# Patient Record
Sex: FEMALE | Race: WHITE | NOT HISPANIC OR LATINO | Employment: FULL TIME | URBAN - METROPOLITAN AREA
[De-identification: names, ages, dates, MRNs, and addresses within clinical notes are randomized per-mention and may not be internally consistent; named-entity substitution may affect disease eponyms.]

---

## 2019-03-27 LAB
EXTERNAL CHLAMYDIA RESULT: NEGATIVE
EXTERNAL HIV SCREEN: NORMAL
HCV AB SER-ACNC: <0.1

## 2020-10-27 ENCOUNTER — INITIAL PRENATAL (OUTPATIENT)
Dept: OBGYN CLINIC | Facility: CLINIC | Age: 30
End: 2020-10-27

## 2020-10-27 VITALS — HEIGHT: 64 IN | WEIGHT: 208 LBS | BODY MASS INDEX: 35.51 KG/M2

## 2020-10-27 DIAGNOSIS — Z71.89 PRENATAL CONSULT: Primary | ICD-10-CM

## 2020-10-27 PROCEDURE — OBC: Performed by: PHYSICIAN ASSISTANT

## 2020-10-27 RX ORDER — ESTRADIOL 2 MG/1
2 TABLET ORAL DAILY
COMMUNITY
Start: 2020-08-17 | End: 2020-11-13 | Stop reason: ALTCHOICE

## 2020-10-27 RX ORDER — FOLIC ACID 1 MG/1
1 TABLET ORAL DAILY
COMMUNITY
Start: 2020-09-24

## 2020-10-27 RX ORDER — METFORMIN HYDROCHLORIDE 500 MG/1
1500 TABLET, EXTENDED RELEASE ORAL DAILY
COMMUNITY
Start: 2020-10-26

## 2020-11-04 ENCOUNTER — INITIAL PRENATAL (OUTPATIENT)
Dept: OBGYN CLINIC | Facility: CLINIC | Age: 30
End: 2020-11-04
Payer: COMMERCIAL

## 2020-11-04 VITALS — DIASTOLIC BLOOD PRESSURE: 80 MMHG | BODY MASS INDEX: 35.7 KG/M2 | SYSTOLIC BLOOD PRESSURE: 120 MMHG | WEIGHT: 208 LBS

## 2020-11-04 DIAGNOSIS — Z11.51 SCREENING FOR HPV (HUMAN PAPILLOMAVIRUS): ICD-10-CM

## 2020-11-04 DIAGNOSIS — Z11.8 SCREENING FOR CHLAMYDIAL DISEASE: ICD-10-CM

## 2020-11-04 DIAGNOSIS — E28.2 PCOS (POLYCYSTIC OVARIAN SYNDROME): ICD-10-CM

## 2020-11-04 DIAGNOSIS — Z36.89 ENCOUNTER FOR OTHER SPECIFIED ANTENATAL SCREENING: Primary | ICD-10-CM

## 2020-11-04 DIAGNOSIS — Z11.3 SCREENING FOR STDS (SEXUALLY TRANSMITTED DISEASES): ICD-10-CM

## 2020-11-04 DIAGNOSIS — Z01.419 ROUTINE GYNECOLOGICAL EXAMINATION: ICD-10-CM

## 2020-11-04 DIAGNOSIS — O24.019 PRE-EXISTING TYPE 1 DIABETES MELLITUS DURING PREGNANCY, ANTEPARTUM: ICD-10-CM

## 2020-11-04 DIAGNOSIS — Z3A.11 11 WEEKS GESTATION OF PREGNANCY: ICD-10-CM

## 2020-11-04 PROCEDURE — PNV: Performed by: OBSTETRICS & GYNECOLOGY

## 2020-11-04 PROCEDURE — 76801 OB US < 14 WKS SINGLE FETUS: CPT | Performed by: OBSTETRICS & GYNECOLOGY

## 2020-11-04 RX ORDER — ACETAMINOPHEN 160 MG
2000 TABLET,DISINTEGRATING ORAL DAILY
COMMUNITY

## 2020-11-09 LAB
DEPRECATED C TRACH RRNA XXX QL PRB: NOT DETECTED
HPV HR 12 DNA CVX QL NAA+PROBE: NOT DETECTED
HPV16 DNA SPEC QL NAA+PROBE: NOT DETECTED
HPV18 DNA SPEC QL NAA+PROBE: NOT DETECTED
N GONORRHOEA DNA UR QL NAA+PROBE: NOT DETECTED
THIN PREP CVX: NORMAL

## 2020-11-12 ENCOUNTER — TELEPHONE (OUTPATIENT)
Dept: OBGYN CLINIC | Facility: CLINIC | Age: 30
End: 2020-11-12

## 2020-11-12 ENCOUNTER — NURSE TRIAGE (OUTPATIENT)
Dept: OTHER | Facility: OTHER | Age: 30
End: 2020-11-12

## 2020-11-12 ENCOUNTER — HOSPITAL ENCOUNTER (EMERGENCY)
Facility: HOSPITAL | Age: 30
Discharge: HOME | End: 2020-11-12
Attending: EMERGENCY MEDICINE | Admitting: EMERGENCY MEDICINE
Payer: COMMERCIAL

## 2020-11-12 VITALS
SYSTOLIC BLOOD PRESSURE: 129 MMHG | RESPIRATION RATE: 18 BRPM | BODY MASS INDEX: 35.7 KG/M2 | OXYGEN SATURATION: 98 % | DIASTOLIC BLOOD PRESSURE: 83 MMHG | WEIGHT: 208 LBS | HEART RATE: 94 BPM | TEMPERATURE: 98.4 F

## 2020-11-12 DIAGNOSIS — O20.9 VAGINAL BLEEDING IN PREGNANCY, FIRST TRIMESTER: Primary | ICD-10-CM

## 2020-11-12 LAB
ABO GROUP BLD: NORMAL
ALBUMIN SERPL BCP-MCNC: 3.1 G/DL (ref 3.5–5)
ALP SERPL-CCNC: 49 U/L (ref 46–116)
ALT SERPL W P-5'-P-CCNC: 21 U/L (ref 12–78)
ANION GAP SERPL CALCULATED.3IONS-SCNC: 14 MMOL/L (ref 4–13)
AST SERPL W P-5'-P-CCNC: 36 U/L (ref 5–45)
B-HCG SERPL-ACNC: ABNORMAL MIU/ML
BACTERIA UR QL AUTO: ABNORMAL /HPF
BASOPHILS # BLD AUTO: 0.05 THOUSANDS/ΜL (ref 0–0.1)
BASOPHILS NFR BLD AUTO: 0 % (ref 0–1)
BILIRUB SERPL-MCNC: 0.27 MG/DL (ref 0.2–1)
BILIRUB UR QL STRIP: NEGATIVE
BUN SERPL-MCNC: 12 MG/DL (ref 5–25)
CALCIUM ALBUM COR SERPL-MCNC: 9.9 MG/DL (ref 8.3–10.1)
CALCIUM SERPL-MCNC: 9.2 MG/DL (ref 8.3–10.1)
CHLORIDE SERPL-SCNC: 101 MMOL/L (ref 100–108)
CLARITY UR: CLEAR
CO2 SERPL-SCNC: 21 MMOL/L (ref 21–32)
COLOR UR: ABNORMAL
CREAT SERPL-MCNC: 0.72 MG/DL (ref 0.6–1.3)
EOSINOPHIL # BLD AUTO: 0.09 THOUSAND/ΜL (ref 0–0.61)
EOSINOPHIL NFR BLD AUTO: 1 % (ref 0–6)
ERYTHROCYTE [DISTWIDTH] IN BLOOD BY AUTOMATED COUNT: 12.3 % (ref 11.6–15.1)
GFR SERPL CREATININE-BSD FRML MDRD: 113 ML/MIN/1.73SQ M
GLUCOSE SERPL-MCNC: 170 MG/DL (ref 65–140)
GLUCOSE UR STRIP-MCNC: ABNORMAL MG/DL
HCT VFR BLD AUTO: 40.5 % (ref 34.8–46.1)
HGB BLD-MCNC: 13.4 G/DL (ref 11.5–15.4)
HGB UR QL STRIP.AUTO: ABNORMAL
IMM GRANULOCYTES # BLD AUTO: 0.05 THOUSAND/UL (ref 0–0.2)
IMM GRANULOCYTES NFR BLD AUTO: 0 % (ref 0–2)
KETONES UR STRIP-MCNC: NEGATIVE MG/DL
LEUKOCYTE ESTERASE UR QL STRIP: NEGATIVE
LYMPHOCYTES # BLD AUTO: 3.66 THOUSANDS/ΜL (ref 0.6–4.47)
LYMPHOCYTES NFR BLD AUTO: 27 % (ref 14–44)
MCH RBC QN AUTO: 31.2 PG (ref 26.8–34.3)
MCHC RBC AUTO-ENTMCNC: 33.1 G/DL (ref 31.4–37.4)
MCV RBC AUTO: 94 FL (ref 82–98)
MONOCYTES # BLD AUTO: 0.58 THOUSAND/ΜL (ref 0.17–1.22)
MONOCYTES NFR BLD AUTO: 4 % (ref 4–12)
NEUTROPHILS # BLD AUTO: 9.13 THOUSANDS/ΜL (ref 1.85–7.62)
NEUTS SEG NFR BLD AUTO: 68 % (ref 43–75)
NITRITE UR QL STRIP: NEGATIVE
NON-SQ EPI CELLS URNS QL MICRO: ABNORMAL /HPF
NRBC BLD AUTO-RTO: 0 /100 WBCS
PH UR STRIP.AUTO: 5.5 [PH]
PLATELET # BLD AUTO: 269 THOUSANDS/UL (ref 149–390)
PMV BLD AUTO: 11.5 FL (ref 8.9–12.7)
POTASSIUM SERPL-SCNC: 4.4 MMOL/L (ref 3.5–5.3)
PROT SERPL-MCNC: 7.2 G/DL (ref 6.4–8.2)
PROT UR STRIP-MCNC: NEGATIVE MG/DL
RBC # BLD AUTO: 4.3 MILLION/UL (ref 3.81–5.12)
RBC #/AREA URNS AUTO: ABNORMAL /HPF
RH BLD: POSITIVE
SODIUM SERPL-SCNC: 136 MMOL/L (ref 136–145)
SP GR UR STRIP.AUTO: 1.01 (ref 1–1.03)
UROBILINOGEN UR QL STRIP.AUTO: 0.2 E.U./DL
WBC # BLD AUTO: 13.56 THOUSAND/UL (ref 4.31–10.16)
WBC #/AREA URNS AUTO: ABNORMAL /HPF

## 2020-11-12 PROCEDURE — 99285 EMERGENCY DEPT VISIT HI MDM: CPT | Performed by: PHYSICIAN ASSISTANT

## 2020-11-12 PROCEDURE — 36415 COLL VENOUS BLD VENIPUNCTURE: CPT | Performed by: PHYSICIAN ASSISTANT

## 2020-11-12 PROCEDURE — 86900 BLOOD TYPING SEROLOGIC ABO: CPT | Performed by: PHYSICIAN ASSISTANT

## 2020-11-12 PROCEDURE — 84702 CHORIONIC GONADOTROPIN TEST: CPT | Performed by: PHYSICIAN ASSISTANT

## 2020-11-12 PROCEDURE — 86901 BLOOD TYPING SEROLOGIC RH(D): CPT | Performed by: PHYSICIAN ASSISTANT

## 2020-11-12 PROCEDURE — 85025 COMPLETE CBC W/AUTO DIFF WBC: CPT | Performed by: PHYSICIAN ASSISTANT

## 2020-11-12 PROCEDURE — 81001 URINALYSIS AUTO W/SCOPE: CPT | Performed by: PHYSICIAN ASSISTANT

## 2020-11-12 PROCEDURE — 80053 COMPREHEN METABOLIC PANEL: CPT | Performed by: PHYSICIAN ASSISTANT

## 2020-11-12 PROCEDURE — 99283 EMERGENCY DEPT VISIT LOW MDM: CPT

## 2020-11-13 ENCOUNTER — HOSPITAL ENCOUNTER (OUTPATIENT)
Dept: RADIOLOGY | Age: 30
Discharge: HOME | End: 2020-11-13
Payer: COMMERCIAL

## 2020-11-13 ENCOUNTER — ROUTINE PRENATAL (OUTPATIENT)
Dept: OBGYN CLINIC | Facility: CLINIC | Age: 30
End: 2020-11-13

## 2020-11-13 ENCOUNTER — TRANSCRIBE ORDERS (OUTPATIENT)
Dept: OBGYN CLINIC | Facility: CLINIC | Age: 30
End: 2020-11-13

## 2020-11-13 VITALS — SYSTOLIC BLOOD PRESSURE: 120 MMHG | DIASTOLIC BLOOD PRESSURE: 80 MMHG | BODY MASS INDEX: 34.84 KG/M2 | WEIGHT: 203 LBS

## 2020-11-13 DIAGNOSIS — O20.0 THREATENED ABORTION: ICD-10-CM

## 2020-11-13 DIAGNOSIS — Z34.01 PRENATAL CARE, FIRST PREGNANCY IN FIRST TRIMESTER: Primary | ICD-10-CM

## 2020-11-13 DIAGNOSIS — O09.811 PREGNANCY RESULTING FROM IN VITRO FERTILIZATION, FIRST TRIMESTER: ICD-10-CM

## 2020-11-13 DIAGNOSIS — O20.0 THREATENED ABORTION: Primary | ICD-10-CM

## 2020-11-13 PROCEDURE — PNV: Performed by: PHYSICIAN ASSISTANT

## 2020-11-13 PROCEDURE — 76801 OB US < 14 WKS SINGLE FETUS: CPT

## 2020-11-17 ENCOUNTER — TELEPHONE (OUTPATIENT)
Dept: PERINATAL CARE | Facility: CLINIC | Age: 30
End: 2020-11-17

## 2020-11-18 ENCOUNTER — ROUTINE PRENATAL (OUTPATIENT)
Dept: PERINATAL CARE | Facility: OTHER | Age: 30
End: 2020-11-18
Payer: COMMERCIAL

## 2020-11-18 VITALS
BODY MASS INDEX: 36.4 KG/M2 | HEART RATE: 80 BPM | SYSTOLIC BLOOD PRESSURE: 119 MMHG | WEIGHT: 213.2 LBS | HEIGHT: 64 IN | DIASTOLIC BLOOD PRESSURE: 82 MMHG | TEMPERATURE: 95.9 F

## 2020-11-18 DIAGNOSIS — O09.811 PREGNANCY RESULTING FROM IN VITRO FERTILIZATION, FIRST TRIMESTER: ICD-10-CM

## 2020-11-18 DIAGNOSIS — O99.211 MATERNAL OBESITY, ANTEPARTUM, FIRST TRIMESTER: ICD-10-CM

## 2020-11-18 DIAGNOSIS — Z36.82 ENCOUNTER FOR ANTENATAL SCREENING FOR NUCHAL TRANSLUCENCY: ICD-10-CM

## 2020-11-18 DIAGNOSIS — O24.011 PREGNANCY COMPLICATED BY PRE-EXISTING TYPE 1 DIABETES, FIRST TRIMESTER: Primary | ICD-10-CM

## 2020-11-18 DIAGNOSIS — O20.9 FIRST TRIMESTER BLEEDING: ICD-10-CM

## 2020-11-18 DIAGNOSIS — Z3A.12 12 WEEKS GESTATION OF PREGNANCY: ICD-10-CM

## 2020-11-18 PROCEDURE — 76801 OB US < 14 WKS SINGLE FETUS: CPT | Performed by: OBSTETRICS & GYNECOLOGY

## 2020-11-18 PROCEDURE — 76813 OB US NUCHAL MEAS 1 GEST: CPT | Performed by: OBSTETRICS & GYNECOLOGY

## 2020-11-18 PROCEDURE — 99242 OFF/OP CONSLTJ NEW/EST SF 20: CPT | Performed by: OBSTETRICS & GYNECOLOGY

## 2020-11-18 PROCEDURE — NC001 PR NO CHARGE: Performed by: OBSTETRICS & GYNECOLOGY

## 2020-11-19 ENCOUNTER — HOSPITAL ENCOUNTER (EMERGENCY)
Facility: HOSPITAL | Age: 30
Discharge: HOME | End: 2020-11-20
Attending: EMERGENCY MEDICINE | Admitting: EMERGENCY MEDICINE
Payer: COMMERCIAL

## 2020-11-19 DIAGNOSIS — O41.8X90 SUBCHORIONIC HEMORRHAGE: ICD-10-CM

## 2020-11-19 DIAGNOSIS — O46.8X9 SUBCHORIONIC HEMORRHAGE: ICD-10-CM

## 2020-11-19 DIAGNOSIS — O20.9 VAGINAL BLEEDING IN PREGNANCY, FIRST TRIMESTER: Primary | ICD-10-CM

## 2020-11-19 PROCEDURE — 99284 EMERGENCY DEPT VISIT MOD MDM: CPT

## 2020-11-19 RX ORDER — ASPIRIN 81 MG/1
162 TABLET, CHEWABLE ORAL DAILY
COMMUNITY
End: 2021-03-20 | Stop reason: HOSPADM

## 2020-11-20 VITALS
WEIGHT: 213 LBS | BODY MASS INDEX: 36.56 KG/M2 | SYSTOLIC BLOOD PRESSURE: 120 MMHG | DIASTOLIC BLOOD PRESSURE: 80 MMHG | HEART RATE: 70 BPM | OXYGEN SATURATION: 100 % | TEMPERATURE: 98.1 F | RESPIRATION RATE: 18 BRPM

## 2020-11-20 LAB
ALBUMIN SERPL BCP-MCNC: 3.3 G/DL (ref 3.5–5)
ALP SERPL-CCNC: 55 U/L (ref 46–116)
ALT SERPL W P-5'-P-CCNC: 23 U/L (ref 12–78)
ANION GAP SERPL CALCULATED.3IONS-SCNC: 9 MMOL/L (ref 4–13)
AST SERPL W P-5'-P-CCNC: 14 U/L (ref 5–45)
B-HCG SERPL-ACNC: ABNORMAL MIU/ML
BACTERIA UR QL AUTO: ABNORMAL /HPF
BASOPHILS # BLD AUTO: 0.03 THOUSANDS/ΜL (ref 0–0.1)
BASOPHILS NFR BLD AUTO: 0 % (ref 0–1)
BILIRUB SERPL-MCNC: 0.25 MG/DL (ref 0.2–1)
BILIRUB UR QL STRIP: NEGATIVE
BUN SERPL-MCNC: 14 MG/DL (ref 5–25)
CALCIUM ALBUM COR SERPL-MCNC: 9.8 MG/DL (ref 8.3–10.1)
CALCIUM SERPL-MCNC: 9.2 MG/DL (ref 8.3–10.1)
CHLORIDE SERPL-SCNC: 102 MMOL/L (ref 100–108)
CLARITY UR: ABNORMAL
CO2 SERPL-SCNC: 25 MMOL/L (ref 21–32)
COLOR UR: ABNORMAL
CREAT SERPL-MCNC: 0.78 MG/DL (ref 0.6–1.3)
EOSINOPHIL # BLD AUTO: 0.1 THOUSAND/ΜL (ref 0–0.61)
EOSINOPHIL NFR BLD AUTO: 1 % (ref 0–6)
ERYTHROCYTE [DISTWIDTH] IN BLOOD BY AUTOMATED COUNT: 12.3 % (ref 11.6–15.1)
GFR SERPL CREATININE-BSD FRML MDRD: 102 ML/MIN/1.73SQ M
GLUCOSE SERPL-MCNC: 158 MG/DL (ref 65–140)
GLUCOSE UR STRIP-MCNC: ABNORMAL MG/DL
HCT VFR BLD AUTO: 40.2 % (ref 34.8–46.1)
HGB BLD-MCNC: 13 G/DL (ref 11.5–15.4)
HGB UR QL STRIP.AUTO: ABNORMAL
IMM GRANULOCYTES # BLD AUTO: 0.07 THOUSAND/UL (ref 0–0.2)
IMM GRANULOCYTES NFR BLD AUTO: 1 % (ref 0–2)
KETONES UR STRIP-MCNC: NEGATIVE MG/DL
LEUKOCYTE ESTERASE UR QL STRIP: NEGATIVE
LYMPHOCYTES # BLD AUTO: 4.05 THOUSANDS/ΜL (ref 0.6–4.47)
LYMPHOCYTES NFR BLD AUTO: 29 % (ref 14–44)
MCH RBC QN AUTO: 30.9 PG (ref 26.8–34.3)
MCHC RBC AUTO-ENTMCNC: 32.3 G/DL (ref 31.4–37.4)
MCV RBC AUTO: 96 FL (ref 82–98)
MONOCYTES # BLD AUTO: 0.58 THOUSAND/ΜL (ref 0.17–1.22)
MONOCYTES NFR BLD AUTO: 4 % (ref 4–12)
NEUTROPHILS # BLD AUTO: 9.02 THOUSANDS/ΜL (ref 1.85–7.62)
NEUTS SEG NFR BLD AUTO: 65 % (ref 43–75)
NITRITE UR QL STRIP: NEGATIVE
NON-SQ EPI CELLS URNS QL MICRO: ABNORMAL /HPF
NRBC BLD AUTO-RTO: 0 /100 WBCS
PH UR STRIP.AUTO: 6.5 [PH]
PLATELET # BLD AUTO: 231 THOUSANDS/UL (ref 149–390)
PMV BLD AUTO: 11.2 FL (ref 8.9–12.7)
POTASSIUM SERPL-SCNC: 4.2 MMOL/L (ref 3.5–5.3)
PROT SERPL-MCNC: 7 G/DL (ref 6.4–8.2)
PROT UR STRIP-MCNC: ABNORMAL MG/DL
RBC # BLD AUTO: 4.21 MILLION/UL (ref 3.81–5.12)
RBC #/AREA URNS AUTO: ABNORMAL /HPF
SODIUM SERPL-SCNC: 136 MMOL/L (ref 136–145)
SP GR UR STRIP.AUTO: 1.02 (ref 1–1.03)
UROBILINOGEN UR QL STRIP.AUTO: 0.2 E.U./DL
WBC # BLD AUTO: 13.85 THOUSAND/UL (ref 4.31–10.16)
WBC #/AREA URNS AUTO: ABNORMAL /HPF

## 2020-11-20 PROCEDURE — 87086 URINE CULTURE/COLONY COUNT: CPT | Performed by: PHYSICIAN ASSISTANT

## 2020-11-20 PROCEDURE — 85025 COMPLETE CBC W/AUTO DIFF WBC: CPT | Performed by: PHYSICIAN ASSISTANT

## 2020-11-20 PROCEDURE — 84702 CHORIONIC GONADOTROPIN TEST: CPT | Performed by: PHYSICIAN ASSISTANT

## 2020-11-20 PROCEDURE — 36415 COLL VENOUS BLD VENIPUNCTURE: CPT | Performed by: PHYSICIAN ASSISTANT

## 2020-11-20 PROCEDURE — 99285 EMERGENCY DEPT VISIT HI MDM: CPT | Performed by: PHYSICIAN ASSISTANT

## 2020-11-20 PROCEDURE — 80053 COMPREHEN METABOLIC PANEL: CPT | Performed by: PHYSICIAN ASSISTANT

## 2020-11-20 PROCEDURE — 81001 URINALYSIS AUTO W/SCOPE: CPT | Performed by: PHYSICIAN ASSISTANT

## 2020-11-20 PROCEDURE — 96360 HYDRATION IV INFUSION INIT: CPT

## 2020-11-20 RX ADMIN — SODIUM CHLORIDE 1000 ML: 0.9 INJECTION, SOLUTION INTRAVENOUS at 00:14

## 2020-11-21 LAB — BACTERIA UR CULT: NORMAL

## 2020-11-23 ENCOUNTER — TELEPHONE (OUTPATIENT)
Dept: PERINATAL CARE | Facility: CLINIC | Age: 30
End: 2020-11-23

## 2020-11-23 ENCOUNTER — ROUTINE PRENATAL (OUTPATIENT)
Dept: OBGYN CLINIC | Facility: CLINIC | Age: 30
End: 2020-11-23

## 2020-11-23 ENCOUNTER — TELEMEDICINE (OUTPATIENT)
Dept: PERINATAL CARE | Facility: CLINIC | Age: 30
End: 2020-11-23

## 2020-11-23 VITALS — WEIGHT: 219 LBS | SYSTOLIC BLOOD PRESSURE: 120 MMHG | DIASTOLIC BLOOD PRESSURE: 80 MMHG | BODY MASS INDEX: 37.59 KG/M2

## 2020-11-23 DIAGNOSIS — Z3A.13 13 WEEKS GESTATION OF PREGNANCY: ICD-10-CM

## 2020-11-23 DIAGNOSIS — O46.90 VAGINAL BLEEDING IN PREGNANCY: ICD-10-CM

## 2020-11-23 DIAGNOSIS — O28.5 ABNORMAL CHROMOSOMAL AND GENETIC FINDING ON ANTENATAL SCREENING MOTHER: Primary | ICD-10-CM

## 2020-11-23 DIAGNOSIS — Z31.5 ENCOUNTER FOR PROCREATIVE GENETIC COUNSELING: ICD-10-CM

## 2020-11-23 DIAGNOSIS — O24.011 PRE-EXISTING TYPE 1 DIABETES MELLITUS DURING PREGNANCY IN FIRST TRIMESTER: Primary | ICD-10-CM

## 2020-11-23 PROCEDURE — NC001 PR NO CHARGE

## 2020-11-23 PROCEDURE — PNV: Performed by: OBSTETRICS & GYNECOLOGY

## 2020-11-25 LAB — HBA1C MFR BLD HPLC: 6.3 %

## 2020-11-27 LAB — EXTERNAL HEPATITIS B SURFACE ANTIGEN: NEGATIVE

## 2020-12-02 ENCOUNTER — ROUTINE PRENATAL (OUTPATIENT)
Dept: OBGYN CLINIC | Facility: CLINIC | Age: 30
End: 2020-12-02

## 2020-12-02 VITALS — DIASTOLIC BLOOD PRESSURE: 82 MMHG | SYSTOLIC BLOOD PRESSURE: 120 MMHG | BODY MASS INDEX: 36.22 KG/M2 | WEIGHT: 211 LBS

## 2020-12-02 DIAGNOSIS — E10.9 TYPE 1 DIABETES MELLITUS WITHOUT COMPLICATION (HCC): ICD-10-CM

## 2020-12-02 DIAGNOSIS — Z34.02 PRENATAL CARE, FIRST PREGNANCY IN SECOND TRIMESTER: Primary | ICD-10-CM

## 2020-12-02 PROCEDURE — PNV: Performed by: PHYSICIAN ASSISTANT

## 2020-12-05 ENCOUNTER — NURSE TRIAGE (OUTPATIENT)
Dept: OTHER | Facility: OTHER | Age: 30
End: 2020-12-05

## 2020-12-06 ENCOUNTER — TELEPHONE (OUTPATIENT)
Dept: PERINATAL CARE | Facility: CLINIC | Age: 30
End: 2020-12-06

## 2020-12-09 ENCOUNTER — ROUTINE PRENATAL (OUTPATIENT)
Dept: OBGYN CLINIC | Facility: CLINIC | Age: 30
End: 2020-12-09

## 2020-12-09 ENCOUNTER — TELEPHONE (OUTPATIENT)
Dept: OBGYN CLINIC | Facility: CLINIC | Age: 30
End: 2020-12-09

## 2020-12-09 VITALS — SYSTOLIC BLOOD PRESSURE: 122 MMHG | BODY MASS INDEX: 36.9 KG/M2 | DIASTOLIC BLOOD PRESSURE: 84 MMHG | WEIGHT: 215 LBS

## 2020-12-09 DIAGNOSIS — O41.8X20 SUBCHORIONIC HEMORRHAGE OF PLACENTA IN SECOND TRIMESTER, SINGLE OR UNSPECIFIED FETUS: ICD-10-CM

## 2020-12-09 DIAGNOSIS — Z34.02 PRENATAL CARE, FIRST PREGNANCY IN SECOND TRIMESTER: Primary | ICD-10-CM

## 2020-12-09 DIAGNOSIS — O46.8X2 SUBCHORIONIC HEMORRHAGE OF PLACENTA IN SECOND TRIMESTER, SINGLE OR UNSPECIFIED FETUS: ICD-10-CM

## 2020-12-09 PROCEDURE — PNV: Performed by: PHYSICIAN ASSISTANT

## 2020-12-15 ENCOUNTER — TELEPHONE (OUTPATIENT)
Dept: PERINATAL CARE | Facility: OTHER | Age: 30
End: 2020-12-15

## 2020-12-16 ENCOUNTER — TELEPHONE (OUTPATIENT)
Dept: PERINATAL CARE | Facility: CLINIC | Age: 30
End: 2020-12-16

## 2020-12-21 ENCOUNTER — ROUTINE PRENATAL (OUTPATIENT)
Dept: OBGYN CLINIC | Facility: CLINIC | Age: 30
End: 2020-12-21

## 2020-12-21 ENCOUNTER — HOSPITAL ENCOUNTER (OUTPATIENT)
Facility: HOSPITAL | Age: 30
Discharge: HOME | End: 2020-12-21
Attending: OBSTETRICS & GYNECOLOGY | Admitting: OBSTETRICS & GYNECOLOGY
Payer: COMMERCIAL

## 2020-12-21 ENCOUNTER — TELEPHONE (OUTPATIENT)
Dept: OBGYN CLINIC | Facility: CLINIC | Age: 30
End: 2020-12-21

## 2020-12-21 VITALS
OXYGEN SATURATION: 96 % | RESPIRATION RATE: 20 BRPM | SYSTOLIC BLOOD PRESSURE: 141 MMHG | TEMPERATURE: 98.5 F | BODY MASS INDEX: 35.51 KG/M2 | WEIGHT: 208 LBS | DIASTOLIC BLOOD PRESSURE: 85 MMHG | HEIGHT: 64 IN | HEART RATE: 98 BPM

## 2020-12-21 VITALS — WEIGHT: 208 LBS | SYSTOLIC BLOOD PRESSURE: 122 MMHG | BODY MASS INDEX: 35.7 KG/M2 | DIASTOLIC BLOOD PRESSURE: 80 MMHG

## 2020-12-21 DIAGNOSIS — O46.90 VAGINAL BLEEDING IN PREGNANCY: ICD-10-CM

## 2020-12-21 DIAGNOSIS — Z3A.17 17 WEEKS GESTATION OF PREGNANCY: Primary | ICD-10-CM

## 2020-12-21 DIAGNOSIS — O42.919 PRETERM PREMATURE RUPTURE OF MEMBRANES (PPROM) WITH UNKNOWN ONSET OF LABOR: ICD-10-CM

## 2020-12-21 LAB
A1 MICROGLOB PLACENTAL VAG QL: POSITIVE
ERYTHROCYTE [DISTWIDTH] IN BLOOD BY AUTOMATED COUNT: 12.6 % (ref 11.6–15.1)
HCT VFR BLD AUTO: 38.9 % (ref 34.8–46.1)
HGB BLD-MCNC: 12.9 G/DL (ref 11.5–15.4)
MCH RBC QN AUTO: 31.2 PG (ref 26.8–34.3)
MCHC RBC AUTO-ENTMCNC: 33.2 G/DL (ref 31.4–37.4)
MCV RBC AUTO: 94 FL (ref 82–98)
PLATELET # BLD AUTO: 245 THOUSANDS/UL (ref 149–390)
PMV BLD AUTO: 11 FL (ref 8.9–12.7)
RBC # BLD AUTO: 4.13 MILLION/UL (ref 3.81–5.12)
WBC # BLD AUTO: 12.23 THOUSAND/UL (ref 4.31–10.16)

## 2020-12-21 PROCEDURE — 84112 EVAL AMNIOTIC FLUID PROTEIN: CPT | Performed by: OBSTETRICS & GYNECOLOGY

## 2020-12-21 PROCEDURE — NC001 PR NO CHARGE: Performed by: OBSTETRICS & GYNECOLOGY

## 2020-12-21 PROCEDURE — G0463 HOSPITAL OUTPT CLINIC VISIT: HCPCS

## 2020-12-21 PROCEDURE — 99214 OFFICE O/P EST MOD 30 MIN: CPT | Performed by: OBSTETRICS & GYNECOLOGY

## 2020-12-21 PROCEDURE — PNV: Performed by: OBSTETRICS & GYNECOLOGY

## 2020-12-21 PROCEDURE — 99213 OFFICE O/P EST LOW 20 MIN: CPT

## 2020-12-21 PROCEDURE — 85027 COMPLETE CBC AUTOMATED: CPT | Performed by: OBSTETRICS & GYNECOLOGY

## 2020-12-24 ENCOUNTER — TELEPHONE (OUTPATIENT)
Dept: PERINATAL CARE | Facility: OTHER | Age: 30
End: 2020-12-24

## 2020-12-28 ENCOUNTER — ULTRASOUND (OUTPATIENT)
Dept: PERINATAL CARE | Facility: CLINIC | Age: 30
End: 2020-12-28
Payer: COMMERCIAL

## 2020-12-28 VITALS
WEIGHT: 210.4 LBS | DIASTOLIC BLOOD PRESSURE: 77 MMHG | SYSTOLIC BLOOD PRESSURE: 124 MMHG | HEART RATE: 106 BPM | BODY MASS INDEX: 35.92 KG/M2 | HEIGHT: 64 IN

## 2020-12-28 DIAGNOSIS — R73.03 PREDIABETES: ICD-10-CM

## 2020-12-28 DIAGNOSIS — O09.812 ENCOUNTER FOR SUPERVISION OF PREGNANCY RESULTING FROM ASSISTED REPRODUCTIVE TECHNOLOGY IN SECOND TRIMESTER, ANTEPARTUM: ICD-10-CM

## 2020-12-28 DIAGNOSIS — O45.92 PLACENTAL ABRUPTION IN SECOND TRIMESTER: ICD-10-CM

## 2020-12-28 DIAGNOSIS — Z3A.18 18 WEEKS GESTATION OF PREGNANCY: Primary | ICD-10-CM

## 2020-12-28 DIAGNOSIS — O42.919 PRETERM PREMATURE RUPTURE OF MEMBRANES (PPROM) WITH UNKNOWN ONSET OF LABOR: ICD-10-CM

## 2020-12-28 PROCEDURE — 76811 OB US DETAILED SNGL FETUS: CPT | Performed by: OBSTETRICS & GYNECOLOGY

## 2020-12-28 PROCEDURE — 99212 OFFICE O/P EST SF 10 MIN: CPT | Performed by: OBSTETRICS & GYNECOLOGY

## 2020-12-29 PROBLEM — O09.812 ENCOUNTER FOR SUPERVISION OF PREGNANCY RESULTING FROM ASSISTED REPRODUCTIVE TECHNOLOGY IN SECOND TRIMESTER, ANTEPARTUM: Status: ACTIVE | Noted: 2020-12-29

## 2020-12-29 PROBLEM — R73.03 PREDIABETES: Status: ACTIVE | Noted: 2020-12-29

## 2020-12-30 ENCOUNTER — ROUTINE PRENATAL (OUTPATIENT)
Dept: OBGYN CLINIC | Facility: CLINIC | Age: 30
End: 2020-12-30

## 2020-12-30 ENCOUNTER — TELEPHONE (OUTPATIENT)
Dept: PERINATAL CARE | Facility: CLINIC | Age: 30
End: 2020-12-30

## 2020-12-30 VITALS — BODY MASS INDEX: 35.19 KG/M2 | SYSTOLIC BLOOD PRESSURE: 124 MMHG | DIASTOLIC BLOOD PRESSURE: 74 MMHG | WEIGHT: 205 LBS

## 2020-12-30 DIAGNOSIS — O42.90: ICD-10-CM

## 2020-12-30 DIAGNOSIS — R73.03 PREDIABETES: ICD-10-CM

## 2020-12-30 DIAGNOSIS — Z11.8 SPECIAL SCREENING EXAMINATION FOR CHLAMYDIAL DISEASE: ICD-10-CM

## 2020-12-30 DIAGNOSIS — N72 ACUTE CERVICITIS: Primary | ICD-10-CM

## 2020-12-30 DIAGNOSIS — Z3A.18 18 WEEKS GESTATION OF PREGNANCY: ICD-10-CM

## 2020-12-30 DIAGNOSIS — N76.0 ACUTE VAGINITIS: ICD-10-CM

## 2020-12-30 DIAGNOSIS — Z11.3 SCREENING EXAMINATION FOR STD (SEXUALLY TRANSMITTED DISEASE): ICD-10-CM

## 2020-12-30 DIAGNOSIS — O09.812 ENCOUNTER FOR SUPERVISION OF PREGNANCY RESULTING FROM ASSISTED REPRODUCTIVE TECHNOLOGY IN SECOND TRIMESTER, ANTEPARTUM: ICD-10-CM

## 2020-12-30 PROCEDURE — PNV: Performed by: OBSTETRICS & GYNECOLOGY

## 2020-12-30 RX ORDER — MULTIVIT WITH MINERALS/LUTEIN
1000 TABLET ORAL DAILY
COMMUNITY

## 2021-01-03 LAB
A VAGINAE DNA VAG NAA+PROBE-LOG#: <3.25
A VAGINAE DNA VAG QL NAA+PROBE: NOT DETECTED
BVAB2 DNA VAG QL NAA+PROBE: NOT DETECTED
C TRACH DNA SPEC QL PROBE+SIG AMP: NOT DETECTED
G VAGINALIS DNA SPEC QL NAA+PROBE: NOT DETECTED
G VAGINALIS DNA VAG NAA+PROBE-LOG#: <3.25
HSV1 DNA SPEC QL NAA+PROBE: NOT DETECTED
HSV2 DNA SPEC QL NAA+PROBE: NOT DETECTED
LACTOBACILLUS DNA VAG NAA+PROBE-LOG#: DETECTED
LACTOBACILLUS DNA VAG NAA+PROBE-LOG#: NORMAL
M GENITALIUM DNA SPEC QL NAA+PROBE: NOT DETECTED
MEGA1 DNA VAG QL NAA+PROBE: NOT DETECTED
N GONORRHOEA DNA SPEC QL NAA+PROBE: NOT DETECTED
SL AMB C.ALBICANS BY PCR: NOT DETECTED
SL AMB CANDIDA GENUS: NOT DETECTED
T VAGINALIS RRNA SPEC QL NAA+PROBE: NOT DETECTED

## 2021-01-04 ENCOUNTER — TELEPHONE (OUTPATIENT)
Dept: OBGYN CLINIC | Facility: CLINIC | Age: 31
End: 2021-01-04

## 2021-01-04 NOTE — TELEPHONE ENCOUNTER
Pt advised  Pt scheduled OV for a HB check for 1/7/2021 because she was nervous about waiting 2 weeks without any kind of check up or u/s

## 2021-01-07 ENCOUNTER — ROUTINE PRENATAL (OUTPATIENT)
Dept: OBGYN CLINIC | Facility: CLINIC | Age: 31
End: 2021-01-07

## 2021-01-07 VITALS — SYSTOLIC BLOOD PRESSURE: 130 MMHG | BODY MASS INDEX: 34.84 KG/M2 | WEIGHT: 203 LBS | DIASTOLIC BLOOD PRESSURE: 80 MMHG

## 2021-01-07 DIAGNOSIS — O09.812 ENCOUNTER FOR SUPERVISION OF PREGNANCY RESULTING FROM ASSISTED REPRODUCTIVE TECHNOLOGY IN SECOND TRIMESTER, ANTEPARTUM: ICD-10-CM

## 2021-01-07 DIAGNOSIS — Z34.02 PRENATAL CARE, FIRST PREGNANCY IN SECOND TRIMESTER: Primary | ICD-10-CM

## 2021-01-07 DIAGNOSIS — O42.919 PRETERM PREMATURE RUPTURE OF MEMBRANES (PPROM) WITH UNKNOWN ONSET OF LABOR: ICD-10-CM

## 2021-01-07 DIAGNOSIS — Z86.39 H/O INSULIN DEPENDENT DIABETES MELLITUS: ICD-10-CM

## 2021-01-07 PROCEDURE — PNV: Performed by: PHYSICIAN ASSISTANT

## 2021-01-07 NOTE — PROGRESS NOTES
Pt feeling well overall  She experienced PPROM ~ 2 weeks ago  She is being comanaged and followed by MFM--next appt 1/13  She reports no BRB  No cramping  occ clearish-brownish discharge  No fever  Pt is on modified bedrest at home--not doing much but ambulating a bit to avoid dvt  She reports she is hydrating A LOT  She actually reports some fluttering, +FM this week  C/o constipation and advised stool softener ok  May take pnv QOD  Hopes to get steroids for lung maturity in near future--  pt is an insulin dependent diabetic

## 2021-01-07 NOTE — PROGRESS NOTES
The patient is having watery brown discharge  The patient is having mild swelling in her hands  No nausea, vomiting, headache or dizziness  No bleeding or cramping  The patient is having upper abdominal cramping

## 2021-01-12 ENCOUNTER — TELEPHONE (OUTPATIENT)
Dept: PERINATAL CARE | Facility: OTHER | Age: 31
End: 2021-01-12

## 2021-01-13 ENCOUNTER — ULTRASOUND (OUTPATIENT)
Dept: PERINATAL CARE | Facility: OTHER | Age: 31
End: 2021-01-13
Payer: COMMERCIAL

## 2021-01-13 VITALS
DIASTOLIC BLOOD PRESSURE: 82 MMHG | BODY MASS INDEX: 36.51 KG/M2 | HEIGHT: 64 IN | HEART RATE: 105 BPM | SYSTOLIC BLOOD PRESSURE: 127 MMHG | WEIGHT: 213.85 LBS

## 2021-01-13 DIAGNOSIS — O41.02X0 OLIGOHYDRAMNIOS IN SINGLETON PREGNANCY IN SECOND TRIMESTER: ICD-10-CM

## 2021-01-13 DIAGNOSIS — O42.912: ICD-10-CM

## 2021-01-13 DIAGNOSIS — Z36.89 ENCOUNTER FOR FETAL ANATOMIC SURVEY: ICD-10-CM

## 2021-01-13 DIAGNOSIS — O24.012 PREGNANCY COMPLICATED BY PRE-EXISTING TYPE 1 DIABETES, SECOND TRIMESTER: ICD-10-CM

## 2021-01-13 DIAGNOSIS — Z3A.20 20 WEEKS GESTATION OF PREGNANCY: Primary | ICD-10-CM

## 2021-01-13 PROCEDURE — 76816 OB US FOLLOW-UP PER FETUS: CPT | Performed by: OBSTETRICS & GYNECOLOGY

## 2021-01-13 PROCEDURE — 99214 OFFICE O/P EST MOD 30 MIN: CPT | Performed by: OBSTETRICS & GYNECOLOGY

## 2021-01-13 NOTE — LETTER
January 17, 2021     Faith Caldwell MD  [address] [address] [facility]    Patient: Skyler Ghotra   YOB: 1990   Date of Visit: 1/13/2021       Dear Dr Marci Nails: Thank you for referring Skyler Ghotra to me for evaluation  Below are my notes for this consultation  If you have questions, please do not hesitate to call me  I look forward to following your patient along with you  Sincerely,        Melo Pedroza MD        CC: No Recipients  Melo Pedroza MD  1/13/2021 11:20 AM  Sign when Signing Visit  Please refer to the [facility] ultrasound report in Ob Procedures for additional information regarding today's visit

## 2021-01-13 NOTE — PROGRESS NOTES
Please refer to the [facility] ultrasound report in Ob Procedures for additional information regarding today's visit

## 2021-01-14 ENCOUNTER — ROUTINE PRENATAL (OUTPATIENT)
Dept: OBGYN CLINIC | Facility: CLINIC | Age: 31
End: 2021-01-14

## 2021-01-14 VITALS — WEIGHT: 216 LBS | SYSTOLIC BLOOD PRESSURE: 100 MMHG | BODY MASS INDEX: 37.08 KG/M2 | DIASTOLIC BLOOD PRESSURE: 70 MMHG

## 2021-01-14 DIAGNOSIS — Z3A.21 21 WEEKS GESTATION OF PREGNANCY: Primary | ICD-10-CM

## 2021-01-14 DIAGNOSIS — O09.812 ENCOUNTER FOR SUPERVISION OF PREGNANCY RESULTING FROM ASSISTED REPRODUCTIVE TECHNOLOGY IN SECOND TRIMESTER, ANTEPARTUM: ICD-10-CM

## 2021-01-14 DIAGNOSIS — R73.03 PREDIABETES: ICD-10-CM

## 2021-01-14 DIAGNOSIS — O42.012 PRETERM PROM W/ONSET LABOR WITHIN 24 HOURS RUPTURE IN 2ND TRIMESTER: ICD-10-CM

## 2021-01-14 PROCEDURE — PNV: Performed by: OBSTETRICS & GYNECOLOGY

## 2021-01-14 NOTE — PROGRESS NOTES
No bleeding or cramping  No nausea, vomiting, headache or dizziness  The patient has abdominal pain and frequent flatulence

## 2021-01-14 NOTE — PROGRESS NOTES
Patient denies any fevers or chills  She still continues to leak fluid  Denies any vaginal bleeding or pelvic pressure or pain  Positive fetal movement  Vaginal cultures were negative  Patient was not started on antibiotics secondary to her allergies to penicillins  Did discuss the possibility of a Z-Leo if needed  Patient will be admitted to labor room between 23 and 24 weeks as per  Center  Patient will be seen by  next week  Patient understands the plan of action once admitted to the labor room  All questions were answered  Patient's  was present at today's visit  COVID 19 vaccine was discussed

## 2021-01-17 ENCOUNTER — TELEPHONE (OUTPATIENT)
Dept: PERINATAL CARE | Facility: OTHER | Age: 31
End: 2021-01-17

## 2021-01-17 RX ORDER — AZITHROMYCIN 500 MG/1
TABLET, FILM COATED ORAL
Qty: 2 TABLET | Refills: 0 | Status: SHIPPED | OUTPATIENT
Start: 2021-01-17 | End: 2021-01-18

## 2021-01-17 RX ORDER — CLINDAMYCIN HYDROCHLORIDE 300 MG/1
300 CAPSULE ORAL 3 TIMES DAILY
Qty: 15 CAPSULE | Refills: 0 | Status: SHIPPED | OUTPATIENT
Start: 2021-01-17 | End: 2021-01-23 | Stop reason: ALTCHOICE

## 2021-01-17 NOTE — TELEPHONE ENCOUNTER
I called Pushpa Rodriguez today to let her know that I placed order for antibiotics, Azithromycin and clindamycin, to her pharmacy  I also recommended that she schedule an appointment for later this week to check amniotic fluid volume and discuss preparations for hospital admission at 23 weeks gestation

## 2021-01-19 ENCOUNTER — ROUTINE PRENATAL (OUTPATIENT)
Dept: OBGYN CLINIC | Facility: CLINIC | Age: 31
End: 2021-01-19

## 2021-01-19 VITALS — SYSTOLIC BLOOD PRESSURE: 130 MMHG | WEIGHT: 210.2 LBS | BODY MASS INDEX: 36.08 KG/M2 | DIASTOLIC BLOOD PRESSURE: 70 MMHG

## 2021-01-19 DIAGNOSIS — O42.912 PREMATURE RUPTURE OF MEMBRANES IN SECOND TRIMESTER, UNSPECIFIED DURATION TO ONSET OF LABOR: ICD-10-CM

## 2021-01-19 DIAGNOSIS — R73.03 PREDIABETES: ICD-10-CM

## 2021-01-19 DIAGNOSIS — O09.812 ENCOUNTER FOR SUPERVISION OF PREGNANCY RESULTING FROM ASSISTED REPRODUCTIVE TECHNOLOGY IN SECOND TRIMESTER, ANTEPARTUM: ICD-10-CM

## 2021-01-19 DIAGNOSIS — Z3A.21 21 WEEKS GESTATION OF PREGNANCY: Primary | ICD-10-CM

## 2021-01-19 PROCEDURE — PNV: Performed by: OBSTETRICS & GYNECOLOGY

## 2021-01-19 NOTE — PROGRESS NOTES
Recently seen at  Center an ultrasound revealed no fluid around the baby  Patient was placed on Zithromax 1 dose and clindamycin  Patient denies any fever or vaginal discharge  Patient seen Center on this Saturday with the plan to be admitted to the hospital at 23 weeks      Continue hydration prenatal vitamins eating healthy, call with any fevers or abdominal pain or cramps

## 2021-01-19 NOTE — PROGRESS NOTES
No bleeding or cramping  No nausea, vomiting, headache or dizziness  The patient has been feeling better since taking the colace at night

## 2021-01-22 ENCOUNTER — TELEPHONE (OUTPATIENT)
Dept: PERINATAL CARE | Facility: CLINIC | Age: 31
End: 2021-01-22

## 2021-01-22 NOTE — TELEPHONE ENCOUNTER
Spoke with patient and confirmed appointment with M  1 support person ( must be over age of 15) may accompany patient  Will you and your support person be able to wear a mask ,without a valve , during entire appointment? Yes   To minimize your exposure in our waiting area,check in and rooming questions will be done via phone  When you arrive in the parking lot please call the following inside line # prior to entering office:    [phone]    Have you or your support person traveled outside the state in the last 2 weeks? No   If yes, what state did you travel to? Do you or your support person have:  Fever or flu- like symptoms? No      Symptoms of upper respiratory infection like runny nose, sore throat or cough? No  Do you have new headache that you have not had in the past? No  Have you experienced any new shortness of breath recently? No  Do you have any new loss of taste or smell? No  Do you have any new diarrhea, nausea or vomiting? No   Have you recently been in contact with anyone who has been sick or diagnosed with COVID-19 infection? No  Have you been recommended to quarantine because of an exposure to a confirmed positive COVID19 person? No  Have you recently been tested for COVID19? No    Patient verbalized understanding of all instructions

## 2021-01-23 ENCOUNTER — ROUTINE PRENATAL (OUTPATIENT)
Dept: PERINATAL CARE | Facility: OTHER | Age: 31
End: 2021-01-23
Payer: COMMERCIAL

## 2021-01-23 VITALS
DIASTOLIC BLOOD PRESSURE: 80 MMHG | HEIGHT: 64 IN | WEIGHT: 214.73 LBS | BODY MASS INDEX: 36.66 KG/M2 | SYSTOLIC BLOOD PRESSURE: 124 MMHG

## 2021-01-23 DIAGNOSIS — O35.9XX0 SUSPECTED FETAL ANOMALY, ANTEPARTUM, SINGLE OR UNSPECIFIED FETUS: ICD-10-CM

## 2021-01-23 DIAGNOSIS — O41.02X0 OLIGOHYDRAMNIOS IN SINGLETON PREGNANCY IN SECOND TRIMESTER: ICD-10-CM

## 2021-01-23 DIAGNOSIS — O09.812 ENCOUNTER FOR SUPERVISION OF PREGNANCY RESULTING FROM ASSISTED REPRODUCTIVE TECHNOLOGY IN SECOND TRIMESTER, ANTEPARTUM: ICD-10-CM

## 2021-01-23 DIAGNOSIS — Z3A.22 22 WEEKS GESTATION OF PREGNANCY: ICD-10-CM

## 2021-01-23 DIAGNOSIS — O42.919 PRETERM PREMATURE RUPTURE OF MEMBRANES (PPROM) WITH UNKNOWN ONSET OF LABOR: Primary | ICD-10-CM

## 2021-01-23 DIAGNOSIS — O24.012 PREGNANCY COMPLICATED BY PRE-EXISTING TYPE 1 DIABETES, SECOND TRIMESTER: ICD-10-CM

## 2021-01-23 PROCEDURE — 99215 OFFICE O/P EST HI 40 MIN: CPT | Performed by: OBSTETRICS & GYNECOLOGY

## 2021-01-23 PROCEDURE — 76816 OB US FOLLOW-UP PER FETUS: CPT | Performed by: OBSTETRICS & GYNECOLOGY

## 2021-01-23 NOTE — LETTER
January 23, 2021     Gustavo Thomas MD  [address] [address] [facility]    Patient: Guero Jeff   YOB: 1990   Date of Visit: 1/23/2021       Dear Dr Ernesto Garay: Thank you for referring Guero Jeff to me for evaluation  Below are my notes for this consultation  If you have questions, please do not hesitate to call me  I look forward to following your patient along with you  Sincerely,        Farzana Hansen MD        CC: No Recipients  Farzana Hansen MD  1/23/2021  6:15 PM  Sign when Signing Visit  Guero Jeff  has no complaints today  She reports fetal movements and does not report any vaginal bleeding or signs of labor  Her recently completed fetal testing revealed a normal NIPT  Problem list:  1  Type 1 diabetic on insulin pump and glucose sensor  2  IVF pregnancy  3  Increased BMI  4  P prom at 17 weeks 4 days  5  Allergic to amoxicillin and cefaclor so she got clindamycin and azithromycin antibiotics for latency    Ultrasound findings: The ultrasound today shows anhydramnios  Normal umbilical Dopplers and normal MCA Dopplers  A new finding today is fetal ascites  No other sign of hydrops is seen  Etiology unknown  Fetal breathing and movement seen  Fetus is liang breech  Pregnancy ultrasound has limitations and is unable to detect all forms of fetal congenital abnormalities  Specific counseling was provided on the following problems:  1  New findings today of ascites  Etiology unknown  Persistent anhydramnios is seen  Possible etiology could be infectious but ultrasound showed fetus is active with fetal breathing motions  Other etiologies could be rupture of an abdominal cyst that we had not identified in the past   Other etiologies could be signs of aneuploidy not found on cell free DNA screening or other fetal malformations such as a heart defect that we have not identified    This may be another sign that can be associated with a poor prognosis/future stillbirth    Follow up recommended:   Tests ordered today: none  Tests recommended through her ob office in the future: none  Future ultrasounds ordered today:    Recommend a growth scan and fetal echo and review of missed anatomy after admission  Due to unanticipated P prom she has not formally transfer her diabetes management to our diabetes educators  She was concerned today because her glucose sensor soon after eating will show high sugars in the 200 range  I expressed that in pregnancy we follow fasting blood sugars and 2 hour or 1 hour postprandial blood sugars as pregnancy is expected to cause insulin resistance and higher overall sugars  Recommend she correlate her fasting blood sugar and 2 hour post prandials by glucose sensor to the testing by glucometer  Recommend she monitor herself very closely for signs of infection and onset of labor with this new finding of fetal ascites  Plan admission on Thursday  Will plan steroids and magnesium and NICU consult  Since she is not in labor we may be able to control her hyperglycemia from her steroids with titration of her insulin pump rather than starting an insulin drip and stopping her pump  Pre visit time reviewing her records   15 minutes  Face to face time 30 minutes  Post visit time on documentation of note, updating her problem list, adding orders and prescriptions 30 minutes  Procedures that were completed today were charged separately     The level of decision making was high complexity    Jessica Jin MD

## 2021-01-23 NOTE — PROGRESS NOTES
Dillan Langston  has no complaints today  She reports fetal movements and does not report any vaginal bleeding or signs of labor  Her recently completed fetal testing revealed a normal NIPT  Problem list:  1  Type 1 diabetic on insulin pump and glucose sensor  2  IVF pregnancy  3  Increased BMI  4  P prom at 17 weeks 4 days  5  Allergic to amoxicillin and cefaclor so she got clindamycin and azithromycin antibiotics for latency    Ultrasound findings: The ultrasound today shows anhydramnios  Normal umbilical Dopplers and normal MCA Dopplers  A new finding today is fetal ascites  No other sign of hydrops is seen  Etiology unknown  Fetal breathing and movement seen  Fetus is liang breech  Pregnancy ultrasound has limitations and is unable to detect all forms of fetal congenital abnormalities  Specific counseling was provided on the following problems:  1  New findings today of ascites  Etiology unknown  Persistent anhydramnios is seen  Possible etiology could be infectious but ultrasound showed fetus is active with fetal breathing motions  Other etiologies could be rupture of an abdominal cyst that we had not identified in the past   Other etiologies could be signs of aneuploidy not found on cell free DNA screening or other fetal malformations such as a heart defect that we have not identified  This may be another sign that can be associated with a poor prognosis/future stillbirth    Follow up recommended:   Tests ordered today: none  Tests recommended through her ob office in the future: none  Future ultrasounds ordered today:    Recommend a growth scan and fetal echo and review of missed anatomy after admission  Due to unanticipated P prom she has not formally transfer her diabetes management to our diabetes educators  She was concerned today because her glucose sensor soon after eating will show high sugars in the 200 range    I expressed that in pregnancy we follow fasting blood sugars and 2 hour or 1 hour postprandial blood sugars as pregnancy is expected to cause insulin resistance and higher overall sugars  Recommend she correlate her fasting blood sugar and 2 hour post prandials by glucose sensor to the testing by glucometer  Recommend she monitor herself very closely for signs of infection and onset of labor with this new finding of fetal ascites  Plan admission on Thursday  Will plan steroids and magnesium and NICU consult  Since she is not in labor we may be able to control her hyperglycemia from her steroids with titration of her insulin pump rather than starting an insulin drip and stopping her pump  Pre visit time reviewing her records   15 minutes  Face to face time 30 minutes  Post visit time on documentation of note, updating her problem list, adding orders and prescriptions 30 minutes  Procedures that were completed today were charged separately     The level of decision making was high complexity    Obi Gordon MD

## 2021-01-23 NOTE — Clinical Note
Type I IDDM with glucose sensor and insulin pump  How quickly can we get her seen  She is being admitted Thursday for PPROM at 23 weeks  We will have to give her steroids  Please give suggestions in your note as to how to titrate insulin pump for hyperglycemia from steroids rather then switching to insulin drip       Thanks    Izabel Kilgore

## 2021-01-28 ENCOUNTER — HOSPITAL ENCOUNTER (INPATIENT)
Facility: HOSPITAL | Age: 31
LOS: 51 days | Discharge: HOME | End: 2021-03-20
Attending: OBSTETRICS & GYNECOLOGY | Admitting: OBSTETRICS & GYNECOLOGY
Payer: COMMERCIAL

## 2021-01-28 DIAGNOSIS — O42.919 PRETERM PREMATURE RUPTURE OF MEMBRANES (PPROM) DELIVERED, CURRENT HOSPITALIZATION: ICD-10-CM

## 2021-01-28 DIAGNOSIS — Z3A.24 24 WEEKS GESTATION OF PREGNANCY: ICD-10-CM

## 2021-01-28 DIAGNOSIS — O24.013 TYPE 1 DIABETES MELLITUS AFFECTING PREGNANCY IN THIRD TRIMESTER, ANTEPARTUM: ICD-10-CM

## 2021-01-28 DIAGNOSIS — O41.02X0 OLIGOHYDRAMNIOS IN SINGLETON PREGNANCY IN SECOND TRIMESTER: ICD-10-CM

## 2021-01-28 DIAGNOSIS — Z98.891 S/P PRIMARY LOW TRANSVERSE C-SECTION: Primary | ICD-10-CM

## 2021-01-28 DIAGNOSIS — Z3A.27 27 WEEKS GESTATION OF PREGNANCY: ICD-10-CM

## 2021-01-28 DIAGNOSIS — O24.012 PREGNANCY COMPLICATED BY PRE-EXISTING TYPE 1 DIABETES, SECOND TRIMESTER: ICD-10-CM

## 2021-01-28 DIAGNOSIS — O42.919 PRETERM PREMATURE RUPTURE OF MEMBRANES (PPROM) WITH UNKNOWN ONSET OF LABOR: ICD-10-CM

## 2021-01-28 DIAGNOSIS — Z3A.23 23 WEEKS GESTATION OF PREGNANCY: ICD-10-CM

## 2021-01-28 LAB
ABO GROUP BLD: NORMAL
BLD GP AB SCN SERPL QL: NEGATIVE
ERYTHROCYTE [DISTWIDTH] IN BLOOD BY AUTOMATED COUNT: 12.9 % (ref 11.6–15.1)
GLUCOSE SERPL-MCNC: 140 MG/DL (ref 65–140)
GLUCOSE SERPL-MCNC: 176 MG/DL (ref 65–140)
GLUCOSE SERPL-MCNC: 203 MG/DL (ref 65–140)
GLUCOSE SERPL-MCNC: 94 MG/DL (ref 65–140)
HCT VFR BLD AUTO: 38.9 % (ref 34.8–46.1)
HGB BLD-MCNC: 12.9 G/DL (ref 11.5–15.4)
MCH RBC QN AUTO: 30.7 PG (ref 26.8–34.3)
MCHC RBC AUTO-ENTMCNC: 33.2 G/DL (ref 31.4–37.4)
MCV RBC AUTO: 93 FL (ref 82–98)
PLATELET # BLD AUTO: 204 THOUSANDS/UL (ref 149–390)
PMV BLD AUTO: 11.2 FL (ref 8.9–12.7)
RBC # BLD AUTO: 4.2 MILLION/UL (ref 3.81–5.12)
RH BLD: POSITIVE
RPR SER QL: NORMAL
SPECIMEN EXPIRATION DATE: NORMAL
WBC # BLD AUTO: 11.48 THOUSAND/UL (ref 4.31–10.16)

## 2021-01-28 PROCEDURE — 86901 BLOOD TYPING SEROLOGIC RH(D): CPT | Performed by: OBSTETRICS & GYNECOLOGY

## 2021-01-28 PROCEDURE — 4A1HXCZ MONITORING OF PRODUCTS OF CONCEPTION, CARDIAC RATE, EXTERNAL APPROACH: ICD-10-PCS | Performed by: OBSTETRICS & GYNECOLOGY

## 2021-01-28 PROCEDURE — NC001 PR NO CHARGE: Performed by: OBSTETRICS & GYNECOLOGY

## 2021-01-28 PROCEDURE — 85027 COMPLETE CBC AUTOMATED: CPT | Performed by: OBSTETRICS & GYNECOLOGY

## 2021-01-28 PROCEDURE — 82948 REAGENT STRIP/BLOOD GLUCOSE: CPT

## 2021-01-28 PROCEDURE — 86900 BLOOD TYPING SEROLOGIC ABO: CPT | Performed by: OBSTETRICS & GYNECOLOGY

## 2021-01-28 PROCEDURE — 59025 FETAL NON-STRESS TEST: CPT | Performed by: OBSTETRICS & GYNECOLOGY

## 2021-01-28 PROCEDURE — 86592 SYPHILIS TEST NON-TREP QUAL: CPT | Performed by: OBSTETRICS & GYNECOLOGY

## 2021-01-28 PROCEDURE — 99254 IP/OBS CNSLTJ NEW/EST MOD 60: CPT | Performed by: OBSTETRICS & GYNECOLOGY

## 2021-01-28 PROCEDURE — 86850 RBC ANTIBODY SCREEN: CPT | Performed by: OBSTETRICS & GYNECOLOGY

## 2021-01-28 RX ORDER — MAGNESIUM SULFATE HEPTAHYDRATE 40 MG/ML
2 INJECTION, SOLUTION INTRAVENOUS ONCE
Status: COMPLETED | OUTPATIENT
Start: 2021-01-28 | End: 2021-01-28

## 2021-01-28 RX ORDER — DOCUSATE SODIUM 100 MG/1
100 CAPSULE, LIQUID FILLED ORAL 2 TIMES DAILY
Status: DISCONTINUED | OUTPATIENT
Start: 2021-01-28 | End: 2021-03-16

## 2021-01-28 RX ORDER — METFORMIN HYDROCHLORIDE 500 MG/1
1500 TABLET, EXTENDED RELEASE ORAL DAILY
Status: DISCONTINUED | OUTPATIENT
Start: 2021-01-28 | End: 2021-03-20 | Stop reason: HOSPADM

## 2021-01-28 RX ORDER — ACETAMINOPHEN 325 MG/1
650 TABLET ORAL EVERY 4 HOURS PRN
Status: DISCONTINUED | OUTPATIENT
Start: 2021-01-28 | End: 2021-03-16

## 2021-01-28 RX ORDER — MAGNESIUM SULFATE HEPTAHYDRATE 40 MG/ML
2 INJECTION, SOLUTION INTRAVENOUS CONTINUOUS
Status: DISCONTINUED | OUTPATIENT
Start: 2021-01-28 | End: 2021-01-28

## 2021-01-28 RX ORDER — CALCIUM GLUCONATE 94 MG/ML
1 INJECTION, SOLUTION INTRAVENOUS ONCE AS NEEDED
Status: DISCONTINUED | OUTPATIENT
Start: 2021-01-28 | End: 2021-01-29

## 2021-01-28 RX ORDER — ASPIRIN 81 MG/1
162 TABLET, CHEWABLE ORAL DAILY
Status: DISCONTINUED | OUTPATIENT
Start: 2021-01-28 | End: 2021-03-16

## 2021-01-28 RX ORDER — CALCIUM CARBONATE 200(500)MG
1000 TABLET,CHEWABLE ORAL DAILY PRN
Status: DISCONTINUED | OUTPATIENT
Start: 2021-01-28 | End: 2021-03-16

## 2021-01-28 RX ORDER — SODIUM CHLORIDE 9 MG/ML
125 INJECTION, SOLUTION INTRAVENOUS CONTINUOUS
Status: DISCONTINUED | OUTPATIENT
Start: 2021-01-28 | End: 2021-02-04

## 2021-01-28 RX ORDER — MAGNESIUM SULFATE HEPTAHYDRATE 40 MG/ML
4 INJECTION, SOLUTION INTRAVENOUS ONCE
Status: COMPLETED | OUTPATIENT
Start: 2021-01-28 | End: 2021-01-28

## 2021-01-28 RX ORDER — BETAMETHASONE SODIUM PHOSPHATE AND BETAMETHASONE ACETATE 3; 3 MG/ML; MG/ML
12 INJECTION, SUSPENSION INTRA-ARTICULAR; INTRALESIONAL; INTRAMUSCULAR; SOFT TISSUE EVERY 24 HOURS
Status: COMPLETED | OUTPATIENT
Start: 2021-01-28 | End: 2021-01-29

## 2021-01-28 RX ADMIN — MAGNESIUM SULFATE IN WATER 2 G/HR: 40 INJECTION, SOLUTION INTRAVENOUS at 22:06

## 2021-01-28 RX ADMIN — SODIUM CHLORIDE 125 ML/HR: 0.9 INJECTION, SOLUTION INTRAVENOUS at 18:23

## 2021-01-28 RX ADMIN — DOCUSATE SODIUM 100 MG: 100 CAPSULE, LIQUID FILLED ORAL at 18:19

## 2021-01-28 RX ADMIN — BETAMETHASONE SODIUM PHOSPHATE AND BETAMETHASONE ACETATE 12 MG: 3; 3 INJECTION, SUSPENSION INTRA-ARTICULAR; INTRALESIONAL; INTRAMUSCULAR at 12:12

## 2021-01-28 RX ADMIN — MAGNESIUM SULFATE HEPTAHYDRATE 2 G: 40 INJECTION, SOLUTION INTRAVENOUS at 12:20

## 2021-01-28 RX ADMIN — MAGNESIUM SULFATE IN WATER 2 G/HR: 40 INJECTION, SOLUTION INTRAVENOUS at 12:31

## 2021-01-28 RX ADMIN — ASPIRIN 81 MG CHEWABLE TABLET 162 MG: 81 TABLET CHEWABLE at 22:02

## 2021-01-28 RX ADMIN — METFORMIN ER 500 MG 1500 MG: 500 TABLET ORAL at 22:02

## 2021-01-28 RX ADMIN — MAGNESIUM SULFATE HEPTAHYDRATE 4 G: 40 INJECTION, SOLUTION INTRAVENOUS at 12:00

## 2021-01-28 RX ADMIN — SODIUM CHLORIDE 125 ML/HR: 0.9 INJECTION, SOLUTION INTRAVENOUS at 10:47

## 2021-01-28 NOTE — H&P
History & Physical - OB/GYN   Kiet Schmidt 27 y o  female MRN: [id number]  Unit/Bed#: -01 Encounter: [id number]    27 y o   at 23w0d by embryo transfer date, IVF pregnancy    She is a patient of Dr Maurizio Finn    Chief complaint:  I am here for admission until delivery, PPROM at 17 weeks 3 days    HPI:  Amaury Rubalcava presents with her  for admission until delivery in the setting of PPROM at 16 weeks  She reports occasional discharge that is clear to yellow, but denies any malodorous discharge, bright red vaginal bleeding  Denies pelvic cramping of contractions  Denies fevers, chills, nausea, vomiting, chest pain, shortness of breath  She continues to use her insulin pump  Reports occasional postprandial hyperglycemia in the 200s, but overall states her blood sugars are well controlled, notable for fasting blood glucose in the 80s most mornings  She received latency antibiotics outpatient      Pregnancy Complications:  Patient Active Problem List   Diagnosis    23 weeks gestation of pregnancy     premature rupture of membranes (PPROM) with unknown onset of labor    Vaginal bleeding in pregnancy, second trimester    Encounter for supervision of pregnancy resulting from assisted reproductive technology in second trimester, antepartum    Prediabetes    Oligohydramnios in poe pregnancy in second trimester    Pregnancy complicated by pre-existing type 1 diabetes, second trimester    Suspected fetal anomaly, antepartum       PMH:  Past Medical History:   Diagnosis Date    Diabetes mellitus ([address] )     Female infertility     Varicella        PSH:  No past surgical history on file      Social Hx:  Denies x 3 in pregnancy     OB Hx:  OB History    Para Term  AB Living   3 0 0 0 2 0   SAB TAB Ectopic Multiple Live Births   2 0 0 0 0      # Outcome Date GA Lbr [place]/2nd Weight Sex Delivery Anes PTL Lv   3 Current            2 SAB            1 SAB                Meds:  No current facility-administered medications on file prior to encounter  Current Outpatient Medications on File Prior to Encounter   Medication Sig Dispense Refill    Ascorbic Acid (vitamin C) 1000 MG tablet Take 1,000 mg by mouth daily      aspirin 81 mg chewable tablet Chew 162 mg daily       Cholecalciferol (Vitamin D3) 50 MCG (2000 UT) capsule Take 2,000 Units by mouth daily      folic acid (FOLVITE) 1 mg tablet Take 1 mg by mouth daily      metFORMIN (GLUCOPHAGE-XR) 500 mg 24 hr tablet Take 1,500 mg by mouth daily      NovoLOG 100 UNIT/ML injection Inject 100 mL as directed daily      Prenatal MV-Min-Fe Fum-FA-DHA (PRENATAL 1 PO) Take by mouth         Allergies: Allergies   Allergen Reactions    Amoxicillin Hives    Cefaclor        Labs:  Blood type: O+  Antibody: negative  Group B strep: unknown  HIV: negative  Hepatitis B: negative  RPR: NR  Rubella: Immune       Physical Exam:  /75   Pulse 100   Temp 97 6 °F (36 4 °C) (Oral)   Resp 18   SpO2 94%     Physical Exam  Constitutional:       Appearance: Normal appearance  Cardiovascular:      Rate and Rhythm: Normal rate and regular rhythm  Pulses: Normal pulses  Heart sounds: Normal heart sounds  Pulmonary:      Effort: Pulmonary effort is normal       Breath sounds: Normal breath sounds  Abdominal:      General: Abdomen is flat  Bowel sounds are normal       Palpations: Abdomen is soft  Comments: Gravid    Skin:     General: Skin is warm and dry  Neurological:      General: No focal deficit present  Mental Status: She is alert and oriented to person, place, and time  Psychiatric:         Mood and Affect: Mood normal          Behavior: Behavior normal          Estimated Fetal Weight: 1 lbs  Presentation: Breech    SVE: deferred  FHT:  150 / Moderate 6 - 25 bpm / + 10x10 accelerations, no decelerations  [place]: quiet    Membranes: PPROM    Assessment:   27 y o   at 23w0d with PPROM    Plan:   1   Admit to L&D  2  CBC, RPR, type and screen  3  PPROM: received latency antibiotics outpatient, BTM 1/28-1/29  4  Magnesium sulfate x 12 hours for fetal neuroprotection, can stop at 0000 1/28  5  T1DM: using continuous insulin pump, basal rate 3, Meal Boluses 3 units, and correction boluses 1 units for every 25 mg/dl over 150 mg/dl  Plan to continue this regimen for days 1-2, modify by half to days 3-4, then return to normal settings after day 5, blood sugars-fasting, pre prandial, postprandial and qhs, will need snacks in-between meals  6  Consultations: perinatology, neonatology, anesthesia   7  IUP @ 23 weeks: NST TID   8  FEN: GDM 22 Kcal diet, NS while magnesium ongoing  9  PPX: SCDs, ambulate, consider lovenox  10   Incomplete prenatal panel: follow-up Hep B Ag, RPR IgG    Discussed with Dr Lena Pineda DO

## 2021-01-28 NOTE — PLAN OF CARE
Problem: PAIN - ADULT  Goal: Verbalizes/displays adequate comfort level or baseline comfort level  Description: Interventions:  - Encourage patient to monitor pain and request assistance  - Assess pain using appropriate pain scale  - Administer analgesics based on type and severity of pain and evaluate response  - Implement non-pharmacological measures as appropriate and evaluate response  - Consider cultural and social influences on pain and pain management  - Notify physician/advanced practitioner if interventions unsuccessful or patient reports new pain  Outcome: Progressing     Problem: INFECTION - ADULT  Goal: Absence or prevention of progression during hospitalization  Description: INTERVENTIONS:  - Assess and monitor for signs and symptoms of infection  - Monitor lab/diagnostic results  - Monitor all insertion sites, i e  indwelling lines, tubes, and drains  - Monitor endotracheal if appropriate and nasal secretions for changes in amount and color  - [place] appropriate cooling/warming therapies per order  - Administer medications as ordered  - Instruct and encourage patient and family to use good hand hygiene technique  - Identify and instruct in appropriate isolation precautions for identified infection/condition  Outcome: Progressing  Goal: Absence of fever/infection during neutropenic period  Description: INTERVENTIONS:  - Monitor WBC    Outcome: Progressing     Problem: SAFETY ADULT  Goal: Patient will remain free of falls  Description: INTERVENTIONS:  - Assess patient frequently for physical needs  -  Identify cognitive and physical deficits and behaviors that affect risk of falls    -  [place] fall precautions as indicated by assessment   - Educate patient/family on patient safety including physical limitations  - Instruct patient to call for assistance with activity based on assessment  - Modify environment to reduce risk of injury  - Consider OT/PT consult to assist with strengthening/mobility  Outcome: Progressing  Goal: Maintain or return to baseline ADL function  Description: INTERVENTIONS:  -  Assess patient's ability to carry out ADLs; assess patient's baseline for ADL function and identify physical deficits which impact ability to perform ADLs (bathing, care of mouth/teeth, toileting, grooming, dressing, etc )  - Assess/evaluate cause of self-care deficits   - Assess range of motion  - Assess patient's mobility; develop plan if impaired  - Assess patient's need for assistive devices and provide as appropriate  - Encourage maximum independence but intervene and supervise when necessary  - Involve family in performance of ADLs  - Assess for home care needs following discharge   - Consider OT consult to assist with ADL evaluation and planning for discharge  - Provide patient education as appropriate  Outcome: Progressing  Goal: Maintain or return mobility status to optimal level  Description: INTERVENTIONS:  - Assess patient's baseline mobility status (ambulation, transfers, stairs, etc )    - Identify cognitive and physical deficits and behaviors that affect mobility  - Identify mobility aids required to assist with transfers and/or ambulation (gait belt, sit-to-stand, lift, walker, cane, etc )  - [place] fall precautions as indicated by assessment  - Record patient progress and toleration of activity level on Mobility SBAR; progress patient to next Phase/Stage  - Instruct patient to call for assistance with activity based on assessment  - Consider rehabilitation consult to assist with strengthening/weightbearing, etc   Outcome: Progressing     Problem: Knowledge Deficit  Goal: Patient/family/caregiver demonstrates understanding of disease process, treatment plan, medications, and discharge instructions  Description: Complete learning assessment and assess knowledge base    Interventions:  - Provide teaching at level of understanding  - Provide teaching via preferred learning methods  Outcome: Progressing  Goal: Verbalizes understanding of labor plan  Description: Assess patient/family/caregiver's baseline knowledge level and ability to understand information  Provide education via patient/family/caregiver's preferred learning method at appropriate level of understanding  1  Provide teaching at level of understanding  2  Provide teaching via preferred learning method(s)  Outcome: Progressing     Problem: DISCHARGE PLANNING  Goal: Discharge to home or other facility with appropriate resources  Description: INTERVENTIONS:  - Identify barriers to discharge w/patient and caregiver  - Arrange for needed discharge resources and transportation as appropriate  - Identify discharge learning needs (meds, wound care, etc )  - Arrange for interpretive services to assist at discharge as needed  - Refer to Case Management Department for coordinating discharge planning if the patient needs post-hospital services based on physician/advanced practitioner order or complex needs related to functional status, cognitive ability, or social support system  Outcome: Progressing     Problem: Labor & Delivery  Goal: Manages discomfort  Description: Assess and monitor for signs and symptoms of discomfort  Assess patient's pain level regularly and per hospital policy  Administer medications as ordered  Support use of nonpharmacological methods to help control pain such as distraction, imagery, relaxation, and application of heat and cold  Collaborate with interdisciplinary team and patient to determine appropriate pain management plan  1  Include patient in decisions related to comfort  2  Offer non-pharmacological pain management interventions  3  Report ineffective pain management to physician  Outcome: Progressing  Goal: Patient vital signs are stable  Description: 1  Assess vital signs - vaginal delivery    Outcome: Progressing

## 2021-01-28 NOTE — CONSULTS
Consultation - BRIAN Lino 27 y o  female MRN: [id number]  Unit/Bed#: -01 Encounter: [id number]      Inpatient consult to Perinatology  Consult performed by: Oscar Lin DO  Consult ordered by: Oscar Lin DO          Chief Complaint   Patient presents with    Rupture of Membranes     premature at 17weeks 4 days       Physician Requesting Consult: Ericka Pabon MD  Reason for Consult / Principal Problem: PPROM  Subspeciality: Perinatology    HPI:  Saira Tuttle presents with her  for admission until delivery in the setting of PPROM at 12 weeks  She reports occasional discharge that is clear to yellow, but denies any malodorous discharge, bright red vaginal bleeding  Denies pelvic cramping of contractions  Denies fevers, chills, nausea, vomiting, chest pain, shortness of breath       She continues to use her insulin pump  Reports occasional postprandial hyperglycemia in the 200s, but overall states her blood sugars are well controlled, notable for fasting blood glucose in the 80s most mornings  She received latency antibiotics outpatient       Past Medical History:   Diagnosis Date    Diabetes mellitus ([facility] )     Female infertility     Varicella        OB History    Para Term  AB Living   3 0 0 0 2 0   SAB TAB Ectopic Multiple Live Births   2 0 0 0 0      # Outcome Date GA Lbr [place]/2nd Weight Sex Delivery Anes PTL Lv   3 Current            2 SAB            1 SAB                Baby complications/comments: Growth Scan EFW 1 lb, Breech, Fetal Ascites seen on most recent ultrasound     Historical Information   Past Medical History:   Diagnosis Date    [facility] )     Female infertility     Varicella      No past surgical history on file    Social History   Social History     Substance and Sexual Activity   Alcohol Use Not Currently     Social History     Substance and Sexual Activity   Drug Use Never     Social History     Tobacco Use   Smoking Status Former Smoker Smokeless Tobacco Never Used     Family History   Problem Relation Age of Onset    Hyperlipidemia Mother     Diabetes Father     Heart attack Father     Diabetes Sister     Heart attack Paternal Uncle          Meds/Allergies      Medications Prior to Admission   Medication    Ascorbic Acid (vitamin C) 1000 MG tablet    aspirin 81 mg chewable tablet    Cholecalciferol (Vitamin D3) 50 MCG (2000 UT) capsule    folic acid (FOLVITE) 1 mg tablet    metFORMIN (GLUCOPHAGE-XR) 500 mg 24 hr tablet    NovoLOG 100 UNIT/ML injection    Prenatal MV-Min-Fe Fum-FA-DHA (PRENATAL 1 PO)        Allergies   Allergen Reactions    Amoxicillin Hives    Cefaclor        Review of Systems   Constitutional: Negative for chills, diaphoresis, fatigue and fever  Respiratory: Negative for cough, choking, chest tightness and shortness of breath  Cardiovascular: Negative for chest pain, palpitations and leg swelling  Gastrointestinal: Negative for abdominal pain, constipation, diarrhea, nausea and vomiting  Genitourinary: Positive for vaginal discharge  Negative for decreased urine volume, pelvic pain, vaginal bleeding and vaginal pain  Musculoskeletal: Negative for arthralgias, back pain, myalgias and neck pain  Neurological: Negative for dizziness, weakness, light-headedness and headaches  Psychiatric/Behavioral: Negative for self-injury, sleep disturbance and suicidal ideas  The patient is not nervous/anxious  /64   Pulse 100   Temp 98 °F (36 7 °C) (Oral)   Resp 18   SpO2 96%     Physical Exam  Constitutional:       Appearance: Normal appearance  Cardiovascular:      Rate and Rhythm: Normal rate and regular rhythm  Pulses: Normal pulses  Heart sounds: Normal heart sounds  Pulmonary:      Effort: Pulmonary effort is normal       Breath sounds: Normal breath sounds  Abdominal:      General: Abdomen is flat  Bowel sounds are normal       Palpations: Abdomen is soft        Comments: Gravid    Skin:     General: Skin is warm and dry  Neurological:      General: No focal deficit present  Mental Status: She is alert and oriented to person, place, and time  Psychiatric:         Mood and Affect: Mood normal          Behavior: Behavior normal            SVE: deferred  FHT:  150 / Moderate 6 - 25 bpm / + 10x10 accelerations, no decelerations  [place]: quiet     Membranes: PPROM    Labs:  Blood type: O+  Antibody: negative  Group B strep: unknown  HIV: negative  Hepatitis B: negative  RPR: NR  Rubella: immune     Assessment:   27 y o   at 23w0d with PPROM     Plan:   1  Admit to L&D  2  CBC, RPR, type and screen  3  PPROM: s/p latency antibiotics, Start course of betamethasone  4  Magnesium sulfate x 12 hours for fetal neuroprotection, can stop at 0000   5  T1DM: using continuous insulin pump, basal rate 3, Meal Boluses 3 units, and correction boluses 1 units for every 25 mg/dl over 150 mg/dl  Plan to continue this regimen for days 1-2, modify by half to days 3-4, then return to normal settings after day 5, blood sugars-fasting, pre prandial, postprandial and qhs, will need snacks in-between meals; fetal echo scheduled for tomorrow   6  Consultations:  neonatology, anesthesia   7  IUP @ 23 weeks: NST TID  8  FEN: GDM 22 Kcal diet, NS while magnesium ongoing  9  PPX: SCDs, ambulate, consider lovenox  10   Incomplete prenatal panel: follow-up Hep B Ag, RPR IgG    Patient seen and assessed with Dr Heladio Randall, DO   OB/Gyn PGY-4  2021 4:11 PM

## 2021-01-29 LAB
GLUCOSE SERPL-MCNC: 126 MG/DL (ref 65–140)
GLUCOSE SERPL-MCNC: 132 MG/DL (ref 65–140)
GLUCOSE SERPL-MCNC: 165 MG/DL (ref 65–140)
GLUCOSE SERPL-MCNC: 166 MG/DL (ref 65–140)
GLUCOSE SERPL-MCNC: 188 MG/DL (ref 65–140)
GLUCOSE SERPL-MCNC: 191 MG/DL (ref 65–140)
GLUCOSE SERPL-MCNC: 238 MG/DL (ref 65–140)

## 2021-01-29 PROCEDURE — 76816 OB US FOLLOW-UP PER FETUS: CPT | Performed by: OBSTETRICS & GYNECOLOGY

## 2021-01-29 PROCEDURE — 82948 REAGENT STRIP/BLOOD GLUCOSE: CPT

## 2021-01-29 PROCEDURE — NC001 PR NO CHARGE: Performed by: OBSTETRICS & GYNECOLOGY

## 2021-01-29 PROCEDURE — 59025 FETAL NON-STRESS TEST: CPT | Performed by: OBSTETRICS & GYNECOLOGY

## 2021-01-29 PROCEDURE — 99233 SBSQ HOSP IP/OBS HIGH 50: CPT | Performed by: OBSTETRICS & GYNECOLOGY

## 2021-01-29 RX ORDER — MELATONIN
2000 DAILY
Status: DISCONTINUED | OUTPATIENT
Start: 2021-01-29 | End: 2021-03-16

## 2021-01-29 RX ORDER — FOLIC ACID 1 MG/1
1 TABLET ORAL DAILY
Status: DISCONTINUED | OUTPATIENT
Start: 2021-01-29 | End: 2021-03-16

## 2021-01-29 RX ORDER — ASCORBIC ACID 500 MG
1000 TABLET ORAL DAILY
Status: DISCONTINUED | OUTPATIENT
Start: 2021-01-29 | End: 2021-03-20 | Stop reason: HOSPADM

## 2021-01-29 RX ADMIN — DOCUSATE SODIUM 100 MG: 100 CAPSULE, LIQUID FILLED ORAL at 17:47

## 2021-01-29 RX ADMIN — DOCUSATE SODIUM 100 MG: 100 CAPSULE, LIQUID FILLED ORAL at 08:59

## 2021-01-29 RX ADMIN — ASPIRIN 81 MG CHEWABLE TABLET 162 MG: 81 TABLET CHEWABLE at 22:11

## 2021-01-29 RX ADMIN — METFORMIN ER 500 MG 1500 MG: 500 TABLET ORAL at 22:11

## 2021-01-29 RX ADMIN — Medication 1 TABLET: at 08:03

## 2021-01-29 RX ADMIN — Medication 2000 UNITS: at 08:59

## 2021-01-29 RX ADMIN — FOLIC ACID 1 MG: 1 TABLET ORAL at 08:59

## 2021-01-29 RX ADMIN — INSULIN ASPART 100 UNITS: 100 INJECTION, SOLUTION INTRAVENOUS; SUBCUTANEOUS at 09:03

## 2021-01-29 RX ADMIN — OXYCODONE HYDROCHLORIDE AND ACETAMINOPHEN 1000 MG: 500 TABLET ORAL at 08:03

## 2021-01-29 RX ADMIN — BETAMETHASONE SODIUM PHOSPHATE AND BETAMETHASONE ACETATE 12 MG: 3; 3 INJECTION, SUSPENSION INTRA-ARTICULAR; INTRALESIONAL; INTRAMUSCULAR at 11:56

## 2021-01-29 NOTE — PROGRESS NOTES
Progress Note - [facility]  Paulino Case 27 y o  female MRN: [id number]  Unit/Bed#:  330-01 Encounter: [id number]      Paulino Case has no current complaints  Continues to report scan leakage of fluid but denies malodorous leakage, vaginal bleeding, fevers, chills, cramping  Reports fetus moving well  She is happy with her post prandial blood sugars that they are not as high as she would have anticipated  /76 (BP Location: Left arm)   Pulse 94   Temp 98 3 °F (36 8 °C) (Oral)   Resp 18   Ht 5' 4" (1 626 m)   SpO2 95%   BMI 36 86 kg/m²     Physical Exam:   General: AAOx3  No acute distress  Heart: Regular rate & rhythm  No murmurs/clicks/gallops  Lungs: Clear bilaterally  Normal effort  No wheezes/rales/rhonchi  Abdominal: Soft, non-tender gravid uterus  Extremities: No TTP  Lower limbs symmetric bilaterally  FHT:  Baseline Rate: 130 bpm  Variability: Moderate 6-25 bpm  Accelerations: 10 x 10 (<32 weeks)  Decelerations: None  FHR Category: Category I  TOCO:   Contraction Frequency (minutes): 0    Lab Results   Component Value Date    WBC 11 48 (H) 2021    HGB 12 9 2021    HCT 38 9 2021     2021     Lab Results   Component Value Date    K 4 2 2020     2020    CO2 25 2020    BUN 14 2020    CREATININE 0 78 2020    AST 14 2020    ALT 23 2020       A/P: Paulino Case is a 27 y o   at 23w1d admitted with PPROM  Currently in stable condition   Hospital Day: 2   1) PPROM: s/p latency antibiotics outpatient, second dose of betamethasone today, s/p magnesium sulfate x 12 hours   2) T1DM: using continuous insulin pump, basal rate 3, Meal Boluses  1 units per 3 grams carbohydrate, and correction boluses 1 units for every 25 mg/dl over 150 mg/dl   Plan to continue this regimen for days 1-2, modify by half to days 3-4, then return to normal settings after day 5   blood sugars-fasting, pre prandial, postprandial and qhs, will need snacks in-between meals; fetal echo scheduled for today    Postprandial blood sugars 203 after lunch, 176 after dinner, fasting this morning 132  3) IUP @ 23 weeks: NST TID   4) DVT PPx: SCDs, ambulate, consider lovenox   5) FEN: GDM 2200 Calorie Diet   6) Consultations: neonatology, anesthesia    Disposition: inpatient until delivery

## 2021-01-29 NOTE — UTILIZATION REVIEW
Initial Clinical Review    Admission: Date/Time/Statement:   Admission Orders (From admission, onward)     Ordered        01/28/21 0950  INPATIENT ADMISSION  Once                   Orders Placed This Encounter   Procedures    INPATIENT ADMISSION     Standing Status:   Standing     Number of Occurrences:   1     Order Specific Question:   Level of Care     Answer:   Med Surg [16]     Order Specific Question:   Estimated length of stay     Answer:   More than 2 Midnights     Order Specific Question:   Certification     Answer:   I certify that inpatient services are medically necessary for this patient for a duration of greater than two midnights  See H&P and MD Progress Notes for additional information about the patient's course of treatment  Chief Complaint   Patient presents with    Rupture of Membranes     premature at 17weeks 4 days     Assessment/Plan:  28 yo femle G 3 P 0 @  23w0d by embryo transfer date, IVF pregnancy, presented to L&D from home as inpatient admission for PPROM since  17 3/7 wks Patient continues to leak good fetal movement  PMHX DM on insulin pump  Estimated Fetal Weight: 1 lbs  Presentation: Breech  SVE: deferred  FHT:  150 / Moderate 6 - 25 bpm / + 10x10 accelerations, no decelerations  [place]: quiet  Plan BTM MGSO 4 x 12 hrs for neuro protection, Consult perinatology neonatology  Will remains as inpatient admission until delivery        01-23-21 showed absent GORDON     ED Triage Vitals   Temperature Pulse Respirations Blood Pressure SpO2   01/28/21 1010 01/28/21 1010 01/28/21 1010 01/28/21 1010 01/28/21 1200   99 1 °F (37 3 °C) 88 16 129/66 98 %      Temp Source Heart Rate Source Patient Position - Orthostatic VS BP Location FiO2 (%)   01/28/21 1010 01/28/21 1010 01/28/21 2000 01/28/21 2000 --   Oral Monitor Sitting Right arm       Pain Score       01/28/21 0952       No Pain         Additional Vital Signs:   Date/Time  Temp  Pulse  Resp  BP  SpO2  O2 Device  Cardiac (WDL)  Patient Requested medication(s) are due for refill today: Yes  Patient has already received a courtesy refill: No  Other reason request has been forwarded to provider: none Position - Orthostatic VS   01/29/21 1147  98 1 °F (36 7 °C)  102  20  120/68  99 %         01/29/21 0846  98 2 °F (36 8 °C)  99  20  130/72  98 %         01/29/21 0800              WDL     01/29/21 0408  98 3 °F (36 8 °C)  94  18  121/76  95 %  None (Room air)    Sitting   01/29/21 0000  97 9 °F (36 6 °C)  97  18  116/60  96 %  None (Room air)    Sitting   01/28/21 2200    102  18  105/58  95 %  None (Room air)    Sitting   01/28/21 2000  97 7 °F (36 5 °C)  107Abnormal   18  117/73  95 %  None (Room air)  WDL  Sitting   01/28/21 1900                   Comment rows:   OBSERV: pt states no leaking of vaginal fluid since arrival to hospital today at 01/28/21 1900   01/28/21 1800    109Abnormal   18  113/71  95 %         01/28/21 1600  98 °F (36 7 °C)  100  18  114/64  96 %         01/28/21 1410    100    124/75           01/28/21 1400  97 6 °F (36 4 °C)  100  18  124/75  94 %         01/28/21 1205    96    122/78           01/28/21 1200  98 1 °F (36 7 °C)  96  18  122/78  98 %             Pertinent Labs/Diagnostic Test Results:       Results from last 7 days   Lab Units 01/28/21  1044   WBC Thousand/uL 11 48*   HEMOGLOBIN g/dL 12 9   HEMATOCRIT % 38 9   PLATELETS Thousands/uL 204                 Results from last 7 days   Lab Units 01/29/21  1154 01/29/21  0942 01/29/21  0304 01/28/21  2050 01/28/21  1819 01/28/21  1603 01/28/21  1345   POC GLUCOSE mg/dl 166* 126 132 176* 140 203* 94         Past Medical History:   Diagnosis Date    Diabetes mellitus ([address] )     Female infertility     Varicella      Present on Admission:  **None**      Admitting Diagnosis: 23 weeks gestation of pregnancy [Z3A 23]  Age/Sex: 27 y o  female  Admission Orders:  Scheduled Medications:  vitamin C, 1,000 mg, Oral, Daily  aspirin, 162 mg, Oral, Daily  cholecalciferol, 2,000 Units, Oral, Daily  docusate sodium, 100 mg, Oral, BID  folic acid, 1 mg, Oral, Daily  metFORMIN, 1,500 mg, Oral, Daily  patient maintained insulin pump, 1 each, Subcutaneous, Q8H  prenatal multivitamin, 1 tablet, Oral, Daily      Continuous IV Infusions:  sodium chloride, 125 mL/hr, Intravenous, Continuous      PRN Meds:  acetaminophen, 650 mg, Oral, Q4H PRN  calcium carbonate, 1,000 mg, Oral, Daily PRN  insulin aspart, 100 Units, Subcutaneous Insulin Pump, Daily PRN        IP CONSULT TO ANESTHESIOLOGY  IP CONSULT TO PERINATOLOGY  IP CONSULT TO NEONATOLOGY   Blood sugar ac and hs  NST TID       Network Utilization Review Department  ATTENTION: Please call with any questions or concerns to [phone] and carefully listen to the prompts so that you are directed to the right person  All voicemails are confidential   Nadya Zamora all requests for admission clinical reviews, approved or denied determinations and any other requests to dedicated fax number below belonging to the campus where the patient is receiving treatment   List of dedicated fax numbers for the Facilities:  [address] DENIALS (Administrative/Medical Necessity) [phone]   [address] (Maternity/NICU/Pediatrics) [phone]   [address] [place] [address] Dr Damian Rey 7661 (  Alicia Hilario "Jinny" 103) [address] [address] [address] [address] [place], [place] [address] [phone]

## 2021-01-30 LAB
EXTERNAL RUBELLA IGG QUANTITATION: 1.25
GLUCOSE SERPL-MCNC: 106 MG/DL (ref 65–140)
GLUCOSE SERPL-MCNC: 119 MG/DL (ref 65–140)
GLUCOSE SERPL-MCNC: 122 MG/DL (ref 65–140)
GLUCOSE SERPL-MCNC: 134 MG/DL (ref 65–140)
GLUCOSE SERPL-MCNC: 143 MG/DL (ref 65–140)
GLUCOSE SERPL-MCNC: 151 MG/DL (ref 65–140)
GLUCOSE SERPL-MCNC: 259 MG/DL (ref 65–140)

## 2021-01-30 PROCEDURE — 86777 TOXOPLASMA ANTIBODY: CPT | Performed by: OBSTETRICS & GYNECOLOGY

## 2021-01-30 PROCEDURE — NC001 PR NO CHARGE: Performed by: OBSTETRICS & GYNECOLOGY

## 2021-01-30 PROCEDURE — 99233 SBSQ HOSP IP/OBS HIGH 50: CPT | Performed by: OBSTETRICS & GYNECOLOGY

## 2021-01-30 PROCEDURE — 86644 CMV ANTIBODY: CPT | Performed by: OBSTETRICS & GYNECOLOGY

## 2021-01-30 PROCEDURE — 86696 HERPES SIMPLEX TYPE 2 TEST: CPT | Performed by: OBSTETRICS & GYNECOLOGY

## 2021-01-30 PROCEDURE — 59025 FETAL NON-STRESS TEST: CPT | Performed by: OBSTETRICS & GYNECOLOGY

## 2021-01-30 PROCEDURE — 86762 RUBELLA ANTIBODY: CPT | Performed by: OBSTETRICS & GYNECOLOGY

## 2021-01-30 PROCEDURE — 82948 REAGENT STRIP/BLOOD GLUCOSE: CPT

## 2021-01-30 PROCEDURE — 86695 HERPES SIMPLEX TYPE 1 TEST: CPT | Performed by: OBSTETRICS & GYNECOLOGY

## 2021-01-30 PROCEDURE — 86694 HERPES SIMPLEX NES ANTBDY: CPT | Performed by: OBSTETRICS & GYNECOLOGY

## 2021-01-30 RX ADMIN — DOCUSATE SODIUM 100 MG: 100 CAPSULE, LIQUID FILLED ORAL at 18:35

## 2021-01-30 RX ADMIN — OXYCODONE HYDROCHLORIDE AND ACETAMINOPHEN 1000 MG: 500 TABLET ORAL at 10:00

## 2021-01-30 RX ADMIN — METFORMIN ER 500 MG 1500 MG: 500 TABLET ORAL at 22:19

## 2021-01-30 RX ADMIN — Medication 2000 UNITS: at 11:35

## 2021-01-30 RX ADMIN — ASPIRIN 81 MG CHEWABLE TABLET 162 MG: 81 TABLET CHEWABLE at 22:19

## 2021-01-30 RX ADMIN — Medication 1 TABLET: at 10:00

## 2021-01-30 RX ADMIN — FOLIC ACID 1 MG: 1 TABLET ORAL at 11:35

## 2021-01-30 RX ADMIN — DOCUSATE SODIUM 100 MG: 100 CAPSULE, LIQUID FILLED ORAL at 10:00

## 2021-01-30 NOTE — CONSULTS
MATERNAL CONSULTATION - NICU   Elina Kinney 27 y o  female MRN: [id number]  Unit/Bed#: -01 Encounter: [id number]    History of Present Illness     Inpatient consult to Neonatology  Consult performed by: Janeth Lechuga MD  Consult ordered by: Lucia Alvarado DO        HPI: Elina Kinney is a 27y o  year old  female at 23w2d with an ESTHER of 2021, who presents with PPROM since 17 weeks gestation and anhydromnios for fetal monitoring  She has received a course of betamethasone and magnesium sulfate this admission  She has the following prenatal labs: per OB record are O positive, HepB negative, HIV negative, RPR NR, GBS unknown, with no s/o chorio, no maternal fever  Pregnancy complications: IDDM, IVF, PPROM since 17 weeks gestation, anhydromnios, fetal ascites    Fetal Complications: anhydromnios, fetal ascites    Maternal medical history and medications: DM on insulin, infertility    Maternal social history: denies smoke, drugs, alcohol, She is a teacher  Marital status: ,  supportive per [facility]    Maternal  medications: Vitamin, Aspirin, Metformin,  Insulin, s/p betamethasone ,, magnesium sulfate    Historical Information   Past Medical History:   Diagnosis Date    [facility] mellitus [facility] )     Female infertility     Varicella      No past surgical history on file    Social History     Tobacco Use   Smoking Status Former Smoker   Smokeless Tobacco Never Used     OB History:   # 1 - Date: None, Sex: None, Weight: None, GA: None, Delivery: None, Apgar1: None, Apgar5: None, Living: None, Birth Comments: None    # 2 - Date: None, Sex: None, Weight: None, GA: None, Delivery: None, Apgar1: None, Apgar5: None, Living: None, Birth Comments: None    # 3 - Date: None, Sex: None, Weight: None, GA: None, Delivery: None, Apgar1: None, Apgar5: None, Living: None, Birth Comments: None    Family History: non-contributory    Meds/Allergies   all current active meds have been reviewed    Allergies   Allergen Reactions    Amoxicillin Hives    Cefaclor        Objective   No intake or output data in the 24 hours ending 21 466  Invasive Devices     Peripheral Intravenous Line            Peripheral IV 21 Dorsal (posterior); Right Hand 1 day          Line            Pump Device Insulin pump Anterior; Left Hip 1 day                Lab Results: I have personally reviewed pertinent reports  Imaging Studies: I have personally reviewed pertinent reports  Counseling / [address] in her room, and she told the FOB had left the hospital  Hema Fry agreed to discuss the plan for baby today, I asked her to call nicu if her  wants to talk to us after he comes back  Hema Fry told she understood the reason of today's consult, her pregnancy is complicated with PPROM for 6 weeks with anhydramnios so the fetus is at high risk of  mortality and complications secondary to sepsis, pulmonary hypoplasia as well as extreme prematurity  At first we discussed about the  labor and possible  delivery,discussed about the  DR resuscitation, chance of survival for 23 week infant, and morbidities of infant born at 20 weeks gestation  Then I mentioned increased possibility of pulmonary hypoplasia due to PPROM for 6 weeks, which most likely will be a challenge to adequately ventilate the infant after birth in the DR and in the nicu with maximum ventilatory support on 100% FiO2 and surfactant administration  After the discussion she verbalized that she understood the complications of 23 weeks gestation and chance infant may not survive after full resuscitation in DR and still wants us to do resuscitation in case infant is born at any time beyond 23 weeks  Then I explained that the NICU team will be present to care for the infant at the time of delivery  The neonatologist would evaluate the infant immediately   It would be reassuirng if he had breathing efforts and a heart rate  The team will intubate for respiratory support  There is a chance that our equipment may not be small enough for his airway and as mentioned earlier the lungs may be hypoplastic making the ventilation inadequate  We may have to place an UVC to give fluid/medication in DR during the resuscitation  Once he is stable in the delivery room,he will be transported to the NICU for continued care  Charmaine Solomon verbalized she understood the DR resuscitation/intubation process and possible challenges and complications  Then we discussed the NICU care for extreme  infants  Infant will need surfactant and mechanical ventilation for unknown duration, the longer he needs it, the more chance of chronic lungs disease  Infant will be in heated incubator for thermoregulation  She was encouraged to  initiate pumping her breast milk as soon as possible following delivery  Infant will not be feeding initially and will need central lines placed using UVC and UAC for TPN, antibiotics for h/o PPROM and any other medication as clinically needed  I discussed the screening done for  infants, HUS for IVH, echo for PDA, eye exam for ROP screen  Infant may need PRBC and other blood product transfusion during nicu stay  The risk of infection due to PPROM, prematurity and  IV access for PN in first few weeks was discussed  Explained that long term complications, poor neurodevelopmental outcome could be worse if infant gets infected or has NEC, or IVH or growth restriction  We discussed the long term complications of  infants born at a gestational age of 20 weeks  There is a high chance the infant would have chronic lung disease and during discharge infant may need respiratory support, and will have  some type of neurodevelopmental delay, which includes increased risk of IVH, developmental delay, vision, hearing problem, language, behavioral disorder       During the consult I mentioned there are other concerns besides PPROM and  delivery at or after 23 weeks gestation and they are increased risk of infection and risk of Lung hypoplasia due to anhydramnios  I mentioned possibility of difficulty of adequate ventilation and oxygenation in the DR and in nicu on maximal ventilatory support  The recent fetal u/s showed ascites and mother's TORCH titer was sent  I also discussed that we will wait for her report and monitor the ascites in her next u/s  If the infant has ascites after birth, we may need to do further investigation which could include abdomen u/s, aspirate the ascitic fluid for diagnosis or therapy if it affects the infant's clinical condition  I told her that NICU can talk to parents again after 25 weeks gestation or  if any new findings or result are back  I also told her that nicu will keep parents updated every day and every moment as clinical condition changes  The lab, xray, u/s report and all plans will be discussed with them in details  Jim Buckner verbalized she understood all of the the above risks and complications and she wants nicu to do the resuscitation after birth  ( since this was our first discussion and consult and it was lot of information for Lupe,  I did not ask her about the chest compression in DR if needed  Jim Buckner requested to resuscitate and intubate the infant after birth if he is born any time from now, ie 23 weeks gestation)  We will plan to check in with OB team weekly while she is inpatient, NICU  can come back to talk to parents in future as the infant grows and we have more information on u/s, fetal weight, and ascites  NICU team  to come back to talk to parents if they have any further question and then discuss their wish regarding chest compression and cardiac medication if needed in DR after infant is born and if HR is low after intubation on PPV and oxygen        Assessment/Plan     Assessment:  at 23 weeks 2 days gestation with [facility] since 17 weeks gestation Anhydramnios                       Fetal ascites on u/s of unknown cause at present    Plan:  - Continue mother and fetal monitoring per OB   - Serial u/s and fetal growth scan  - Call nicu if parent have any question or concern in future  - Nicu to talk to parents to discuss further about the morbidity,mortality and other complications once fetus is > 25 weeks or if any new findings on u/s   - Call NICU at [phone] for any concern  Total floor / unit time spent today 45 minutes  Greater than 50% of total time was spent with the patient and / or family counseling and / or coordination of care

## 2021-01-30 NOTE — PROGRESS NOTES
Progress Note - [facility]  Ama Recinos 27 y o  female MRN: [id number]  Unit/Bed#: -01 Encounter: [id number]      Ama Recinos has no current complaints  Report leakage of fluid but denies malodorous leakage, vaginal bleeding, fevers, chills, cramping  Reports fetus moving well  /50 (BP Location: Left arm)   Pulse 84   Temp 98 3 °F (36 8 °C) (Oral)   Resp 18   Ht 5' 4" (1 626 m)   SpO2 95%   BMI 36 86 kg/m²     Physical Exam:   General: AAOx3  No acute distress  Heart: Regular rate & rhythm  No murmurs/clicks/gallops  Lungs: Clear bilaterally  Normal effort  No wheezes/rales/rhonchi  Abdominal: Soft, non-tender gravid uterus  Extremities: No TTP  Lower limbs symmetric bilaterally  FHT:  Baseline Rate: 140 bpm  Variability: Moderate 6-25 bpm  Accelerations: 10 x 10 (<32 weeks)  Decelerations: None  FHR Category: Category I  TOCO:   Contraction Frequency (minutes): 0  Contraction Quality: Not applicable    Lab Results   Component Value Date    WBC 11 48 (H) 2021    HGB 12 9 2021    HCT 38 9 2021     2021     Lab Results   Component Value Date    K 4 2 2020     2020    CO2 25 2020    BUN 14 2020    CREATININE 0 78 2020    AST 14 2020    ALT 23 2020       A/P: Ama Recinos is a 27 y o   at 23w2d admitted with PPROM  Currently in stable condition   Hospital Day: 3     1) PPROM: s/p latency antibiotics outpatient, second dose of betamethasone today, s/p magnesium sulfate x 12 hours    - Fetal ascites noted, TORCH labs ordered by Dr Gayle Márquez, f/u accordingly   - Fetal echo with slight pericardial effusion, may be physiologic process, otherwise normal    2) T1DM: using continuous insulin pump, basal rate 3, Meal Boluses  1 units per 3 grams carbohydrate, and correction boluses 1 units for every 25 mg/dl over 150 mg/dl   Plan to continue this regimen for days 1-2, modify by half to days 3-4, then return to normal settings after day 5   blood sugars-fasting, pre prandial, postprandial and qhs, will need snacks in-between meals; fetal echo scheduled for today    Postprandial blood sugars 188 after dinner, received 11u bolus, overnight 0300 value 106    3) IUP @ 23 weeks: NST TID     4) DVT PPx: SCDs, ambulate, consider lovenox     5) FEN: GDM 2200 Calorie Diet     6) Consultations: neonatology, anesthesia    Disposition: inpatient until delivery

## 2021-01-30 NOTE — QUICK NOTE
Breakfast bolus:    71g of carbs for breakfast  Preprandial glucose 119    Patient had 24U bolus prior to breakfast  Will continue monitoring pre and postprandial glucose      Srinivasa Cheng MD  OBGYN, PGY-4  1/30/2021  10:35 AM

## 2021-01-30 NOTE — QUICK NOTE
Iup at 23 2/7 weeks with pprom being managed by mfm  Patient reports still very hungry with meals, not enough compared to her usual diet  Will discuss with mfm team who is also managing glucose  Continue care as per mfm

## 2021-01-30 NOTE — PLAN OF CARE
Problem: PAIN - ADULT  Goal: Verbalizes/displays adequate comfort level or baseline comfort level  Description: Interventions:  - Encourage patient to monitor pain and request assistance  - Assess pain using appropriate pain scale  - Administer analgesics based on type and severity of pain and evaluate response  - Implement non-pharmacological measures as appropriate and evaluate response  - Consider cultural and social influences on pain and pain management  - Notify physician/advanced practitioner if interventions unsuccessful or patient reports new pain  Outcome: Progressing     Problem: INFECTION - ADULT  Goal: Absence or prevention of progression during hospitalization  Description: INTERVENTIONS:  - Assess and monitor for signs and symptoms of infection  - Monitor lab/diagnostic results  - Monitor all insertion sites, i e  indwelling lines, tubes, and drains  - Monitor endotracheal if appropriate and nasal secretions for changes in amount and color  - [place] appropriate cooling/warming therapies per order  - Administer medications as ordered  - Instruct and encourage patient and family to use good hand hygiene technique  - Identify and instruct in appropriate isolation precautions for identified infection/condition  Outcome: Progressing  Goal: Absence of fever/infection during neutropenic period  Description: INTERVENTIONS:  - Monitor WBC    Outcome: Progressing     Problem: SAFETY ADULT  Goal: Patient will remain free of falls  Description: INTERVENTIONS:  - Assess patient frequently for physical needs  -  Identify cognitive and physical deficits and behaviors that affect risk of falls    -  [place] fall precautions as indicated by assessment   - Educate patient/family on patient safety including physical limitations  - Instruct patient to call for assistance with activity based on assessment  - Modify environment to reduce risk of injury  - Consider OT/PT consult to assist with strengthening/mobility  Outcome: Progressing  Goal: Maintain or return to baseline ADL function  Description: INTERVENTIONS:  -  Assess patient's ability to carry out ADLs; assess patient's baseline for ADL function and identify physical deficits which impact ability to perform ADLs (bathing, care of mouth/teeth, toileting, grooming, dressing, etc )  - Assess/evaluate cause of self-care deficits   - Assess range of motion  - Assess patient's mobility; develop plan if impaired  - Assess patient's need for assistive devices and provide as appropriate  - Encourage maximum independence but intervene and supervise when necessary  - Involve family in performance of ADLs  - Assess for home care needs following discharge   - Consider OT consult to assist with ADL evaluation and planning for discharge  - Provide patient education as appropriate  Outcome: Progressing  Goal: Maintain or return mobility status to optimal level  Description: INTERVENTIONS:  - Assess patient's baseline mobility status (ambulation, transfers, stairs, etc )    - Identify cognitive and physical deficits and behaviors that affect mobility  - Identify mobility aids required to assist with transfers and/or ambulation (gait belt, sit-to-stand, lift, walker, cane, etc )  - [place] fall precautions as indicated by assessment  - Record patient progress and toleration of activity level on Mobility SBAR; progress patient to next Phase/Stage  - Instruct patient to call for assistance with activity based on assessment  - Consider rehabilitation consult to assist with strengthening/weightbearing, etc   Outcome: Progressing     Problem: Knowledge Deficit  Goal: Patient/family/caregiver demonstrates understanding of disease process, treatment plan, medications, and discharge instructions  Description: Complete learning assessment and assess knowledge base    Interventions:  - Provide teaching at level of understanding  - Provide teaching via preferred learning methods  Outcome: Progressing     Problem: DISCHARGE PLANNING  Goal: Discharge to home or other facility with appropriate resources  Description: INTERVENTIONS:  - Identify barriers to discharge w/patient and caregiver  - Arrange for needed discharge resources and transportation as appropriate  - Identify discharge learning needs (meds, wound care, etc )  - Arrange for interpretive services to assist at discharge as needed  - Refer to Case Management Department for coordinating discharge planning if the patient needs post-hospital services based on physician/advanced practitioner order or complex needs related to functional status, cognitive ability, or social support system  Outcome: Progressing

## 2021-01-31 LAB
ABO GROUP BLD: NORMAL
BLD GP AB SCN SERPL QL: NEGATIVE
GLUCOSE SERPL-MCNC: 109 MG/DL (ref 65–140)
GLUCOSE SERPL-MCNC: 113 MG/DL (ref 65–140)
GLUCOSE SERPL-MCNC: 152 MG/DL (ref 65–140)
GLUCOSE SERPL-MCNC: 154 MG/DL (ref 65–140)
GLUCOSE SERPL-MCNC: 178 MG/DL (ref 65–140)
GLUCOSE SERPL-MCNC: 180 MG/DL (ref 65–140)
GLUCOSE SERPL-MCNC: 85 MG/DL (ref 65–140)
GLUCOSE SERPL-MCNC: 87 MG/DL (ref 65–140)
RH BLD: POSITIVE
SPECIMEN EXPIRATION DATE: NORMAL

## 2021-01-31 PROCEDURE — 59025 FETAL NON-STRESS TEST: CPT | Performed by: OBSTETRICS & GYNECOLOGY

## 2021-01-31 PROCEDURE — 86900 BLOOD TYPING SEROLOGIC ABO: CPT | Performed by: OBSTETRICS & GYNECOLOGY

## 2021-01-31 PROCEDURE — 99233 SBSQ HOSP IP/OBS HIGH 50: CPT | Performed by: OBSTETRICS & GYNECOLOGY

## 2021-01-31 PROCEDURE — 86850 RBC ANTIBODY SCREEN: CPT | Performed by: OBSTETRICS & GYNECOLOGY

## 2021-01-31 PROCEDURE — 86901 BLOOD TYPING SEROLOGIC RH(D): CPT | Performed by: OBSTETRICS & GYNECOLOGY

## 2021-01-31 PROCEDURE — NC001 PR NO CHARGE: Performed by: OBSTETRICS & GYNECOLOGY

## 2021-01-31 PROCEDURE — 82948 REAGENT STRIP/BLOOD GLUCOSE: CPT

## 2021-01-31 RX ADMIN — DOCUSATE SODIUM 100 MG: 100 CAPSULE, LIQUID FILLED ORAL at 18:43

## 2021-01-31 RX ADMIN — ASPIRIN 81 MG CHEWABLE TABLET 162 MG: 81 TABLET CHEWABLE at 21:46

## 2021-01-31 RX ADMIN — Medication 1 TABLET: at 09:55

## 2021-01-31 RX ADMIN — METFORMIN ER 500 MG 1500 MG: 500 TABLET ORAL at 21:46

## 2021-01-31 RX ADMIN — OXYCODONE HYDROCHLORIDE AND ACETAMINOPHEN 1000 MG: 500 TABLET ORAL at 09:55

## 2021-01-31 RX ADMIN — Medication 2000 UNITS: at 09:55

## 2021-01-31 RX ADMIN — FOLIC ACID 1 MG: 1 TABLET ORAL at 09:55

## 2021-01-31 RX ADMIN — DOCUSATE SODIUM 100 MG: 100 CAPSULE, LIQUID FILLED ORAL at 09:55

## 2021-01-31 NOTE — PROGRESS NOTES
Progress Note - Maternal Fetal Medicine   Paulino Case 27 y o  female MRN: [id number]  Unit/Bed#: -01 Encounter: [id number]    Assessment:  27 y o  [id number] at 23w3d admitted with PPROM  Hospital day 4    Plan:    PPROM  S/p latency antibiotics outpatient  S/p BTM 1/28-1/29, magnesium x 12 hours  Fetal ascites noted, TORCH labs pending  Fetal echo with slight pericardial effusion    T1DM  Using continuous insulin pump with basal rate 2 25  Meal bolus: 1U per 3g carb (breakfast), per 4g carb (lunch, dinner)  ISF: 1U for every 25mg/dL over 100mg/dL  Plan to decrease basal rate to 1 5 tomorrow  Blood sugars: fasting, preprandial, postprandial, QHS    IUP at 23 weeks  NST TID    FEN: GDM 2200 calorie diet    DVT ppx: SCDs, ambulation      Subjective/Objective   Chief Complaint:     Patient is feeling well  Denies contractions, cramping, vaginal bleeding, leakage of fluid  Denies fever, myalgia chills, shortness of breath, chest pain, lower extremity tenderness  Subjective:     Contractions: no  Loss of fluid: no  Vaginal bleeding: no  Pain: no  Tolerating PO: yes  Voiding: yes  Ambulating: yes  Headaches: no  Visual changes: no  Chest pain: no  Shortness of breath: no  Nausea: no  Vomiting/Diarrhea: no  Dysuria: no  Leg pain: no    Objective:     Vitals:   Temp:  [97 7 °F (36 5 °C)-98 6 °F (37 °C)] 97 9 °F (36 6 °C)  HR:  [73-90] 73  Resp:  [17-20] 17  BP: (105-122)/(58-71) 109/59       Physical Exam  Constitutional:       General: She is not in acute distress  Appearance: She is well-developed  She is not diaphoretic  Pulmonary:      Effort: Pulmonary effort is normal  No respiratory distress  Abdominal:      General: There is no distension  Palpations: Abdomen is soft  There is no mass  Tenderness: There is no abdominal tenderness  There is no guarding or rebound  Hernia: No hernia is present  Neurological:      Mental Status: She is alert and oriented to person, place, and time  Fetal Assessment:  FHT: 140 / Moderate 6 - 25 bpm / 10x10 / reactive  [place]: None    Lab, Imaging and other studies: I have personally reviewed pertinent films in PACS    Lab Results   Component Value Date    WBC 11 48 (H) 01/28/2021    HGB 12 9 01/28/2021    HCT 38 9 01/28/2021    MCV 93 01/28/2021     01/28/2021       Lab Results   Component Value Date    CALCIUM 9 2 11/20/2020    K 4 2 11/20/2020    CO2 25 11/20/2020     11/20/2020    BUN 14 11/20/2020    CREATININE 0 78 11/20/2020       Lab Results   Component Value Date    ALT 23 11/20/2020    AST 14 11/20/2020    ALKPHOS 55 11/20/2020       Maria E Briscoe MD  OBGYN, PGY-4  1/31/2021  6:03 AM

## 2021-01-31 NOTE — QUICK NOTE
Lunch bolus:    50g of carbs for lunch  Preprandial glucose 136  Bolus: 12 5U    Dinner bolus:    67g of carbs for dinner  Preprandial glucose 143  Bolus: Allen Canela MD  OBN, PGY-4  1/30/2021  7:59 PM

## 2021-01-31 NOTE — PLAN OF CARE
Problem: PAIN - ADULT  Goal: Verbalizes/displays adequate comfort level or baseline comfort level  Description: Interventions:  - Encourage patient to monitor pain and request assistance  - Assess pain using appropriate pain scale  - Administer analgesics based on type and severity of pain and evaluate response  - Implement non-pharmacological measures as appropriate and evaluate response  - Consider cultural and social influences on pain and pain management  - Notify physician/advanced practitioner if interventions unsuccessful or patient reports new pain  Outcome: Progressing     Problem: INFECTION - ADULT  Goal: Absence or prevention of progression during hospitalization  Description: INTERVENTIONS:  - Assess and monitor for signs and symptoms of infection  - Monitor lab/diagnostic results  - Monitor all insertion sites, i e  indwelling lines, tubes, and drains  - Monitor endotracheal if appropriate and nasal secretions for changes in amount and color  - [place] appropriate cooling/warming therapies per order  - Administer medications as ordered  - Instruct and encourage patient and family to use good hand hygiene technique  - Identify and instruct in appropriate isolation precautions for identified infection/condition  Outcome: Progressing  Goal: Absence of fever/infection during neutropenic period  Description: INTERVENTIONS:  - Monitor WBC    Outcome: Progressing     Problem: SAFETY ADULT  Goal: Patient will remain free of falls  Description: INTERVENTIONS:  - Assess patient frequently for physical needs  -  Identify cognitive and physical deficits and behaviors that affect risk of falls    -  [place] fall precautions as indicated by assessment   - Educate patient/family on patient safety including physical limitations  - Instruct patient to call for assistance with activity based on assessment  - Modify environment to reduce risk of injury  - Consider OT/PT consult to assist with strengthening/mobility  Outcome: Progressing  Goal: Maintain or return to baseline ADL function  Description: INTERVENTIONS:  -  Assess patient's ability to carry out ADLs; assess patient's baseline for ADL function and identify physical deficits which impact ability to perform ADLs (bathing, care of mouth/teeth, toileting, grooming, dressing, etc )  - Assess/evaluate cause of self-care deficits   - Assess range of motion  - Assess patient's mobility; develop plan if impaired  - Assess patient's need for assistive devices and provide as appropriate  - Encourage maximum independence but intervene and supervise when necessary  - Involve family in performance of ADLs  - Assess for home care needs following discharge   - Consider OT consult to assist with ADL evaluation and planning for discharge  - Provide patient education as appropriate  Outcome: Progressing  Goal: Maintain or return mobility status to optimal level  Description: INTERVENTIONS:  - Assess patient's baseline mobility status (ambulation, transfers, stairs, etc )    - Identify cognitive and physical deficits and behaviors that affect mobility  - Identify mobility aids required to assist with transfers and/or ambulation (gait belt, sit-to-stand, lift, walker, cane, etc )  - [place] fall precautions as indicated by assessment  - Record patient progress and toleration of activity level on Mobility SBAR; progress patient to next Phase/Stage  - Instruct patient to call for assistance with activity based on assessment  - Consider rehabilitation consult to assist with strengthening/weightbearing, etc   Outcome: Progressing     Problem: Knowledge Deficit  Goal: Patient/family/caregiver demonstrates understanding of disease process, treatment plan, medications, and discharge instructions  Description: Complete learning assessment and assess knowledge base    Interventions:  - Provide teaching at level of understanding  - Provide teaching via preferred learning methods  Outcome: Progressing     Problem: DISCHARGE PLANNING  Goal: Discharge to home or other facility with appropriate resources  Description: INTERVENTIONS:  - Identify barriers to discharge w/patient and caregiver  - Arrange for needed discharge resources and transportation as appropriate  - Identify discharge learning needs (meds, wound care, etc )  - Arrange for interpretive services to assist at discharge as needed  - Refer to Case Management Department for coordinating discharge planning if the patient needs post-hospital services based on physician/advanced practitioner order or complex needs related to functional status, cognitive ability, or social support system  Outcome: Progressing

## 2021-02-01 PROBLEM — R73.03 PREDIABETES: Status: RESOLVED | Noted: 2020-12-29 | Resolved: 2021-02-01

## 2021-02-01 LAB
GLUCOSE SERPL-MCNC: 115 MG/DL (ref 65–140)
GLUCOSE SERPL-MCNC: 128 MG/DL (ref 65–140)
GLUCOSE SERPL-MCNC: 137 MG/DL (ref 65–140)
GLUCOSE SERPL-MCNC: 140 MG/DL (ref 65–140)
GLUCOSE SERPL-MCNC: 173 MG/DL (ref 65–140)
GLUCOSE SERPL-MCNC: 180 MG/DL (ref 65–140)
GLUCOSE SERPL-MCNC: 86 MG/DL (ref 65–140)
GLUCOSE SERPL-MCNC: 93 MG/DL (ref 65–140)

## 2021-02-01 PROCEDURE — 59025 FETAL NON-STRESS TEST: CPT | Performed by: OBSTETRICS & GYNECOLOGY

## 2021-02-01 PROCEDURE — 82948 REAGENT STRIP/BLOOD GLUCOSE: CPT

## 2021-02-01 PROCEDURE — 99232 SBSQ HOSP IP/OBS MODERATE 35: CPT | Performed by: OBSTETRICS & GYNECOLOGY

## 2021-02-01 PROCEDURE — NC001 PR NO CHARGE: Performed by: OBSTETRICS & GYNECOLOGY

## 2021-02-01 RX ADMIN — DOCUSATE SODIUM 100 MG: 100 CAPSULE, LIQUID FILLED ORAL at 18:00

## 2021-02-01 RX ADMIN — Medication 1 TABLET: at 08:39

## 2021-02-01 RX ADMIN — ASPIRIN 81 MG CHEWABLE TABLET 162 MG: 81 TABLET CHEWABLE at 22:29

## 2021-02-01 RX ADMIN — OXYCODONE HYDROCHLORIDE AND ACETAMINOPHEN 1000 MG: 500 TABLET ORAL at 08:40

## 2021-02-01 RX ADMIN — Medication 2000 UNITS: at 08:40

## 2021-02-01 RX ADMIN — FOLIC ACID 1 MG: 1 TABLET ORAL at 08:40

## 2021-02-01 RX ADMIN — DOCUSATE SODIUM 100 MG: 100 CAPSULE, LIQUID FILLED ORAL at 08:40

## 2021-02-01 RX ADMIN — METFORMIN ER 500 MG 1500 MG: 500 TABLET ORAL at 22:29

## 2021-02-01 NOTE — UTILIZATION REVIEW
Notification of Inpatient Admission/Inpatient Authorization Request - Your request has been successfully submitted and the reference number is [id number]     This is a Notification of Inpatient Admission for [address]  Be advised that this patient was admitted to our facility under Inpatient Status  Contact Ike Majano at [phone] for additional admission information  [facility]  DEDICATED [phone]  Patient Name:   Mack Lino   YOB: 1990       [address]   [address]:   Rodrigo Jones  Tax ID: [id number]  NPI: [id number] Attending Provider/NPI: Ericka Pabon Md [[id number]]   Place of Service Code: 24     Place of Service Name:  [address] [place] [address]   Start Date: 1/28/21 5364     Discharge Date & Time: No discharge date for patient encounter  Type of Admission: Inpatient Status Discharge Disposition   (if discharged): Home/Self Care   Patient Diagnoses: 23 weeks gestation of pregnancy [Z3A 23]     Orders: Admission Orders (From admission, onward)     Ordered        01/28/21 0950  INPATIENT ADMISSION  Once                    Assigned Utilization Review Contact: Ike Majano  Utilization   Network Utilization Review Department  Phone: [phone]; Fax [phone]  Email: [email or web address]   ATTENTION PAYERS: Please call the assigned Utilization  directly with any questions or concerns ALL voicemails in the department are confidential  Send all requests for admission clinical reviews, approved or denied determinations and any other requests to dedicated fax number belonging to the campus where the patient is receiving treatment       Rosalina Dunbar RN   Registered Nurse   Specialty:  Utilization Review   Utilization Review   Signed   Date of Service:  1/29/2021  1:09 PM               Signed        Expand All Collapse All      Initial Clinical Review     Admission: Date/Time/Statement:       Admission Orders (From admission, onward)              Ordered          01/28/21 0950   INPATIENT ADMISSION  Once                             Orders Placed This Encounter   Procedures    INPATIENT ADMISSION       Standing Status:   Standing       Number of Occurrences:   1       Order Specific Question:   Level of Care       Answer:   Med Surg [16]       Order Specific Question:   Estimated length of stay       Answer:   More than 2 Midnights       Order Specific Question:   Certification       Answer:   I certify that inpatient services are medically necessary for this patient for a duration of greater than two midnights   See H&P and MD Progress Notes for additional information about the patient's course of treatment            Chief Complaint   Patient presents with    Rupture of Membranes       premature at 17weeks 4 days      Assessment/Plan:  28 yo femle G 3 P 0 @  23w0d by embryo transfer date, IVF pregnancy, presented to L&D from home as inpatient admission for PPROM since  17 3/7 wks Patient continues to leak good fetal movement  PMHX DM on insulin pump  Estimated Fetal Weight: 1 lbs  Presentation: [place]  SVE: deferred  FHT:  150 / Moderate 6 - 25 bpm / + 10x10 accelerations, no decelerations  [place]: quiet  Plan BTM MGSO 4 x 12 hrs for neuro protection, Consult perinatology neonatology  Will remains as inpatient admission until delivery        01-23-21 showed absent GORDON             ED Triage Vitals   Temperature Pulse Respirations Blood Pressure SpO2   01/28/21 1010 01/28/21 1010 01/28/21 1010 01/28/21 1010 01/28/21 1200   99 1 °F (37 3 °C) 88 16 129/66 98 %       Temp Source Heart Rate Source Patient Position - Orthostatic VS BP Location FiO2 (%)   01/28/21 1010 01/28/21 1010 01/28/21 2000 01/28/21 2000 --   Oral Monitor Sitting Right arm         Pain Score           01/28/21 0952           No Pain              Additional Vital Signs:   Date/Time   Temp   Pulse   Resp   BP   SpO2   O2 Device   Cardiac (WDL)   Patient Position - Orthostatic VS   01/29/21 1147   98 1 °F (36 7 °C)   102   20   120/68   99 %            01/29/21 0846   98 2 °F (36 8 °C)   99   20   130/72   98 %            01/29/21 0800                     WDL      01/29/21 0408   98 3 °F (36 8 °C)   94   18   121/76   95 %   None (Room air)      Sitting   01/29/21 0000   97 9 °F (36 6 °C)   97   18   116/60   96 %   None (Room air)      Sitting   01/28/21 2200      102   18   105/58   95 %   None (Room air)      Sitting   01/28/21 2000   97 7 °F (36 5 °C)   107Abnormal    18   117/73   95 %   None (Room air)   WDL   Sitting   01/28/21 1900                           Comment rows:   OBSERV: pt states no leaking of vaginal fluid since arrival to hospital today at 01/28/21 1900   01/28/21 1800      109Abnormal    18   113/71   95 %            01/28/21 1600   98 °F (36 7 °C)   100   18   114/64   96 %            01/28/21 1410      100      124/75               01/28/21 1400   97 6 °F (36 4 °C)   100   18   124/75   94 %            01/28/21 1205      96      122/78               01/28/21 1200   98 1 °F (36 7 °C)   96   18   122/78   98 %                  Pertinent Labs/Diagnostic Test Results:            Results from last 7 days   Lab Units 01/28/21  1044   WBC Thousand/uL 11 48*   HEMOGLOBIN g/dL 12 9   HEMATOCRIT % 38 9   PLATELETS Thousands/uL 204                             Results from last 7 days   Lab Units 01/29/21  1154 01/29/21  0942 01/29/21  0304 01/28/21  2050 01/28/21  1819 01/28/21  1603 01/28/21  1345   POC GLUCOSE mg/dl 166* 126 132 176* 140 203* 94            Medical History        Past Medical History:   Diagnosis Date    Diabetes mellitus ([address] )      Female infertility      Varicella          Present on Admission:  **None**        Admitting Diagnosis: 23 weeks gestation of pregnancy [Z3A 23]  Age/Sex: 27 y o  female  Admission Orders:  Scheduled Medications:  vitamin C, 1,000 mg, Oral, Daily  aspirin, 162 mg, Oral, Daily  cholecalciferol, 2,000 Units, Oral, Daily  docusate sodium, 100 mg, Oral, BID  folic acid, 1 mg, Oral, Daily  metFORMIN, 1,500 mg, Oral, Daily  patient maintained insulin pump, 1 each, Subcutaneous, Q8H  prenatal multivitamin, 1 tablet, Oral, Daily        Continuous IV Infusions:  sodium chloride, 125 mL/hr, Intravenous, Continuous        PRN Meds:  acetaminophen, 650 mg, Oral, Q4H PRN  calcium carbonate, 1,000 mg, Oral, Daily PRN  insulin aspart, 100 Units, Subcutaneous Insulin Pump, Daily PRN           IP CONSULT TO ANESTHESIOLOGY  IP CONSULT TO PERINATOLOGY  IP CONSULT TO NEONATOLOGY   Blood sugar ac and hs  NST TID         Network Utilization Review Department  ATTENTION: Please call with any questions or concerns to [phone] and carefully listen to the prompts so that you are directed to the right person  All voicemails are confidential   Finis Feeling all requests for admission clinical reviews, approved or denied determinations and any other requests to dedicated fax number below belonging to the campus where the patient is receiving treatment   List of dedicated fax numbers for the Facilities:  [address] DENIALS (Administrative/Medical Necessity) [phone]   [address] (Maternity/NICU/Pediatrics) [phone]   [address] Damian Rey 0461 ([facility]) [address] [place] [address] [address] [place]) [phone] [address] [place] [address] Pkwy [phone]

## 2021-02-01 NOTE — UTILIZATION REVIEW
Continued Stay Review    Date: 02-01-21                          Current Patient Class: inpatient  Current Level of Care: medical    HPI:30 y o  female initially admitted on *01-28-21 for PPROM @ 23 wks  ruptured since 17 wks     Assessment/Plan: s/p BTM MGSO4 IV and po antibiotics 23 4/7 wks continues to leak, good fetal movement denies cramping or contractions  Plan maintain pregnancy as long as possible  Deliver if S/S of infection, fetal distress  Patient will remain in hospital until delivery  01-29-21 US   Anhydramnios  Repeat US every Tuesday and Friday   Pertinent Labs/Diagnostic Results:       Results from last 7 days   Lab Units 01/28/21  1044   WBC Thousand/uL 11 48*   HEMOGLOBIN g/dL 12 9   HEMATOCRIT % 38 9   PLATELETS Thousands/uL 204     Results from last 7 days   Lab Units 02/01/21  0301 01/31/21  2145 01/31/21  1940 01/31/21  1620 01/31/21  1341 01/31/21  1112 01/31/21  0858 01/31/21  0604 01/31/21  0251 01/30/21  2152 01/30/21  1837 01/30/21  1627   POC GLUCOSE mg/dl 128 152* 178* 180* 109 154* 87 85 113 151* 143* 259*     Vital Signs:   /Time  Temp  Pulse  Resp  BP  SpO2  O2 Device  Cardiac (WDL)  Patient Position - Orthostatic VS   02/01/21 0605                   Comment rows:   OBSERV: Sent tigertext to [facility] to notify that pt forgot to decrease her basil rate at 2200 but did adjust it to 1 5 at this time  at 02/01/21 0605   02/01/21 0348  98 4 °F (36 9 °C)  77  18  117/59  100 %      Lying   02/01/21 0020  97 9 °F (36 6 °C)  80  18  113/60           01/31/21 2200                   Comment rows:   OBSERV: notified dr Simone Solis of pt's bs of 152 and that she still has 8 1 active units of insulin  He instructed me to let her know to change basil rate down to 1 5 now  Pt understands and will lower it   at 01/31/21 2200   01/31/21 2100            None (Room air)  WDL     01/31/21 2020  98 °F (36 7 °C)  91  18  112/67                 Medications:   Scheduled Medications:  vitamin C, 1,000 mg, Oral, Daily  aspirin, 162 mg, Oral, Daily  cholecalciferol, 2,000 Units, Oral, Daily  docusate sodium, 100 mg, Oral, BID  folic acid, 1 mg, Oral, Daily  metFORMIN, 1,500 mg, Oral, Daily  patient maintained insulin pump, 1 each, Subcutaneous, Q8H  prenatal multivitamin, 1 tablet, Oral, Daily      Continuous IV Infusions:  sodium chloride, 125 mL/hr, Intravenous, Continuous      PRN Meds:  acetaminophen, 650 mg, Oral, Q4H PRN  calcium carbonate, 1,000 mg, Oral, Daily PRN  insulin aspart, 100 Units, Subcutaneous Insulin Pump, Daily PRN        Discharge Plan:     Network Utilization Review Department  ATTENTION: Please call with any questions or concerns to [phone] and carefully listen to the prompts so that you are directed to the right person  All voicemails are confidential   Bertha Fu all requests for admission clinical reviews, approved or denied determinations and any other requests to dedicated fax number below belonging to the campus where the patient is receiving treatment   List of dedicated fax numbers for the Facilities:  [address] DENIALS (Administrative/Medical Necessity) [phone]   [address] (Maternity/NICU/Pediatrics) [phone] [address] [place] [address] Dr Damian Rey 2127 ([facility]) [address] [place] [address] [address] [place] [address] [phone]

## 2021-02-01 NOTE — PROGRESS NOTES
Progress Note - Maternal Fetal Medicine   Cramen Linares 27 y o  female MRN: [id number]  Unit/Bed#: -01 Encounter: [id number]    Assessment:  27 y o  [id number] at 23w4d admitted with PPROM @ 17w  Hospital day #5  Plan:    #1  PPROM:  S/p latency antibiotics outpatient  S/p BTM 1/28-1/29, magnesium x 12 hours  Fetal ascites noted, TORCH labs pending  Fetal echo with slight pericardial effusion     #2  T1DM:  Using continuous insulin pump with basal rate 2 25 -> decreased to 1 5 this AM per patient  Meal bolus: 1U per 3g carb (breakfast), per 4g carb (lunch, dinner)  Blood sugars: fasting, preprandial, postprandial, QHS     IUP at 23w4d:  NST TID     FEN: GDM 2200 calorie diet     DVT ppx: SCDs, ambulation      Subjective:   Denies contractions or cramping  No vaginal bleeding  Continues to have some leakage, not foul smelling  Denies SOB or chest pain  Changed her basal rate this AM       Objective:     Vitals:   Vitals:    01/31/21 1547 01/31/21 2020 02/01/21 0020 02/01/21 0348   BP: 112/62 112/67 113/60 117/59   BP Location: Left arm Left arm  Right arm   Pulse: 80 91 80 77   Resp: 18 18 18 18   Temp: 98 3 °F (36 8 °C) 98 °F (36 7 °C) 97 9 °F (36 6 °C) 98 4 °F (36 9 °C)   TempSrc: Oral Oral Oral Oral   SpO2: 97%   100%   Height:             Physical Exam  Constitutional:       General: She is not in acute distress  Appearance: She is normal weight  She is not ill-appearing  Cardiovascular:      Rate and Rhythm: Normal rate and regular rhythm  Heart sounds: Normal heart sounds  Pulmonary:      Effort: Pulmonary effort is normal  No respiratory distress  Breath sounds: Normal breath sounds  Skin:     General: Skin is warm and dry  Neurological:      Mental Status: She is alert and oriented to person, place, and time     Psychiatric:         Mood and Affect: Mood normal          Behavior: Behavior normal            Fetal Assessment:  FHT: 140 / Moderate 6 - 25 bpm / no decels, reactive  [place]: quiet    Lab, Imaging and other studies: I have personally reviewed pertinent reports        Lab Results   Component Value Date    WBC 11 48 (H) 01/28/2021    HGB 12 9 01/28/2021    HCT 38 9 01/28/2021    MCV 93 01/28/2021     01/28/2021       Lab Results   Component Value Date    CALCIUM 9 2 11/20/2020    K 4 2 11/20/2020    CO2 25 11/20/2020     11/20/2020    BUN 14 11/20/2020    CREATININE 0 78 11/20/2020       Lab Results   Component Value Date    ALT 23 11/20/2020    AST 14 11/20/2020    ALKPHOS 55 11/20/2020       Maddy Lewis MD  OB/GYN PGY-3  2/1/2021  6:22 AM

## 2021-02-02 LAB
ABO GROUP BLD: NORMAL
BLD GP AB SCN SERPL QL: NEGATIVE
GLUCOSE SERPL-MCNC: 102 MG/DL (ref 65–140)
GLUCOSE SERPL-MCNC: 116 MG/DL (ref 65–140)
GLUCOSE SERPL-MCNC: 117 MG/DL (ref 65–140)
GLUCOSE SERPL-MCNC: 120 MG/DL (ref 65–140)
GLUCOSE SERPL-MCNC: 126 MG/DL (ref 65–140)
GLUCOSE SERPL-MCNC: 138 MG/DL (ref 65–140)
GLUCOSE SERPL-MCNC: 163 MG/DL (ref 65–140)
GLUCOSE SERPL-MCNC: 214 MG/DL (ref 65–140)
RH BLD: POSITIVE
SPECIMEN EXPIRATION DATE: NORMAL

## 2021-02-02 PROCEDURE — 99232 SBSQ HOSP IP/OBS MODERATE 35: CPT | Performed by: OBSTETRICS & GYNECOLOGY

## 2021-02-02 PROCEDURE — 82948 REAGENT STRIP/BLOOD GLUCOSE: CPT

## 2021-02-02 PROCEDURE — 87389 HIV-1 AG W/HIV-1&-2 AB AG IA: CPT | Performed by: OBSTETRICS & GYNECOLOGY

## 2021-02-02 PROCEDURE — 86850 RBC ANTIBODY SCREEN: CPT | Performed by: OBSTETRICS & GYNECOLOGY

## 2021-02-02 PROCEDURE — 86901 BLOOD TYPING SEROLOGIC RH(D): CPT | Performed by: OBSTETRICS & GYNECOLOGY

## 2021-02-02 PROCEDURE — 86900 BLOOD TYPING SEROLOGIC ABO: CPT | Performed by: OBSTETRICS & GYNECOLOGY

## 2021-02-02 RX ADMIN — Medication 1 TABLET: at 08:22

## 2021-02-02 RX ADMIN — DOCUSATE SODIUM 100 MG: 100 CAPSULE, LIQUID FILLED ORAL at 18:06

## 2021-02-02 RX ADMIN — FOLIC ACID 1 MG: 1 TABLET ORAL at 08:22

## 2021-02-02 RX ADMIN — DOCUSATE SODIUM 100 MG: 100 CAPSULE, LIQUID FILLED ORAL at 08:22

## 2021-02-02 RX ADMIN — OXYCODONE HYDROCHLORIDE AND ACETAMINOPHEN 1000 MG: 500 TABLET ORAL at 08:22

## 2021-02-02 RX ADMIN — Medication 2000 UNITS: at 08:22

## 2021-02-02 RX ADMIN — METFORMIN ER 500 MG 1500 MG: 500 TABLET ORAL at 21:20

## 2021-02-02 RX ADMIN — ASPIRIN 81 MG CHEWABLE TABLET 162 MG: 81 TABLET CHEWABLE at 21:20

## 2021-02-02 NOTE — PLAN OF CARE
Problem: PAIN - ADULT  Goal: Verbalizes/displays adequate comfort level or baseline comfort level  Description: Interventions:  - Encourage patient to monitor pain and request assistance  - Assess pain using appropriate pain scale  - Administer analgesics based on type and severity of pain and evaluate response  - Implement non-pharmacological measures as appropriate and evaluate response  - Consider cultural and social influences on pain and pain management  - Notify physician/advanced practitioner if interventions unsuccessful or patient reports new pain  Outcome: Progressing     Problem: INFECTION - ADULT  Goal: Absence or prevention of progression during hospitalization  Description: INTERVENTIONS:  - Assess and monitor for signs and symptoms of infection  - Monitor lab/diagnostic results  - Monitor all insertion sites, i e  indwelling lines, tubes, and drains  - Monitor endotracheal if appropriate and nasal secretions for changes in amount and color  - [place] appropriate cooling/warming therapies per order  - Administer medications as ordered  - Instruct and encourage patient and family to use good hand hygiene technique  - Identify and instruct in appropriate isolation precautions for identified infection/condition  Outcome: Progressing  Goal: Absence of fever/infection during neutropenic period  Description: INTERVENTIONS:  - Monitor WBC    Outcome: Progressing     Problem: SAFETY ADULT  Goal: Patient will remain free of falls  Description: INTERVENTIONS:  - Assess patient frequently for physical needs  -  Identify cognitive and physical deficits and behaviors that affect risk of falls    -  [place] fall precautions as indicated by assessment   - Educate patient/family on patient safety including physical limitations  - Instruct patient to call for assistance with activity based on assessment  - Modify environment to reduce risk of injury  - Consider OT/PT consult to assist with strengthening/mobility  Outcome: Progressing  Goal: Maintain or return to baseline ADL function  Description: INTERVENTIONS:  -  Assess patient's ability to carry out ADLs; assess patient's baseline for ADL function and identify physical deficits which impact ability to perform ADLs (bathing, care of mouth/teeth, toileting, grooming, dressing, etc )  - Assess/evaluate cause of self-care deficits   - Assess range of motion  - Assess patient's mobility; develop plan if impaired  - Assess patient's need for assistive devices and provide as appropriate  - Encourage maximum independence but intervene and supervise when necessary  - Involve family in performance of ADLs  - Assess for home care needs following discharge   - Consider OT consult to assist with ADL evaluation and planning for discharge  - Provide patient education as appropriate  Outcome: Progressing  Goal: Maintain or return mobility status to optimal level  Description: INTERVENTIONS:  - Assess patient's baseline mobility status (ambulation, transfers, stairs, etc )    - Identify cognitive and physical deficits and behaviors that affect mobility  - Identify mobility aids required to assist with transfers and/or ambulation (gait belt, sit-to-stand, lift, walker, cane, etc )  - [place] fall precautions as indicated by assessment  - Record patient progress and toleration of activity level on Mobility SBAR; progress patient to next Phase/Stage  - Instruct patient to call for assistance with activity based on assessment  - Consider rehabilitation consult to assist with strengthening/weightbearing, etc   Outcome: Progressing     Problem: DISCHARGE PLANNING  Goal: Discharge to home or other facility with appropriate resources  Description: INTERVENTIONS:  - Identify barriers to discharge w/patient and caregiver  - Arrange for needed discharge resources and transportation as appropriate  - Identify discharge learning needs (meds, wound care, etc )  - Arrange for interpretive services to assist at discharge as needed  - Refer to Case Management Department for coordinating discharge planning if the patient needs post-hospital services based on physician/advanced practitioner order or complex needs related to functional status, cognitive ability, or social support system  Outcome: Progressing     Problem: ANTEPARTUM  Goal: Maintain pregnancy as long as maternal and/or fetal condition is stable  Description: INTERVENTIONS:  - Maternal surveillance  - Fetal surveillance  - Monitor uterine activity  - Medications as ordered  - Bedrest  Outcome: Progressing     Problem: DISCHARGE PLANNING - CARE MANAGEMENT  Goal: Discharge to post-acute care or home with appropriate resources  Description: INTERVENTIONS:  - Conduct assessment to determine patient/family and health care team treatment goals, and need for post-acute services based on payer coverage, community resources, and patient preferences, and barriers to discharge  - Address psychosocial, clinical, and financial barriers to discharge as identified in assessment in conjunction with the patient/family and health care team  - Arrange appropriate level of post-acute services according to patients   needs and preference and payer coverage in collaboration with the physician and health care team  - Communicate with and update the patient/family, physician, and health care team regarding progress on the discharge plan  - Arrange appropriate transportation to post-acute venues  Outcome: Progressing

## 2021-02-02 NOTE — PROGRESS NOTES
Progress Note - Maternal Fetal Medicine   [place] Ashleyo 27 y o  female MRN: [id number]  Unit/Bed#: -01 Encounter: [id number]    Assessment:  27 y o   at 23w5d admitted with PPROM  Hospital day 6    Plan:    PPROM  S/p latency antibiotics outpatient  S/p BTM -, magnesium x 12 hours  Fetal ascites noted, TORCH labs pending  Fetal echo with slight pericardial effusion    T1DM  Using continuous insulin pump with basal rate 1 5  Meal bolus: 1U per 3g carb (breakfast), per 4g carb (lunch, dinner)  ISF: 1U for every 25mg/dL over 100mg/dL  Blood sugars: fasting, preprandial, postprandial, QHS  Patient has high postprandial glucose; Dr Samir Brink to adjust insulin regimen with patient this AM     IUP at 23 weeks  NST TID    FEN: GDM 2200 calorie diet    DVT ppx: SCDs, ambulation      Subjective/Objective   Chief Complaint:     Patient is feeling well  Denies contractions, cramping, vaginal bleeding, leakage of fluid  Denies fever, myalgia chills, shortness of breath, chest pain, lower extremity tenderness  She is concerned about her insulin regimen  She reports she has needed to give herself boluses for high postprandial levels, then would drop low requiring saltine crackers or a snack to level herself out  She would like to speak with Dr Samir Brink this morning regarding her insulin regimen  Subjective:     Contractions: no  Loss of fluid: no  Vaginal bleeding: no  Pain: no  Tolerating PO: yes  Voiding: yes  Ambulating: yes  Headaches: no  Visual changes: no  Chest pain: no  Shortness of breath: no  Nausea: no  Vomiting/Diarrhea: no  Dysuria: no  Leg pain: no    Objective:     Vitals:   Temp:  [97 9 °F (36 6 °C)-98 3 °F (36 8 °C)] 98 3 °F (36 8 °C)  HR:  [] 86  Resp:  [15-18] 15  BP: (112-127)/(56-78) 127/76       Physical Exam  Constitutional:       General: She is not in acute distress  Appearance: She is well-developed  She is not diaphoretic     Pulmonary:      Effort: Pulmonary effort is normal    Abdominal:      General: There is no distension  Palpations: Abdomen is soft  There is no mass  Tenderness: There is no abdominal tenderness  There is no guarding or rebound  Hernia: No hernia is present  Neurological:      Mental Status: She is alert and oriented to person, place, and time         Fetal Assessment:  FHT: 140 / Moderate 6 - 25 bpm / 10x10 / reactive  [place]: None    Lab, Imaging and other studies: I have personally reviewed pertinent films in PACS    Lab Results   Component Value Date    WBC 11 48 (H) 01/28/2021    HGB 12 9 01/28/2021    HCT 38 9 01/28/2021    MCV 93 01/28/2021     01/28/2021       Lab Results   Component Value Date    CALCIUM 9 2 11/20/2020    K 4 2 11/20/2020    CO2 25 11/20/2020     11/20/2020    BUN 14 11/20/2020    CREATININE 0 78 11/20/2020       Lab Results   Component Value Date    ALT 23 11/20/2020    AST 14 11/20/2020    ALKPHOS 55 11/20/2020       Víctor Aquino MD  OBGYN, PGY-4  2/2/2021  7:34 AM

## 2021-02-03 LAB
CMV IGG SERPL IA-ACNC: 7.9 U/ML (ref 0–0.59)
GLUCOSE SERPL-MCNC: 121 MG/DL (ref 65–140)
GLUCOSE SERPL-MCNC: 129 MG/DL (ref 65–140)
GLUCOSE SERPL-MCNC: 131 MG/DL (ref 65–140)
GLUCOSE SERPL-MCNC: 135 MG/DL (ref 65–140)
GLUCOSE SERPL-MCNC: 177 MG/DL (ref 65–140)
GLUCOSE SERPL-MCNC: 181 MG/DL (ref 65–140)
GLUCOSE SERPL-MCNC: 214 MG/DL (ref 65–140)
HSV1 IGG SER IA-ACNC: <0.91 INDEX (ref 0–0.9)
HSV2 IGG SER IA-ACNC: <0.91 INDEX (ref 0–0.9)
RUBV IGG SERPL IA-ACNC: 1.25 INDEX
T GONDII IGG SERPL IA-ACNC: <3 IU/ML (ref 0–7.1)

## 2021-02-03 PROCEDURE — 59025 FETAL NON-STRESS TEST: CPT | Performed by: OBSTETRICS & GYNECOLOGY

## 2021-02-03 PROCEDURE — 76816 OB US FOLLOW-UP PER FETUS: CPT | Performed by: OBSTETRICS & GYNECOLOGY

## 2021-02-03 PROCEDURE — NC001 PR NO CHARGE: Performed by: OBSTETRICS & GYNECOLOGY

## 2021-02-03 PROCEDURE — 99232 SBSQ HOSP IP/OBS MODERATE 35: CPT | Performed by: OBSTETRICS & GYNECOLOGY

## 2021-02-03 PROCEDURE — 82948 REAGENT STRIP/BLOOD GLUCOSE: CPT

## 2021-02-03 RX ADMIN — OXYCODONE HYDROCHLORIDE AND ACETAMINOPHEN 1000 MG: 500 TABLET ORAL at 08:18

## 2021-02-03 RX ADMIN — Medication 2000 UNITS: at 08:15

## 2021-02-03 RX ADMIN — Medication 1 TABLET: at 08:18

## 2021-02-03 RX ADMIN — DOCUSATE SODIUM 100 MG: 100 CAPSULE, LIQUID FILLED ORAL at 21:57

## 2021-02-03 RX ADMIN — DOCUSATE SODIUM 100 MG: 100 CAPSULE, LIQUID FILLED ORAL at 08:18

## 2021-02-03 RX ADMIN — METFORMIN ER 500 MG 1500 MG: 500 TABLET ORAL at 21:57

## 2021-02-03 RX ADMIN — ASPIRIN 81 MG CHEWABLE TABLET 162 MG: 81 TABLET CHEWABLE at 21:57

## 2021-02-03 RX ADMIN — FOLIC ACID 1 MG: 1 TABLET ORAL at 08:15

## 2021-02-03 NOTE — QUICK NOTE
NST 02/03/21 Time: 1422 - 1442    Baseline: 145-150  Variability: moderate  Accelerations: present, 10x10  Decelerations: absent  Contractions: absent  Assessment: not indicative of immediate need to delivery (23w6d gestational age)  Plan: continue TID and PRN    Results from last 7 days   Lab Units 02/03/21  1609 02/03/21  1316 02/03/21  1125 02/03/21  0855 02/03/21  0606 02/02/21  2318   POC GLUCOSE mg/dl 177* 121 135 131 129 120     Waldron Dubin, MD 5:15 PM

## 2021-02-03 NOTE — PLAN OF CARE
Problem: PAIN - ADULT  Goal: Verbalizes/displays adequate comfort level or baseline comfort level  Description: Interventions:  - Encourage patient to monitor pain and request assistance  - Assess pain using appropriate pain scale  - Administer analgesics based on type and severity of pain and evaluate response  - Implement non-pharmacological measures as appropriate and evaluate response  - Consider cultural and social influences on pain and pain management  - Notify physician/advanced practitioner if interventions unsuccessful or patient reports new pain  Outcome: Progressing     Problem: INFECTION - ADULT  Goal: Absence or prevention of progression during hospitalization  Description: INTERVENTIONS:  - Assess and monitor for signs and symptoms of infection  - Monitor lab/diagnostic results  - Monitor all insertion sites, i e  indwelling lines, tubes, and drains  - Monitor endotracheal if appropriate and nasal secretions for changes in amount and color  - [place] appropriate cooling/warming therapies per order  - Administer medications as ordered  - Instruct and encourage patient and family to use good hand hygiene technique  - Identify and instruct in appropriate isolation precautions for identified infection/condition  Outcome: Progressing  Goal: Absence of fever/infection during neutropenic period  Description: INTERVENTIONS:  - Monitor WBC    Outcome: Progressing     Problem: SAFETY ADULT  Goal: Patient will remain free of falls  Description: INTERVENTIONS:  - Assess patient frequently for physical needs  -  Identify cognitive and physical deficits and behaviors that affect risk of falls    -  [place] fall precautions as indicated by assessment   - Educate patient/family on patient safety including physical limitations  - Instruct patient to call for assistance with activity based on assessment  - Modify environment to reduce risk of injury  - Consider OT/PT consult to assist with strengthening/mobility  Outcome: Progressing  Goal: Maintain or return to baseline ADL function  Description: INTERVENTIONS:  -  Assess patient's ability to carry out ADLs; assess patient's baseline for ADL function and identify physical deficits which impact ability to perform ADLs (bathing, care of mouth/teeth, toileting, grooming, dressing, etc )  - Assess/evaluate cause of self-care deficits   - Assess range of motion  - Assess patient's mobility; develop plan if impaired  - Assess patient's need for assistive devices and provide as appropriate  - Encourage maximum independence but intervene and supervise when necessary  - Involve family in performance of ADLs  - Assess for home care needs following discharge   - Consider OT consult to assist with ADL evaluation and planning for discharge  - Provide patient education as appropriate  Outcome: Progressing  Goal: Maintain or return mobility status to optimal level  Description: INTERVENTIONS:  - Assess patient's baseline mobility status (ambulation, transfers, stairs, etc )    - Identify cognitive and physical deficits and behaviors that affect mobility  - Identify mobility aids required to assist with transfers and/or ambulation (gait belt, sit-to-stand, lift, walker, cane, etc )  - [place] fall precautions as indicated by assessment  - Record patient progress and toleration of activity level on Mobility SBAR; progress patient to next Phase/Stage  - Instruct patient to call for assistance with activity based on assessment  - Consider rehabilitation consult to assist with strengthening/weightbearing, etc   Outcome: Progressing     Problem: DISCHARGE PLANNING  Goal: Discharge to home or other facility with appropriate resources  Description: INTERVENTIONS:  - Identify barriers to discharge w/patient and caregiver  - Arrange for needed discharge resources and transportation as appropriate  - Identify discharge learning needs (meds, wound care, etc )  - Arrange for interpretive services to assist at discharge as needed  - Refer to Case Management Department for coordinating discharge planning if the patient needs post-hospital services based on physician/advanced practitioner order or complex needs related to functional status, cognitive ability, or social support system  Outcome: Progressing     Problem: ANTEPARTUM  Goal: Maintain pregnancy as long as maternal and/or fetal condition is stable  Description: INTERVENTIONS:  - Maternal surveillance  - Fetal surveillance  - Monitor uterine activity  - Medications as ordered  - Bedrest  Outcome: Progressing     Problem: DISCHARGE PLANNING - CARE MANAGEMENT  Goal: Discharge to post-acute care or home with appropriate resources  Description: INTERVENTIONS:  - Conduct assessment to determine patient/family and health care team treatment goals, and need for post-acute services based on payer coverage, community resources, and patient preferences, and barriers to discharge  - Address psychosocial, clinical, and financial barriers to discharge as identified in assessment in conjunction with the patient/family and health care team  - Arrange appropriate level of post-acute services according to patients   needs and preference and payer coverage in collaboration with the physician and health care team  - Communicate with and update the patient/family, physician, and health care team regarding progress on the discharge plan  - Arrange appropriate transportation to post-acute venues  Outcome: Progressing

## 2021-02-03 NOTE — PROGRESS NOTES
Progress Note - Maternal Fetal Medicine   Reema Willams 27 y o  female MRN: [id number]  Unit/Bed#: -01 Encounter: [id number]    Assessment:  27 y o  [id number] at 23w6d admitted with PPROM  Hospital day 7    Plan:  1  PPROM  - S/p Latency antibiotics  - S/p BTM 1/28-1/29, magnesium 12h  - There was noted fetal ascites with no significant hydrops    - Fetal Echo with slight pericardial effusion    2  T1DM  - using continous insulin with basal rate 1 6  - meal bolus: 1U per 3g carbs (breakfast), per 4 g carb (lunch, dinner)  - ISF: 1U for every 25mg/dL over 100mg/dL  - blood glucose levels [phone]  - we may consider to  Discuss lowering carb  content in diet    3  IUP at 23w  - NST TID    4  FEN  - GDM 2200 ruddy    5  DVT PPx  - SCDs, ambulation    Subjective/Objective   Chief Complaint:     Patient is feeling well decline contractions fluid leakage, vaginal bleeding and reporting good fetal movements  She decline fever myalgia, chills shortness of breath, chest pain lower extremity pain  Subjective:     Contractions: no  Loss of fluid: yes  Vaginal bleeding: no  Fetal movement: yes    Pain: no  Tolerating PO: yes  Voiding: yes  Ambulating: yes  Headaches: no  Visual changes: no  Chest pain: no  Shortness of breath: no  Nausea: no  Vomiting/Diarrhea: no  Dysuria: no  Leg pain: no    Objective:     Vitals:   Temp:  [97 6 °F (36 4 °C)-98 4 °F (36 9 °C)] 98 2 °F (36 8 °C)  HR:  [] 87  Resp:  [16-18] 18  BP: (101-129)/(56-75) 105/63       Physical Exam  Constitutional:       Appearance: She is obese  Cardiovascular:      Rate and Rhythm: Normal rate  Pulmonary:      Effort: Pulmonary effort is normal    Abdominal:      Palpations: Abdomen is soft  Musculoskeletal: Normal range of motion  Skin:     General: Skin is warm  Neurological:      General: No focal deficit present  Mental Status: She is alert and oriented to person, place, and time     Psychiatric:         Mood and Affect: Mood normal          Behavior: Behavior normal            Fetal Assessment:  FHT: 145 / Moderate 6 - 25 bpm / + accels and no decels, reactive  [place]: q no contractions    Lab, Imaging and other studies: I have personally reviewed pertinent reports        Lab Results   Component Value Date    WBC 11 48 (H) 01/28/2021    HGB 12 9 01/28/2021    HCT 38 9 01/28/2021    MCV 93 01/28/2021     01/28/2021       Lab Results   Component Value Date    CALCIUM 9 2 11/20/2020    K 4 2 11/20/2020    CO2 25 11/20/2020     11/20/2020    BUN 14 11/20/2020    CREATININE 0 78 11/20/2020       Lab Results   Component Value Date    ALT 23 11/20/2020    AST 14 11/20/2020    ALKPHOS 55 43/91/8461       Grecia Hammer MD  OBGYN, PGY-3  2/3/2021  6:03 AM

## 2021-02-04 LAB
GLUCOSE SERPL-MCNC: 116 MG/DL (ref 65–140)
GLUCOSE SERPL-MCNC: 122 MG/DL (ref 65–140)
GLUCOSE SERPL-MCNC: 127 MG/DL (ref 65–140)
GLUCOSE SERPL-MCNC: 135 MG/DL (ref 65–140)
GLUCOSE SERPL-MCNC: 150 MG/DL (ref 65–140)
GLUCOSE SERPL-MCNC: 156 MG/DL (ref 65–140)
GLUCOSE SERPL-MCNC: 222 MG/DL (ref 65–140)
HIV 1+2 AB+HIV1 P24 AG SERPL QL IA: NORMAL

## 2021-02-04 PROCEDURE — 82948 REAGENT STRIP/BLOOD GLUCOSE: CPT

## 2021-02-04 PROCEDURE — 99232 SBSQ HOSP IP/OBS MODERATE 35: CPT | Performed by: OBSTETRICS & GYNECOLOGY

## 2021-02-04 RX ADMIN — OXYCODONE HYDROCHLORIDE AND ACETAMINOPHEN 1000 MG: 500 TABLET ORAL at 09:31

## 2021-02-04 RX ADMIN — FOLIC ACID 1 MG: 1 TABLET ORAL at 09:31

## 2021-02-04 RX ADMIN — DOCUSATE SODIUM 100 MG: 100 CAPSULE, LIQUID FILLED ORAL at 09:31

## 2021-02-04 RX ADMIN — DOCUSATE SODIUM 100 MG: 100 CAPSULE, LIQUID FILLED ORAL at 20:34

## 2021-02-04 RX ADMIN — ASPIRIN 81 MG CHEWABLE TABLET 162 MG: 81 TABLET CHEWABLE at 22:03

## 2021-02-04 RX ADMIN — METFORMIN ER 500 MG 1500 MG: 500 TABLET ORAL at 22:04

## 2021-02-04 RX ADMIN — Medication 2000 UNITS: at 09:31

## 2021-02-04 RX ADMIN — Medication 1 TABLET: at 09:31

## 2021-02-04 NOTE — PROGRESS NOTES
Progress Note - [facility]  Maverick Love 27 y o  female MRN: [id number]  Unit/Bed#: -01 Encounter: [id number]      Maverick Love has no current complaints  Relieved that the ultrasound showed less ascites than yesterday  Continues to report occasional fluid leakage  Denies foul smelling discharge, purulent discharge  Denies vaginal bleeding  /70   Pulse 83   Temp 97 6 °F (36 4 °C) (Oral)   Resp 16   Ht 5' 4" (1 626 m)   SpO2 97%   BMI 36 86 kg/m²     Physical Exam:   General: AAOx3  No acute distress  Heart: Regular rate & rhythm  No murmurs/clicks/gallops  Lungs: Clear bilaterally  Normal effort  No wheezes/rales/rhonchi  Abdominal: Soft, non-tender gravid uterus  Extremities: No TTP  Lower limbs symmetric bilaterally  FHT:  Baseline Rate: 140 bpm  Variability: Moderate 6-25 bpm  Accelerations: Episodic, 15 x 15 or greater, At variable times  Decelerations: None  FHR Category: Category I  TOCO:   Contraction Frequency (minutes): none  Contraction Duration (seconds): n/a  Contraction Quality: Not applicable    Lab Results   Component Value Date    WBC 11 48 (H) 2021    HGB 12 9 2021    HCT 38 9 2021     2021     Lab Results   Component Value Date    K 4 2 2020     2020    CO2 25 2020    BUN 14 2020    CREATININE 0 78 2020    AST 14 2020    ALT 23 2020       A/P: Maverick Love is a 27 y o   at 24w0d admitted with PPROM  Currently in stable condition  Hospital Day: 8   1) PPROM: s/p latency antibiotics, magnesium sulfate x 12 hours, BTM -, fetal ultrasound yesterday showed fetus in liang breech presentation, overall stable fetal ascites, no overt evidence of fetal hydrops     2) T1DM: on insulin pump; basal rate 1 65, insulin to carb ration 1:5 for breakfast and lunch, 1:4 with dinner, ISF 1 units for every 25 mg/dL over 100 mg /dL, fasting blood sugars have been in the  120s, postprandial blood sugars 130s-214s, continue to monitor closely, continue 2[address] GDM diet   3) IUP @ 23 weeks: NST TID  4) DVT PPx: SCDs, ambulation, consider lovenox     Dispo: inpatient    Daniel Torres DO

## 2021-02-05 LAB
ABO GROUP BLD: NORMAL
BASOPHILS # BLD AUTO: 0.03 THOUSANDS/ΜL (ref 0–0.1)
BASOPHILS NFR BLD AUTO: 0 % (ref 0–1)
BLD GP AB SCN SERPL QL: NEGATIVE
EOSINOPHIL # BLD AUTO: 0.04 THOUSAND/ΜL (ref 0–0.61)
EOSINOPHIL NFR BLD AUTO: 0 % (ref 0–6)
ERYTHROCYTE [DISTWIDTH] IN BLOOD BY AUTOMATED COUNT: 13 % (ref 11.6–15.1)
GLUCOSE SERPL-MCNC: 128 MG/DL (ref 65–140)
GLUCOSE SERPL-MCNC: 135 MG/DL (ref 65–140)
GLUCOSE SERPL-MCNC: 164 MG/DL (ref 65–140)
GLUCOSE SERPL-MCNC: 186 MG/DL (ref 65–140)
HCT VFR BLD AUTO: 38.4 % (ref 34.8–46.1)
HGB BLD-MCNC: 12.6 G/DL (ref 11.5–15.4)
IMM GRANULOCYTES # BLD AUTO: 0.14 THOUSAND/UL (ref 0–0.2)
IMM GRANULOCYTES NFR BLD AUTO: 1 % (ref 0–2)
LYMPHOCYTES # BLD AUTO: 3.02 THOUSANDS/ΜL (ref 0.6–4.47)
LYMPHOCYTES NFR BLD AUTO: 22 % (ref 14–44)
MCH RBC QN AUTO: 30.6 PG (ref 26.8–34.3)
MCHC RBC AUTO-ENTMCNC: 32.8 G/DL (ref 31.4–37.4)
MCV RBC AUTO: 93 FL (ref 82–98)
MONOCYTES # BLD AUTO: 0.6 THOUSAND/ΜL (ref 0.17–1.22)
MONOCYTES NFR BLD AUTO: 4 % (ref 4–12)
NEUTROPHILS # BLD AUTO: 9.88 THOUSANDS/ΜL (ref 1.85–7.62)
NEUTS SEG NFR BLD AUTO: 73 % (ref 43–75)
NRBC BLD AUTO-RTO: 0 /100 WBCS
PLATELET # BLD AUTO: 227 THOUSANDS/UL (ref 149–390)
PMV BLD AUTO: 11.7 FL (ref 8.9–12.7)
RBC # BLD AUTO: 4.12 MILLION/UL (ref 3.81–5.12)
RH BLD: POSITIVE
SPECIMEN EXPIRATION DATE: NORMAL
WBC # BLD AUTO: 13.71 THOUSAND/UL (ref 4.31–10.16)

## 2021-02-05 PROCEDURE — 86900 BLOOD TYPING SEROLOGIC ABO: CPT | Performed by: OBSTETRICS & GYNECOLOGY

## 2021-02-05 PROCEDURE — 59025 FETAL NON-STRESS TEST: CPT | Performed by: OBSTETRICS & GYNECOLOGY

## 2021-02-05 PROCEDURE — 86901 BLOOD TYPING SEROLOGIC RH(D): CPT | Performed by: OBSTETRICS & GYNECOLOGY

## 2021-02-05 PROCEDURE — 99232 SBSQ HOSP IP/OBS MODERATE 35: CPT | Performed by: OBSTETRICS & GYNECOLOGY

## 2021-02-05 PROCEDURE — 86850 RBC ANTIBODY SCREEN: CPT | Performed by: OBSTETRICS & GYNECOLOGY

## 2021-02-05 PROCEDURE — 85025 COMPLETE CBC W/AUTO DIFF WBC: CPT | Performed by: OBSTETRICS & GYNECOLOGY

## 2021-02-05 PROCEDURE — NC001 PR NO CHARGE: Performed by: OBSTETRICS & GYNECOLOGY

## 2021-02-05 PROCEDURE — 82948 REAGENT STRIP/BLOOD GLUCOSE: CPT

## 2021-02-05 RX ORDER — BACITRACIN, NEOMYCIN, POLYMYXIN B 400; 3.5; 5 [USP'U]/G; MG/G; [USP'U]/G
1 OINTMENT TOPICAL 2 TIMES DAILY
Status: DISCONTINUED | OUTPATIENT
Start: 2021-02-05 | End: 2021-02-17

## 2021-02-05 RX ADMIN — ASPIRIN 81 MG CHEWABLE TABLET 162 MG: 81 TABLET CHEWABLE at 22:22

## 2021-02-05 RX ADMIN — METFORMIN ER 500 MG 1500 MG: 500 TABLET ORAL at 23:03

## 2021-02-05 RX ADMIN — DOCUSATE SODIUM 100 MG: 100 CAPSULE, LIQUID FILLED ORAL at 20:31

## 2021-02-05 RX ADMIN — OXYCODONE HYDROCHLORIDE AND ACETAMINOPHEN 1000 MG: 500 TABLET ORAL at 09:58

## 2021-02-05 RX ADMIN — Medication 1 TABLET: at 09:58

## 2021-02-05 RX ADMIN — DOCUSATE SODIUM 100 MG: 100 CAPSULE, LIQUID FILLED ORAL at 09:58

## 2021-02-05 RX ADMIN — BACITRACIN, NEOMYCIN, POLYMYXIN B 1 SMALL APPLICATION: 400; 3.5; 5 OINTMENT TOPICAL at 20:31

## 2021-02-05 RX ADMIN — FOLIC ACID 1 MG: 1 TABLET ORAL at 08:32

## 2021-02-05 RX ADMIN — INSULIN ASPART 100 UNITS: 100 INJECTION, SOLUTION INTRAVENOUS; SUBCUTANEOUS at 18:55

## 2021-02-05 RX ADMIN — Medication 2000 UNITS: at 08:30

## 2021-02-05 NOTE — PLAN OF CARE
Problem: PAIN - ADULT  Goal: Verbalizes/displays adequate comfort level or baseline comfort level  Description: Interventions:  - Encourage patient to monitor pain and request assistance  - Assess pain using appropriate pain scale  - Administer analgesics based on type and severity of pain and evaluate response  - Implement non-pharmacological measures as appropriate and evaluate response  - Consider cultural and social influences on pain and pain management  - Notify physician/advanced practitioner if interventions unsuccessful or patient reports new pain  Outcome: Progressing     Problem: INFECTION - ADULT  Goal: Absence or prevention of progression during hospitalization  Description: INTERVENTIONS:  - Assess and monitor for signs and symptoms of infection  - Monitor lab/diagnostic results  - Monitor all insertion sites, i e  indwelling lines, tubes, and drains  - Monitor endotracheal if appropriate and nasal secretions for changes in amount and color  - [place] appropriate cooling/warming therapies per order  - Administer medications as ordered  - Instruct and encourage patient and family to use good hand hygiene technique  - Identify and instruct in appropriate isolation precautions for identified infection/condition  Outcome: Progressing  Goal: Absence of fever/infection during neutropenic period  Description: INTERVENTIONS:  - Monitor WBC    Outcome: Progressing     Problem: SAFETY ADULT  Goal: Patient will remain free of falls  Description: INTERVENTIONS:  - Assess patient frequently for physical needs  -  Identify cognitive and physical deficits and behaviors that affect risk of falls    -  [place] fall precautions as indicated by assessment   - Educate patient/family on patient safety including physical limitations  - Instruct patient to call for assistance with activity based on assessment  - Modify environment to reduce risk of injury  - Consider OT/PT consult to assist with strengthening/mobility  Outcome: Progressing  Goal: Maintain or return to baseline ADL function  Description: INTERVENTIONS:  -  Assess patient's ability to carry out ADLs; assess patient's baseline for ADL function and identify physical deficits which impact ability to perform ADLs (bathing, care of mouth/teeth, toileting, grooming, dressing, etc )  - Assess/evaluate cause of self-care deficits   - Assess range of motion  - Assess patient's mobility; develop plan if impaired  - Assess patient's need for assistive devices and provide as appropriate  - Encourage maximum independence but intervene and supervise when necessary  - Involve family in performance of ADLs  - Assess for home care needs following discharge   - Consider OT consult to assist with ADL evaluation and planning for discharge  - Provide patient education as appropriate  Outcome: Progressing  Goal: Maintain or return mobility status to optimal level  Description: INTERVENTIONS:  - Assess patient's baseline mobility status (ambulation, transfers, stairs, etc )    - Identify cognitive and physical deficits and behaviors that affect mobility  - Identify mobility aids required to assist with transfers and/or ambulation (gait belt, sit-to-stand, lift, walker, cane, etc )  - [place] fall precautions as indicated by assessment  - Record patient progress and toleration of activity level on Mobility SBAR; progress patient to next Phase/Stage  - Instruct patient to call for assistance with activity based on assessment  - Consider rehabilitation consult to assist with strengthening/weightbearing, etc   Outcome: Progressing     Problem: DISCHARGE PLANNING  Goal: Discharge to home or other facility with appropriate resources  Description: INTERVENTIONS:  - Identify barriers to discharge w/patient and caregiver  - Arrange for needed discharge resources and transportation as appropriate  - Identify discharge learning needs (meds, wound care, etc )  - Arrange for interpretive services to assist at discharge as needed  - Refer to Case Management Department for coordinating discharge planning if the patient needs post-hospital services based on physician/advanced practitioner order or complex needs related to functional status, cognitive ability, or social support system  Outcome: Progressing     Problem: Labor & Delivery  Goal: Manages discomfort  Description: Assess and monitor for signs and symptoms of discomfort  Assess patient's pain level regularly and per hospital policy  Administer medications as ordered  Support use of nonpharmacological methods to help control pain such as distraction, imagery, relaxation, and application of heat and cold  Collaborate with interdisciplinary team and patient to determine appropriate pain management plan  1  Include patient in decisions related to comfort  2  Offer non-pharmacological pain management interventions  3  Report ineffective pain management to physician  Outcome: Progressing  Goal: Patient vital signs are stable  Description: 1  Assess vital signs - vaginal delivery    Outcome: Progressing     Problem: ANTEPARTUM  Goal: Maintain pregnancy as long as maternal and/or fetal condition is stable  Description: INTERVENTIONS:  - Maternal surveillance  - Fetal surveillance  - Monitor uterine activity  - Medications as ordered  - Bedrest  Outcome: Progressing     Problem: DISCHARGE PLANNING - CARE MANAGEMENT  Goal: Discharge to post-acute care or home with appropriate resources  Description: INTERVENTIONS:  - Conduct assessment to determine patient/family and health care team treatment goals, and need for post-acute services based on payer coverage, community resources, and patient preferences, and barriers to discharge  - Address psychosocial, clinical, and financial barriers to discharge as identified in assessment in conjunction with the patient/family and health care team  - Arrange appropriate level of post-acute services according to patients   needs and preference and payer coverage in collaboration with the physician and health care team  - Communicate with and update the patient/family, physician, and health care team regarding progress on the discharge plan  - Arrange appropriate transportation to post-acute venues  Outcome: Progressing

## 2021-02-05 NOTE — PROGRESS NOTES
Progress Note - [facility]  Hunter Zabala 27 y o  female MRN: [id number]  Unit/Bed#: -01 Encounter: [id number]      Hunter Zabala has no current complaints  Continues to report occasional fluid leakage  Denies foul smelling discharge, purulent discharge  Denies vaginal bleeding  /77 (BP Location: Left arm)   Pulse 87   Temp 98 1 °F (36 7 °C) (Oral)   Resp 18   Ht 5' 4" (1 626 m)   Wt 95 3 kg (210 lb)   SpO2 97%   BMI 36 05 kg/m²     Physical Exam:   General: AAOx3  No acute distress  Heart: Regular rate & rhythm  No murmurs/clicks/gallops  Lungs: Clear bilaterally  Normal effort  No wheezes/rales/rhonchi  Abdominal: Soft, non-tender gravid uterus  Extremities: No TTP  Lower limbs symmetric bilaterally  FHT:  Baseline Rate: 135 bpm  Variability: Moderate 6-25 bpm  Accelerations: Episodic, 10 x 10 (<32 weeks), At variable times  Decelerations: None  FHR Category: Category I  TOCO:   Contraction Frequency (minutes): 0  Contraction Duration (seconds): n/a  Contraction Quality: Not applicable    Lab Results   Component Value Date    WBC 11 48 (H) 2021    HGB 12 9 2021    HCT 38 9 2021     2021     Lab Results   Component Value Date    K 4 2 2020     2020    CO2 25 2020    BUN 14 2020    CREATININE 0 78 2020    AST 14 2020    ALT 23 2020       A/P: Hunter Zabala is a 27 y o   at 24w0d admitted with PPROM  Currently in stable condition  Hospital Day: 9   1) PPROM: s/p latency antibiotics, magnesium sulfate x 12 hours, BTM -, fetal ultrasound yesterday showed fetus in liang breech presentation, overall stable fetal ascites, no overt evidence of fetal hydrops     2) T1DM: on insulin pump; basal rate now 1 75 12a-12p followed by 2 0 12p-12a, insulin to carb ration 1:5 for breakfast and lunch, 1:4 with dinner, ISF 1 units for every 25 mg/dL over 100 mg /dL, fasting blood sugars have been in the  120s, postprandial blood sugars 130s-220s, continue to monitor closely, continue 2200 Jayson GDM diet   3) IUP @ 23 weeks: NST TID  4) DVT PPx: SCDs, ambulation, consider lovenox     Dispo: inpatient    Sabi Pandya DO

## 2021-02-05 NOTE — UTILIZATION REVIEW
Continued Stay Review    Date: 02-05-21                        Current Patient Class: inpatient  Current Level of Care: medical    HPI:30 y o  female initially admitted on *01-28-21 at 23 weeks gestation for PPROM since 16 week gestation     Assessment/Plan:  HD # 8  continues to leak clear odorless  (+) fetal movement  Abdomen soft no s/s of infection  NST TID  24 weeks gestation    02-03-21 GORDON 1 0 cm  OLIGOHYDRAMNIOS  The fetus is in liang breech presentation and fetal ascites is present   though appears slightly improved from prior    T1DM: on insulin pump; basal rate now 1 75 12a-12p followed by 2 0 12p-12a, insulin to carb ration 1:5 for breakfast and lunch, 1:4 with dinner, ISF 1 units for every 25 mg/dL over 100 mg /dL, fasting blood sugars have been in the  120s, postprandial blood sugars 130s-220s, continue to monitor closely, continue 2200 Jayson GDM diet   S/P BTM X 2 doses and 12 hrs MGSO4 infusion  Patient will remain in house until delivery      FHT:  Baseline Rate: 135 bpm  Variability: Moderate 6-25 bpm  Accelerations: Episodic, 10 x 10 (<32 weeks), At variable times  Decelerations: None  FHR Category: Category I  TOCO:   Contraction Frequency (minutes): 0  Contraction Duration (seconds): n/a  Contraction Quality: Not applicable     P  Results from last 7 days   Lab Units 02/05/21  1109 02/05/21  0735 02/04/21  2237 02/04/21  2033 02/04/21  1821 02/04/21  1610 02/04/21  1340 02/04/21  1107 02/04/21  0733 02/03/21  2129 02/03/21  1856 02/03/21  1609   POC GLUCOSE mg/dl 164* 135 156* 222* 135 150* 116 122 127 214* 181* 177*     Vital Signs:   Date/Time  Temp  Pulse  Resp  BP  SpO2  O2 Device  Cardiac (WDL)  Patient Position - Orthostatic VS   02/05/21 1148  98 2 °F (36 8 °C)  --  --  --  --  --  --  --   02/05/21 0819  98 °F (36 7 °C)  101  20  120/76  98 %  --  --  --   02/05/21 0400  98 1 °F (36 7 °C)  --  --  --  --  --  --  --   02/05/21 0000  98 3 °F (36 8 °C)  87  18  126/77  --  --  --  Lying 02/04/21 2200  --  --  --  --  --  --  WDL  --   02/04/21 1811  97 8 °F (36 6 °C)  94  20  124/79  --  None (Room air)  --  Sitting   02/04/21 1147  98 1 °F (36 7 °C)  87  20  112/76  --  --  --  --   02/04/21 0731  97 9 °F (36 6 °C)  84  18  116/73  --  --  WDL  Sitting   Comment rows:         Medications:   Scheduled Medications:  vitamin C, 1,000 mg, Oral, Daily  aspirin, 162 mg, Oral, Daily  cholecalciferol, 2,000 Units, Oral, Daily  docusate sodium, 100 mg, Oral, BID  folic acid, 1 mg, Oral, Daily  metFORMIN, 1,500 mg, Oral, Daily  patient maintained insulin pump, 1 each, Subcutaneous, Q8H  prenatal multivitamin, 1 tablet, Oral, Daily      Continuous IV Infusions:     PRN Meds:  acetaminophen, 650 mg, Oral, Q4H PRN  calcium carbonate, 1,000 mg, Oral, Daily PRN  insulin aspart, 100 Units, Subcutaneous Insulin Pump, Daily PRN        Discharge Plan: home after delivery      Network Utilization Review Department  ATTENTION: Please call with any questions or concerns to [phone] and carefully listen to the prompts so that you are directed to the right person  All voicemails are confidential   Nadya Zamora all requests for admission clinical reviews, approved or denied determinations and any other requests to dedicated fax number below belonging to the campus where the patient is receiving treatment   List of dedicated fax numbers for the Facilities:  [address] DENIALS (Administrative/Medical Necessity) [phone]   [address] (Maternity/NICU/Pediatrics) [phone]   [address] [address] [facility] [address] [place] [address] ([address]etta [address] "Jinny" [address]) [address] [facility] [phone] 244 [facility] 301 [facility] [phone]   [address] [place] [address] [phone]

## 2021-02-05 NOTE — QUICK NOTE
NST 02/05/21 Time: 1424 - 1750    Baseline: 140-145  Variability: moderate  Accelerations: present, 10x10  Decelerations: variables at 1723, 1557, 1503, 1447, and 1433  Contractions: infrequent and irregular  Assessment: appropriate for gestational age and clinical condition  Plan: OK to return to TID and PRN     Recent Labs     02/05/21  0735 02/05/21  1109   POCGLU 135 164*     Salbador Grady MD 6:19 PM

## 2021-02-05 NOTE — PLAN OF CARE
Problem: PAIN - ADULT  Goal: Verbalizes/displays adequate comfort level or baseline comfort level  Description: Interventions:  - Encourage patient to monitor pain and request assistance  - Assess pain using appropriate pain scale  - Administer analgesics based on type and severity of pain and evaluate response  - Implement non-pharmacological measures as appropriate and evaluate response  - Consider cultural and social influences on pain and pain management  - Notify physician/advanced practitioner if interventions unsuccessful or patient reports new pain  Outcome: Progressing     Problem: INFECTION - ADULT  Goal: Absence or prevention of progression during hospitalization  Description: INTERVENTIONS:  - Assess and monitor for signs and symptoms of infection  - Monitor lab/diagnostic results  - Monitor all insertion sites, i e  indwelling lines, tubes, and drains  - Monitor endotracheal if appropriate and nasal secretions for changes in amount and color  - [place] appropriate cooling/warming therapies per order  - Administer medications as ordered  - Instruct and encourage patient and family to use good hand hygiene technique  - Identify and instruct in appropriate isolation precautions for identified infection/condition  Outcome: Progressing  Goal: Absence of fever/infection during neutropenic period  Description: INTERVENTIONS:  - Monitor WBC    Outcome: Progressing     Problem: SAFETY ADULT  Goal: Patient will remain free of falls  Description: INTERVENTIONS:  - Assess patient frequently for physical needs  -  Identify cognitive and physical deficits and behaviors that affect risk of falls    -  [place] fall precautions as indicated by assessment   - Educate patient/family on patient safety including physical limitations  - Instruct patient to call for assistance with activity based on assessment  - Modify environment to reduce risk of injury  - Consider OT/PT consult to assist with strengthening/mobility  Outcome: Progressing  Goal: Maintain or return to baseline ADL function  Description: INTERVENTIONS:  -  Assess patient's ability to carry out ADLs; assess patient's baseline for ADL function and identify physical deficits which impact ability to perform ADLs (bathing, care of mouth/teeth, toileting, grooming, dressing, etc )  - Assess/evaluate cause of self-care deficits   - Assess range of motion  - Assess patient's mobility; develop plan if impaired  - Assess patient's need for assistive devices and provide as appropriate  - Encourage maximum independence but intervene and supervise when necessary  - Involve family in performance of ADLs  - Assess for home care needs following discharge   - Consider OT consult to assist with ADL evaluation and planning for discharge  - Provide patient education as appropriate  Outcome: Progressing  Goal: Maintain or return mobility status to optimal level  Description: INTERVENTIONS:  - Assess patient's baseline mobility status (ambulation, transfers, stairs, etc )    - Identify cognitive and physical deficits and behaviors that affect mobility  - Identify mobility aids required to assist with transfers and/or ambulation (gait belt, sit-to-stand, lift, walker, cane, etc )  - [place] fall precautions as indicated by assessment  - Record patient progress and toleration of activity level on Mobility SBAR; progress patient to next Phase/Stage  - Instruct patient to call for assistance with activity based on assessment  - Consider rehabilitation consult to assist with strengthening/weightbearing, etc   Outcome: Progressing     Problem: DISCHARGE PLANNING  Goal: Discharge to home or other facility with appropriate resources  Description: INTERVENTIONS:  - Identify barriers to discharge w/patient and caregiver  - Arrange for needed discharge resources and transportation as appropriate  - Identify discharge learning needs (meds, wound care, etc )  - Arrange for interpretive services to assist at discharge as needed  - Refer to Case Management Department for coordinating discharge planning if the patient needs post-hospital services based on physician/advanced practitioner order or complex needs related to functional status, cognitive ability, or social support system  Outcome: Progressing     Problem: Labor & Delivery  Goal: Manages discomfort  Description: Assess and monitor for signs and symptoms of discomfort  Assess patient's pain level regularly and per hospital policy  Administer medications as ordered  Support use of nonpharmacological methods to help control pain such as distraction, imagery, relaxation, and application of heat and cold  Collaborate with interdisciplinary team and patient to determine appropriate pain management plan  1  Include patient in decisions related to comfort  2  Offer non-pharmacological pain management interventions  3  Report ineffective pain management to physician  Outcome: Progressing  Goal: Patient vital signs are stable  Description: 1  Assess vital signs - vaginal delivery    Outcome: Progressing     Problem: ANTEPARTUM  Goal: Maintain pregnancy as long as maternal and/or fetal condition is stable  Description: INTERVENTIONS:  - Maternal surveillance  - Fetal surveillance  - Monitor uterine activity  - Medications as ordered  - Bedrest  Outcome: Progressing     Problem: DISCHARGE PLANNING - CARE MANAGEMENT  Goal: Discharge to post-acute care or home with appropriate resources  Description: INTERVENTIONS:  - Conduct assessment to determine patient/family and health care team treatment goals, and need for post-acute services based on payer coverage, community resources, and patient preferences, and barriers to discharge  - Address psychosocial, clinical, and financial barriers to discharge as identified in assessment in conjunction with the patient/family and health care team  - Arrange appropriate level of post-acute services according to patients   needs and preference and payer coverage in collaboration with the physician and health care team  - Communicate with and update the patient/family, physician, and health care team regarding progress on the discharge plan  - Arrange appropriate transportation to post-acute venues  Outcome: Progressing

## 2021-02-06 LAB
GLUCOSE SERPL-MCNC: 109 MG/DL (ref 65–140)
GLUCOSE SERPL-MCNC: 122 MG/DL (ref 65–140)
GLUCOSE SERPL-MCNC: 140 MG/DL (ref 65–140)
GLUCOSE SERPL-MCNC: 159 MG/DL (ref 65–140)
GLUCOSE SERPL-MCNC: 161 MG/DL (ref 65–140)
GLUCOSE SERPL-MCNC: 171 MG/DL (ref 65–140)
GLUCOSE SERPL-MCNC: 97 MG/DL (ref 65–140)

## 2021-02-06 PROCEDURE — NC001 PR NO CHARGE: Performed by: OBSTETRICS & GYNECOLOGY

## 2021-02-06 PROCEDURE — 59025 FETAL NON-STRESS TEST: CPT | Performed by: OBSTETRICS & GYNECOLOGY

## 2021-02-06 PROCEDURE — 99232 SBSQ HOSP IP/OBS MODERATE 35: CPT | Performed by: OBSTETRICS & GYNECOLOGY

## 2021-02-06 PROCEDURE — 82948 REAGENT STRIP/BLOOD GLUCOSE: CPT

## 2021-02-06 RX ADMIN — FOLIC ACID 1 MG: 1 TABLET ORAL at 08:55

## 2021-02-06 RX ADMIN — Medication 1 TABLET: at 08:54

## 2021-02-06 RX ADMIN — OXYCODONE HYDROCHLORIDE AND ACETAMINOPHEN 1000 MG: 500 TABLET ORAL at 08:54

## 2021-02-06 RX ADMIN — DOCUSATE SODIUM 100 MG: 100 CAPSULE, LIQUID FILLED ORAL at 18:03

## 2021-02-06 RX ADMIN — METFORMIN ER 500 MG 1500 MG: 500 TABLET ORAL at 22:06

## 2021-02-06 RX ADMIN — BACITRACIN, NEOMYCIN, POLYMYXIN B 1 SMALL APPLICATION: 400; 3.5; 5 OINTMENT TOPICAL at 18:03

## 2021-02-06 RX ADMIN — BACITRACIN, NEOMYCIN, POLYMYXIN B 1 SMALL APPLICATION: 400; 3.5; 5 OINTMENT TOPICAL at 08:55

## 2021-02-06 RX ADMIN — ASPIRIN 81 MG CHEWABLE TABLET 162 MG: 81 TABLET CHEWABLE at 22:06

## 2021-02-06 RX ADMIN — DOCUSATE SODIUM 100 MG: 100 CAPSULE, LIQUID FILLED ORAL at 08:54

## 2021-02-06 RX ADMIN — Medication 2000 UNITS: at 08:56

## 2021-02-06 NOTE — PLAN OF CARE
Problem: PAIN - ADULT  Goal: Verbalizes/displays adequate comfort level or baseline comfort level  Description: Interventions:  - Encourage patient to monitor pain and request assistance  - Assess pain using appropriate pain scale  - Administer analgesics based on type and severity of pain and evaluate response  - Implement non-pharmacological measures as appropriate and evaluate response  - Consider cultural and social influences on pain and pain management  - Notify physician/advanced practitioner if interventions unsuccessful or patient reports new pain  Outcome: Progressing     Problem: INFECTION - ADULT  Goal: Absence or prevention of progression during hospitalization  Description: INTERVENTIONS:  - Assess and monitor for signs and symptoms of infection  - Monitor lab/diagnostic results  - Monitor all insertion sites, i e  indwelling lines, tubes, and drains  - Monitor endotracheal if appropriate and nasal secretions for changes in amount and color  - [place] appropriate cooling/warming therapies per order  - Administer medications as ordered  - Instruct and encourage patient and family to use good hand hygiene technique  - Identify and instruct in appropriate isolation precautions for identified infection/condition  Outcome: Progressing     Problem: SAFETY ADULT  Goal: Patient will remain free of falls  Description: INTERVENTIONS:  - Assess patient frequently for physical needs  -  Identify cognitive and physical deficits and behaviors that affect risk of falls    -  [place] fall precautions as indicated by assessment   - Educate patient/family on patient safety including physical limitations  - Instruct patient to call for assistance with activity based on assessment  - Modify environment to reduce risk of injury  - Consider OT/PT consult to assist with strengthening/mobility  Outcome: Progressing  Goal: Maintain or return to baseline ADL function  Description: INTERVENTIONS:  -  Assess patient's ability to carry out ADLs; assess patient's baseline for ADL function and identify physical deficits which impact ability to perform ADLs (bathing, care of mouth/teeth, toileting, grooming, dressing, etc )  - Assess/evaluate cause of self-care deficits   - Assess range of motion  - Assess patient's mobility; develop plan if impaired  - Assess patient's need for assistive devices and provide as appropriate  - Encourage maximum independence but intervene and supervise when necessary  - Involve family in performance of ADLs  - Assess for home care needs following discharge   - Consider OT consult to assist with ADL evaluation and planning for discharge  - Provide patient education as appropriate  Outcome: Progressing  Goal: Maintain or return mobility status to optimal level  Description: INTERVENTIONS:  - Assess patient's baseline mobility status (ambulation, transfers, stairs, etc )    - Identify cognitive and physical deficits and behaviors that affect mobility  - Identify mobility aids required to assist with transfers and/or ambulation (gait belt, sit-to-stand, lift, walker, cane, etc )  - [place] fall precautions as indicated by assessment  - Record patient progress and toleration of activity level on Mobility SBAR; progress patient to next Phase/Stage  - Instruct patient to call for assistance with activity based on assessment  - Consider rehabilitation consult to assist with strengthening/weightbearing, etc   Outcome: Progressing     Problem: DISCHARGE PLANNING  Goal: Discharge to home or other facility with appropriate resources  Description: INTERVENTIONS:  - Identify barriers to discharge w/patient and caregiver  - Arrange for needed discharge resources and transportation as appropriate  - Identify discharge learning needs (meds, wound care, etc )  - Arrange for interpretive services to assist at discharge as needed  - Refer to Case Management Department for coordinating discharge planning if the patient needs post-hospital services based on physician/advanced practitioner order or complex needs related to functional status, cognitive ability, or social support system  Outcome: Progressing     Problem: Labor & Delivery  Goal: Manages discomfort  Description: Assess and monitor for signs and symptoms of discomfort  Assess patient's pain level regularly and per hospital policy  Administer medications as ordered  Support use of nonpharmacological methods to help control pain such as distraction, imagery, relaxation, and application of heat and cold  Collaborate with interdisciplinary team and patient to determine appropriate pain management plan  1  Include patient in decisions related to comfort  2  Offer non-pharmacological pain management interventions  3  Report ineffective pain management to physician  Outcome: Progressing  Goal: Patient vital signs are stable  Description: 1  Assess vital signs - vaginal delivery    Outcome: Progressing     Problem: ANTEPARTUM  Goal: Maintain pregnancy as long as maternal and/or fetal condition is stable  Description: INTERVENTIONS:  - Maternal surveillance  - Fetal surveillance  - Monitor uterine activity  - Medications as ordered  - Bedrest  Outcome: Progressing     Problem: DISCHARGE PLANNING - CARE MANAGEMENT  Goal: Discharge to post-acute care or home with appropriate resources  Description: INTERVENTIONS:  - Conduct assessment to determine patient/family and health care team treatment goals, and need for post-acute services based on payer coverage, community resources, and patient preferences, and barriers to discharge  - Address psychosocial, clinical, and financial barriers to discharge as identified in assessment in conjunction with the patient/family and health care team  - Arrange appropriate level of post-acute services according to patients   needs and preference and payer coverage in collaboration with the physician and health care team  - Communicate with and update the patient/family, physician, and health care team regarding progress on the discharge plan  - Arrange appropriate transportation to post-acute venues  Outcome: Progressing     Problem: Potential for Falls  Goal: Patient will remain free of falls  Description: INTERVENTIONS:  - Assess patient frequently for physical needs  -  Identify cognitive and physical deficits and behaviors that affect risk of falls    -  [place] fall precautions as indicated by assessment   - Educate patient/family on patient safety including physical limitations  - Instruct patient to call for assistance with activity based on assessment  - Modify environment to reduce risk of injury  - Consider OT/PT consult to assist with strengthening/mobility  Outcome: Progressing     Problem: INFECTION - ADULT  Goal: Absence of fever/infection during neutropenic period  Description: INTERVENTIONS:  - Monitor WBC    Outcome: Completed

## 2021-02-06 NOTE — PROGRESS NOTES
Progress Note - [facility]  Nieves Yusuf 27 y o  female MRN: [id number]  Unit/Bed#:  330-01 Encounter: [id number]      Nieves Yusuf has no current complaints  Somewhat tearful yesterday when the variable decelerations occurred, but better this moring  Continues to report occasional fluid leakage  Denies foul smelling discharge, purulent discharge  Denies vaginal bleeding  In regards to her blood sugars, she reports only eating vegetables for snacks yesterday and did not bolus for snacks  She also did not have preprandial blood sugars drawn, but confirmed that they will be drawn today  BP 98/53 (BP Location: Right arm)   Pulse 81   Temp 98 1 °F (36 7 °C)   Resp 18   Ht 5' 4" (1 626 m)   Wt 95 3 kg (210 lb)   SpO2 96%   BMI 36 05 kg/m²     Physical Exam:   General: AAOx3  No acute distress  Heart: Regular rate & rhythm  No murmurs/clicks/gallops  Lungs: Clear bilaterally  Normal effort  No wheezes/rales/rhonchi  Abdominal: Soft, non-tender gravid uterus  Extremities: No TTP  Lower limbs symmetric bilaterally  FHT:  Baseline Rate: 140 bpm  Variability: Moderate 6-25 bpm  Accelerations: 10 x 10 (<32 weeks)  Decelerations: Variable  FHR Category: Category I  TOCO:   Contraction Frequency (minutes): 0  Contraction Duration (seconds): n/a  Contraction Quality: Not applicable    Lab Results   Component Value Date    WBC 13 71 (H) 2021    HGB 12 6 2021    HCT 38 4 2021     2021     Lab Results   Component Value Date    K 4 2 2020     2020    CO2 25 2020    BUN 14 2020    CREATININE 0 78 2020    AST 14 2020    ALT 23 2020       A/P: Nieves Yusuf is a 27 y o   at 24w0d admitted with PPROM  Currently in stable condition   Hospital Day: 10   1) PPROM: s/p latency antibiotics, magnesium sulfate x 12 hours, BTM -, fetal ultrasound yesterday showed fetus in liang breech presentation, overall stable fetal ascites, no overt evidence of fetal hydrops     2) T1DM: on insulin pump; basal rate now 2 0, insulin to carb ration 1:5 for breakfast and lunch, 1:4 with dinner and snacks, ISF 1 units for every 25 mg/dL over 100 mg /dL, fasting blood sugars have been in the 120s, postprandial blood sugars 120s-180s, continue to monitor closely, Diet [place]/CHO controlled consistent carbohydrate Diet Level 1 (4 carb servings/60 grams CHO/meal  3) IUP @ 23 weeks: NST TID  4) DVT PPx: SCDs, ambulation, consider lovenox     Dispo: inpatient    [place], DO

## 2021-02-06 NOTE — PLAN OF CARE
Problem: PAIN - ADULT  Goal: Verbalizes/displays adequate comfort level or baseline comfort level  Description: Interventions:  - Encourage patient to monitor pain and request assistance  - Assess pain using appropriate pain scale  - Administer analgesics based on type and severity of pain and evaluate response  - Implement non-pharmacological measures as appropriate and evaluate response  - Consider cultural and social influences on pain and pain management  - Notify physician/advanced practitioner if interventions unsuccessful or patient reports new pain  Outcome: Progressing     Problem: INFECTION - ADULT  Goal: Absence or prevention of progression during hospitalization  Description: INTERVENTIONS:  - Assess and monitor for signs and symptoms of infection  - Monitor lab/diagnostic results  - Monitor all insertion sites, i e  indwelling lines, tubes, and drains  - Monitor endotracheal if appropriate and nasal secretions for changes in amount and color  - [place] appropriate cooling/warming therapies per order  - Administer medications as ordered  - Instruct and encourage patient and family to use good hand hygiene technique  - Identify and instruct in appropriate isolation precautions for identified infection/condition  Outcome: Progressing  Goal: Absence of fever/infection during neutropenic period  Description: INTERVENTIONS:  - Monitor WBC    Outcome: Progressing     Problem: SAFETY ADULT  Goal: Patient will remain free of falls  Description: INTERVENTIONS:  - Assess patient frequently for physical needs  -  Identify cognitive and physical deficits and behaviors that affect risk of falls    -  [place] fall precautions as indicated by assessment   - Educate patient/family on patient safety including physical limitations  - Instruct patient to call for assistance with activity based on assessment  - Modify environment to reduce risk of injury  - Consider OT/PT consult to assist with strengthening/mobility  Outcome: Progressing  Goal: Maintain or return to baseline ADL function  Description: INTERVENTIONS:  -  Assess patient's ability to carry out ADLs; assess patient's baseline for ADL function and identify physical deficits which impact ability to perform ADLs (bathing, care of mouth/teeth, toileting, grooming, dressing, etc )  - Assess/evaluate cause of self-care deficits   - Assess range of motion  - Assess patient's mobility; develop plan if impaired  - Assess patient's need for assistive devices and provide as appropriate  - Encourage maximum independence but intervene and supervise when necessary  - Involve family in performance of ADLs  - Assess for home care needs following discharge   - Consider OT consult to assist with ADL evaluation and planning for discharge  - Provide patient education as appropriate  Outcome: Progressing  Goal: Maintain or return mobility status to optimal level  Description: INTERVENTIONS:  - Assess patient's baseline mobility status (ambulation, transfers, stairs, etc )    - Identify cognitive and physical deficits and behaviors that affect mobility  - Identify mobility aids required to assist with transfers and/or ambulation (gait belt, sit-to-stand, lift, walker, cane, etc )  - [place] fall precautions as indicated by assessment  - Record patient progress and toleration of activity level on Mobility SBAR; progress patient to next Phase/Stage  - Instruct patient to call for assistance with activity based on assessment  - Consider rehabilitation consult to assist with strengthening/weightbearing, etc   Outcome: Progressing     Problem: DISCHARGE PLANNING  Goal: Discharge to home or other facility with appropriate resources  Description: INTERVENTIONS:  - Identify barriers to discharge w/patient and caregiver  - Arrange for needed discharge resources and transportation as appropriate  - Identify discharge learning needs (meds, wound care, etc )  - Arrange for interpretive services to assist at discharge as needed  - Refer to Case Management Department for coordinating discharge planning if the patient needs post-hospital services based on physician/advanced practitioner order or complex needs related to functional status, cognitive ability, or social support system  Outcome: Progressing     Problem: Labor & Delivery  Goal: Manages discomfort  Description: Assess and monitor for signs and symptoms of discomfort  Assess patient's pain level regularly and per hospital policy  Administer medications as ordered  Support use of nonpharmacological methods to help control pain such as distraction, imagery, relaxation, and application of heat and cold  Collaborate with interdisciplinary team and patient to determine appropriate pain management plan  1  Include patient in decisions related to comfort  2  Offer non-pharmacological pain management interventions  3  Report ineffective pain management to physician  Outcome: Progressing  Goal: Patient vital signs are stable  Description: 1  Assess vital signs - vaginal delivery    Outcome: Progressing     Problem: ANTEPARTUM  Goal: Maintain pregnancy as long as maternal and/or fetal condition is stable  Description: INTERVENTIONS:  - Maternal surveillance  - Fetal surveillance  - Monitor uterine activity  - Medications as ordered  - Bedrest  Outcome: Progressing     Problem: DISCHARGE PLANNING - CARE MANAGEMENT  Goal: Discharge to post-acute care or home with appropriate resources  Description: INTERVENTIONS:  - Conduct assessment to determine patient/family and health care team treatment goals, and need for post-acute services based on payer coverage, community resources, and patient preferences, and barriers to discharge  - Address psychosocial, clinical, and financial barriers to discharge as identified in assessment in conjunction with the patient/family and health care team  - Arrange appropriate level of post-acute services according to patients   needs and preference and payer coverage in collaboration with the physician and health care team  - Communicate with and update the patient/family, physician, and health care team regarding progress on the discharge plan  - Arrange appropriate transportation to post-acute venues  Outcome: Progressing

## 2021-02-07 LAB
GLUCOSE SERPL-MCNC: 119 MG/DL (ref 65–140)
GLUCOSE SERPL-MCNC: 132 MG/DL (ref 65–140)
GLUCOSE SERPL-MCNC: 166 MG/DL (ref 65–140)
GLUCOSE SERPL-MCNC: 84 MG/DL (ref 65–140)
GLUCOSE SERPL-MCNC: 84 MG/DL (ref 65–140)
GLUCOSE SERPL-MCNC: 89 MG/DL (ref 65–140)

## 2021-02-07 PROCEDURE — 59025 FETAL NON-STRESS TEST: CPT | Performed by: OBSTETRICS & GYNECOLOGY

## 2021-02-07 PROCEDURE — 82948 REAGENT STRIP/BLOOD GLUCOSE: CPT

## 2021-02-07 PROCEDURE — 99232 SBSQ HOSP IP/OBS MODERATE 35: CPT | Performed by: OBSTETRICS & GYNECOLOGY

## 2021-02-07 PROCEDURE — NC001 PR NO CHARGE: Performed by: OBSTETRICS & GYNECOLOGY

## 2021-02-07 RX ADMIN — ENOXAPARIN SODIUM 40 MG: 40 INJECTION SUBCUTANEOUS at 09:14

## 2021-02-07 RX ADMIN — Medication 2000 UNITS: at 09:13

## 2021-02-07 RX ADMIN — FOLIC ACID 1 MG: 1 TABLET ORAL at 09:15

## 2021-02-07 RX ADMIN — ASPIRIN 81 MG CHEWABLE TABLET 162 MG: 81 TABLET CHEWABLE at 22:00

## 2021-02-07 RX ADMIN — BACITRACIN, NEOMYCIN, POLYMYXIN B 1 SMALL APPLICATION: 400; 3.5; 5 OINTMENT TOPICAL at 09:15

## 2021-02-07 RX ADMIN — DOCUSATE SODIUM 100 MG: 100 CAPSULE, LIQUID FILLED ORAL at 09:14

## 2021-02-07 RX ADMIN — OXYCODONE HYDROCHLORIDE AND ACETAMINOPHEN 1000 MG: 500 TABLET ORAL at 09:12

## 2021-02-07 RX ADMIN — METFORMIN ER 500 MG 1500 MG: 500 TABLET ORAL at 22:01

## 2021-02-07 RX ADMIN — DOCUSATE SODIUM 100 MG: 100 CAPSULE, LIQUID FILLED ORAL at 22:00

## 2021-02-07 RX ADMIN — Medication 1 TABLET: at 09:15

## 2021-02-07 NOTE — PLAN OF CARE
Problem: PAIN - ADULT  Goal: Verbalizes/displays adequate comfort level or baseline comfort level  Description: Interventions:  - Encourage patient to monitor pain and request assistance  - Assess pain using appropriate pain scale  - Administer analgesics based on type and severity of pain and evaluate response  - Implement non-pharmacological measures as appropriate and evaluate response  - Consider cultural and social influences on pain and pain management  - Notify physician/advanced practitioner if interventions unsuccessful or patient reports new pain  Outcome: Progressing     Problem: INFECTION - ADULT  Goal: Absence or prevention of progression during hospitalization  Description: INTERVENTIONS:  - Assess and monitor for signs and symptoms of infection  - Monitor lab/diagnostic results  - Monitor all insertion sites, i e  indwelling lines, tubes, and drains  - Monitor endotracheal if appropriate and nasal secretions for changes in amount and color  - [place] appropriate cooling/warming therapies per order  - Administer medications as ordered  - Instruct and encourage patient and family to use good hand hygiene technique  - Identify and instruct in appropriate isolation precautions for identified infection/condition  Outcome: Progressing     Problem: SAFETY ADULT  Goal: Patient will remain free of falls  Description: INTERVENTIONS:  - Assess patient frequently for physical needs  -  Identify cognitive and physical deficits and behaviors that affect risk of falls    -  [place] fall precautions as indicated by assessment   - Educate patient/family on patient safety including physical limitations  - Instruct patient to call for assistance with activity based on assessment  - Modify environment to reduce risk of injury  - Consider OT/PT consult to assist with strengthening/mobility  Outcome: Progressing  Goal: Maintain or return to baseline ADL function  Description: INTERVENTIONS:  -  Assess patient's ability to carry out ADLs; assess patient's baseline for ADL function and identify physical deficits which impact ability to perform ADLs (bathing, care of mouth/teeth, toileting, grooming, dressing, etc )  - Assess/evaluate cause of self-care deficits   - Assess range of motion  - Assess patient's mobility; develop plan if impaired  - Assess patient's need for assistive devices and provide as appropriate  - Encourage maximum independence but intervene and supervise when necessary  - Involve family in performance of ADLs  - Assess for home care needs following discharge   - Consider OT consult to assist with ADL evaluation and planning for discharge  - Provide patient education as appropriate  Outcome: Progressing  Goal: Maintain or return mobility status to optimal level  Description: INTERVENTIONS:  - Assess patient's baseline mobility status (ambulation, transfers, stairs, etc )    - Identify cognitive and physical deficits and behaviors that affect mobility  - Identify mobility aids required to assist with transfers and/or ambulation (gait belt, sit-to-stand, lift, walker, cane, etc )  - [place] fall precautions as indicated by assessment  - Record patient progress and toleration of activity level on Mobility SBAR; progress patient to next Phase/Stage  - Instruct patient to call for assistance with activity based on assessment  - Consider rehabilitation consult to assist with strengthening/weightbearing, etc   Outcome: Progressing     Problem: DISCHARGE PLANNING  Goal: Discharge to home or other facility with appropriate resources  Description: INTERVENTIONS:  - Identify barriers to discharge w/patient and caregiver  - Arrange for needed discharge resources and transportation as appropriate  - Identify discharge learning needs (meds, wound care, etc )  - Arrange for interpretive services to assist at discharge as needed  - Refer to Case Management Department for coordinating discharge planning if the patient needs post-hospital services based on physician/advanced practitioner order or complex needs related to functional status, cognitive ability, or social support system  Outcome: Progressing     Problem: Labor & Delivery  Goal: Manages discomfort  Description: Assess and monitor for signs and symptoms of discomfort  Assess patient's pain level regularly and per hospital policy  Administer medications as ordered  Support use of nonpharmacological methods to help control pain such as distraction, imagery, relaxation, and application of heat and cold  Collaborate with interdisciplinary team and patient to determine appropriate pain management plan  1  Include patient in decisions related to comfort  2  Offer non-pharmacological pain management interventions  3  Report ineffective pain management to physician  Outcome: Progressing  Goal: Patient vital signs are stable  Description: 1  Assess vital signs - vaginal delivery    Outcome: Progressing     Problem: ANTEPARTUM  Goal: Maintain pregnancy as long as maternal and/or fetal condition is stable  Description: INTERVENTIONS:  - Maternal surveillance  - Fetal surveillance  - Monitor uterine activity  - Medications as ordered  - Bedrest  Outcome: Progressing     Problem: DISCHARGE PLANNING - CARE MANAGEMENT  Goal: Discharge to post-acute care or home with appropriate resources  Description: INTERVENTIONS:  - Conduct assessment to determine patient/family and health care team treatment goals, and need for post-acute services based on payer coverage, community resources, and patient preferences, and barriers to discharge  - Address psychosocial, clinical, and financial barriers to discharge as identified in assessment in conjunction with the patient/family and health care team  - Arrange appropriate level of post-acute services according to patients   needs and preference and payer coverage in collaboration with the physician and health care team  - Communicate with and update the patient/family, physician, and health care team regarding progress on the discharge plan  - Arrange appropriate transportation to post-acute venues  Outcome: Progressing     Problem: Potential for Falls  Goal: Patient will remain free of falls  Description: INTERVENTIONS:  - Assess patient frequently for physical needs  -  Identify cognitive and physical deficits and behaviors that affect risk of falls    -  [place] fall precautions as indicated by assessment   - Educate patient/family on patient safety including physical limitations  - Instruct patient to call for assistance with activity based on assessment  - Modify environment to reduce risk of injury  - Consider OT/PT consult to assist with strengthening/mobility  Outcome: Progressing

## 2021-02-07 NOTE — PROGRESS NOTES
Progress Note - [facility]  Remington Cruz 27 y o  female MRN: [id number]  Unit/Bed#:  330-01 Encounter: [id number]      Remington Cruz has no current complaints  Resting well overnight  Attempting to avoid carbohydrate heavy late night snacks  In regards to her blood sugars, she reports only eating vegetables for snacks yesterday and did not bolus for snacks  She also did not have preprandial blood sugars drawn, but confirmed that they will be drawn today  /60 (BP Location: Right arm)   Pulse 86   Temp 97 6 °F (36 4 °C) (Oral)   Resp 18   Ht 5' 4" (1 626 m)   Wt 95 3 kg (210 lb)   SpO2 97%   BMI 36 05 kg/m²     Physical Exam:   General: AAOx3  No acute distress  Heart: Regular rate & rhythm  No murmurs/clicks/gallops  Lungs: Clear bilaterally  Normal effort  No wheezes/rales/rhonchi  Abdominal: Soft, non-tender gravid uterus  Extremities: No TTP  Lower limbs symmetric bilaterally  FHT:  Baseline Rate: 140 bpm  Variability: Moderate 6-25 bpm  Accelerations: 15 x 15 or greater  Decelerations: None  FHR Category: Category I  TOCO:   Contraction Frequency (minutes): 0  Contraction Duration (seconds): n/a  Contraction Quality: Not applicable    Lab Results   Component Value Date    WBC 13 71 (H) 2021    HGB 12 6 2021    HCT 38 4 2021     2021     Lab Results   Component Value Date    K 4 2 2020     2020    CO2 25 2020    BUN 14 2020    CREATININE 0 78 2020    AST 14 2020    ALT 23 2020       A/P: Remington Cruz is a 27 y o   at 24w0d admitted with PPROM  Currently in stable condition  Hospital Day:    1) PPROM: s/p latency antibiotics, magnesium sulfate x 12 hours, BTM -, fetal ultrasound yesterday showed fetus in liang breech presentation, overall stable fetal ascites, no overt evidence of fetal hydrops     2) T1DM: on insulin pump; basal rate now 2 1 midnight to 8 am,  2 0 all other hours, insulin to carb ration 1:5 for breakfast and lunch, 1:4 with dinner and snacks, ISF 1 units for every 25 mg/dL over 100 mg /dL, fasting blood sugars over the weekend 80s-90s, postprandial blood sugars 100s-180s, continue to monitor closely, Diet Jayson/CHO controlled consistent carbohydrate Diet Level 1 with 4 carb servings/60 grams CHO/meal  3) IUP @ 23 weeks: NST TID  4) DVT PPx: SCDs, ambulation, lovenox 40 mg qday  5) IV access: for midline placement this week, likely Monday, hold anticoagulation prior to midline placement     Dispo: inpatient    [place], DO

## 2021-02-08 LAB
GLUCOSE SERPL-MCNC: 115 MG/DL (ref 65–140)
GLUCOSE SERPL-MCNC: 118 MG/DL (ref 65–140)
GLUCOSE SERPL-MCNC: 134 MG/DL (ref 65–140)
GLUCOSE SERPL-MCNC: 158 MG/DL (ref 65–140)
GLUCOSE SERPL-MCNC: 172 MG/DL (ref 65–140)
GLUCOSE SERPL-MCNC: 89 MG/DL (ref 65–140)
GLUCOSE SERPL-MCNC: 94 MG/DL (ref 65–140)

## 2021-02-08 PROCEDURE — 82948 REAGENT STRIP/BLOOD GLUCOSE: CPT

## 2021-02-08 PROCEDURE — 99232 SBSQ HOSP IP/OBS MODERATE 35: CPT | Performed by: OBSTETRICS & GYNECOLOGY

## 2021-02-08 PROCEDURE — 59025 FETAL NON-STRESS TEST: CPT | Performed by: OBSTETRICS & GYNECOLOGY

## 2021-02-08 PROCEDURE — NC001 PR NO CHARGE: Performed by: OBSTETRICS & GYNECOLOGY

## 2021-02-08 RX ADMIN — DOCUSATE SODIUM 100 MG: 100 CAPSULE, LIQUID FILLED ORAL at 18:04

## 2021-02-08 RX ADMIN — ENOXAPARIN SODIUM 40 MG: 40 INJECTION SUBCUTANEOUS at 12:48

## 2021-02-08 RX ADMIN — Medication 2000 UNITS: at 08:10

## 2021-02-08 RX ADMIN — DOCUSATE SODIUM 100 MG: 100 CAPSULE, LIQUID FILLED ORAL at 08:10

## 2021-02-08 RX ADMIN — ASPIRIN 81 MG CHEWABLE TABLET 162 MG: 81 TABLET CHEWABLE at 22:06

## 2021-02-08 RX ADMIN — FOLIC ACID 1 MG: 1 TABLET ORAL at 08:08

## 2021-02-08 RX ADMIN — METFORMIN ER 500 MG 1500 MG: 500 TABLET ORAL at 22:06

## 2021-02-08 RX ADMIN — OXYCODONE HYDROCHLORIDE AND ACETAMINOPHEN 1000 MG: 500 TABLET ORAL at 08:08

## 2021-02-08 RX ADMIN — Medication 1 TABLET: at 08:08

## 2021-02-08 NOTE — PLAN OF CARE
Problem: PAIN - ADULT  Goal: Verbalizes/displays adequate comfort level or baseline comfort level  Description: Interventions:  - Encourage patient to monitor pain and request assistance  - Assess pain using appropriate pain scale  - Administer analgesics based on type and severity of pain and evaluate response  - Implement non-pharmacological measures as appropriate and evaluate response  - Consider cultural and social influences on pain and pain management  - Notify physician/advanced practitioner if interventions unsuccessful or patient reports new pain  Outcome: Progressing     Problem: INFECTION - ADULT  Goal: Absence or prevention of progression during hospitalization  Description: INTERVENTIONS:  - Assess and monitor for signs and symptoms of infection  - Monitor lab/diagnostic results  - Monitor all insertion sites, i e  indwelling lines, tubes, and drains  - Monitor endotracheal if appropriate and nasal secretions for changes in amount and color  - [place] appropriate cooling/warming therapies per order  - Administer medications as ordered  - Instruct and encourage patient and family to use good hand hygiene technique  - Identify and instruct in appropriate isolation precautions for identified infection/condition  Outcome: Progressing     Problem: SAFETY ADULT  Goal: Patient will remain free of falls  Description: INTERVENTIONS:  - Assess patient frequently for physical needs  -  Identify cognitive and physical deficits and behaviors that affect risk of falls    -  [place] fall precautions as indicated by assessment   - Educate patient/family on patient safety including physical limitations  - Instruct patient to call for assistance with activity based on assessment  - Modify environment to reduce risk of injury  - Consider OT/PT consult to assist with strengthening/mobility  Outcome: Progressing  Goal: Maintain or return to baseline ADL function  Description: INTERVENTIONS:  -  Assess patient's ability to carry out ADLs; assess patient's baseline for ADL function and identify physical deficits which impact ability to perform ADLs (bathing, care of mouth/teeth, toileting, grooming, dressing, etc )  - Assess/evaluate cause of self-care deficits   - Assess range of motion  - Assess patient's mobility; develop plan if impaired  - Assess patient's need for assistive devices and provide as appropriate  - Encourage maximum independence but intervene and supervise when necessary  - Involve family in performance of ADLs  - Assess for home care needs following discharge   - Consider OT consult to assist with ADL evaluation and planning for discharge  - Provide patient education as appropriate  Outcome: Progressing  Goal: Maintain or return mobility status to optimal level  Description: INTERVENTIONS:  - Assess patient's baseline mobility status (ambulation, transfers, stairs, etc )    - Identify cognitive and physical deficits and behaviors that affect mobility  - Identify mobility aids required to assist with transfers and/or ambulation (gait belt, sit-to-stand, lift, walker, cane, etc )  - [place] fall precautions as indicated by assessment  - Record patient progress and toleration of activity level on Mobility SBAR; progress patient to next Phase/Stage  - Instruct patient to call for assistance with activity based on assessment  - Consider rehabilitation consult to assist with strengthening/weightbearing, etc   Outcome: Progressing     Problem: DISCHARGE PLANNING  Goal: Discharge to home or other facility with appropriate resources  Description: INTERVENTIONS:  - Identify barriers to discharge w/patient and caregiver  - Arrange for needed discharge resources and transportation as appropriate  - Identify discharge learning needs (meds, wound care, etc )  - Arrange for interpretive services to assist at discharge as needed  - Refer to Case Management Department for coordinating discharge planning if the patient needs post-hospital services based on physician/advanced practitioner order or complex needs related to functional status, cognitive ability, or social support system  Outcome: Progressing     Problem: ANTEPARTUM  Goal: Maintain pregnancy as long as maternal and/or fetal condition is stable  Description: INTERVENTIONS:  - Maternal surveillance  - Fetal surveillance  - Monitor uterine activity  - Medications as ordered  - Bedrest  Outcome: Progressing     Problem: DISCHARGE PLANNING - CARE MANAGEMENT  Goal: Discharge to post-acute care or home with appropriate resources  Description: INTERVENTIONS:  - Conduct assessment to determine patient/family and health care team treatment goals, and need for post-acute services based on payer coverage, community resources, and patient preferences, and barriers to discharge  - Address psychosocial, clinical, and financial barriers to discharge as identified in assessment in conjunction with the patient/family and health care team  - Arrange appropriate level of post-acute services according to patients   needs and preference and payer coverage in collaboration with the physician and health care team  - Communicate with and update the patient/family, physician, and health care team regarding progress on the discharge plan  - Arrange appropriate transportation to post-acute venues  Outcome: Progressing     Problem: Potential for Falls  Goal: Patient will remain free of falls  Description: INTERVENTIONS:  - Assess patient frequently for physical needs  -  Identify cognitive and physical deficits and behaviors that affect risk of falls    -  [place] fall precautions as indicated by assessment   - Educate patient/family on patient safety including physical limitations  - Instruct patient to call for assistance with activity based on assessment  - Modify environment to reduce risk of injury  - Consider OT/PT consult to assist with strengthening/mobility  Outcome: Progressing

## 2021-02-08 NOTE — PROGRESS NOTES
Progress Note - Maternal Fetal Medicine   Fred Ojeda 27 y o  female MRN: [id number]  Unit/Bed#: -01 Encounter: [id number]    Assessment:  27 y o  [id number] at 24w4d admitted with PPROM  Hospital day 12    Plan:  1) PPROM: s/p latency antibiotics, magnesium sulfate x 12 hours, BTM 1/28-1/29, fetal ultrasound 2/3 showed fetus in liang breech presentation, overall stable fetal ascites, no overt evidence of fetal hydrops  2) T1DM: on insulin pump; basal rate now 2 05 midnight to 8 am,  2 0 all other hours, insulin to carb ration 1:5 for breakfast and lunch, 1:4 with dinner and snacks, ISF 1 units for every 25 mg/dL over 100 mg /dL, fasting blood sugars yesterday 84-86, postprandial blood sugars 80s-160s, continue to monitor closely, Diet Jayson/CHO controlled consistent carbohydrate Diet Level 1 with 4 carb servings/60 grams CHO/meal  3) IUP @ 24 weeks: NST TID  4) DVT PPx: SCDs, ambulation, lovenox 40 mg qday  5) IV access: for midline placement possibly today, hold anticoagulation prior to midline placement     Subjective/Objective   Chief Complaint:     No complaints this morning    Subjective:     Patient currently has no complaints this morning  Contractions: no  Loss of fluid: no  Vaginal bleeding: yes  Fetal movement: yes    Objective:     Vitals:   Temp:  [97 5 °F (36 4 °C)-98 4 °F (36 9 °C)] 98 1 °F (36 7 °C)  HR:  [74-88] 74  Resp:  [16-18] 16  BP: ()/(53-74) 97/53       Physical Exam  Vitals signs and nursing note reviewed  Constitutional:       Appearance: She is well-developed  Cardiovascular:      Rate and Rhythm: Normal rate and regular rhythm  Pulmonary:      Effort: Pulmonary effort is normal       Breath sounds: Normal breath sounds  Abdominal:      General: Bowel sounds are normal       Tenderness: There is no abdominal tenderness  There is no guarding or rebound  Musculoskeletal: Normal range of motion  General: No tenderness     Neurological:      Mental Status: She is alert and oriented to person, place, and time         Fetal Assessment:  FHT: 140 / Moderate 6 - 25 bpm / reactive, no decels  [place]: none    Lab Results   Component Value Date    WBC 13 71 (H) 02/05/2021    HGB 12 6 02/05/2021    HCT 38 4 02/05/2021    MCV 93 02/05/2021     02/05/2021       Lab Results   Component Value Date    CALCIUM 9 2 11/20/2020    K 4 2 11/20/2020    CO2 25 11/20/2020     11/20/2020    BUN 14 11/20/2020    CREATININE 0 78 11/20/2020       Lab Results   Component Value Date    ALT 23 11/20/2020    AST 14 11/20/2020    ALKPHOS 55 11/20/2020       Thi Sarah Beth Silverio MD  OBGYN, PGY-4  2/8/2021  6:44 AM

## 2021-02-08 NOTE — PLAN OF CARE
Problem: PAIN - ADULT  Goal: Verbalizes/displays adequate comfort level or baseline comfort level  Description: Interventions:  - Encourage patient to monitor pain and request assistance  - Assess pain using appropriate pain scale  - Administer analgesics based on type and severity of pain and evaluate response  - Implement non-pharmacological measures as appropriate and evaluate response  - Consider cultural and social influences on pain and pain management  - Notify physician/advanced practitioner if interventions unsuccessful or patient reports new pain  Outcome: Progressing     Problem: INFECTION - ADULT  Goal: Absence or prevention of progression during hospitalization  Description: INTERVENTIONS:  - Assess and monitor for signs and symptoms of infection  - Monitor lab/diagnostic results  - Monitor all insertion sites, i e  indwelling lines, tubes, and drains  - Monitor endotracheal if appropriate and nasal secretions for changes in amount and color  - [place] appropriate cooling/warming therapies per order  - Administer medications as ordered  - Instruct and encourage patient and family to use good hand hygiene technique  - Identify and instruct in appropriate isolation precautions for identified infection/condition  Outcome: Progressing     Problem: SAFETY ADULT  Goal: Patient will remain free of falls  Description: INTERVENTIONS:  - Assess patient frequently for physical needs  -  Identify cognitive and physical deficits and behaviors that affect risk of falls    -  [place] fall precautions as indicated by assessment   - Educate patient/family on patient safety including physical limitations  - Instruct patient to call for assistance with activity based on assessment  - Modify environment to reduce risk of injury  - Consider OT/PT consult to assist with strengthening/mobility  Outcome: Progressing  Goal: Maintain or return to baseline ADL function  Description: INTERVENTIONS:  -  Assess patient's ability to carry out ADLs; assess patient's baseline for ADL function and identify physical deficits which impact ability to perform ADLs (bathing, care of mouth/teeth, toileting, grooming, dressing, etc )  - Assess/evaluate cause of self-care deficits   - Assess range of motion  - Assess patient's mobility; develop plan if impaired  - Assess patient's need for assistive devices and provide as appropriate  - Encourage maximum independence but intervene and supervise when necessary  - Involve family in performance of ADLs  - Assess for home care needs following discharge   - Consider OT consult to assist with ADL evaluation and planning for discharge  - Provide patient education as appropriate  Outcome: Progressing  Goal: Maintain or return mobility status to optimal level  Description: INTERVENTIONS:  - Assess patient's baseline mobility status (ambulation, transfers, stairs, etc )    - Identify cognitive and physical deficits and behaviors that affect mobility  - Identify mobility aids required to assist with transfers and/or ambulation (gait belt, sit-to-stand, lift, walker, cane, etc )  - [place] fall precautions as indicated by assessment  - Record patient progress and toleration of activity level on Mobility SBAR; progress patient to next Phase/Stage  - Instruct patient to call for assistance with activity based on assessment  - Consider rehabilitation consult to assist with strengthening/weightbearing, etc   Outcome: Progressing     Problem: DISCHARGE PLANNING  Goal: Discharge to home or other facility with appropriate resources  Description: INTERVENTIONS:  - Identify barriers to discharge w/patient and caregiver  - Arrange for needed discharge resources and transportation as appropriate  - Identify discharge learning needs (meds, wound care, etc )  - Arrange for interpretive services to assist at discharge as needed  - Refer to Case Management Department for coordinating discharge planning if the patient needs post-hospital services based on physician/advanced practitioner order or complex needs related to functional status, cognitive ability, or social support system  Outcome: Progressing     Problem: Labor & Delivery  Goal: Manages discomfort  Description: Assess and monitor for signs and symptoms of discomfort  Assess patient's pain level regularly and per hospital policy  Administer medications as ordered  Support use of nonpharmacological methods to help control pain such as distraction, imagery, relaxation, and application of heat and cold  Collaborate with interdisciplinary team and patient to determine appropriate pain management plan  1  Include patient in decisions related to comfort  2  Offer non-pharmacological pain management interventions  3  Report ineffective pain management to physician  Outcome: Progressing  Goal: Patient vital signs are stable  Description: 1  Assess vital signs - vaginal delivery    Outcome: Progressing     Problem: ANTEPARTUM  Goal: Maintain pregnancy as long as maternal and/or fetal condition is stable  Description: INTERVENTIONS:  - Maternal surveillance  - Fetal surveillance  - Monitor uterine activity  - Medications as ordered  - Bedrest  Outcome: Progressing     Problem: DISCHARGE PLANNING - CARE MANAGEMENT  Goal: Discharge to post-acute care or home with appropriate resources  Description: INTERVENTIONS:  - Conduct assessment to determine patient/family and health care team treatment goals, and need for post-acute services based on payer coverage, community resources, and patient preferences, and barriers to discharge  - Address psychosocial, clinical, and financial barriers to discharge as identified in assessment in conjunction with the patient/family and health care team  - Arrange appropriate level of post-acute services according to patients   needs and preference and payer coverage in collaboration with the physician and health care team  - Communicate with and update the patient/family, physician, and health care team regarding progress on the discharge plan  - Arrange appropriate transportation to post-acute venues  Outcome: Progressing     Problem: Potential for Falls  Goal: Patient will remain free of falls  Description: INTERVENTIONS:  - Assess patient frequently for physical needs  -  Identify cognitive and physical deficits and behaviors that affect risk of falls    -  [place] fall precautions as indicated by assessment   - Educate patient/family on patient safety including physical limitations  - Instruct patient to call for assistance with activity based on assessment  - Modify environment to reduce risk of injury  - Consider OT/PT consult to assist with strengthening/mobility  Outcome: Progressing

## 2021-02-09 LAB
GLUCOSE SERPL-MCNC: 103 MG/DL (ref 65–140)
GLUCOSE SERPL-MCNC: 104 MG/DL (ref 65–140)
GLUCOSE SERPL-MCNC: 115 MG/DL (ref 65–140)
GLUCOSE SERPL-MCNC: 136 MG/DL (ref 65–140)
GLUCOSE SERPL-MCNC: 157 MG/DL (ref 65–140)
GLUCOSE SERPL-MCNC: 165 MG/DL (ref 65–140)
GLUCOSE SERPL-MCNC: 218 MG/DL (ref 65–140)
GLUCOSE SERPL-MCNC: 97 MG/DL (ref 65–140)

## 2021-02-09 PROCEDURE — 59025 FETAL NON-STRESS TEST: CPT | Performed by: OBSTETRICS & GYNECOLOGY

## 2021-02-09 PROCEDURE — 76816 OB US FOLLOW-UP PER FETUS: CPT | Performed by: OBSTETRICS & GYNECOLOGY

## 2021-02-09 PROCEDURE — NC001 PR NO CHARGE: Performed by: OBSTETRICS & GYNECOLOGY

## 2021-02-09 PROCEDURE — 99232 SBSQ HOSP IP/OBS MODERATE 35: CPT | Performed by: OBSTETRICS & GYNECOLOGY

## 2021-02-09 PROCEDURE — 82948 REAGENT STRIP/BLOOD GLUCOSE: CPT

## 2021-02-09 RX ORDER — POLYETHYLENE GLYCOL 3350 17 G/17G
17 POWDER, FOR SOLUTION ORAL DAILY PRN
Status: DISCONTINUED | OUTPATIENT
Start: 2021-02-09 | End: 2021-03-16

## 2021-02-09 RX ADMIN — Medication 2000 UNITS: at 08:59

## 2021-02-09 RX ADMIN — Medication 1 TABLET: at 08:59

## 2021-02-09 RX ADMIN — OXYCODONE HYDROCHLORIDE AND ACETAMINOPHEN 1000 MG: 500 TABLET ORAL at 08:59

## 2021-02-09 RX ADMIN — METFORMIN ER 500 MG 1500 MG: 500 TABLET ORAL at 22:23

## 2021-02-09 RX ADMIN — DOCUSATE SODIUM 100 MG: 100 CAPSULE, LIQUID FILLED ORAL at 18:10

## 2021-02-09 RX ADMIN — DOCUSATE SODIUM 100 MG: 100 CAPSULE, LIQUID FILLED ORAL at 08:59

## 2021-02-09 RX ADMIN — ASPIRIN 81 MG CHEWABLE TABLET 162 MG: 81 TABLET CHEWABLE at 22:23

## 2021-02-09 RX ADMIN — FOLIC ACID 1 MG: 1 TABLET ORAL at 08:59

## 2021-02-09 RX ADMIN — POLYETHYLENE GLYCOL 3350 17 G: 17 POWDER, FOR SOLUTION ORAL at 19:59

## 2021-02-09 NOTE — PROGRESS NOTES
Progress Note - Maternal Fetal Medicine   Keli Horne 27 y o  female MRN: [id number]  Unit/Bed#: -01 Encounter: [id number]    Assessment:  27 y o  [id number] at 24w5d admitted with PPROM  Hospital day 13    Plan:  1) PPROM: s/p latency antibiotics, magnesium sulfate x 12 hours, BTM 1/28-1/29, fetal ultrasound 2/3 showed fetus in liang breech presentation, overall stable fetal ascites, no overt evidence of fetal hydrops  Patient will be due for another ultrasound scan today  2) T1DM: on insulin pump; basal rate now 2 05 midnight to 8 am,  2 0 all other hours, insulin to carb ration 1:5 for breakfast and lunch, 1:4 with dinner and snacks, ISF 1 units for every 25 mg/dL over 100 mg /dL, fasting blood sugars yesterday 89, postprandial blood sugars 110s-170s, continue to monitor closely, Diet Jayson/CHO controlled consistent carbohydrate Diet Level 1 with 4 carb servings/60 grams CHO/meal  3) IUP @ 24 weeks: NST TID  4) DVT PPx: SCDs, ambulation, lovenox 40 mg qday  5) IV access: for midline placement today, hold anticoagulation prior to midline placement     Subjective/Objective   Chief Complaint:     No complaints this morning    Subjective:     Patient currently has no complaints this morning  Contractions: no  Loss of fluid: no  Vaginal bleeding: yes  Fetal movement: yes    Objective:     Vitals:   Temp:  [97 9 °F (36 6 °C)-98 2 °F (36 8 °C)] 97 9 °F (36 6 °C)  HR:  [88-91] 88  Resp:  [16-20] 16  BP: ()/(55-68) 99/55       Physical Exam  Vitals signs and nursing note reviewed  Constitutional:       Appearance: She is well-developed  Cardiovascular:      Rate and Rhythm: Normal rate and regular rhythm  Pulmonary:      Effort: Pulmonary effort is normal       Breath sounds: Normal breath sounds  Abdominal:      General: Bowel sounds are normal       Tenderness: There is no abdominal tenderness  There is no guarding or rebound  Comments: Gravid uterus   Musculoskeletal: Normal range of motion  General: No tenderness  Neurological:      Mental Status: She is alert and oriented to person, place, and time         Fetal Assessment:  FHT: 140 / Moderate 6 - 25 bpm / reactive, no decels  [place]: none    Lab Results   Component Value Date    WBC 13 71 (H) 02/05/2021    HGB 12 6 02/05/2021    HCT 38 4 02/05/2021    MCV 93 02/05/2021     02/05/2021       Lab Results   Component Value Date    CALCIUM 9 2 11/20/2020    K 4 2 11/20/2020    CO2 25 11/20/2020     11/20/2020    BUN 14 11/20/2020    CREATININE 0 78 11/20/2020       Lab Results   Component Value Date    ALT 23 11/20/2020    AST 14 11/20/2020    ALKPHOS 55 11/20/2020       Thi Thi Gerhard Boxer, MD  OBGYN, PGY-4  2/9/2021  6:43 AM

## 2021-02-09 NOTE — PLAN OF CARE
Problem: PAIN - ADULT  Goal: Verbalizes/displays adequate comfort level or baseline comfort level  Description: Interventions:  - Encourage patient to monitor pain and request assistance  - Assess pain using appropriate pain scale  - Administer analgesics based on type and severity of pain and evaluate response  - Implement non-pharmacological measures as appropriate and evaluate response  - Consider cultural and social influences on pain and pain management  - Notify physician/advanced practitioner if interventions unsuccessful or patient reports new pain  Outcome: Progressing     Problem: INFECTION - ADULT  Goal: Absence or prevention of progression during hospitalization  Description: INTERVENTIONS:  - Assess and monitor for signs and symptoms of infection  - Monitor lab/diagnostic results  - Monitor all insertion sites, i e  indwelling lines, tubes, and drains  - Monitor endotracheal if appropriate and nasal secretions for changes in amount and color  - [place] appropriate cooling/warming therapies per order  - Administer medications as ordered  - Instruct and encourage patient and family to use good hand hygiene technique  - Identify and instruct in appropriate isolation precautions for identified infection/condition  Outcome: Progressing     Problem: SAFETY ADULT  Goal: Patient will remain free of falls  Description: INTERVENTIONS:  - Assess patient frequently for physical needs  -  Identify cognitive and physical deficits and behaviors that affect risk of falls    -  [place] fall precautions as indicated by assessment   - Educate patient/family on patient safety including physical limitations  - Instruct patient to call for assistance with activity based on assessment  - Modify environment to reduce risk of injury  - Consider OT/PT consult to assist with strengthening/mobility  Outcome: Progressing  Goal: Maintain or return to baseline ADL function  Description: INTERVENTIONS:  -  Assess patient's ability to carry out ADLs; assess patient's baseline for ADL function and identify physical deficits which impact ability to perform ADLs (bathing, care of mouth/teeth, toileting, grooming, dressing, etc )  - Assess/evaluate cause of self-care deficits   - Assess range of motion  - Assess patient's mobility; develop plan if impaired  - Assess patient's need for assistive devices and provide as appropriate  - Encourage maximum independence but intervene and supervise when necessary  - Involve family in performance of ADLs  - Assess for home care needs following discharge   - Consider OT consult to assist with ADL evaluation and planning for discharge  - Provide patient education as appropriate  Outcome: Progressing  Goal: Maintain or return mobility status to optimal level  Description: INTERVENTIONS:  - Assess patient's baseline mobility status (ambulation, transfers, stairs, etc )    - Identify cognitive and physical deficits and behaviors that affect mobility  - Identify mobility aids required to assist with transfers and/or ambulation (gait belt, sit-to-stand, lift, walker, cane, etc )  - [place] fall precautions as indicated by assessment  - Record patient progress and toleration of activity level on Mobility SBAR; progress patient to next Phase/Stage  - Instruct patient to call for assistance with activity based on assessment  - Consider rehabilitation consult to assist with strengthening/weightbearing, etc   Outcome: Progressing     Problem: DISCHARGE PLANNING  Goal: Discharge to home or other facility with appropriate resources  Description: INTERVENTIONS:  - Identify barriers to discharge w/patient and caregiver  - Arrange for needed discharge resources and transportation as appropriate  - Identify discharge learning needs (meds, wound care, etc )  - Arrange for interpretive services to assist at discharge as needed  - Refer to Case Management Department for coordinating discharge planning if the patient needs post-hospital services based on physician/advanced practitioner order or complex needs related to functional status, cognitive ability, or social support system  Outcome: Progressing     Problem: Labor & Delivery  Goal: Manages discomfort  Description: Assess and monitor for signs and symptoms of discomfort  Assess patient's pain level regularly and per hospital policy  Administer medications as ordered  Support use of nonpharmacological methods to help control pain such as distraction, imagery, relaxation, and application of heat and cold  Collaborate with interdisciplinary team and patient to determine appropriate pain management plan  1  Include patient in decisions related to comfort  2  Offer non-pharmacological pain management interventions  3  Report ineffective pain management to physician  Outcome: Progressing  Goal: Patient vital signs are stable  Description: 1  Assess vital signs - vaginal delivery    Outcome: Progressing     Problem: ANTEPARTUM  Goal: Maintain pregnancy as long as maternal and/or fetal condition is stable  Description: INTERVENTIONS:  - Maternal surveillance  - Fetal surveillance  - Monitor uterine activity  - Medications as ordered  - Bedrest  Outcome: Progressing     Problem: DISCHARGE PLANNING - CARE MANAGEMENT  Goal: Discharge to post-acute care or home with appropriate resources  Description: INTERVENTIONS:  - Conduct assessment to determine patient/family and health care team treatment goals, and need for post-acute services based on payer coverage, community resources, and patient preferences, and barriers to discharge  - Address psychosocial, clinical, and financial barriers to discharge as identified in assessment in conjunction with the patient/family and health care team  - Arrange appropriate level of post-acute services according to patients   needs and preference and payer coverage in collaboration with the physician and health care team  - Communicate with and update the patient/family, physician, and health care team regarding progress on the discharge plan  - Arrange appropriate transportation to post-acute venues  Outcome: Progressing     Problem: Potential for Falls  Goal: Patient will remain free of falls  Description: INTERVENTIONS:  - Assess patient frequently for physical needs  -  Identify cognitive and physical deficits and behaviors that affect risk of falls    -  [place] fall precautions as indicated by assessment   - Educate patient/family on patient safety including physical limitations  - Instruct patient to call for assistance with activity based on assessment  - Modify environment to reduce risk of injury  - Consider OT/PT consult to assist with strengthening/mobility  Outcome: Progressing

## 2021-02-10 LAB
GLUCOSE SERPL-MCNC: 108 MG/DL (ref 65–140)
GLUCOSE SERPL-MCNC: 114 MG/DL (ref 65–140)
GLUCOSE SERPL-MCNC: 133 MG/DL (ref 65–140)
GLUCOSE SERPL-MCNC: 138 MG/DL (ref 65–140)
GLUCOSE SERPL-MCNC: 143 MG/DL (ref 65–140)
GLUCOSE SERPL-MCNC: 162 MG/DL (ref 65–140)
GLUCOSE SERPL-MCNC: 174 MG/DL (ref 65–140)
GLUCOSE SERPL-MCNC: 180 MG/DL (ref 65–140)

## 2021-02-10 PROCEDURE — NC001 PR NO CHARGE: Performed by: OBSTETRICS & GYNECOLOGY

## 2021-02-10 PROCEDURE — 99232 SBSQ HOSP IP/OBS MODERATE 35: CPT | Performed by: OBSTETRICS & GYNECOLOGY

## 2021-02-10 PROCEDURE — 59025 FETAL NON-STRESS TEST: CPT | Performed by: OBSTETRICS & GYNECOLOGY

## 2021-02-10 PROCEDURE — 82948 REAGENT STRIP/BLOOD GLUCOSE: CPT

## 2021-02-10 RX ADMIN — OXYCODONE HYDROCHLORIDE AND ACETAMINOPHEN 1000 MG: 500 TABLET ORAL at 09:29

## 2021-02-10 RX ADMIN — DOCUSATE SODIUM 100 MG: 100 CAPSULE, LIQUID FILLED ORAL at 09:29

## 2021-02-10 RX ADMIN — Medication 1 TABLET: at 09:29

## 2021-02-10 RX ADMIN — FOLIC ACID 1 MG: 1 TABLET ORAL at 09:29

## 2021-02-10 RX ADMIN — METFORMIN ER 500 MG 1500 MG: 500 TABLET ORAL at 22:44

## 2021-02-10 RX ADMIN — ASPIRIN 81 MG CHEWABLE TABLET 162 MG: 81 TABLET CHEWABLE at 22:13

## 2021-02-10 RX ADMIN — Medication 2000 UNITS: at 09:29

## 2021-02-10 RX ADMIN — DOCUSATE SODIUM 100 MG: 100 CAPSULE, LIQUID FILLED ORAL at 18:06

## 2021-02-10 RX ADMIN — ENOXAPARIN SODIUM 40 MG: 40 INJECTION SUBCUTANEOUS at 09:30

## 2021-02-10 NOTE — UTILIZATION REVIEW
Continued Stay Review  Date: 2/10/2021                        Current Patient Class: inpatient   Current Level of Care: med surg   HPI:30 y o  female initially admitted on 01-28-21 at 21 weeks gestation for PPROM since 16 week gestation     Assessment/Plan: 2/10/2021 OB Resident MD  27 y o  Amena Aggarwal at 24w6d admitted with PPROM  Hospital day 14  Plan:  1) PPROM: s/p latency antibiotics, magnesium sulfate x 12 hours, BTM 1/28-1/29, fetal ultrasound 2/9 showed fetus weighing 700g in liang breech presentation, improved fetal ascites, anhydramnios     2) T1DM: on insulin pump; basal rate 2 05 midnight to 8 am,  2 0 all other hours, insulin to carb ration 1:5 for breakfast and lunch, 1:4 with dinner and snacks, ISF 1 units for every 25 mg/dL over 100 mg /dL, fasting blood sugars yesterday 100-110s, postprandial blood sugars 90s-150s, continue to monitor closely, Diet Jayson/CHO controlled consistent carbohydrate Diet Level 1 with 4 carb servings/60 grams CHO/meal; kinked tube last night which was then replaced  3) IUP @ 24 weeks: NST TID  4) DVT PPx: SCDs, ambulation, lovenox 40 mg qday    Pertinent Labs/Diagnostic Results:       Results from last 7 days   Lab Units 02/05/21  2125   WBC Thousand/uL 13 71*   HEMOGLOBIN g/dL 12 6   HEMATOCRIT % 38 4   PLATELETS Thousands/uL 227   NEUTROS ABS Thousands/µL 9 88*                 Results from last 7 days   Lab Units 02/10/21  1353 02/10/21  1216 02/10/21  0937 02/10/21  0611 02/09/21  2304 02/09/21  2116 02/09/21  1918 02/09/21  1636 02/09/21  1359 02/09/21  1126 02/09/21  0858 02/09/21  0615   POC GLUCOSE mg/dl 133 138 114 108 136 218* 157* 104 97 165* 115 103     Vital Signs:   Date/Time  Temp  Pulse  Resp  BP  SpO2  O2 Device  Cardiac (WDL)  Patient Position - Orthostatic VS   02/10/21 1145  98 2 °F (36 8 °C)  --  --  --  --  --  --  --   02/10/21 0930  --  --  --  --  --  None (Room air)  WDL  --   02/10/21 0757  98 °F (36 7 °C)  82  20  100/53  --  --  --  --   02/10/21 0600 97 8 °F (36 6 °C)  --  --  --  --  --  --  --   02/10/21 0300  --  --  --  --  --  --  WDL  --   02/10/21 0000  98 °F (36 7 °C)  83  20  103/56  --  None (Room air)  --  Lying         Medications:   Scheduled Medications:  vitamin C, 1,000 mg, Oral, Daily  aspirin, 162 mg, Oral, Daily  cholecalciferol, 2,000 Units, Oral, Daily  docusate sodium, 100 mg, Oral, BID  enoxaparin, 40 mg, Subcutaneous, H79W IRAJ  folic acid, 1 mg, Oral, Daily  metFORMIN, 1,500 mg, Oral, Daily  neomycin-bacitracin-polymyxin b, 1 small application, Topical, BID  patient maintained insulin pump, 1 each, Subcutaneous, Q8H  prenatal multivitamin, 1 tablet, Oral, Daily    Continuous IV Infusions:     PRN Meds:  acetaminophen, 650 mg, Oral, Q4H PRN  calcium carbonate, 1,000 mg, Oral, Daily PRN  insulin aspart, 100 Units, Subcutaneous Insulin Pump, Daily PRN  polyethylene glycol, 17 g, Oral, Daily PRN  Discharge Plan: Delivery is recommended with onset of labor, clinical suspicion of evolving chorioamnionitis or abruption, or concerns related to fetal heart rate monitoring  TBD  Network Utilization Review Department  ATTENTION: Please call with any questions or concerns to [phone] and carefully listen to the prompts so that you are directed to the right person  All voicemails are confidential   Tom Sevilla all requests for admission clinical reviews, approved or denied determinations and any other requests to dedicated fax number below belonging to the campus where the patient is receiving treatment   List of dedicated fax numbers for the Facilities:  [address] DENIALS (Administrative/Medical Necessity) [phone]   [address] [place] [address] (Maternity/NICU/Pediatrics) 261 [facility],[address] [address] [place] [address] [place] [address] [place] ([address]) [address] [place] [address] [place] [address] [address] [phone]   [address] [phone]

## 2021-02-10 NOTE — PLAN OF CARE
Problem: PAIN - ADULT  Goal: Verbalizes/displays adequate comfort level or baseline comfort level  Description: Interventions:  - Encourage patient to monitor pain and request assistance  - Assess pain using appropriate pain scale  - Administer analgesics based on type and severity of pain and evaluate response  - Implement non-pharmacological measures as appropriate and evaluate response  - Consider cultural and social influences on pain and pain management  - Notify physician/advanced practitioner if interventions unsuccessful or patient reports new pain  Outcome: Progressing     Problem: INFECTION - ADULT  Goal: Absence or prevention of progression during hospitalization  Description: INTERVENTIONS:  - Assess and monitor for signs and symptoms of infection  - Monitor lab/diagnostic results  - Monitor all insertion sites, i e  indwelling lines, tubes, and drains  - Monitor endotracheal if appropriate and nasal secretions for changes in amount and color  - [place] appropriate cooling/warming therapies per order  - Administer medications as ordered  - Instruct and encourage patient and family to use good hand hygiene technique  - Identify and instruct in appropriate isolation precautions for identified infection/condition  Outcome: Progressing     Problem: SAFETY ADULT  Goal: Patient will remain free of falls  Description: INTERVENTIONS:  - Assess patient frequently for physical needs  -  Identify cognitive and physical deficits and behaviors that affect risk of falls    -  [place] fall precautions as indicated by assessment   - Educate patient/family on patient safety including physical limitations  - Instruct patient to call for assistance with activity based on assessment  - Modify environment to reduce risk of injury  - Consider OT/PT consult to assist with strengthening/mobility  Outcome: Progressing  Goal: Maintain or return to baseline ADL function  Description: INTERVENTIONS:  -  Assess patient's ability to carry out ADLs; assess patient's baseline for ADL function and identify physical deficits which impact ability to perform ADLs (bathing, care of mouth/teeth, toileting, grooming, dressing, etc )  - Assess/evaluate cause of self-care deficits   - Assess range of motion  - Assess patient's mobility; develop plan if impaired  - Assess patient's need for assistive devices and provide as appropriate  - Encourage maximum independence but intervene and supervise when necessary  - Involve family in performance of ADLs  - Assess for home care needs following discharge   - Consider OT consult to assist with ADL evaluation and planning for discharge  - Provide patient education as appropriate  Outcome: Progressing  Goal: Maintain or return mobility status to optimal level  Description: INTERVENTIONS:  - Assess patient's baseline mobility status (ambulation, transfers, stairs, etc )    - Identify cognitive and physical deficits and behaviors that affect mobility  - Identify mobility aids required to assist with transfers and/or ambulation (gait belt, sit-to-stand, lift, walker, cane, etc )  - [place] fall precautions as indicated by assessment  - Record patient progress and toleration of activity level on Mobility SBAR; progress patient to next Phase/Stage  - Instruct patient to call for assistance with activity based on assessment  - Consider rehabilitation consult to assist with strengthening/weightbearing, etc   Outcome: Progressing     Problem: DISCHARGE PLANNING  Goal: Discharge to home or other facility with appropriate resources  Description: INTERVENTIONS:  - Identify barriers to discharge w/patient and caregiver  - Arrange for needed discharge resources and transportation as appropriate  - Identify discharge learning needs (meds, wound care, etc )  - Arrange for interpretive services to assist at discharge as needed  - Refer to Case Management Department for coordinating discharge planning if the patient needs post-hospital services based on physician/advanced practitioner order or complex needs related to functional status, cognitive ability, or social support system  Outcome: Progressing     Problem: Labor & Delivery  Goal: Manages discomfort  Description: Assess and monitor for signs and symptoms of discomfort  Assess patient's pain level regularly and per hospital policy  Administer medications as ordered  Support use of nonpharmacological methods to help control pain such as distraction, imagery, relaxation, and application of heat and cold  Collaborate with interdisciplinary team and patient to determine appropriate pain management plan  1  Include patient in decisions related to comfort  2  Offer non-pharmacological pain management interventions  3  Report ineffective pain management to physician  Outcome: Progressing  Goal: Patient vital signs are stable  Description: 1  Assess vital signs - vaginal delivery    Outcome: Progressing     Problem: ANTEPARTUM  Goal: Maintain pregnancy as long as maternal and/or fetal condition is stable  Description: INTERVENTIONS:  - Maternal surveillance  - Fetal surveillance  - Monitor uterine activity  - Medications as ordered  - Bedrest  Outcome: Progressing     Problem: DISCHARGE PLANNING - CARE MANAGEMENT  Goal: Discharge to post-acute care or home with appropriate resources  Description: INTERVENTIONS:  - Conduct assessment to determine patient/family and health care team treatment goals, and need for post-acute services based on payer coverage, community resources, and patient preferences, and barriers to discharge  - Address psychosocial, clinical, and financial barriers to discharge as identified in assessment in conjunction with the patient/family and health care team  - Arrange appropriate level of post-acute services according to patients   needs and preference and payer coverage in collaboration with the physician and health care team  - Communicate with and update the patient/family, physician, and health care team regarding progress on the discharge plan  - Arrange appropriate transportation to post-acute venues  Outcome: Progressing     Problem: Potential for Falls  Goal: Patient will remain free of falls  Description: INTERVENTIONS:  - Assess patient frequently for physical needs  -  Identify cognitive and physical deficits and behaviors that affect risk of falls    -  [place] fall precautions as indicated by assessment   - Educate patient/family on patient safety including physical limitations  - Instruct patient to call for assistance with activity based on assessment  - Modify environment to reduce risk of injury  - Consider OT/PT consult to assist with strengthening/mobility  Outcome: Progressing

## 2021-02-10 NOTE — PROGRESS NOTES
Progress Note - Maternal Fetal Medicine   Kiet Schmidt 27 y o  female MRN: [id number]  Unit/Bed#: -01 Encounter: [id number]    Assessment:  27 y o  [id number] at 24w6d admitted with PPROM  Hospital day 14    Plan:  1) PPROM: s/p latency antibiotics, magnesium sulfate x 12 hours, BTM 1/28-1/29, fetal ultrasound 2/9 showed fetus weighing 700g in liang breech presentation, improved fetal ascites, anhydramnios  2) T1DM: on insulin pump; basal rate 2 05 midnight to 8 am,  2 0 all other hours, insulin to carb ration 1:5 for breakfast and lunch, 1:4 with dinner and snacks, ISF 1 units for every 25 mg/dL over 100 mg /dL, fasting blood sugars yesterday 100-110s, postprandial blood sugars 90s-150s, continue to monitor closely, Diet Jayson/CHO controlled consistent carbohydrate Diet Level 1 with 4 carb servings/60 grams CHO/meal; kinked tube last night which was then replaced  3) IUP @ 24 weeks: NST TID  4) DVT PPx: SCDs, ambulation, lovenox 40 mg qday    Subjective/Objective   Chief Complaint:     No complaints this morning    Subjective:     Patient currently has no complaints this morning  Contractions: no  Loss of fluid: yes  Vaginal bleeding: no  Fetal movement: yes    Objective:     Vitals:   Temp:  [97 6 °F (36 4 °C)-98 3 °F (36 8 °C)] 98 °F (36 7 °C)  HR:  [] 83  Resp:  [20] 20  BP: (103-112)/(56-78) 103/56       Physical Exam  Vitals signs and nursing note reviewed  Constitutional:       Appearance: She is well-developed  Cardiovascular:      Rate and Rhythm: Normal rate and regular rhythm  Pulmonary:      Effort: Pulmonary effort is normal       Breath sounds: Normal breath sounds  Abdominal:      General: Bowel sounds are normal       Tenderness: There is no abdominal tenderness  There is no guarding or rebound  Comments: gravid   Musculoskeletal: Normal range of motion  General: No tenderness  Neurological:      Mental Status: She is alert and oriented to person, place, and time  Fetal Assessment:  FHT: 145 / Moderate 6 - 25 bpm / reactive, no decels  [place]: none    Lab Results   Component Value Date    WBC 13 71 (H) 02/05/2021    HGB 12 6 02/05/2021    HCT 38 4 02/05/2021    MCV 93 02/05/2021     02/05/2021       Lab Results   Component Value Date    CALCIUM 9 2 11/20/2020    K 4 2 11/20/2020    CO2 25 11/20/2020     11/20/2020    BUN 14 11/20/2020    CREATININE 0 78 11/20/2020       Lab Results   Component Value Date    ALT 23 11/20/2020    AST 14 11/20/2020    ALKPHOS 55 11/20/2020       Sarah Beth Swain MD  OBGYN, PGY-4  2/10/2021  5:54 AM

## 2021-02-11 LAB
ABO GROUP BLD: NORMAL
BLD GP AB SCN SERPL QL: NEGATIVE
GLUCOSE SERPL-MCNC: 108 MG/DL (ref 65–140)
GLUCOSE SERPL-MCNC: 115 MG/DL (ref 65–140)
GLUCOSE SERPL-MCNC: 152 MG/DL (ref 65–140)
GLUCOSE SERPL-MCNC: 184 MG/DL (ref 65–140)
GLUCOSE SERPL-MCNC: 187 MG/DL (ref 65–140)
GLUCOSE SERPL-MCNC: 72 MG/DL (ref 65–140)
RH BLD: POSITIVE
SPECIMEN EXPIRATION DATE: NORMAL

## 2021-02-11 PROCEDURE — 59025 FETAL NON-STRESS TEST: CPT | Performed by: OBSTETRICS & GYNECOLOGY

## 2021-02-11 PROCEDURE — NC001 PR NO CHARGE: Performed by: OBSTETRICS & GYNECOLOGY

## 2021-02-11 PROCEDURE — 86900 BLOOD TYPING SEROLOGIC ABO: CPT | Performed by: OBSTETRICS & GYNECOLOGY

## 2021-02-11 PROCEDURE — 99232 SBSQ HOSP IP/OBS MODERATE 35: CPT | Performed by: OBSTETRICS & GYNECOLOGY

## 2021-02-11 PROCEDURE — 86901 BLOOD TYPING SEROLOGIC RH(D): CPT | Performed by: OBSTETRICS & GYNECOLOGY

## 2021-02-11 PROCEDURE — 86850 RBC ANTIBODY SCREEN: CPT | Performed by: OBSTETRICS & GYNECOLOGY

## 2021-02-11 PROCEDURE — 82948 REAGENT STRIP/BLOOD GLUCOSE: CPT

## 2021-02-11 RX ADMIN — FOLIC ACID 1 MG: 1 TABLET ORAL at 08:20

## 2021-02-11 RX ADMIN — ASPIRIN 81 MG CHEWABLE TABLET 162 MG: 81 TABLET CHEWABLE at 22:01

## 2021-02-11 RX ADMIN — METFORMIN ER 500 MG 1500 MG: 500 TABLET ORAL at 22:01

## 2021-02-11 RX ADMIN — DOCUSATE SODIUM 100 MG: 100 CAPSULE, LIQUID FILLED ORAL at 18:03

## 2021-02-11 RX ADMIN — ENOXAPARIN SODIUM 40 MG: 40 INJECTION SUBCUTANEOUS at 08:19

## 2021-02-11 RX ADMIN — DOCUSATE SODIUM 100 MG: 100 CAPSULE, LIQUID FILLED ORAL at 08:19

## 2021-02-11 RX ADMIN — Medication 2000 UNITS: at 14:19

## 2021-02-11 RX ADMIN — OXYCODONE HYDROCHLORIDE AND ACETAMINOPHEN 1000 MG: 500 TABLET ORAL at 08:19

## 2021-02-11 RX ADMIN — Medication 1 TABLET: at 08:19

## 2021-02-11 NOTE — PROGRESS NOTES
Progress Note - Maternal Fetal Medicine   Sosa Santos 27 y o  female MRN: [id number]  Unit/Bed#: -01 Encounter: [id number]    Assessment:  27 y o  [id number] at 25w0d admitted with PPROM  Hospital day 15    Plan:  1) PPROM: s/p latency antibiotics, magnesium sulfate x 12 hours, BTM 1/28-1/29, fetal ultrasound 2/9 showed fetus weighing 700g in liang breech presentation, improved fetal ascites, anhydramnios  2) T1DM: on insulin pump; basal rate 2 05 midnight to 8 am,  2 0 all other hours, insulin to carb ration 1:5 for breakfast and lunch, 1:4 with dinner and snacks, ISF 1 units for every 25 mg/dL over 100 mg /dL, fasting blood sugars yesterday 100s, postprandial blood sugars 110s-180s, continue to monitor closely, Diet Jayson/CHO controlled consistent carbohydrate Diet Level 1 with 4 carb servings/60 grams CHO/isaac  3) IUP @ 25 weeks: NST TID  4) DVT PPx: SCDs, ambulation, lovenox 40 mg qday    Subjective/Objective    Chief Complaint:     No complaints this morning    Subjective:     Patient currently has no complaints this morning  Contractions: no  Loss of fluid: yes  Vaginal bleeding: no  Fetal movement: yes    Objective:     Vitals:   Temp:  [97 8 °F (36 6 °C)-98 2 °F (36 8 °C)] 97 8 °F (36 6 °C)  HR:  [] 82  Resp:  [16-18] 16  BP: ()/(58-60) 101/60       Physical Exam  Vitals signs and nursing note reviewed  Constitutional:       Appearance: She is well-developed  Cardiovascular:      Rate and Rhythm: Normal rate and regular rhythm  Pulmonary:      Effort: Pulmonary effort is normal       Breath sounds: Normal breath sounds  Abdominal:      Tenderness: There is no abdominal tenderness  There is no guarding or rebound  Comments: gravid   Musculoskeletal: Normal range of motion  General: No tenderness  Neurological:      Mental Status: She is alert and oriented to person, place, and time         Fetal Assessment:  FHT: 135 / Moderate 6 - 25 bpm / reactive, no decels  [place]: none    Lab Results   Component Value Date    WBC 13 71 (H) 2021    HGB 12 6 2021    HCT 38 4 2021    MCV 93 2021     2021       Lab Results   Component Value Date    CALCIUM 9 2 2020    K 4 2 2020    CO2 25 2020     2020    BUN 14 2020    CREATININE 0 78 2020       Lab Results   Component Value Date    ALT 23 2020    AST 14 2020    ALKPHOS 55 2020       Shelly Edwards MD  OBGYN, PGY-4  2021  8:34 AM     MFM Attending: Patient seen/evaluated by myself  I have reviewed resident plan of care, and agree  In summary: Maverick Love is a 27y o  year old  25w0d admitted with PPROM  Her current problems include:  Patient Active Problem List   Diagnosis    25 weeks gestation of pregnancy     premature rupture of membranes (PPROM) with unknown onset of labor    Vaginal bleeding in pregnancy, second trimester    Encounter for supervision of pregnancy resulting from assisted reproductive technology in second trimester, antepartum    Oligohydramnios in poe pregnancy in second trimester    Pregnancy complicated by pre-existing type 1 diabetes, second trimester    Suspected fetal anomaly, antepartum    Fetal ascites causing disproportion       Fetal monitoring was reviewed and shows: baseline 130, moderate variability, 10x10 accels, no decels  [place] quiet  Recommendations are as follows: PPROM with no contraindication to expectant management  Continue current insulin pump settings for type 1 diabetes  Needs type & screen q 3 days, ordered today, for breech presentation and planned  delivery  Louise Reardon MD    Evaluation and Management:   Time spent face-to-face with Maverick Love as well as in floor time coordinating care was 15 minutes

## 2021-02-12 LAB
GLUCOSE SERPL-MCNC: 110 MG/DL (ref 65–140)
GLUCOSE SERPL-MCNC: 169 MG/DL (ref 65–140)
GLUCOSE SERPL-MCNC: 175 MG/DL (ref 65–140)
GLUCOSE SERPL-MCNC: 85 MG/DL (ref 65–140)

## 2021-02-12 PROCEDURE — 59025 FETAL NON-STRESS TEST: CPT | Performed by: OBSTETRICS & GYNECOLOGY

## 2021-02-12 PROCEDURE — 76815 OB US LIMITED FETUS(S): CPT | Performed by: OBSTETRICS & GYNECOLOGY

## 2021-02-12 PROCEDURE — NC001 PR NO CHARGE: Performed by: OBSTETRICS & GYNECOLOGY

## 2021-02-12 PROCEDURE — 99232 SBSQ HOSP IP/OBS MODERATE 35: CPT | Performed by: OBSTETRICS & GYNECOLOGY

## 2021-02-12 PROCEDURE — 82948 REAGENT STRIP/BLOOD GLUCOSE: CPT

## 2021-02-12 RX ADMIN — OXYCODONE HYDROCHLORIDE AND ACETAMINOPHEN 1000 MG: 500 TABLET ORAL at 11:26

## 2021-02-12 RX ADMIN — ASPIRIN 81 MG CHEWABLE TABLET 162 MG: 81 TABLET CHEWABLE at 22:41

## 2021-02-12 RX ADMIN — Medication 2000 UNITS: at 11:26

## 2021-02-12 RX ADMIN — DOCUSATE SODIUM 100 MG: 100 CAPSULE, LIQUID FILLED ORAL at 18:20

## 2021-02-12 RX ADMIN — DOCUSATE SODIUM 100 MG: 100 CAPSULE, LIQUID FILLED ORAL at 11:27

## 2021-02-12 RX ADMIN — Medication 1 TABLET: at 11:28

## 2021-02-12 RX ADMIN — ENOXAPARIN SODIUM 40 MG: 40 INJECTION SUBCUTANEOUS at 11:27

## 2021-02-12 RX ADMIN — METFORMIN ER 500 MG 1500 MG: 500 TABLET ORAL at 22:15

## 2021-02-12 RX ADMIN — FOLIC ACID 1 MG: 1 TABLET ORAL at 11:27

## 2021-02-12 NOTE — PLAN OF CARE
Problem: PAIN - ADULT  Goal: Verbalizes/displays adequate comfort level or baseline comfort level  Description: Interventions:  - Encourage patient to monitor pain and request assistance  - Assess pain using appropriate pain scale  - Administer analgesics based on type and severity of pain and evaluate response  - Implement non-pharmacological measures as appropriate and evaluate response  - Consider cultural and social influences on pain and pain management  - Notify physician/advanced practitioner if interventions unsuccessful or patient reports new pain  Outcome: Progressing     Problem: INFECTION - ADULT  Goal: Absence or prevention of progression during hospitalization  Description: INTERVENTIONS:  - Assess and monitor for signs and symptoms of infection  - Monitor lab/diagnostic results  - Monitor all insertion sites, i e  indwelling lines, tubes, and drains  - Monitor endotracheal if appropriate and nasal secretions for changes in amount and color  - [place] appropriate cooling/warming therapies per order  - Administer medications as ordered  - Instruct and encourage patient and family to use good hand hygiene technique  - Identify and instruct in appropriate isolation precautions for identified infection/condition  Outcome: Progressing     Problem: SAFETY ADULT  Goal: Patient will remain free of falls  Description: INTERVENTIONS:  - Assess patient frequently for physical needs  -  Identify cognitive and physical deficits and behaviors that affect risk of falls    -  [place] fall precautions as indicated by assessment   - Educate patient/family on patient safety including physical limitations  - Instruct patient to call for assistance with activity based on assessment  - Modify environment to reduce risk of injury  - Consider OT/PT consult to assist with strengthening/mobility  Outcome: Progressing  Goal: Maintain or return to baseline ADL function  Description: INTERVENTIONS:  -  Assess patient's ability to carry out ADLs; assess patient's baseline for ADL function and identify physical deficits which impact ability to perform ADLs (bathing, care of mouth/teeth, toileting, grooming, dressing, etc )  - Assess/evaluate cause of self-care deficits   - Assess range of motion  - Assess patient's mobility; develop plan if impaired  - Assess patient's need for assistive devices and provide as appropriate  - Encourage maximum independence but intervene and supervise when necessary  - Involve family in performance of ADLs  - Assess for home care needs following discharge   - Consider OT consult to assist with ADL evaluation and planning for discharge  - Provide patient education as appropriate  Outcome: Progressing  Goal: Maintain or return mobility status to optimal level  Description: INTERVENTIONS:  - Assess patient's baseline mobility status (ambulation, transfers, stairs, etc )    - Identify cognitive and physical deficits and behaviors that affect mobility  - Identify mobility aids required to assist with transfers and/or ambulation (gait belt, sit-to-stand, lift, walker, cane, etc )  - [place] fall precautions as indicated by assessment  - Record patient progress and toleration of activity level on Mobility SBAR; progress patient to next Phase/Stage  - Instruct patient to call for assistance with activity based on assessment  - Consider rehabilitation consult to assist with strengthening/weightbearing, etc   Outcome: Progressing     Problem: DISCHARGE PLANNING  Goal: Discharge to home or other facility with appropriate resources  Description: INTERVENTIONS:  - Identify barriers to discharge w/patient and caregiver  - Arrange for needed discharge resources and transportation as appropriate  - Identify discharge learning needs (meds, wound care, etc )  - Arrange for interpretive services to assist at discharge as needed  - Refer to Case Management Department for coordinating discharge planning if the patient needs post-hospital services based on physician/advanced practitioner order or complex needs related to functional status, cognitive ability, or social support system  Outcome: Progressing     Problem: Labor & Delivery  Goal: Manages discomfort  Description: Assess and monitor for signs and symptoms of discomfort  Assess patient's pain level regularly and per hospital policy  Administer medications as ordered  Support use of nonpharmacological methods to help control pain such as distraction, imagery, relaxation, and application of heat and cold  Collaborate with interdisciplinary team and patient to determine appropriate pain management plan  1  Include patient in decisions related to comfort  2  Offer non-pharmacological pain management interventions  3  Report ineffective pain management to physician  Outcome: Progressing  Goal: Patient vital signs are stable  Description: 1  Assess vital signs - vaginal delivery    Outcome: Progressing     Problem: ANTEPARTUM  Goal: Maintain pregnancy as long as maternal and/or fetal condition is stable  Description: INTERVENTIONS:  - Maternal surveillance  - Fetal surveillance  - Monitor uterine activity  - Medications as ordered  - Bedrest  Outcome: Progressing     Problem: DISCHARGE PLANNING - CARE MANAGEMENT  Goal: Discharge to post-acute care or home with appropriate resources  Description: INTERVENTIONS:  - Conduct assessment to determine patient/family and health care team treatment goals, and need for post-acute services based on payer coverage, community resources, and patient preferences, and barriers to discharge  - Address psychosocial, clinical, and financial barriers to discharge as identified in assessment in conjunction with the patient/family and health care team  - Arrange appropriate level of post-acute services according to patients   needs and preference and payer coverage in collaboration with the physician and health care team  - Communicate with and update the patient/family, physician, and health care team regarding progress on the discharge plan  - Arrange appropriate transportation to post-acute venues  Outcome: Progressing     Problem: Potential for Falls  Goal: Patient will remain free of falls  Description: INTERVENTIONS:  - Assess patient frequently for physical needs  -  Identify cognitive and physical deficits and behaviors that affect risk of falls    -  [place] fall precautions as indicated by assessment   - Educate patient/family on patient safety including physical limitations  - Instruct patient to call for assistance with activity based on assessment  - Modify environment to reduce risk of injury  - Consider OT/PT consult to assist with strengthening/mobility  Outcome: Progressing

## 2021-02-12 NOTE — PROGRESS NOTES
Progress Note - Maternal Fetal Medicine   Americo Molina 27 y o  female MRN: [id number]  Unit/Bed#: -01 Encounter: [id number]    Assessment:  27 y o  [id number] at 25w1d admitted with PPROM  Hospital day 16    Plan:  1) PPROM: s/p latency antibiotics, magnesium sulfate x 12 hours, BTM 1/28-1/29, fetal ultrasound 2/9 showed fetus weighing 700g in liang breech presentation, improved fetal ascites, anhydramnios  2) T1DM: on insulin pump; basal rate 2 05 midnight to 8 am,  2 0 all other hours, insulin to carb ration 1:5 for breakfast and lunch, 1:4 with dinner and snacks, ISF 1 units for every 25 mg/dL over 100 mg /dL, fasting blood sugars yesterday 108, postprandial blood sugars 70-180s, continue to monitor closely, Diet Jayson/CHO controlled consistent carbohydrate Diet Level 1 with 4 carb servings/60 grams CHO/meal  3) IUP @ 25 weeks: NST TID  4) DVT PPx: SCDs, ambulation, lovenox 40 mg qday    Subjective/Objective   Chief Complaint:     No complaints this morning    Subjective:     Patient currently has no complaints this morning  Contractions: no  Loss of fluid: yes  Vaginal bleeding: no  Fetal movement: yes    Objective:     Vitals:   Temp:  [97 4 °F (36 3 °C)-97 9 °F (36 6 °C)] 97 9 °F (36 6 °C)  HR:  [] 83  Resp:  [16-18] 16  BP: ()/(53-76) 105/65       Physical Exam  Vitals signs and nursing note reviewed  Constitutional:       Appearance: She is well-developed  Cardiovascular:      Rate and Rhythm: Normal rate and regular rhythm  Pulmonary:      Effort: Pulmonary effort is normal       Breath sounds: Normal breath sounds  Abdominal:      General: Bowel sounds are normal       Tenderness: There is no abdominal tenderness  There is no guarding or rebound  Comments: gravid   Musculoskeletal: Normal range of motion  General: No tenderness  Neurological:      Mental Status: She is alert and oriented to person, place, and time         Fetal Assessment:  FHT: 145 / Moderate 6 - 25 bpm / reactive, one variable deceleration that resolved with repositioning  [place]: none    Lab Results   Component Value Date    WBC 13 71 (H) 02/05/2021    HGB 12 6 02/05/2021    HCT 38 4 02/05/2021    MCV 93 02/05/2021     02/05/2021       Lab Results   Component Value Date    CALCIUM 9 2 11/20/2020    K 4 2 11/20/2020    CO2 25 11/20/2020     11/20/2020    BUN 14 11/20/2020    CREATININE 0 78 11/20/2020       Lab Results   Component Value Date    ALT 23 11/20/2020    AST 14 11/20/2020    ALKPHOS 55 11/20/2020       Sarah Beth Woodward MD  OBGYN, PGY-4  2/12/2021  6:48 AM

## 2021-02-13 LAB
GLUCOSE SERPL-MCNC: 113 MG/DL (ref 65–140)
GLUCOSE SERPL-MCNC: 125 MG/DL (ref 65–140)
GLUCOSE SERPL-MCNC: 130 MG/DL (ref 65–140)
GLUCOSE SERPL-MCNC: 138 MG/DL (ref 65–140)
GLUCOSE SERPL-MCNC: 144 MG/DL (ref 65–140)

## 2021-02-13 PROCEDURE — 59025 FETAL NON-STRESS TEST: CPT | Performed by: OBSTETRICS & GYNECOLOGY

## 2021-02-13 PROCEDURE — 99232 SBSQ HOSP IP/OBS MODERATE 35: CPT | Performed by: OBSTETRICS & GYNECOLOGY

## 2021-02-13 PROCEDURE — NC001 PR NO CHARGE: Performed by: OBSTETRICS & GYNECOLOGY

## 2021-02-13 PROCEDURE — 82948 REAGENT STRIP/BLOOD GLUCOSE: CPT

## 2021-02-13 RX ADMIN — ENOXAPARIN SODIUM 40 MG: 40 INJECTION SUBCUTANEOUS at 08:02

## 2021-02-13 RX ADMIN — ASPIRIN 81 MG CHEWABLE TABLET 162 MG: 81 TABLET CHEWABLE at 22:08

## 2021-02-13 RX ADMIN — Medication 1 TABLET: at 08:02

## 2021-02-13 RX ADMIN — METFORMIN ER 500 MG 1500 MG: 500 TABLET ORAL at 22:09

## 2021-02-13 RX ADMIN — DOCUSATE SODIUM 100 MG: 100 CAPSULE, LIQUID FILLED ORAL at 21:03

## 2021-02-13 RX ADMIN — FOLIC ACID 1 MG: 1 TABLET ORAL at 08:02

## 2021-02-13 RX ADMIN — OXYCODONE HYDROCHLORIDE AND ACETAMINOPHEN 1000 MG: 500 TABLET ORAL at 08:02

## 2021-02-13 RX ADMIN — Medication 2000 UNITS: at 08:02

## 2021-02-13 RX ADMIN — DOCUSATE SODIUM 100 MG: 100 CAPSULE, LIQUID FILLED ORAL at 08:02

## 2021-02-13 NOTE — PLAN OF CARE
Problem: PAIN - ADULT  Goal: Verbalizes/displays adequate comfort level or baseline comfort level  Description: Interventions:  - Encourage patient to monitor pain and request assistance  - Assess pain using appropriate pain scale  - Administer analgesics based on type and severity of pain and evaluate response  - Implement non-pharmacological measures as appropriate and evaluate response  - Consider cultural and social influences on pain and pain management  - Notify physician/advanced practitioner if interventions unsuccessful or patient reports new pain  Outcome: Progressing     Problem: INFECTION - ADULT  Goal: Absence or prevention of progression during hospitalization  Description: INTERVENTIONS:  - Assess and monitor for signs and symptoms of infection  - Monitor lab/diagnostic results  - Monitor all insertion sites, i e  indwelling lines, tubes, and drains  - Monitor endotracheal if appropriate and nasal secretions for changes in amount and color  - [place] appropriate cooling/warming therapies per order  - Administer medications as ordered  - Instruct and encourage patient and family to use good hand hygiene technique  - Identify and instruct in appropriate isolation precautions for identified infection/condition  Outcome: Progressing     Problem: SAFETY ADULT  Goal: Patient will remain free of falls  Description: INTERVENTIONS:  - Assess patient frequently for physical needs  -  Identify cognitive and physical deficits and behaviors that affect risk of falls    -  [place] fall precautions as indicated by assessment   - Educate patient/family on patient safety including physical limitations  - Instruct patient to call for assistance with activity based on assessment  - Modify environment to reduce risk of injury  - Consider OT/PT consult to assist with strengthening/mobility  Outcome: Progressing  Goal: Maintain or return to baseline ADL function  Description: INTERVENTIONS:  -  Assess patient's ability to carry out ADLs; assess patient's baseline for ADL function and identify physical deficits which impact ability to perform ADLs (bathing, care of mouth/teeth, toileting, grooming, dressing, etc )  - Assess/evaluate cause of self-care deficits   - Assess range of motion  - Assess patient's mobility; develop plan if impaired  - Assess patient's need for assistive devices and provide as appropriate  - Encourage maximum independence but intervene and supervise when necessary  - Involve family in performance of ADLs  - Assess for home care needs following discharge   - Consider OT consult to assist with ADL evaluation and planning for discharge  - Provide patient education as appropriate  Outcome: Progressing  Goal: Maintain or return mobility status to optimal level  Description: INTERVENTIONS:  - Assess patient's baseline mobility status (ambulation, transfers, stairs, etc )    - Identify cognitive and physical deficits and behaviors that affect mobility  - Identify mobility aids required to assist with transfers and/or ambulation (gait belt, sit-to-stand, lift, walker, cane, etc )  - [place] fall precautions as indicated by assessment  - Record patient progress and toleration of activity level on Mobility SBAR; progress patient to next Phase/Stage  - Instruct patient to call for assistance with activity based on assessment  - Consider rehabilitation consult to assist with strengthening/weightbearing, etc   Outcome: Progressing     Problem: DISCHARGE PLANNING  Goal: Discharge to home or other facility with appropriate resources  Description: INTERVENTIONS:  - Identify barriers to discharge w/patient and caregiver  - Arrange for needed discharge resources and transportation as appropriate  - Identify discharge learning needs (meds, wound care, etc )  - Arrange for interpretive services to assist at discharge as needed  - Refer to Case Management Department for coordinating discharge planning if the patient needs post-hospital services based on physician/advanced practitioner order or complex needs related to functional status, cognitive ability, or social support system  Outcome: Progressing     Problem: Labor & Delivery  Goal: Manages discomfort  Description: Assess and monitor for signs and symptoms of discomfort  Assess patient's pain level regularly and per hospital policy  Administer medications as ordered  Support use of nonpharmacological methods to help control pain such as distraction, imagery, relaxation, and application of heat and cold  Collaborate with interdisciplinary team and patient to determine appropriate pain management plan  1  Include patient in decisions related to comfort  2  Offer non-pharmacological pain management interventions  3  Report ineffective pain management to physician  Outcome: Progressing  Goal: Patient vital signs are stable  Description: 1  Assess vital signs - vaginal delivery    Outcome: Progressing     Problem: ANTEPARTUM  Goal: Maintain pregnancy as long as maternal and/or fetal condition is stable  Description: INTERVENTIONS:  - Maternal surveillance  - Fetal surveillance  - Monitor uterine activity  - Medications as ordered  - Bedrest  Outcome: Progressing     Problem: DISCHARGE PLANNING - CARE MANAGEMENT  Goal: Discharge to post-acute care or home with appropriate resources  Description: INTERVENTIONS:  - Conduct assessment to determine patient/family and health care team treatment goals, and need for post-acute services based on payer coverage, community resources, and patient preferences, and barriers to discharge  - Address psychosocial, clinical, and financial barriers to discharge as identified in assessment in conjunction with the patient/family and health care team  - Arrange appropriate level of post-acute services according to patients   needs and preference and payer coverage in collaboration with the physician and health care team  - Communicate with and update the patient/family, physician, and health care team regarding progress on the discharge plan  - Arrange appropriate transportation to post-acute venues  Outcome: Progressing     Problem: Potential for Falls  Goal: Patient will remain free of falls  Description: INTERVENTIONS:  - Assess patient frequently for physical needs  -  Identify cognitive and physical deficits and behaviors that affect risk of falls    -  [place] fall precautions as indicated by assessment   - Educate patient/family on patient safety including physical limitations  - Instruct patient to call for assistance with activity based on assessment  - Modify environment to reduce risk of injury  - Consider OT/PT consult to assist with strengthening/mobility  Outcome: Progressing

## 2021-02-13 NOTE — PROGRESS NOTES
Progress Note - Maternal Fetal Medicine   [place] [facility] 27 y o  female MRN: [id number]  Unit/Bed#: -01 Encounter: [id number]    Assessment:  27 y o  [id number] at 25w2d admitted with PPROM  Hospital day 17    Plan:  1) PPROM: s/p latency antibiotics, magnesium sulfate x 12 hours, BTM 1/28-1/29, fetal ultrasound 2/9 showed fetus weighing 700g in liang breech presentation, improved fetal ascites, anhydramnios  2) T1DM: on insulin pump; basal rate 2 05 midnight to 8 am,  2 0 all other hours, insulin to carb ration 1:5 for breakfast and lunch, 1:4 with dinner and snacks, ISF 1 units for every 25 mg/dL over 100 mg /dL, fasting blood sugars yesterday 108, postprandial blood sugars 80-170s, continue to monitor closely, Diet [place]/CHO controlled consistent carbohydrate Diet Level 1 with 4 carb servings/60 grams CHO/meal  3) IUP @ 25 weeks: NST TID  4) DVT PPx: SCDs, ambulation, lovenox 40 mg qday    Subjective/Objective   Chief Complaint:     No new complaints this morning    Subjective:     Patient currently has no new complaints this morning  Contractions: no  Loss of fluid: yes  Vaginal bleeding: no  Fetal movement: yes    Objective:     Vitals:   Temp:  [97 5 °F (36 4 °C)-98 2 °F (36 8 °C)] 97 9 °F (36 6 °C)  HR:  [] 88  Resp:  [18-20] 18  BP: (100-113)/(58-77) 100/58       Physical Exam  Vitals signs and nursing note reviewed  Constitutional:       Appearance: She is well-developed  Cardiovascular:      Rate and Rhythm: Normal rate and regular rhythm  Pulmonary:      Effort: Pulmonary effort is normal       Breath sounds: Normal breath sounds  Abdominal:      General: Bowel sounds are normal       Tenderness: There is no abdominal tenderness  There is no guarding or rebound  Comments: gravid   Musculoskeletal: Normal range of motion  General: No tenderness  Neurological:      Mental Status: She is alert and oriented to person, place, and time         Fetal Assessment:  FHT: 140 / Moderate 6 - 25 bpm / reactive,no decels  [place]: none    Lab Results   Component Value Date    WBC 13 71 (H) 02/05/2021    HGB 12 6 02/05/2021    HCT 38 4 02/05/2021    MCV 93 02/05/2021     02/05/2021       Lab Results   Component Value Date    CALCIUM 9 2 11/20/2020    K 4 2 11/20/2020    CO2 25 11/20/2020     11/20/2020    BUN 14 11/20/2020    CREATININE 0 78 11/20/2020       Lab Results   Component Value Date    ALT 23 11/20/2020    AST 14 11/20/2020    ALKPHOS 55 11/20/2020       Thi Sarah Beth Nails MD  OBGYN, PGY-4  2/13/2021  6:56 AM

## 2021-02-13 NOTE — PLAN OF CARE
Problem: PAIN - ADULT  Goal: Verbalizes/displays adequate comfort level or baseline comfort level  Description: Interventions:  - Encourage patient to monitor pain and request assistance  - Assess pain using appropriate pain scale  - Administer analgesics based on type and severity of pain and evaluate response  - Implement non-pharmacological measures as appropriate and evaluate response  - Consider cultural and social influences on pain and pain management  - Notify physician/advanced practitioner if interventions unsuccessful or patient reports new pain  Outcome: Progressing     Problem: INFECTION - ADULT  Goal: Absence or prevention of progression during hospitalization  Description: INTERVENTIONS:  - Assess and monitor for signs and symptoms of infection  - Monitor lab/diagnostic results  - Monitor all insertion sites, i e  indwelling lines, tubes, and drains  - Monitor endotracheal if appropriate and nasal secretions for changes in amount and color  - [place] appropriate cooling/warming therapies per order  - Administer medications as ordered  - Instruct and encourage patient and family to use good hand hygiene technique  - Identify and instruct in appropriate isolation precautions for identified infection/condition  Outcome: Progressing     Problem: SAFETY ADULT  Goal: Patient will remain free of falls  Description: INTERVENTIONS:  - Assess patient frequently for physical needs  -  Identify cognitive and physical deficits and behaviors that affect risk of falls    -  [place] fall precautions as indicated by assessment   - Educate patient/family on patient safety including physical limitations  - Instruct patient to call for assistance with activity based on assessment  - Modify environment to reduce risk of injury  - Consider OT/PT consult to assist with strengthening/mobility  Outcome: Progressing  Goal: Maintain or return to baseline ADL function  Description: INTERVENTIONS:  -  Assess patient's ability to carry out ADLs; assess patient's baseline for ADL function and identify physical deficits which impact ability to perform ADLs (bathing, care of mouth/teeth, toileting, grooming, dressing, etc )  - Assess/evaluate cause of self-care deficits   - Assess range of motion  - Assess patient's mobility; develop plan if impaired  - Assess patient's need for assistive devices and provide as appropriate  - Encourage maximum independence but intervene and supervise when necessary  - Involve family in performance of ADLs  - Assess for home care needs following discharge   - Consider OT consult to assist with ADL evaluation and planning for discharge  - Provide patient education as appropriate  Outcome: Progressing  Goal: Maintain or return mobility status to optimal level  Description: INTERVENTIONS:  - Assess patient's baseline mobility status (ambulation, transfers, stairs, etc )    - Identify cognitive and physical deficits and behaviors that affect mobility  - Identify mobility aids required to assist with transfers and/or ambulation (gait belt, sit-to-stand, lift, walker, cane, etc )  - [place] fall precautions as indicated by assessment  - Record patient progress and toleration of activity level on Mobility SBAR; progress patient to next Phase/Stage  - Instruct patient to call for assistance with activity based on assessment  - Consider rehabilitation consult to assist with strengthening/weightbearing, etc   Outcome: Progressing     Problem: DISCHARGE PLANNING  Goal: Discharge to home or other facility with appropriate resources  Description: INTERVENTIONS:  - Identify barriers to discharge w/patient and caregiver  - Arrange for needed discharge resources and transportation as appropriate  - Identify discharge learning needs (meds, wound care, etc )  - Arrange for interpretive services to assist at discharge as needed  - Refer to Case Management Department for coordinating discharge planning if the patient needs post-hospital services based on physician/advanced practitioner order or complex needs related to functional status, cognitive ability, or social support system  Outcome: Progressing     Problem: Labor & Delivery  Goal: Manages discomfort  Description: Assess and monitor for signs and symptoms of discomfort  Assess patient's pain level regularly and per hospital policy  Administer medications as ordered  Support use of nonpharmacological methods to help control pain such as distraction, imagery, relaxation, and application of heat and cold  Collaborate with interdisciplinary team and patient to determine appropriate pain management plan  1  Include patient in decisions related to comfort  2  Offer non-pharmacological pain management interventions  3  Report ineffective pain management to physician  Outcome: Progressing  Goal: Patient vital signs are stable  Description: 1  Assess vital signs - vaginal delivery    Outcome: Progressing     Problem: ANTEPARTUM  Goal: Maintain pregnancy as long as maternal and/or fetal condition is stable  Description: INTERVENTIONS:  - Maternal surveillance  - Fetal surveillance  - Monitor uterine activity  - Medications as ordered  - Bedrest  Outcome: Progressing     Problem: DISCHARGE PLANNING - CARE MANAGEMENT  Goal: Discharge to post-acute care or home with appropriate resources  Description: INTERVENTIONS:  - Conduct assessment to determine patient/family and health care team treatment goals, and need for post-acute services based on payer coverage, community resources, and patient preferences, and barriers to discharge  - Address psychosocial, clinical, and financial barriers to discharge as identified in assessment in conjunction with the patient/family and health care team  - Arrange appropriate level of post-acute services according to patients   needs and preference and payer coverage in collaboration with the physician and health care team  - Communicate with and update the patient/family, physician, and health care team regarding progress on the discharge plan  - Arrange appropriate transportation to post-acute venues  Outcome: Progressing     Problem: Potential for Falls  Goal: Patient will remain free of falls  Description: INTERVENTIONS:  - Assess patient frequently for physical needs  -  Identify cognitive and physical deficits and behaviors that affect risk of falls    -  [place] fall precautions as indicated by assessment   - Educate patient/family on patient safety including physical limitations  - Instruct patient to call for assistance with activity based on assessment  - Modify environment to reduce risk of injury  - Consider OT/PT consult to assist with strengthening/mobility  Outcome: Progressing

## 2021-02-14 LAB
ABO GROUP BLD: NORMAL
BLD GP AB SCN SERPL QL: NEGATIVE
GLUCOSE SERPL-MCNC: 126 MG/DL (ref 65–140)
GLUCOSE SERPL-MCNC: 144 MG/DL (ref 65–140)
GLUCOSE SERPL-MCNC: 166 MG/DL (ref 65–140)
GLUCOSE SERPL-MCNC: 174 MG/DL (ref 65–140)
GLUCOSE SERPL-MCNC: 83 MG/DL (ref 65–140)
GLUCOSE SERPL-MCNC: 84 MG/DL (ref 65–140)
GLUCOSE SERPL-MCNC: 85 MG/DL (ref 65–140)
RH BLD: POSITIVE
SPECIMEN EXPIRATION DATE: NORMAL

## 2021-02-14 PROCEDURE — 86901 BLOOD TYPING SEROLOGIC RH(D): CPT | Performed by: OBSTETRICS & GYNECOLOGY

## 2021-02-14 PROCEDURE — 86850 RBC ANTIBODY SCREEN: CPT | Performed by: OBSTETRICS & GYNECOLOGY

## 2021-02-14 PROCEDURE — NC001 PR NO CHARGE: Performed by: OBSTETRICS & GYNECOLOGY

## 2021-02-14 PROCEDURE — 59025 FETAL NON-STRESS TEST: CPT | Performed by: OBSTETRICS & GYNECOLOGY

## 2021-02-14 PROCEDURE — 82948 REAGENT STRIP/BLOOD GLUCOSE: CPT

## 2021-02-14 PROCEDURE — 86900 BLOOD TYPING SEROLOGIC ABO: CPT | Performed by: OBSTETRICS & GYNECOLOGY

## 2021-02-14 PROCEDURE — 99232 SBSQ HOSP IP/OBS MODERATE 35: CPT | Performed by: OBSTETRICS & GYNECOLOGY

## 2021-02-14 RX ADMIN — FOLIC ACID 1 MG: 1 TABLET ORAL at 08:31

## 2021-02-14 RX ADMIN — ASPIRIN 81 MG CHEWABLE TABLET 162 MG: 81 TABLET CHEWABLE at 22:02

## 2021-02-14 RX ADMIN — DOCUSATE SODIUM 100 MG: 100 CAPSULE, LIQUID FILLED ORAL at 08:36

## 2021-02-14 RX ADMIN — Medication 2000 UNITS: at 08:31

## 2021-02-14 RX ADMIN — Medication 1 TABLET: at 08:36

## 2021-02-14 RX ADMIN — OXYCODONE HYDROCHLORIDE AND ACETAMINOPHEN 1000 MG: 500 TABLET ORAL at 08:36

## 2021-02-14 RX ADMIN — DOCUSATE SODIUM 100 MG: 100 CAPSULE, LIQUID FILLED ORAL at 17:27

## 2021-02-14 RX ADMIN — ENOXAPARIN SODIUM 40 MG: 40 INJECTION SUBCUTANEOUS at 08:36

## 2021-02-14 RX ADMIN — METFORMIN ER 500 MG 1500 MG: 500 TABLET ORAL at 22:02

## 2021-02-14 NOTE — PROGRESS NOTES
Progress Note - Maternal Fetal Medicine   Zulma Hoang 27 y o  female MRN: [id number]  Unit/Bed#: -01 Encounter: [id number]    Assessment:  27 y o  [id number] at 25w3d admitted with PPROM  Hospital day 18    Plan:  1  PPROM  - status post latency antibiotics, magnesium [id number] hours, betamethasone 1 course, fetal ultrasound on 2/9 showed fetus we 700 g in liang breech presentation, improved fetal ascites, anhydroamnios  Type and screen reordered today    2  Type 1 diabetes  - on insulin pump, basal rate 2 05 midnight to 8:00 a m , 2 0 all other hours, insulin to carb ratio 1:5 for breakfast and lunch, 1:4 with dinner and snacks  ISF 1 unit for every D 25 mg/dL over 100 mg/dL fasting glucose sugar yesterday 130  Postprandial blood glucose 113-144, continue to monitor closely  Diet Jayson/CHO controlled consistent carbohydrate diet level 1 with 4 core servings per 60 g carbohydrate per meal    3  Intrauterine pregnancy at 25 weeks  - NST t i d     4  DVT prophylaxis  - SCDs, ablation, Lovenox 40 mg daily    Subjective/Objective   Chief Complaint:     No new complaints today    Subjective:  Patient is currently doing well    Contractions: no  Loss of fluid: yes  Vaginal bleeding: no  Fetal movement: yes    Pain: no  Tolerating PO: yes  Voiding: yes  Ambulating: yes  Headaches: no  Visual changes: no  Chest pain: no  Shortness of breath: no  Nausea: no  Vomiting/Diarrhea: no  Dysuria: no  Leg pain: no    Objective:     Vitals:   Temp:  [97 5 °F (36 4 °C)-98 3 °F (36 8 °C)] 97 9 °F (36 6 °C)  HR:  [] 79  Resp:  [18-20] 18  BP: ()/(55-70) 96/55       Physical Exam  Constitutional:       Appearance: Normal appearance  She is normal weight  Neck:      Musculoskeletal: Normal range of motion  Cardiovascular:      Rate and Rhythm: Normal rate  Pulses: Normal pulses  Pulmonary:      Effort: Pulmonary effort is normal    Abdominal:      Palpations: Abdomen is soft     Musculoskeletal: Normal range of motion  Skin:     General: Skin is warm  Neurological:      General: No focal deficit present  Mental Status: She is alert and oriented to person, place, and time  Psychiatric:         Mood and Affect: Mood normal          Behavior: Behavior normal            Fetal Assessment:  FHT: 140 / Moderate 6 - 25 bpm / + accelerations and no decelerations, reactive  [place]: q no contractions    Lab, Imaging and other studies: I have personally reviewed pertinent reports        Lab Results   Component Value Date    WBC 13 71 (H) 02/05/2021    HGB 12 6 02/05/2021    HCT 38 4 02/05/2021    MCV 93 02/05/2021     02/05/2021       Lab Results   Component Value Date    CALCIUM 9 2 11/20/2020    K 4 2 11/20/2020    CO2 25 11/20/2020     11/20/2020    BUN 14 11/20/2020    CREATININE 0 78 11/20/2020       Lab Results   Component Value Date    ALT 23 11/20/2020    AST 14 11/20/2020    ALKPHOS 55 11/71/8453       Dougie Rachel MD  OBGYN, PGY-3  2/14/2021  12:38 AM

## 2021-02-14 NOTE — PLAN OF CARE
Problem: PAIN - ADULT  Goal: Verbalizes/displays adequate comfort level or baseline comfort level  Description: Interventions:  - Encourage patient to monitor pain and request assistance  - Assess pain using appropriate pain scale  - Administer analgesics based on type and severity of pain and evaluate response  - Implement non-pharmacological measures as appropriate and evaluate response  - Consider cultural and social influences on pain and pain management  - Notify physician/advanced practitioner if interventions unsuccessful or patient reports new pain  Outcome: Progressing     Problem: INFECTION - ADULT  Goal: Absence or prevention of progression during hospitalization  Description: INTERVENTIONS:  - Assess and monitor for signs and symptoms of infection  - Monitor lab/diagnostic results  - Monitor all insertion sites, i e  indwelling lines, tubes, and drains  - Monitor endotracheal if appropriate and nasal secretions for changes in amount and color  - [place] appropriate cooling/warming therapies per order  - Administer medications as ordered  - Instruct and encourage patient and family to use good hand hygiene technique  - Identify and instruct in appropriate isolation precautions for identified infection/condition  Outcome: Progressing     Problem: SAFETY ADULT  Goal: Patient will remain free of falls  Description: INTERVENTIONS:  - Assess patient frequently for physical needs  -  Identify cognitive and physical deficits and behaviors that affect risk of falls    -  [place] fall precautions as indicated by assessment   - Educate patient/family on patient safety including physical limitations  - Instruct patient to call for assistance with activity based on assessment  - Modify environment to reduce risk of injury  - Consider OT/PT consult to assist with strengthening/mobility  Outcome: Progressing  Goal: Maintain or return to baseline ADL function  Description: INTERVENTIONS:  -  Assess patient's ability to carry out ADLs; assess patient's baseline for ADL function and identify physical deficits which impact ability to perform ADLs (bathing, care of mouth/teeth, toileting, grooming, dressing, etc )  - Assess/evaluate cause of self-care deficits   - Assess range of motion  - Assess patient's mobility; develop plan if impaired  - Assess patient's need for assistive devices and provide as appropriate  - Encourage maximum independence but intervene and supervise when necessary  - Involve family in performance of ADLs  - Assess for home care needs following discharge   - Consider OT consult to assist with ADL evaluation and planning for discharge  - Provide patient education as appropriate  Outcome: Progressing  Goal: Maintain or return mobility status to optimal level  Description: INTERVENTIONS:  - Assess patient's baseline mobility status (ambulation, transfers, stairs, etc )    - Identify cognitive and physical deficits and behaviors that affect mobility  - Identify mobility aids required to assist with transfers and/or ambulation (gait belt, sit-to-stand, lift, walker, cane, etc )  - [place] fall precautions as indicated by assessment  - Record patient progress and toleration of activity level on Mobility SBAR; progress patient to next Phase/Stage  - Instruct patient to call for assistance with activity based on assessment  - Consider rehabilitation consult to assist with strengthening/weightbearing, etc   Outcome: Progressing     Problem: DISCHARGE PLANNING  Goal: Discharge to home or other facility with appropriate resources  Description: INTERVENTIONS:  - Identify barriers to discharge w/patient and caregiver  - Arrange for needed discharge resources and transportation as appropriate  - Identify discharge learning needs (meds, wound care, etc )  - Arrange for interpretive services to assist at discharge as needed  - Refer to Case Management Department for coordinating discharge planning if the patient needs post-hospital services based on physician/advanced practitioner order or complex needs related to functional status, cognitive ability, or social support system  Outcome: Progressing     Problem: Labor & Delivery  Goal: Manages discomfort  Description: Assess and monitor for signs and symptoms of discomfort  Assess patient's pain level regularly and per hospital policy  Administer medications as ordered  Support use of nonpharmacological methods to help control pain such as distraction, imagery, relaxation, and application of heat and cold  Collaborate with interdisciplinary team and patient to determine appropriate pain management plan  1  Include patient in decisions related to comfort  2  Offer non-pharmacological pain management interventions  3  Report ineffective pain management to physician  Outcome: Progressing  Goal: Patient vital signs are stable  Description: 1  Assess vital signs - vaginal delivery    Outcome: Progressing     Problem: ANTEPARTUM  Goal: Maintain pregnancy as long as maternal and/or fetal condition is stable  Description: INTERVENTIONS:  - Maternal surveillance  - Fetal surveillance  - Monitor uterine activity  - Medications as ordered  - Bedrest  Outcome: Progressing     Problem: DISCHARGE PLANNING - CARE MANAGEMENT  Goal: Discharge to post-acute care or home with appropriate resources  Description: INTERVENTIONS:  - Conduct assessment to determine patient/family and health care team treatment goals, and need for post-acute services based on payer coverage, community resources, and patient preferences, and barriers to discharge  - Address psychosocial, clinical, and financial barriers to discharge as identified in assessment in conjunction with the patient/family and health care team  - Arrange appropriate level of post-acute services according to patients   needs and preference and payer coverage in collaboration with the physician and health care team  - Communicate with and update the patient/family, physician, and health care team regarding progress on the discharge plan  - Arrange appropriate transportation to post-acute venues  Outcome: Progressing     Problem: Potential for Falls  Goal: Patient will remain free of falls  Description: INTERVENTIONS:  - Assess patient frequently for physical needs  -  Identify cognitive and physical deficits and behaviors that affect risk of falls    -  [place] fall precautions as indicated by assessment   - Educate patient/family on patient safety including physical limitations  - Instruct patient to call for assistance with activity based on assessment  - Modify environment to reduce risk of injury  - Consider OT/PT consult to assist with strengthening/mobility  Outcome: Progressing

## 2021-02-15 LAB
GLUCOSE SERPL-MCNC: 100 MG/DL (ref 65–140)
GLUCOSE SERPL-MCNC: 106 MG/DL (ref 65–140)
GLUCOSE SERPL-MCNC: 112 MG/DL (ref 65–140)
GLUCOSE SERPL-MCNC: 124 MG/DL (ref 65–140)
GLUCOSE SERPL-MCNC: 134 MG/DL (ref 65–140)
GLUCOSE SERPL-MCNC: 159 MG/DL (ref 65–140)
GLUCOSE SERPL-MCNC: 172 MG/DL (ref 65–140)

## 2021-02-15 PROCEDURE — 99232 SBSQ HOSP IP/OBS MODERATE 35: CPT | Performed by: OBSTETRICS & GYNECOLOGY

## 2021-02-15 PROCEDURE — NC001 PR NO CHARGE: Performed by: OBSTETRICS & GYNECOLOGY

## 2021-02-15 PROCEDURE — 82948 REAGENT STRIP/BLOOD GLUCOSE: CPT

## 2021-02-15 RX ADMIN — OXYCODONE HYDROCHLORIDE AND ACETAMINOPHEN 1000 MG: 500 TABLET ORAL at 09:50

## 2021-02-15 RX ADMIN — METFORMIN ER 500 MG 1500 MG: 500 TABLET ORAL at 22:00

## 2021-02-15 RX ADMIN — INSULIN ASPART 300 UNITS: 100 INJECTION, SOLUTION INTRAVENOUS; SUBCUTANEOUS at 06:41

## 2021-02-15 RX ADMIN — DOCUSATE SODIUM 100 MG: 100 CAPSULE, LIQUID FILLED ORAL at 18:09

## 2021-02-15 RX ADMIN — ASPIRIN 81 MG CHEWABLE TABLET 162 MG: 81 TABLET CHEWABLE at 21:59

## 2021-02-15 RX ADMIN — Medication 1 TABLET: at 09:50

## 2021-02-15 RX ADMIN — FOLIC ACID 1 MG: 1 TABLET ORAL at 09:50

## 2021-02-15 RX ADMIN — ENOXAPARIN SODIUM 40 MG: 40 INJECTION SUBCUTANEOUS at 09:50

## 2021-02-15 RX ADMIN — DOCUSATE SODIUM 100 MG: 100 CAPSULE, LIQUID FILLED ORAL at 09:50

## 2021-02-15 RX ADMIN — Medication 2000 UNITS: at 09:50

## 2021-02-15 NOTE — PROGRESS NOTES
Progress Note - Maternal Fetal Medicine   Jax Liao 27 y o  female MRN: [id number]  Unit/Bed#: -01 Encounter: [id number]    Assessment:  27 y o  [id number] at 25w4d admitted with PPROM  Hospital day 19    Plan:  1) PPROM: s/p latency antibiotics, magnesium sulfate x 12 hours, BTM 1/28-1/29, fetal ultrasound 2/9 showed fetus weighing 700g in liang breech presentation, improved fetal ascites, anhydramnios  Fluid check tomorrow 2/16  2) T1DM: on insulin pump; basal rate 2 05 midnight to 8 am,  2 0 all other hours, insulin to carb ration 1:5 for breakfast and lunch, 1:4 with dinner and snacks, ISF 1 units for every 25 mg/dL over 100 mg /dL, fasting blood sugars yesterday 108, postprandial blood sugars 80-170s, continue to monitor closely, Diet J[place]/CHO controlled consistent carbohydrate Diet Level 1 with 4 carb servings/60 grams CHO/meal  3) IUP @ 25 weeks: NST TID  4) DVT PPx: SCDs, ambulation, lovenox 40 mg qday    Subjective/Objective   Chief Complaint:     No new complaints this morning    Subjective:     Patient currently has no new complaints this morning  Contractions: no  Loss of fluid: yes  Vaginal bleeding: no  Fetal movement: yes    Objective:     Vitals:   Temp:  [97 5 °F (36 4 °C)-98 5 °F (36 9 °C)] 98 5 °F (36 9 °C)  HR:  [76-94] 76  Resp:  [18-20] 20  BP: (104-115)/(65-70) 108/68       Physical Exam  Vitals signs and nursing note reviewed  Constitutional:       Appearance: She is well-developed  Cardiovascular:      Rate and Rhythm: Normal rate and regular rhythm  Pulmonary:      Effort: Pulmonary effort is normal       Breath sounds: Normal breath sounds  Abdominal:      General: There is no distension  Tenderness: There is no abdominal tenderness  There is no guarding or rebound  Comments: gravid   Musculoskeletal: Normal range of motion  General: No tenderness  Neurological:      Mental Status: She is alert and oriented to person, place, and time         Fetal Assessment:  FHT: 140 / Moderate 6 - 25 bpm / reactive,no decels  [place]: none    Lab Results   Component Value Date    WBC 13 71 (H) 02/05/2021    HGB 12 6 02/05/2021    HCT 38 4 02/05/2021    MCV 93 02/05/2021     02/05/2021       Lab Results   Component Value Date    CALCIUM 9 2 11/20/2020    K 4 2 11/20/2020    CO2 25 11/20/2020     11/20/2020    BUN 14 11/20/2020    CREATININE 0 78 11/20/2020       Lab Results   Component Value Date    ALT 23 11/20/2020    AST 14 11/20/2020    ALKPHOS 55 11/20/2020       Thi Sarah Beth Dumont MD  OBGYN, PGY-4  2/15/2021  9:34 AM

## 2021-02-16 LAB
GLUCOSE SERPL-MCNC: 101 MG/DL (ref 65–140)
GLUCOSE SERPL-MCNC: 102 MG/DL (ref 65–140)
GLUCOSE SERPL-MCNC: 107 MG/DL (ref 65–140)
GLUCOSE SERPL-MCNC: 110 MG/DL (ref 65–140)
GLUCOSE SERPL-MCNC: 111 MG/DL (ref 65–140)
GLUCOSE SERPL-MCNC: 135 MG/DL (ref 65–140)
GLUCOSE SERPL-MCNC: 92 MG/DL (ref 65–140)

## 2021-02-16 PROCEDURE — NC001 PR NO CHARGE: Performed by: OBSTETRICS & GYNECOLOGY

## 2021-02-16 PROCEDURE — 59025 FETAL NON-STRESS TEST: CPT | Performed by: OBSTETRICS & GYNECOLOGY

## 2021-02-16 PROCEDURE — 99232 SBSQ HOSP IP/OBS MODERATE 35: CPT | Performed by: OBSTETRICS & GYNECOLOGY

## 2021-02-16 PROCEDURE — 82948 REAGENT STRIP/BLOOD GLUCOSE: CPT

## 2021-02-16 RX ADMIN — DOCUSATE SODIUM 100 MG: 100 CAPSULE, LIQUID FILLED ORAL at 09:44

## 2021-02-16 RX ADMIN — ENOXAPARIN SODIUM 40 MG: 40 INJECTION SUBCUTANEOUS at 09:44

## 2021-02-16 RX ADMIN — DOCUSATE SODIUM 100 MG: 100 CAPSULE, LIQUID FILLED ORAL at 18:12

## 2021-02-16 RX ADMIN — OXYCODONE HYDROCHLORIDE AND ACETAMINOPHEN 1000 MG: 500 TABLET ORAL at 09:43

## 2021-02-16 RX ADMIN — METFORMIN ER 500 MG 1500 MG: 500 TABLET ORAL at 22:21

## 2021-02-16 RX ADMIN — FOLIC ACID 1 MG: 1 TABLET ORAL at 09:43

## 2021-02-16 RX ADMIN — ASPIRIN 81 MG CHEWABLE TABLET 162 MG: 81 TABLET CHEWABLE at 22:20

## 2021-02-16 RX ADMIN — Medication 1 TABLET: at 09:44

## 2021-02-16 RX ADMIN — Medication 2000 UNITS: at 09:44

## 2021-02-16 NOTE — PROGRESS NOTES
Progress Note - Maternal Fetal Medicine   Diamond Parks 27 y o  female MRN: [id number]  Unit/Bed#: -01 Encounter: [id number]    Assessment:  27 y o  [id number] at 25w5d admitted with PPROM  Hospital day 19    Plan:  1) PPROM: s/p latency antibiotics, magnesium sulfate x 12 hours, BTM 1/28-1/29, fetal ultrasound 2/9 showed fetus weighing 700g in liang breech presentation, improved fetal ascites, anhydramnios  Fluid check tomorrow 2/17  2) T1DM: on insulin pump; basal rate 2 05 midnight to 8 am,  2 0 all other hours, insulin to carb ration 1:5 for breakfast and lunch, 1:4 with dinner and snacks, ISF 1 units for every 25 mg/dL over 100 mg /dL, fasting blood sugars yesterday 106-120, postprandial blood sugars 100-170s, continue to monitor closely, Diet [place]/CHO controlled consistent carbohydrate Diet Level 1 with 4 carb servings/60 grams CHO/meal  3) IUP @ 25 weeks: NST TID  4) DVT PPx: SCDs, ambulation, lovenox 40 mg qday    Subjective/Objective   Chief Complaint:     No new complaints this morning    Subjective:     Patient currently has no new complaints this morning  Contractions: no  Loss of fluid: yes  Vaginal bleeding: no  Fetal movement: yes    Objective:     Vitals:   Temp:  [98 1 °F (36 7 °C)-98 5 °F (36 9 °C)] 98 2 °F (36 8 °C)  HR:  [76-96] 93  Resp:  [18-20] 18  BP: (104-109)/(63-71) 104/63       Physical Exam  Vitals signs and nursing note reviewed  Constitutional:       Appearance: She is well-developed  Cardiovascular:      Rate and Rhythm: Normal rate and regular rhythm  Pulmonary:      Effort: Pulmonary effort is normal       Breath sounds: Normal breath sounds  Abdominal:      General: There is no distension  Tenderness: There is no abdominal tenderness  There is no guarding or rebound  Comments: gravid   Musculoskeletal: Normal range of motion  General: No tenderness  Neurological:      Mental Status: She is alert and oriented to person, place, and time         Fetal Assessment:  FHT: 135 / Moderate 6 - 25 bpm / reactive,no decels  [place]: none    Lab Results   Component Value Date    WBC 13 71 (H) 02/05/2021    HGB 12 6 02/05/2021    HCT 38 4 02/05/2021    MCV 93 02/05/2021     02/05/2021       Lab Results   Component Value Date    CALCIUM 9 2 11/20/2020    K 4 2 11/20/2020    CO2 25 11/20/2020     11/20/2020    BUN 14 11/20/2020    CREATININE 0 78 11/20/2020       Lab Results   Component Value Date    ALT 23 11/20/2020    AST 14 11/20/2020    ALKPHOS 55 11/20/2020       Thi Sarah Beth Nails MD  OBGYN, PGY-4  2/16/2021  7:20 AM

## 2021-02-16 NOTE — PLAN OF CARE
Problem: PAIN - ADULT  Goal: Verbalizes/displays adequate comfort level or baseline comfort level  Description: Interventions:  - Encourage patient to monitor pain and request assistance  - Assess pain using appropriate pain scale  - Administer analgesics based on type and severity of pain and evaluate response  - Implement non-pharmacological measures as appropriate and evaluate response  - Consider cultural and social influences on pain and pain management  - Notify physician/advanced practitioner if interventions unsuccessful or patient reports new pain  Outcome: Progressing     Problem: INFECTION - ADULT  Goal: Absence or prevention of progression during hospitalization  Description: INTERVENTIONS:  - Assess and monitor for signs and symptoms of infection  - Monitor lab/diagnostic results  - Monitor all insertion sites, i e  indwelling lines, tubes, and drains  - Monitor endotracheal if appropriate and nasal secretions for changes in amount and color  - [place] appropriate cooling/warming therapies per order  - Administer medications as ordered  - Instruct and encourage patient and family to use good hand hygiene technique  - Identify and instruct in appropriate isolation precautions for identified infection/condition  Outcome: Progressing     Problem: SAFETY ADULT  Goal: Patient will remain free of falls  Description: INTERVENTIONS:  - Assess patient frequently for physical needs  -  Identify cognitive and physical deficits and behaviors that affect risk of falls    -  [place] fall precautions as indicated by assessment   - Educate patient/family on patient safety including physical limitations  - Instruct patient to call for assistance with activity based on assessment  - Modify environment to reduce risk of injury  - Consider OT/PT consult to assist with strengthening/mobility  Outcome: Progressing  Goal: Maintain or return to baseline ADL function  Description: INTERVENTIONS:  -  Assess patient's ability to carry out ADLs; assess patient's baseline for ADL function and identify physical deficits which impact ability to perform ADLs (bathing, care of mouth/teeth, toileting, grooming, dressing, etc )  - Assess/evaluate cause of self-care deficits   - Assess range of motion  - Assess patient's mobility; develop plan if impaired  - Assess patient's need for assistive devices and provide as appropriate  - Encourage maximum independence but intervene and supervise when necessary  - Involve family in performance of ADLs  - Assess for home care needs following discharge   - Consider OT consult to assist with ADL evaluation and planning for discharge  - Provide patient education as appropriate  Outcome: Progressing  Goal: Maintain or return mobility status to optimal level  Description: INTERVENTIONS:  - Assess patient's baseline mobility status (ambulation, transfers, stairs, etc )    - Identify cognitive and physical deficits and behaviors that affect mobility  - Identify mobility aids required to assist with transfers and/or ambulation (gait belt, sit-to-stand, lift, walker, cane, etc )  - [place] fall precautions as indicated by assessment  - Record patient progress and toleration of activity level on Mobility SBAR; progress patient to next Phase/Stage  - Instruct patient to call for assistance with activity based on assessment  - Consider rehabilitation consult to assist with strengthening/weightbearing, etc   Outcome: Progressing     Problem: DISCHARGE PLANNING  Goal: Discharge to home or other facility with appropriate resources  Description: INTERVENTIONS:  - Identify barriers to discharge w/patient and caregiver  - Arrange for needed discharge resources and transportation as appropriate  - Identify discharge learning needs (meds, wound care, etc )  - Arrange for interpretive services to assist at discharge as needed  - Refer to Case Management Department for coordinating discharge planning if the patient needs post-hospital services based on physician/advanced practitioner order or complex needs related to functional status, cognitive ability, or social support system  Outcome: Progressing     Problem: Labor & Delivery  Goal: Manages discomfort  Description: Assess and monitor for signs and symptoms of discomfort  Assess patient's pain level regularly and per hospital policy  Administer medications as ordered  Support use of nonpharmacological methods to help control pain such as distraction, imagery, relaxation, and application of heat and cold  Collaborate with interdisciplinary team and patient to determine appropriate pain management plan  1  Include patient in decisions related to comfort  2  Offer non-pharmacological pain management interventions  3  Report ineffective pain management to physician  Outcome: Progressing  Goal: Patient vital signs are stable  Description: 1  Assess vital signs - vaginal delivery    Outcome: Progressing     Problem: ANTEPARTUM  Goal: Maintain pregnancy as long as maternal and/or fetal condition is stable  Description: INTERVENTIONS:  - Maternal surveillance  - Fetal surveillance  - Monitor uterine activity  - Medications as ordered  - Bedrest  Outcome: Progressing     Problem: DISCHARGE PLANNING - CARE MANAGEMENT  Goal: Discharge to post-acute care or home with appropriate resources  Description: INTERVENTIONS:  - Conduct assessment to determine patient/family and health care team treatment goals, and need for post-acute services based on payer coverage, community resources, and patient preferences, and barriers to discharge  - Address psychosocial, clinical, and financial barriers to discharge as identified in assessment in conjunction with the patient/family and health care team  - Arrange appropriate level of post-acute services according to patients   needs and preference and payer coverage in collaboration with the physician and health care team  - Communicate with and update the patient/family, physician, and health care team regarding progress on the discharge plan  - Arrange appropriate transportation to post-acute venues  Outcome: Progressing     Problem: Potential for Falls  Goal: Patient will remain free of falls  Description: INTERVENTIONS:  - Assess patient frequently for physical needs  -  Identify cognitive and physical deficits and behaviors that affect risk of falls    -  [place] fall precautions as indicated by assessment   - Educate patient/family on patient safety including physical limitations  - Instruct patient to call for assistance with activity based on assessment  - Modify environment to reduce risk of injury  - Consider OT/PT consult to assist with strengthening/mobility  Outcome: Progressing

## 2021-02-16 NOTE — PLAN OF CARE
Problem: PAIN - ADULT  Goal: Verbalizes/displays adequate comfort level or baseline comfort level  Description: Interventions:  - Encourage patient to monitor pain and request assistance  - Assess pain using appropriate pain scale  - Administer analgesics based on type and severity of pain and evaluate response  - Implement non-pharmacological measures as appropriate and evaluate response  - Consider cultural and social influences on pain and pain management  - Notify physician/advanced practitioner if interventions unsuccessful or patient reports new pain  Outcome: Progressing     Problem: INFECTION - ADULT  Goal: Absence or prevention of progression during hospitalization  Description: INTERVENTIONS:  - Assess and monitor for signs and symptoms of infection  - Monitor lab/diagnostic results  - Monitor all insertion sites, i e  indwelling lines, tubes, and drains  - Monitor endotracheal if appropriate and nasal secretions for changes in amount and color  - [place] appropriate cooling/warming therapies per order  - Administer medications as ordered  - Instruct and encourage patient and family to use good hand hygiene technique  - Identify and instruct in appropriate isolation precautions for identified infection/condition  Outcome: Progressing     Problem: SAFETY ADULT  Goal: Patient will remain free of falls  Description: INTERVENTIONS:  - Assess patient frequently for physical needs  -  Identify cognitive and physical deficits and behaviors that affect risk of falls    -  [place] fall precautions as indicated by assessment   - Educate patient/family on patient safety including physical limitations  - Instruct patient to call for assistance with activity based on assessment  - Modify environment to reduce risk of injury  - Consider OT/PT consult to assist with strengthening/mobility  Outcome: Progressing  Goal: Maintain or return to baseline ADL function  Description: INTERVENTIONS:  -  Assess patient's ability to carry out ADLs; assess patient's baseline for ADL function and identify physical deficits which impact ability to perform ADLs (bathing, care of mouth/teeth, toileting, grooming, dressing, etc )  - Assess/evaluate cause of self-care deficits   - Assess range of motion  - Assess patient's mobility; develop plan if impaired  - Assess patient's need for assistive devices and provide as appropriate  - Encourage maximum independence but intervene and supervise when necessary  - Involve family in performance of ADLs  - Assess for home care needs following discharge   - Consider OT consult to assist with ADL evaluation and planning for discharge  - Provide patient education as appropriate  Outcome: Progressing  Goal: Maintain or return mobility status to optimal level  Description: INTERVENTIONS:  - Assess patient's baseline mobility status (ambulation, transfers, stairs, etc )    - Identify cognitive and physical deficits and behaviors that affect mobility  - Identify mobility aids required to assist with transfers and/or ambulation (gait belt, sit-to-stand, lift, walker, cane, etc )  - [place] fall precautions as indicated by assessment  - Record patient progress and toleration of activity level on Mobility SBAR; progress patient to next Phase/Stage  - Instruct patient to call for assistance with activity based on assessment  - Consider rehabilitation consult to assist with strengthening/weightbearing, etc   Outcome: Progressing     Problem: DISCHARGE PLANNING  Goal: Discharge to home or other facility with appropriate resources  Description: INTERVENTIONS:  - Identify barriers to discharge w/patient and caregiver  - Arrange for needed discharge resources and transportation as appropriate  - Identify discharge learning needs (meds, wound care, etc )  - Arrange for interpretive services to assist at discharge as needed  - Refer to Case Management Department for coordinating discharge planning if the patient needs post-hospital services based on physician/advanced practitioner order or complex needs related to functional status, cognitive ability, or social support system  Outcome: Progressing     Problem: Labor & Delivery  Goal: Manages discomfort  Description: Assess and monitor for signs and symptoms of discomfort  Assess patient's pain level regularly and per hospital policy  Administer medications as ordered  Support use of nonpharmacological methods to help control pain such as distraction, imagery, relaxation, and application of heat and cold  Collaborate with interdisciplinary team and patient to determine appropriate pain management plan  1  Include patient in decisions related to comfort  2  Offer non-pharmacological pain management interventions  3  Report ineffective pain management to physician  Outcome: Progressing  Goal: Patient vital signs are stable  Description: 1  Assess vital signs - vaginal delivery    Outcome: Progressing     Problem: ANTEPARTUM  Goal: Maintain pregnancy as long as maternal and/or fetal condition is stable  Description: INTERVENTIONS:  - Maternal surveillance  - Fetal surveillance  - Monitor uterine activity  - Medications as ordered  - Bedrest  Outcome: Progressing     Problem: DISCHARGE PLANNING - CARE MANAGEMENT  Goal: Discharge to post-acute care or home with appropriate resources  Description: INTERVENTIONS:  - Conduct assessment to determine patient/family and health care team treatment goals, and need for post-acute services based on payer coverage, community resources, and patient preferences, and barriers to discharge  - Address psychosocial, clinical, and financial barriers to discharge as identified in assessment in conjunction with the patient/family and health care team  - Arrange appropriate level of post-acute services according to patients   needs and preference and payer coverage in collaboration with the physician and health care team  - Communicate with and update the patient/family, physician, and health care team regarding progress on the discharge plan  - Arrange appropriate transportation to post-acute venues  Outcome: Progressing     Problem: Potential for Falls  Goal: Patient will remain free of falls  Description: INTERVENTIONS:  - Assess patient frequently for physical needs  -  Identify cognitive and physical deficits and behaviors that affect risk of falls    -  [place] fall precautions as indicated by assessment   - Educate patient/family on patient safety including physical limitations  - Instruct patient to call for assistance with activity based on assessment  - Modify environment to reduce risk of injury  - Consider OT/PT consult to assist with strengthening/mobility  Outcome: Progressing

## 2021-02-17 LAB
ABO GROUP BLD: NORMAL
BLD GP AB SCN SERPL QL: NEGATIVE
GLUCOSE SERPL-MCNC: 102 MG/DL (ref 65–140)
GLUCOSE SERPL-MCNC: 110 MG/DL (ref 65–140)
GLUCOSE SERPL-MCNC: 116 MG/DL (ref 65–140)
GLUCOSE SERPL-MCNC: 132 MG/DL (ref 65–140)
GLUCOSE SERPL-MCNC: 74 MG/DL (ref 65–140)
GLUCOSE SERPL-MCNC: 82 MG/DL (ref 65–140)
GLUCOSE SERPL-MCNC: 90 MG/DL (ref 65–140)
RH BLD: POSITIVE
SPECIMEN EXPIRATION DATE: NORMAL

## 2021-02-17 PROCEDURE — 59025 FETAL NON-STRESS TEST: CPT | Performed by: OBSTETRICS & GYNECOLOGY

## 2021-02-17 PROCEDURE — 86850 RBC ANTIBODY SCREEN: CPT | Performed by: STUDENT IN AN ORGANIZED HEALTH CARE EDUCATION/TRAINING PROGRAM

## 2021-02-17 PROCEDURE — 99232 SBSQ HOSP IP/OBS MODERATE 35: CPT | Performed by: OBSTETRICS & GYNECOLOGY

## 2021-02-17 PROCEDURE — 76816 OB US FOLLOW-UP PER FETUS: CPT | Performed by: OBSTETRICS & GYNECOLOGY

## 2021-02-17 PROCEDURE — 86900 BLOOD TYPING SEROLOGIC ABO: CPT | Performed by: STUDENT IN AN ORGANIZED HEALTH CARE EDUCATION/TRAINING PROGRAM

## 2021-02-17 PROCEDURE — 86901 BLOOD TYPING SEROLOGIC RH(D): CPT | Performed by: STUDENT IN AN ORGANIZED HEALTH CARE EDUCATION/TRAINING PROGRAM

## 2021-02-17 PROCEDURE — 82948 REAGENT STRIP/BLOOD GLUCOSE: CPT

## 2021-02-17 PROCEDURE — 90715 TDAP VACCINE 7 YRS/> IM: CPT | Performed by: OBSTETRICS & GYNECOLOGY

## 2021-02-17 PROCEDURE — NC001 PR NO CHARGE: Performed by: OBSTETRICS & GYNECOLOGY

## 2021-02-17 RX ADMIN — ASPIRIN 81 MG CHEWABLE TABLET 162 MG: 81 TABLET CHEWABLE at 22:01

## 2021-02-17 RX ADMIN — Medication 1 TABLET: at 09:03

## 2021-02-17 RX ADMIN — DOCUSATE SODIUM 100 MG: 100 CAPSULE, LIQUID FILLED ORAL at 17:54

## 2021-02-17 RX ADMIN — DOCUSATE SODIUM 100 MG: 100 CAPSULE, LIQUID FILLED ORAL at 09:03

## 2021-02-17 RX ADMIN — ENOXAPARIN SODIUM 40 MG: 40 INJECTION SUBCUTANEOUS at 09:04

## 2021-02-17 RX ADMIN — METFORMIN ER 500 MG 1500 MG: 500 TABLET ORAL at 22:02

## 2021-02-17 RX ADMIN — Medication 2000 UNITS: at 09:02

## 2021-02-17 RX ADMIN — OXYCODONE HYDROCHLORIDE AND ACETAMINOPHEN 1000 MG: 500 TABLET ORAL at 09:03

## 2021-02-17 RX ADMIN — FOLIC ACID 1 MG: 1 TABLET ORAL at 09:02

## 2021-02-17 RX ADMIN — TETANUS TOXOID, REDUCED DIPHTHERIA TOXOID AND ACELLULAR PERTUSSIS VACCINE, ADSORBED 0.5 ML: 5; 2.5; 8; 8; 2.5 SUSPENSION INTRAMUSCULAR at 11:40

## 2021-02-17 NOTE — PROGRESS NOTES
Progress Note - Maternal Fetal Medicine   Osmani Callahan 27 y o  female MRN: [id number]  Unit/Bed#: -01 Encounter: [id number]    Assessment:  27 y o  [id number] at 25w6d admitted with PPROM  Hospital day 20    Plan:  1) PPROM: s/p latency antibiotics, magnesium sulfate x 12 hours, BTM 1/28-1/29, fetal ultrasound 2/9 showed fetus weighing 700g in liang breech presentation, improved fetal ascites, anhydramnios  Fluid check this morning 2/17  2) T1DM: on insulin pump; basal rate 2 05 midnight to 8 am,  2 0 all other hours, insulin to carb ration 1:5 for breakfast and lunch, 1:4 with dinner and snacks, ISF 1 units for every 25 mg/dL over 100 mg /dL, fasting blood sugars yesterday 100, postprandial blood sugars 100-110s, continue to monitor closely, Diet [place]/CHO controlled consistent carbohydrate Diet Level 1 with 4 carb servings/60 grams CHO/meal  3) IUP @ 25 weeks: NST TID  4) DVT PPx: SCDs, ambulation, lovenox 40 mg qday    Subjective/Objective   Chief Complaint:     No new complaints this morning    Subjective:     Patient currently has no new complaints this morning  Contractions: no  Loss of fluid: yes  Vaginal bleeding: no  Fetal movement: yes    Objective:     Vitals:   Temp:  [97 7 °F (36 5 °C)-98 3 °F (36 8 °C)] 98 3 °F (36 8 °C)  HR:  [] 93  Resp:  [18] 18  BP: (100-117)/(65-76) 117/71       Physical Exam  Vitals signs and nursing note reviewed  Constitutional:       Appearance: She is well-developed  Cardiovascular:      Rate and Rhythm: Normal rate and regular rhythm  Pulmonary:      Effort: Pulmonary effort is normal       Breath sounds: Normal breath sounds  Abdominal:      General: There is no distension  Tenderness: There is no abdominal tenderness  There is no guarding or rebound  Comments: gravid   Musculoskeletal: Normal range of motion  General: No tenderness  Neurological:      Mental Status: She is alert and oriented to person, place, and time         Fetal Assessment:  FHT: 140 / Moderate 6 - 25 bpm / reactive,no decels  [place]: none    Lab Results   Component Value Date    WBC 13 71 (H) 02/05/2021    HGB 12 6 02/05/2021    HCT 38 4 02/05/2021    MCV 93 02/05/2021     02/05/2021       Lab Results   Component Value Date    CALCIUM 9 2 11/20/2020    K 4 2 11/20/2020    CO2 25 11/20/2020     11/20/2020    BUN 14 11/20/2020    CREATININE 0 78 11/20/2020       Lab Results   Component Value Date    ALT 23 11/20/2020    AST 14 11/20/2020    ALKPHOS 55 11/20/2020       Thi Sarah Beth Velasquez MD  OBGYN, PGY-4  2/17/2021  7:36 AM

## 2021-02-17 NOTE — UTILIZATION REVIEW
Continued Stay Review    Date: 02-17-21                          Current Patient Class: inpatient  Current Level of Care: medical     HPI:30 y o  female initially admitted on *01-28-21 @ 23 wks with PPROM since 17 week gestation       Assessment/Plan: HD # 20  25 6/7 wks gestation  For GORDON level today : s/p latency antibiotics, magnesium sulfate x 12 hours, BTM 1/28-1/29, fetal ultrasound 2/9 showed fetus weighing 700g in liang breech presentation, improved fetal ascites, anhydramnios   Continue to monitor blood sugars on insulin pump (+) leakage clear and odorless (+) fetal movement and no s/o of labor or infection noted     Fetal Assessment:  FHT: 140 / Moderate 6 - 25 bpm / reactive,no decels  [place]: none   US 02-17-21  GORDON Absent     Pertinent Labs/Diagnostic Results:     Results from last 7 days   Lab Units 02/17/21  0840 02/17/21  0608 02/16/21  2108 02/16/21  1843 02/16/21  1513 02/16/21  1248 02/16/21  1049 02/16/21  0758 02/16/21  0605 02/15/21  2053 02/15/21  1807 02/15/21  1444   POC GLUCOSE mg/dl 82 74 135 102 107 110 111 92 101 134 159* 172*     Vital Signs:   Date/Time  Temp  Pulse  Resp  BP  SpO2  O2 Device  Cardiac (WDL)  Patient Position - Orthostatic VS   02/17/21 0900  --  --  --  --  --  None (Room air)  WDL  --   02/17/21 0836  97 8 °F (36 6 °C)  90  20  90/59  97 %  --  --  --   02/17/21 0500  98 3 °F (36 8 °C)  --  --  --  --  --  --  --   02/16/21 2257  97 8 °F (36 6 °C)  93  18  117/71  97 %  --  --  Lying   02/16/21 2200  --  --  --  --  --  --  WDL  --   Comment rows:   OBSERV: pt states she is feeling fetal movement, no changes in color of fluid, no new vaginal bleeding, no contractions at 02/16/21 2200 02/16/21 2038  97 7 °F (36 5 °C)  --  --  --  --  --  --  --   02/16/21 1629  97 7 °F (36 5 °C)  100  18  114/76  96 %  None (Room air)  --  Sitting   02/16/21 1227  98 °F (36 7 °C)  --  --  --  --  --  --  --   02/16/21 0900  --  --  --  --  --  --  WDL  --   Comment rows:   OBSERV: plan of care for today reviewed with pt at 02/16/21 0900   02/16/21 0800  98 °F (36 7 °C)  91  18  100/65  97 %  None (Room air)  --  Sitting   02/16/21 0500  98 2 °F (36 8 °C)  --  --  --  --  --  --  --         Medications:   Scheduled Medications:  vitamin C, 1,000 mg, Oral, Daily  aspirin, 162 mg, Oral, Daily  cholecalciferol, 2,000 Units, Oral, Daily  docusate sodium, 100 mg, Oral, BID  enoxaparin, 40 mg, Subcutaneous, V13O IRAJ  folic acid, 1 mg, Oral, Daily  metFORMIN, 1,500 mg, Oral, Daily  patient maintained insulin pump, 1 each, Subcutaneous, Q8H  prenatal multivitamin, 1 tablet, Oral, Daily  tetanus-diphtheria-acellular pertussis, 0 5 mL, Intramuscular, Once      Continuous IV Infusions:     PRN Meds:  acetaminophen, 650 mg, Oral, Q4H PRN  calcium carbonate, 1,000 mg, Oral, Daily PRN  insulin aspart, 300 Units, Subcutaneous Insulin Pump, Daily PRN  polyethylene glycol, 17 g, Oral, Daily PRN        Discharge Plan:  D/c after delivery    Network Utilization Review Department  ATTENTION: Please call with any questions or concerns to [phone] and carefully listen to the prompts so that you are directed to the right person  All voicemails are confidential   Tom Sevilla all requests for admission clinical reviews, approved or denied determinations and any other requests to dedicated fax number below belonging to the campus where the patient is receiving treatment   List of dedicated fax numbers for the Facilities:  [address] [place] [address] DENIALS (Administrative/Medical Necessity) [phone]   [address] (Maternity/NICU/Pediatrics) [phone]   [address] [address] [facility] [place] 4892 (  Alicia Hilario "Jinny" 103) [address] [facility] [address] [facility] [phone]   [address] [place] [address] [facility] [phone]   Fanny Kendrick [address] [place], [place] [address] [phone]

## 2021-02-17 NOTE — PLAN OF CARE
Problem: PAIN - ADULT  Goal: Verbalizes/displays adequate comfort level or baseline comfort level  Description: Interventions:  - Encourage patient to monitor pain and request assistance  - Assess pain using appropriate pain scale  - Administer analgesics based on type and severity of pain and evaluate response  - Implement non-pharmacological measures as appropriate and evaluate response  - Consider cultural and social influences on pain and pain management  - Notify physician/advanced practitioner if interventions unsuccessful or patient reports new pain  Outcome: Progressing     Problem: INFECTION - ADULT  Goal: Absence or prevention of progression during hospitalization  Description: INTERVENTIONS:  - Assess and monitor for signs and symptoms of infection  - Monitor lab/diagnostic results  - Monitor all insertion sites, i e  indwelling lines, tubes, and drains  - Monitor endotracheal if appropriate and nasal secretions for changes in amount and color  - [place] appropriate cooling/warming therapies per order  - Administer medications as ordered  - Instruct and encourage patient and family to use good hand hygiene technique  - Identify and instruct in appropriate isolation precautions for identified infection/condition  Outcome: Progressing     Problem: SAFETY ADULT  Goal: Patient will remain free of falls  Description: INTERVENTIONS:  - Assess patient frequently for physical needs  -  Identify cognitive and physical deficits and behaviors that affect risk of falls    -  [place] fall precautions as indicated by assessment   - Educate patient/family on patient safety including physical limitations  - Instruct patient to call for assistance with activity based on assessment  - Modify environment to reduce risk of injury  - Consider OT/PT consult to assist with strengthening/mobility  Outcome: Progressing  Goal: Maintain or return to baseline ADL function  Description: INTERVENTIONS:  -  Assess patient's ability to carry out ADLs; assess patient's baseline for ADL function and identify physical deficits which impact ability to perform ADLs (bathing, care of mouth/teeth, toileting, grooming, dressing, etc )  - Assess/evaluate cause of self-care deficits   - Assess range of motion  - Assess patient's mobility; develop plan if impaired  - Assess patient's need for assistive devices and provide as appropriate  - Encourage maximum independence but intervene and supervise when necessary  - Involve family in performance of ADLs  - Assess for home care needs following discharge   - Consider OT consult to assist with ADL evaluation and planning for discharge  - Provide patient education as appropriate  Outcome: Progressing  Goal: Maintain or return mobility status to optimal level  Description: INTERVENTIONS:  - Assess patient's baseline mobility status (ambulation, transfers, stairs, etc )    - Identify cognitive and physical deficits and behaviors that affect mobility  - Identify mobility aids required to assist with transfers and/or ambulation (gait belt, sit-to-stand, lift, walker, cane, etc )  - [place] fall precautions as indicated by assessment  - Record patient progress and toleration of activity level on Mobility SBAR; progress patient to next Phase/Stage  - Instruct patient to call for assistance with activity based on assessment  - Consider rehabilitation consult to assist with strengthening/weightbearing, etc   Outcome: Progressing     Problem: DISCHARGE PLANNING  Goal: Discharge to home or other facility with appropriate resources  Description: INTERVENTIONS:  - Identify barriers to discharge w/patient and caregiver  - Arrange for needed discharge resources and transportation as appropriate  - Identify discharge learning needs (meds, wound care, etc )  - Arrange for interpretive services to assist at discharge as needed  - Refer to Case Management Department for coordinating discharge planning if the patient needs post-hospital services based on physician/advanced practitioner order or complex needs related to functional status, cognitive ability, or social support system  Outcome: Progressing     Problem: Labor & Delivery  Goal: Manages discomfort  Description: Assess and monitor for signs and symptoms of discomfort  Assess patient's pain level regularly and per hospital policy  Administer medications as ordered  Support use of nonpharmacological methods to help control pain such as distraction, imagery, relaxation, and application of heat and cold  Collaborate with interdisciplinary team and patient to determine appropriate pain management plan  1  Include patient in decisions related to comfort  2  Offer non-pharmacological pain management interventions  3  Report ineffective pain management to physician  Outcome: Progressing  Goal: Patient vital signs are stable  Description: 1  Assess vital signs - vaginal delivery    Outcome: Progressing     Problem: ANTEPARTUM  Goal: Maintain pregnancy as long as maternal and/or fetal condition is stable  Description: INTERVENTIONS:  - Maternal surveillance  - Fetal surveillance  - Monitor uterine activity  - Medications as ordered  - Bedrest  Outcome: Progressing     Problem: DISCHARGE PLANNING - CARE MANAGEMENT  Goal: Discharge to post-acute care or home with appropriate resources  Description: INTERVENTIONS:  - Conduct assessment to determine patient/family and health care team treatment goals, and need for post-acute services based on payer coverage, community resources, and patient preferences, and barriers to discharge  - Address psychosocial, clinical, and financial barriers to discharge as identified in assessment in conjunction with the patient/family and health care team  - Arrange appropriate level of post-acute services according to patients   needs and preference and payer coverage in collaboration with the physician and health care team  - Communicate with and update the patient/family, physician, and health care team regarding progress on the discharge plan  - Arrange appropriate transportation to post-acute venues  Outcome: Progressing     Problem: Potential for Falls  Goal: Patient will remain free of falls  Description: INTERVENTIONS:  - Assess patient frequently for physical needs  -  Identify cognitive and physical deficits and behaviors that affect risk of falls    -  [place] fall precautions as indicated by assessment   - Educate patient/family on patient safety including physical limitations  - Instruct patient to call for assistance with activity based on assessment  - Modify environment to reduce risk of injury  - Consider OT/PT consult to assist with strengthening/mobility  Outcome: Progressing

## 2021-02-17 NOTE — PLAN OF CARE
Problem: PAIN - ADULT  Goal: Verbalizes/displays adequate comfort level or baseline comfort level  Description: Interventions:  - Encourage patient to monitor pain and request assistance  - Assess pain using appropriate pain scale  - Administer analgesics based on type and severity of pain and evaluate response  - Implement non-pharmacological measures as appropriate and evaluate response  - Consider cultural and social influences on pain and pain management  - Notify physician/advanced practitioner if interventions unsuccessful or patient reports new pain  Outcome: Progressing     Problem: INFECTION - ADULT  Goal: Absence or prevention of progression during hospitalization  Description: INTERVENTIONS:  - Assess and monitor for signs and symptoms of infection  - Monitor lab/diagnostic results  - Monitor all insertion sites, i e  indwelling lines, tubes, and drains  - Monitor endotracheal if appropriate and nasal secretions for changes in amount and color  - [place] appropriate cooling/warming therapies per order  - Administer medications as ordered  - Instruct and encourage patient and family to use good hand hygiene technique  - Identify and instruct in appropriate isolation precautions for identified infection/condition  Outcome: Progressing     Problem: SAFETY ADULT  Goal: Patient will remain free of falls  Description: INTERVENTIONS:  - Assess patient frequently for physical needs  -  Identify cognitive and physical deficits and behaviors that affect risk of falls    -  [place] fall precautions as indicated by assessment   - Educate patient/family on patient safety including physical limitations  - Instruct patient to call for assistance with activity based on assessment  - Modify environment to reduce risk of injury  - Consider OT/PT consult to assist with strengthening/mobility  Outcome: Progressing  Goal: Maintain or return to baseline ADL function  Description: INTERVENTIONS:  -  Assess patient's ability to carry out ADLs; assess patient's baseline for ADL function and identify physical deficits which impact ability to perform ADLs (bathing, care of mouth/teeth, toileting, grooming, dressing, etc )  - Assess/evaluate cause of self-care deficits   - Assess range of motion  - Assess patient's mobility; develop plan if impaired  - Assess patient's need for assistive devices and provide as appropriate  - Encourage maximum independence but intervene and supervise when necessary  - Involve family in performance of ADLs  - Assess for home care needs following discharge   - Consider OT consult to assist with ADL evaluation and planning for discharge  - Provide patient education as appropriate  Outcome: Progressing  Goal: Maintain or return mobility status to optimal level  Description: INTERVENTIONS:  - Assess patient's baseline mobility status (ambulation, transfers, stairs, etc )    - Identify cognitive and physical deficits and behaviors that affect mobility  - Identify mobility aids required to assist with transfers and/or ambulation (gait belt, sit-to-stand, lift, walker, cane, etc )  - [place] fall precautions as indicated by assessment  - Record patient progress and toleration of activity level on Mobility SBAR; progress patient to next Phase/Stage  - Instruct patient to call for assistance with activity based on assessment  - Consider rehabilitation consult to assist with strengthening/weightbearing, etc   Outcome: Progressing     Problem: DISCHARGE PLANNING  Goal: Discharge to home or other facility with appropriate resources  Description: INTERVENTIONS:  - Identify barriers to discharge w/patient and caregiver  - Arrange for needed discharge resources and transportation as appropriate  - Identify discharge learning needs (meds, wound care, etc )  - Arrange for interpretive services to assist at discharge as needed  - Refer to Case Management Department for coordinating discharge planning if the patient needs post-hospital services based on physician/advanced practitioner order or complex needs related to functional status, cognitive ability, or social support system  Outcome: Progressing     Problem: Labor & Delivery  Goal: Manages discomfort  Description: Assess and monitor for signs and symptoms of discomfort  Assess patient's pain level regularly and per hospital policy  Administer medications as ordered  Support use of nonpharmacological methods to help control pain such as distraction, imagery, relaxation, and application of heat and cold  Collaborate with interdisciplinary team and patient to determine appropriate pain management plan  1  Include patient in decisions related to comfort  2  Offer non-pharmacological pain management interventions  3  Report ineffective pain management to physician  Outcome: Progressing  Goal: Patient vital signs are stable  Description: 1  Assess vital signs - vaginal delivery    Outcome: Progressing     Problem: ANTEPARTUM  Goal: Maintain pregnancy as long as maternal and/or fetal condition is stable  Description: INTERVENTIONS:  - Maternal surveillance  - Fetal surveillance  - Monitor uterine activity  - Medications as ordered  - Bedrest  Outcome: Progressing     Problem: DISCHARGE PLANNING - CARE MANAGEMENT  Goal: Discharge to post-acute care or home with appropriate resources  Description: INTERVENTIONS:  - Conduct assessment to determine patient/family and health care team treatment goals, and need for post-acute services based on payer coverage, community resources, and patient preferences, and barriers to discharge  - Address psychosocial, clinical, and financial barriers to discharge as identified in assessment in conjunction with the patient/family and health care team  - Arrange appropriate level of post-acute services according to patients   needs and preference and payer coverage in collaboration with the physician and health care team  - Communicate with and update the patient/family, physician, and health care team regarding progress on the discharge plan  - Arrange appropriate transportation to post-acute venues  Outcome: Progressing     Problem: Potential for Falls  Goal: Patient will remain free of falls  Description: INTERVENTIONS:  - Assess patient frequently for physical needs  -  Identify cognitive and physical deficits and behaviors that affect risk of falls    -  [place] fall precautions as indicated by assessment   - Educate patient/family on patient safety including physical limitations  - Instruct patient to call for assistance with activity based on assessment  - Modify environment to reduce risk of injury  - Consider OT/PT consult to assist with strengthening/mobility  Outcome: Progressing

## 2021-02-18 LAB
GLUCOSE SERPL-MCNC: 103 MG/DL (ref 65–140)
GLUCOSE SERPL-MCNC: 127 MG/DL (ref 65–140)
GLUCOSE SERPL-MCNC: 136 MG/DL (ref 65–140)
GLUCOSE SERPL-MCNC: 142 MG/DL (ref 65–140)
GLUCOSE SERPL-MCNC: 145 MG/DL (ref 65–140)
GLUCOSE SERPL-MCNC: 83 MG/DL (ref 65–140)

## 2021-02-18 PROCEDURE — 59025 FETAL NON-STRESS TEST: CPT | Performed by: OBSTETRICS & GYNECOLOGY

## 2021-02-18 PROCEDURE — 99232 SBSQ HOSP IP/OBS MODERATE 35: CPT | Performed by: OBSTETRICS & GYNECOLOGY

## 2021-02-18 PROCEDURE — 82948 REAGENT STRIP/BLOOD GLUCOSE: CPT

## 2021-02-18 PROCEDURE — NC001 PR NO CHARGE: Performed by: OBSTETRICS & GYNECOLOGY

## 2021-02-18 RX ADMIN — DOCUSATE SODIUM 100 MG: 100 CAPSULE, LIQUID FILLED ORAL at 18:50

## 2021-02-18 RX ADMIN — ENOXAPARIN SODIUM 40 MG: 40 INJECTION SUBCUTANEOUS at 10:18

## 2021-02-18 RX ADMIN — METFORMIN ER 500 MG 1500 MG: 500 TABLET ORAL at 21:51

## 2021-02-18 RX ADMIN — Medication 2000 UNITS: at 10:18

## 2021-02-18 RX ADMIN — OXYCODONE HYDROCHLORIDE AND ACETAMINOPHEN 1000 MG: 500 TABLET ORAL at 10:19

## 2021-02-18 RX ADMIN — Medication 1 TABLET: at 10:18

## 2021-02-18 RX ADMIN — DOCUSATE SODIUM 100 MG: 100 CAPSULE, LIQUID FILLED ORAL at 10:19

## 2021-02-18 RX ADMIN — ASPIRIN 81 MG CHEWABLE TABLET 162 MG: 81 TABLET CHEWABLE at 21:51

## 2021-02-18 RX ADMIN — FOLIC ACID 1 MG: 1 TABLET ORAL at 10:18

## 2021-02-18 NOTE — PLAN OF CARE
Problem: PAIN - ADULT  Goal: Verbalizes/displays adequate comfort level or baseline comfort level  Description: Interventions:  - Encourage patient to monitor pain and request assistance  - Assess pain using appropriate pain scale  - Administer analgesics based on type and severity of pain and evaluate response  - Implement non-pharmacological measures as appropriate and evaluate response  - Consider cultural and social influences on pain and pain management  - Notify physician/advanced practitioner if interventions unsuccessful or patient reports new pain  Outcome: Progressing     Problem: INFECTION - ADULT  Goal: Absence or prevention of progression during hospitalization  Description: INTERVENTIONS:  - Assess and monitor for signs and symptoms of infection  - Monitor lab/diagnostic results  - Monitor all insertion sites, i e  indwelling lines, tubes, and drains  - Monitor endotracheal if appropriate and nasal secretions for changes in amount and color  - [place] appropriate cooling/warming therapies per order  - Administer medications as ordered  - Instruct and encourage patient and family to use good hand hygiene technique  - Identify and instruct in appropriate isolation precautions for identified infection/condition  Outcome: Progressing     Problem: SAFETY ADULT  Goal: Patient will remain free of falls  Description: INTERVENTIONS:  - Assess patient frequently for physical needs  -  Identify cognitive and physical deficits and behaviors that affect risk of falls    -  [place] fall precautions as indicated by assessment   - Educate patient/family on patient safety including physical limitations  - Instruct patient to call for assistance with activity based on assessment  - Modify environment to reduce risk of injury  - Consider OT/PT consult to assist with strengthening/mobility  Outcome: Progressing  Goal: Maintain or return to baseline ADL function  Description: INTERVENTIONS:  -  Assess patient's ability to carry out ADLs; assess patient's baseline for ADL function and identify physical deficits which impact ability to perform ADLs (bathing, care of mouth/teeth, toileting, grooming, dressing, etc )  - Assess/evaluate cause of self-care deficits   - Assess range of motion  - Assess patient's mobility; develop plan if impaired  - Assess patient's need for assistive devices and provide as appropriate  - Encourage maximum independence but intervene and supervise when necessary  - Involve family in performance of ADLs  - Assess for home care needs following discharge   - Consider OT consult to assist with ADL evaluation and planning for discharge  - Provide patient education as appropriate  Outcome: Progressing  Goal: Maintain or return mobility status to optimal level  Description: INTERVENTIONS:  - Assess patient's baseline mobility status (ambulation, transfers, stairs, etc )    - Identify cognitive and physical deficits and behaviors that affect mobility  - Identify mobility aids required to assist with transfers and/or ambulation (gait belt, sit-to-stand, lift, walker, cane, etc )  - [place] fall precautions as indicated by assessment  - Record patient progress and toleration of activity level on Mobility SBAR; progress patient to next Phase/Stage  - Instruct patient to call for assistance with activity based on assessment  - Consider rehabilitation consult to assist with strengthening/weightbearing, etc   Outcome: Progressing     Problem: DISCHARGE PLANNING  Goal: Discharge to home or other facility with appropriate resources  Description: INTERVENTIONS:  - Identify barriers to discharge w/patient and caregiver  - Arrange for needed discharge resources and transportation as appropriate  - Identify discharge learning needs (meds, wound care, etc )  - Arrange for interpretive services to assist at discharge as needed  - Refer to Case Management Department for coordinating discharge planning if the patient needs post-hospital services based on physician/advanced practitioner order or complex needs related to functional status, cognitive ability, or social support system  Outcome: Progressing     Problem: Labor & Delivery  Goal: Manages discomfort  Description: Assess and monitor for signs and symptoms of discomfort  Assess patient's pain level regularly and per hospital policy  Administer medications as ordered  Support use of nonpharmacological methods to help control pain such as distraction, imagery, relaxation, and application of heat and cold  Collaborate with interdisciplinary team and patient to determine appropriate pain management plan  1  Include patient in decisions related to comfort  2  Offer non-pharmacological pain management interventions  3  Report ineffective pain management to physician  Outcome: Progressing  Goal: Patient vital signs are stable  Description: 1  Assess vital signs - vaginal delivery    Outcome: Progressing     Problem: ANTEPARTUM  Goal: Maintain pregnancy as long as maternal and/or fetal condition is stable  Description: INTERVENTIONS:  - Maternal surveillance  - Fetal surveillance  - Monitor uterine activity  - Medications as ordered  - Bedrest  Outcome: Progressing     Problem: DISCHARGE PLANNING - CARE MANAGEMENT  Goal: Discharge to post-acute care or home with appropriate resources  Description: INTERVENTIONS:  - Conduct assessment to determine patient/family and health care team treatment goals, and need for post-acute services based on payer coverage, community resources, and patient preferences, and barriers to discharge  - Address psychosocial, clinical, and financial barriers to discharge as identified in assessment in conjunction with the patient/family and health care team  - Arrange appropriate level of post-acute services according to patients   needs and preference and payer coverage in collaboration with the physician and health care team  - Communicate with and update the patient/family, physician, and health care team regarding progress on the discharge plan  - Arrange appropriate transportation to post-acute venues  Outcome: Progressing     Problem: Potential for Falls  Goal: Patient will remain free of falls  Description: INTERVENTIONS:  - Assess patient frequently for physical needs  -  Identify cognitive and physical deficits and behaviors that affect risk of falls    -  [place] fall precautions as indicated by assessment   - Educate patient/family on patient safety including physical limitations  - Instruct patient to call for assistance with activity based on assessment  - Modify environment to reduce risk of injury  - Consider OT/PT consult to assist with strengthening/mobility  Outcome: Progressing

## 2021-02-18 NOTE — PLAN OF CARE
Problem: PAIN - ADULT  Goal: Verbalizes/displays adequate comfort level or baseline comfort level  Description: Interventions:  - Encourage patient to monitor pain and request assistance  - Assess pain using appropriate pain scale  - Administer analgesics based on type and severity of pain and evaluate response  - Implement non-pharmacological measures as appropriate and evaluate response  - Consider cultural and social influences on pain and pain management  - Notify physician/advanced practitioner if interventions unsuccessful or patient reports new pain  Outcome: Progressing     Problem: INFECTION - ADULT  Goal: Absence or prevention of progression during hospitalization  Description: INTERVENTIONS:  - Assess and monitor for signs and symptoms of infection  - Monitor lab/diagnostic results  - Monitor all insertion sites, i e  indwelling lines, tubes, and drains  - Monitor endotracheal if appropriate and nasal secretions for changes in amount and color  - [place] appropriate cooling/warming therapies per order  - Administer medications as ordered  - Instruct and encourage patient and family to use good hand hygiene technique  - Identify and instruct in appropriate isolation precautions for identified infection/condition  Outcome: Progressing     Problem: SAFETY ADULT  Goal: Patient will remain free of falls  Description: INTERVENTIONS:  - Assess patient frequently for physical needs  -  Identify cognitive and physical deficits and behaviors that affect risk of falls    -  [place] fall precautions as indicated by assessment   - Educate patient/family on patient safety including physical limitations  - Instruct patient to call for assistance with activity based on assessment  - Modify environment to reduce risk of injury  - Consider OT/PT consult to assist with strengthening/mobility  Outcome: Progressing  Goal: Maintain or return to baseline ADL function  Description: INTERVENTIONS:  -  Assess patient's ability to carry out ADLs; assess patient's baseline for ADL function and identify physical deficits which impact ability to perform ADLs (bathing, care of mouth/teeth, toileting, grooming, dressing, etc )  - Assess/evaluate cause of self-care deficits   - Assess range of motion  - Assess patient's mobility; develop plan if impaired  - Assess patient's need for assistive devices and provide as appropriate  - Encourage maximum independence but intervene and supervise when necessary  - Involve family in performance of ADLs  - Assess for home care needs following discharge   - Consider OT consult to assist with ADL evaluation and planning for discharge  - Provide patient education as appropriate  Outcome: Progressing  Goal: Maintain or return mobility status to optimal level  Description: INTERVENTIONS:  - Assess patient's baseline mobility status (ambulation, transfers, stairs, etc )    - Identify cognitive and physical deficits and behaviors that affect mobility  - Identify mobility aids required to assist with transfers and/or ambulation (gait belt, sit-to-stand, lift, walker, cane, etc )  - [place] fall precautions as indicated by assessment  - Record patient progress and toleration of activity level on Mobility SBAR; progress patient to next Phase/Stage  - Instruct patient to call for assistance with activity based on assessment  - Consider rehabilitation consult to assist with strengthening/weightbearing, etc   Outcome: Progressing     Problem: DISCHARGE PLANNING  Goal: Discharge to home or other facility with appropriate resources  Description: INTERVENTIONS:  - Identify barriers to discharge w/patient and caregiver  - Arrange for needed discharge resources and transportation as appropriate  - Identify discharge learning needs (meds, wound care, etc )  - Arrange for interpretive services to assist at discharge as needed  - Refer to Case Management Department for coordinating discharge planning if the patient needs post-hospital services based on physician/advanced practitioner order or complex needs related to functional status, cognitive ability, or social support system  Outcome: Progressing     Problem: Labor & Delivery  Goal: Manages discomfort  Description: Assess and monitor for signs and symptoms of discomfort  Assess patient's pain level regularly and per hospital policy  Administer medications as ordered  Support use of nonpharmacological methods to help control pain such as distraction, imagery, relaxation, and application of heat and cold  Collaborate with interdisciplinary team and patient to determine appropriate pain management plan  1  Include patient in decisions related to comfort  2  Offer non-pharmacological pain management interventions  3  Report ineffective pain management to physician  Outcome: Progressing  Goal: Patient vital signs are stable  Description: 1  Assess vital signs - vaginal delivery    Outcome: Progressing     Problem: ANTEPARTUM  Goal: Maintain pregnancy as long as maternal and/or fetal condition is stable  Description: INTERVENTIONS:  - Maternal surveillance  - Fetal surveillance  - Monitor uterine activity  - Medications as ordered  - Bedrest  Outcome: Progressing     Problem: DISCHARGE PLANNING - CARE MANAGEMENT  Goal: Discharge to post-acute care or home with appropriate resources  Description: INTERVENTIONS:  - Conduct assessment to determine patient/family and health care team treatment goals, and need for post-acute services based on payer coverage, community resources, and patient preferences, and barriers to discharge  - Address psychosocial, clinical, and financial barriers to discharge as identified in assessment in conjunction with the patient/family and health care team  - Arrange appropriate level of post-acute services according to patients   needs and preference and payer coverage in collaboration with the physician and health care team  - Communicate with and update the patient/family, physician, and health care team regarding progress on the discharge plan  - Arrange appropriate transportation to post-acute venues  Outcome: Progressing     Problem: Potential for Falls  Goal: Patient will remain free of falls  Description: INTERVENTIONS:  - Assess patient frequently for physical needs  -  Identify cognitive and physical deficits and behaviors that affect risk of falls    -  [place] fall precautions as indicated by assessment   - Educate patient/family on patient safety including physical limitations  - Instruct patient to call for assistance with activity based on assessment  - Modify environment to reduce risk of injury  - Consider OT/PT consult to assist with strengthening/mobility  Outcome: Progressing

## 2021-02-18 NOTE — PROGRESS NOTES
Progress Note - Maternal Fetal Medicine   Kiet Schmidt 27 y o  female MRN: [id number]  Unit/Bed#: -01 Encounter: [id number]    Assessment:  27 y o  [id number] at 26w0d admitted with PPROM  Hospital day 21    Plan:  1) PPROM: s/p latency antibiotics, magnesium sulfate x 12 hours, BTM 1/28-1/29, fetal ultrasound 2/9 showed fetus weighing 700g in liang breech presentation, improved fetal ascites, anhydramnios  2) T1DM: on insulin pump; basal rate 2 05 midnight to 8 am,  2 0 all other hours, insulin to carb ration 1:5 for breakfast and lunch, 1:4 with dinner and snacks, ISF 1 units for every 25 mg/dL over 100 mg /dL, fasting blood sugars yesterday 100, postprandial blood sugars 100-110s, continue to monitor closely, Diet Jayson/CHO controlled consistent carbohydrate Diet Level 1 with 4 carb servings/60 grams CHO/meal    3) IUP @ 25 weeks: NST TID    4) DVT PPx: SCDs, ambulation, lovenox 40 mg qday    Subjective/Objective   Chief Complaint:     No new complaints this morning    Subjective:     Patient currently has no new complaints this morning  She denies fevers/chills  Contractions: no  Loss of fluid: yes  Vaginal bleeding: no  Fetal movement: yes    Objective:     Vitals:   Temp:  [97 7 °F (36 5 °C)-98 3 °F (36 8 °C)] 98 3 °F (36 8 °C)  HR:  [87-91] 91  Resp:  [18-20] 18  BP: ()/(56-75) 112/75       Physical Exam  Vitals signs and nursing note reviewed  Constitutional:       Appearance: She is well-developed  Cardiovascular:      Rate and Rhythm: Normal rate and regular rhythm  Pulmonary:      Effort: Pulmonary effort is normal       Breath sounds: Normal breath sounds  Abdominal:      General: There is no distension  Tenderness: There is no abdominal tenderness  There is no guarding or rebound  Comments: gravid   Musculoskeletal: Normal range of motion  General: No tenderness  Neurological:      Mental Status: She is alert and oriented to person, place, and time         Fetal Assessment:  FHT: 145 / Moderate 6 - 25 bpm / reactive,no decels  [place]: none    Lab Results   Component Value Date    WBC 13 71 (H) 02/05/2021    HGB 12 6 02/05/2021    HCT 38 4 02/05/2021    MCV 93 02/05/2021     02/05/2021       Lab Results   Component Value Date    CALCIUM 9 2 11/20/2020    K 4 2 11/20/2020    CO2 25 11/20/2020     11/20/2020    BUN 14 11/20/2020    CREATININE 0 78 11/20/2020       Lab Results   Component Value Date    ALT 23 11/20/2020    AST 14 11/20/2020    ALKPHOS 55 11/20/2020       Pam Marion MD  OBGYN, PGY-3  2/18/2021  6:57 AM

## 2021-02-19 LAB
GLUCOSE SERPL-MCNC: 118 MG/DL (ref 65–140)
GLUCOSE SERPL-MCNC: 134 MG/DL (ref 65–140)
GLUCOSE SERPL-MCNC: 141 MG/DL (ref 65–140)
GLUCOSE SERPL-MCNC: 189 MG/DL (ref 65–140)
GLUCOSE SERPL-MCNC: 90 MG/DL (ref 65–140)

## 2021-02-19 PROCEDURE — 76815 OB US LIMITED FETUS(S): CPT | Performed by: OBSTETRICS & GYNECOLOGY

## 2021-02-19 PROCEDURE — 59025 FETAL NON-STRESS TEST: CPT | Performed by: OBSTETRICS & GYNECOLOGY

## 2021-02-19 PROCEDURE — 99231 SBSQ HOSP IP/OBS SF/LOW 25: CPT | Performed by: OBSTETRICS & GYNECOLOGY

## 2021-02-19 PROCEDURE — 82948 REAGENT STRIP/BLOOD GLUCOSE: CPT

## 2021-02-19 PROCEDURE — NC001 PR NO CHARGE: Performed by: OBSTETRICS & GYNECOLOGY

## 2021-02-19 RX ADMIN — FOLIC ACID 1 MG: 1 TABLET ORAL at 09:03

## 2021-02-19 RX ADMIN — DOCUSATE SODIUM 100 MG: 100 CAPSULE, LIQUID FILLED ORAL at 09:02

## 2021-02-19 RX ADMIN — Medication 1 TABLET: at 09:02

## 2021-02-19 RX ADMIN — Medication 2000 UNITS: at 09:03

## 2021-02-19 RX ADMIN — ASPIRIN 81 MG CHEWABLE TABLET 162 MG: 81 TABLET CHEWABLE at 21:56

## 2021-02-19 RX ADMIN — OXYCODONE HYDROCHLORIDE AND ACETAMINOPHEN 1000 MG: 500 TABLET ORAL at 09:02

## 2021-02-19 RX ADMIN — METFORMIN ER 500 MG 1500 MG: 500 TABLET ORAL at 21:57

## 2021-02-19 RX ADMIN — ENOXAPARIN SODIUM 40 MG: 40 INJECTION SUBCUTANEOUS at 09:02

## 2021-02-19 RX ADMIN — DOCUSATE SODIUM 100 MG: 100 CAPSULE, LIQUID FILLED ORAL at 21:57

## 2021-02-19 NOTE — PLAN OF CARE
Problem: PAIN - ADULT  Goal: Verbalizes/displays adequate comfort level or baseline comfort level  Description: Interventions:  - Encourage patient to monitor pain and request assistance  - Assess pain using appropriate pain scale  - Administer analgesics based on type and severity of pain and evaluate response  - Implement non-pharmacological measures as appropriate and evaluate response  - Consider cultural and social influences on pain and pain management  - Notify physician/advanced practitioner if interventions unsuccessful or patient reports new pain  Outcome: Progressing     Problem: INFECTION - ADULT  Goal: Absence or prevention of progression during hospitalization  Description: INTERVENTIONS:  - Assess and monitor for signs and symptoms of infection  - Monitor lab/diagnostic results  - Monitor all insertion sites, i e  indwelling lines, tubes, and drains  - Monitor endotracheal if appropriate and nasal secretions for changes in amount and color  - [place] appropriate cooling/warming therapies per order  - Administer medications as ordered  - Instruct and encourage patient and family to use good hand hygiene technique  - Identify and instruct in appropriate isolation precautions for identified infection/condition  Outcome: Progressing     Problem: SAFETY ADULT  Goal: Patient will remain free of falls  Description: INTERVENTIONS:  - Assess patient frequently for physical needs  -  Identify cognitive and physical deficits and behaviors that affect risk of falls    -  [place] fall precautions as indicated by assessment   - Educate patient/family on patient safety including physical limitations  - Instruct patient to call for assistance with activity based on assessment  - Modify environment to reduce risk of injury  - Consider OT/PT consult to assist with strengthening/mobility  Outcome: Progressing  Goal: Maintain or return to baseline ADL function  Description: INTERVENTIONS:  -  Assess patient's ability to carry out ADLs; assess patient's baseline for ADL function and identify physical deficits which impact ability to perform ADLs (bathing, care of mouth/teeth, toileting, grooming, dressing, etc )  - Assess/evaluate cause of self-care deficits   - Assess range of motion  - Assess patient's mobility; develop plan if impaired  - Assess patient's need for assistive devices and provide as appropriate  - Encourage maximum independence but intervene and supervise when necessary  - Involve family in performance of ADLs  - Assess for home care needs following discharge   - Consider OT consult to assist with ADL evaluation and planning for discharge  - Provide patient education as appropriate  Outcome: Progressing  Goal: Maintain or return mobility status to optimal level  Description: INTERVENTIONS:  - Assess patient's baseline mobility status (ambulation, transfers, stairs, etc )    - Identify cognitive and physical deficits and behaviors that affect mobility  - Identify mobility aids required to assist with transfers and/or ambulation (gait belt, sit-to-stand, lift, walker, cane, etc )  - [place] fall precautions as indicated by assessment  - Record patient progress and toleration of activity level on Mobility SBAR; progress patient to next Phase/Stage  - Instruct patient to call for assistance with activity based on assessment  - Consider rehabilitation consult to assist with strengthening/weightbearing, etc   Outcome: Progressing     Problem: DISCHARGE PLANNING  Goal: Discharge to home or other facility with appropriate resources  Description: INTERVENTIONS:  - Identify barriers to discharge w/patient and caregiver  - Arrange for needed discharge resources and transportation as appropriate  - Identify discharge learning needs (meds, wound care, etc )  - Arrange for interpretive services to assist at discharge as needed  - Refer to Case Management Department for coordinating discharge planning if the patient needs post-hospital services based on physician/advanced practitioner order or complex needs related to functional status, cognitive ability, or social support system  Outcome: Progressing     Problem: Labor & Delivery  Goal: Manages discomfort  Description: Assess and monitor for signs and symptoms of discomfort  Assess patient's pain level regularly and per hospital policy  Administer medications as ordered  Support use of nonpharmacological methods to help control pain such as distraction, imagery, relaxation, and application of heat and cold  Collaborate with interdisciplinary team and patient to determine appropriate pain management plan  1  Include patient in decisions related to comfort  2  Offer non-pharmacological pain management interventions  3  Report ineffective pain management to physician  Outcome: Progressing  Goal: Patient vital signs are stable  Description: 1  Assess vital signs - vaginal delivery    Outcome: Progressing     Problem: ANTEPARTUM  Goal: Maintain pregnancy as long as maternal and/or fetal condition is stable  Description: INTERVENTIONS:  - Maternal surveillance  - Fetal surveillance  - Monitor uterine activity  - Medications as ordered  - Bedrest  Outcome: Progressing     Problem: DISCHARGE PLANNING - CARE MANAGEMENT  Goal: Discharge to post-acute care or home with appropriate resources  Description: INTERVENTIONS:  - Conduct assessment to determine patient/family and health care team treatment goals, and need for post-acute services based on payer coverage, community resources, and patient preferences, and barriers to discharge  - Address psychosocial, clinical, and financial barriers to discharge as identified in assessment in conjunction with the patient/family and health care team  - Arrange appropriate level of post-acute services according to patients   needs and preference and payer coverage in collaboration with the physician and health care team  - Communicate with and update the patient/family, physician, and health care team regarding progress on the discharge plan  - Arrange appropriate transportation to post-acute venues  Outcome: Progressing     Problem: Potential for Falls  Goal: Patient will remain free of falls  Description: INTERVENTIONS:  - Assess patient frequently for physical needs  -  Identify cognitive and physical deficits and behaviors that affect risk of falls    -  [place] fall precautions as indicated by assessment   - Educate patient/family on patient safety including physical limitations  - Instruct patient to call for assistance with activity based on assessment  - Modify environment to reduce risk of injury  - Consider OT/PT consult to assist with strengthening/mobility  Outcome: Progressing

## 2021-02-19 NOTE — PROGRESS NOTES
Progress Note - Maternal Fetal Medicine   Nieves Yusuf 27 y o  female MRN: [id number]  Unit/Bed#: -01 Encounter: [id number]    Assessment:  27 y o  [id number] at 26w1d admitted with PPROM  Hospital day 22    Plan:  1) PPROM: s/p latency antibiotics, magnesium sulfate x 12 hours, BTM 1/28-1/29, fetal ultrasound 2/9 showed fetus weighing 700g in liang breech presentation, improved fetal ascites, anhydramnios  Fluid check on 2/17 with no new changes  2) T1DM: on insulin pump; basal rate 2 05 midnight to 8 am,  2 0 all other hours, insulin to carb ration 1:5 for breakfast and lunch, 1:4 with dinner and snacks, ISF 1 units for every 25 mg/dL over 100 mg /dL, fasting blood sugars yesterday 80s, postprandial blood sugars 100s-140s, continue to monitor closely, Diet J[place]/CHO controlled consistent carbohydrate Diet Level 1 with 4 carb servings/60 grams CHO/meal  3) IUP @ 26 weeks: NST TID  4) DVT PPx: SCDs, ambulation, lovenox 40 mg qday    Subjective/Objective   Chief Complaint:     No new complaints this morning    Subjective:     Patient currently has no new complaints this morning  Contractions: no  Loss of fluid: yes  Vaginal bleeding: no  Fetal movement: yes    Objective:     Vitals:   Temp:  [97 9 °F (36 6 °C)-98 3 °F (36 8 °C)] 98 2 °F (36 8 °C)  HR:  [] 94  Resp:  [18-20] 18  BP: ()/(56-67) 92/56       Physical Exam  Vitals signs and nursing note reviewed  Constitutional:       Appearance: She is well-developed  Cardiovascular:      Rate and Rhythm: Normal rate and regular rhythm  Pulmonary:      Effort: Pulmonary effort is normal       Breath sounds: Normal breath sounds  Abdominal:      General: There is no distension  Tenderness: There is no abdominal tenderness  There is no guarding or rebound  Comments: gravid   Musculoskeletal: Normal range of motion  General: No tenderness  Neurological:      Mental Status: She is alert and oriented to person, place, and time  Fetal Assessment:  FHT: 140 / Moderate 6 - 25 bpm / reactive,no decels  [place]: none    Lab Results   Component Value Date    WBC 13 71 (H) 02/05/2021    HGB 12 6 02/05/2021    HCT 38 4 02/05/2021    MCV 93 02/05/2021     02/05/2021       Lab Results   Component Value Date    CALCIUM 9 2 11/20/2020    K 4 2 11/20/2020    CO2 25 11/20/2020     11/20/2020    BUN 14 11/20/2020    CREATININE 0 78 11/20/2020       Lab Results   Component Value Date    ALT 23 11/20/2020    AST 14 11/20/2020    ALKPHOS 55 11/20/2020       Sarah Beth Vega MD  OBGYN, PGY-4  2/19/2021  10:02 AM

## 2021-02-20 LAB
ABO GROUP BLD: NORMAL
BLD GP AB SCN SERPL QL: NEGATIVE
GLUCOSE SERPL-MCNC: 104 MG/DL (ref 65–140)
GLUCOSE SERPL-MCNC: 106 MG/DL (ref 65–140)
GLUCOSE SERPL-MCNC: 117 MG/DL (ref 65–140)
GLUCOSE SERPL-MCNC: 125 MG/DL (ref 65–140)
GLUCOSE SERPL-MCNC: 149 MG/DL (ref 65–140)
GLUCOSE SERPL-MCNC: 153 MG/DL (ref 65–140)
RH BLD: POSITIVE
SPECIMEN EXPIRATION DATE: NORMAL

## 2021-02-20 PROCEDURE — 82948 REAGENT STRIP/BLOOD GLUCOSE: CPT

## 2021-02-20 PROCEDURE — 86850 RBC ANTIBODY SCREEN: CPT | Performed by: STUDENT IN AN ORGANIZED HEALTH CARE EDUCATION/TRAINING PROGRAM

## 2021-02-20 PROCEDURE — 86900 BLOOD TYPING SEROLOGIC ABO: CPT | Performed by: STUDENT IN AN ORGANIZED HEALTH CARE EDUCATION/TRAINING PROGRAM

## 2021-02-20 PROCEDURE — 86901 BLOOD TYPING SEROLOGIC RH(D): CPT | Performed by: STUDENT IN AN ORGANIZED HEALTH CARE EDUCATION/TRAINING PROGRAM

## 2021-02-20 PROCEDURE — NC001 PR NO CHARGE: Performed by: OBSTETRICS & GYNECOLOGY

## 2021-02-20 RX ADMIN — Medication 2000 UNITS: at 09:02

## 2021-02-20 RX ADMIN — METFORMIN ER 500 MG 1500 MG: 500 TABLET ORAL at 22:11

## 2021-02-20 RX ADMIN — ENOXAPARIN SODIUM 40 MG: 40 INJECTION SUBCUTANEOUS at 09:02

## 2021-02-20 RX ADMIN — Medication 1 TABLET: at 09:02

## 2021-02-20 RX ADMIN — DOCUSATE SODIUM 100 MG: 100 CAPSULE, LIQUID FILLED ORAL at 18:01

## 2021-02-20 RX ADMIN — DOCUSATE SODIUM 100 MG: 100 CAPSULE, LIQUID FILLED ORAL at 09:02

## 2021-02-20 RX ADMIN — ASPIRIN 81 MG CHEWABLE TABLET 162 MG: 81 TABLET CHEWABLE at 22:11

## 2021-02-20 RX ADMIN — OXYCODONE HYDROCHLORIDE AND ACETAMINOPHEN 1000 MG: 500 TABLET ORAL at 09:02

## 2021-02-20 RX ADMIN — FOLIC ACID 1 MG: 1 TABLET ORAL at 09:02

## 2021-02-20 NOTE — PROGRESS NOTES
Progress Note - Maternal Fetal Medicine   Chris Chacon 27 y o  female MRN: [id number]  Unit/Bed#: -01 Encounter: [id number]    Assessment:  27 y o  [id number] at 26w2d admitted with PPROM  Hospital day #23    Plan:  1  PPROM  - s/p Latency antibiotics  , magneisum sulfate, BTM (1/28 - 1/29)  - Afebrile overnight with no signs of infection   - T&S q3 days   - Fetal US on 2/17: breech presentation, anhydramnios    2  T1DM  - POCT overnight: 104 - 189  - Overall better controlled  - Insulin pump; basal rate 2 05 midnight to 8 am,  2 0 all other hours, insulin to carb ration 1:5 for breakfast and lunch, 1:4 with dinner and snacks, ISF 1 units for every 25 mg/dL over 100 mg /dL, fasting blood sugars yesterday 80s, postprandial blood sugars 100s-140s  - Diet [place]/CHO controlled consistent carbohydrate Diet Level 1 with 4 carb servings/60 grams CHO/meal    3  IUP at 26 weeks  - NST TID  - AM NST reactive with baseline of 135    4  DVT PPX  - Encourage ambulation  - Lovenox 40mg daily   - SCDs    Subjective:     Chris Chacon has no complaints this monring  She is in good spirits  She denies fever, chills, nausea, vomiting, chest pain and leg tenderness  Objective:     Vitals:   Temp:  [98 °F (36 7 °C)-98 3 °F (36 8 °C)] 98 3 °F (36 8 °C)  HR:  [] 84  Resp:  [18] 18  BP: ()/(56-69) 119/69       Physical Exam  Constitutional:       Appearance: Normal appearance  She is obese  HENT:      Head: Normocephalic and atraumatic  Cardiovascular:      Rate and Rhythm: Normal rate and regular rhythm  Heart sounds: Normal heart sounds  No murmur  Pulmonary:      Effort: Pulmonary effort is normal  No respiratory distress  Breath sounds: Normal breath sounds  No wheezing, rhonchi or rales  Chest:      Chest wall: No tenderness  Abdominal:      Palpations: Abdomen is soft  Comments: gravid   Musculoskeletal:         General: No swelling, tenderness, deformity or signs of injury        Right lower leg: No edema  Left lower leg: No edema  Skin:     General: Skin is warm and dry  Coloration: Skin is not jaundiced or pale  Findings: No bruising, erythema, lesion or rash  Neurological:      General: No focal deficit present  Mental Status: She is alert and oriented to person, place, and time  Psychiatric:         Mood and Affect: Mood normal          Behavior: Behavior normal          Thought Content:  Thought content normal          Judgment: Judgment normal            Fetal Assessment:  FHT: 135 / Moderate 6 - 25 bpm / accelerations, no decelerations, reactive  [place]: none        Lab Results   Component Value Date    WBC 13 71 (H) 02/05/2021    HGB 12 6 02/05/2021    HCT 38 4 02/05/2021    MCV 93 02/05/2021     02/05/2021       Lab Results   Component Value Date    CALCIUM 9 2 11/20/2020    K 4 2 11/20/2020    CO2 25 11/20/2020     11/20/2020    BUN 14 11/20/2020    CREATININE 0 78 11/20/2020       Lab Results   Component Value Date    ALT 23 11/20/2020    AST 14 11/20/2020    ALKPHOS 55 11/20/2020       Gordon Jo MD  OBGYN, PGY-3  2/20/2021  7:17 AM

## 2021-02-21 LAB
GLUCOSE SERPL-MCNC: 106 MG/DL (ref 65–140)
GLUCOSE SERPL-MCNC: 111 MG/DL (ref 65–140)
GLUCOSE SERPL-MCNC: 144 MG/DL (ref 65–140)
GLUCOSE SERPL-MCNC: 160 MG/DL (ref 65–140)
GLUCOSE SERPL-MCNC: 180 MG/DL (ref 65–140)
GLUCOSE SERPL-MCNC: 191 MG/DL (ref 65–140)
GLUCOSE SERPL-MCNC: 95 MG/DL (ref 65–140)

## 2021-02-21 PROCEDURE — 82948 REAGENT STRIP/BLOOD GLUCOSE: CPT

## 2021-02-21 PROCEDURE — 59025 FETAL NON-STRESS TEST: CPT | Performed by: OBSTETRICS & GYNECOLOGY

## 2021-02-21 PROCEDURE — 99232 SBSQ HOSP IP/OBS MODERATE 35: CPT | Performed by: OBSTETRICS & GYNECOLOGY

## 2021-02-21 PROCEDURE — NC001 PR NO CHARGE: Performed by: OBSTETRICS & GYNECOLOGY

## 2021-02-21 RX ADMIN — ENOXAPARIN SODIUM 40 MG: 40 INJECTION SUBCUTANEOUS at 10:23

## 2021-02-21 RX ADMIN — Medication 2000 UNITS: at 10:23

## 2021-02-21 RX ADMIN — METFORMIN ER 500 MG 1500 MG: 500 TABLET ORAL at 22:13

## 2021-02-21 RX ADMIN — OXYCODONE HYDROCHLORIDE AND ACETAMINOPHEN 1000 MG: 500 TABLET ORAL at 10:23

## 2021-02-21 RX ADMIN — Medication 1 TABLET: at 10:22

## 2021-02-21 RX ADMIN — ASPIRIN 81 MG CHEWABLE TABLET 162 MG: 81 TABLET CHEWABLE at 22:13

## 2021-02-21 RX ADMIN — DOCUSATE SODIUM 100 MG: 100 CAPSULE, LIQUID FILLED ORAL at 10:23

## 2021-02-21 RX ADMIN — FOLIC ACID 1 MG: 1 TABLET ORAL at 10:23

## 2021-02-21 RX ADMIN — DOCUSATE SODIUM 100 MG: 100 CAPSULE, LIQUID FILLED ORAL at 18:33

## 2021-02-21 NOTE — PROGRESS NOTES
Progress Note - Maternal Fetal Medicine   Nieves Yusuf 27 y o  female MRN: [id number]  Unit/Bed#: -01 Encounter: [id number]    Assessment:  27 y o  [id number] at 26w3d admitted with PPROM- currently without signs of  labor, infection or abruption  Hospital day #24    Plan:  1  PPROM at [address]  - s/p Latency antibiotics (Clinda/ Azithro ), magnesium sulfate (), BTM ( - )  - Afebrile overnight with no signs of infection or labor  - T&S q3 days, last completed yesterday, 2021  - Fetal US on : breech presentation, anhydramnios, continued ascites, normal MCA dopplers  - Continued expectant management until a hopeful 34 weeks gestation or until patient develops signs of labor, chorioamnionitis or abruption which would necessitate earlier delivery  2  T1DM  - POCT overnight: 104 - 163  - Overall better controlled  - Insulin pump; basal rate 2 05 midnight to 8 am,  2 0 all other hours, insulin to carb ration 1:5 for breakfast and lunch, 1:4 with dinner and snacks, ISF 1 units for every 25 mg/dL over 100 mg /dL, fasting blood sugars yesterday 80s, postprandial blood sugars 100s-140s  - Diet Jayson/CHO controlled consistent carbohydrate Diet Level 1 with 4 carb servings/60 grams CHO/meal    3  IUP at 26 weeks  - NST TID  - PNV daily  - S/p NICU consultation    4  DVT PPX  - Encourage ambulation  - Lovenox 40mg daily   - SCDs    Subjective:     Nieves Yusuf was seen at bedside this morning resting comfortably  She denies any complaints or new changes this morning  Denies any change in discharge, abdominal pain, contractions, vaginal bleeding, fever, chills, N/V, CP, SOB or calf pain  She has been tolerating a regular diet and has some constipation but declines anything in addition to stool softners at this time  Ambulates around room regularly      Objective:     Vitals:   Temp:  [97 4 °F (36 3 °C)-98 1 °F (36 7 °C)] 97 7 °F (36 5 °C)  HR:  [87-95] 87  Resp:  [18] 18  BP: ()/(50-73) 94/50 Physical Exam  Vitals signs reviewed  Constitutional:       General: She is not in acute distress  Appearance: Normal appearance  She is well-developed  She is not diaphoretic  HENT:      Head: Normocephalic and atraumatic  Neck:      Musculoskeletal: Normal range of motion and neck supple  Cardiovascular:      Rate and Rhythm: Normal rate and regular rhythm  Heart sounds: Normal heart sounds  No murmur  No friction rub  No gallop  Pulmonary:      Effort: Pulmonary effort is normal  No respiratory distress  Breath sounds: Normal breath sounds  No wheezing or rales  Abdominal:      Palpations: Abdomen is soft  Tenderness: There is no abdominal tenderness  There is no guarding or rebound  Comments: Gravid uterus   Genitourinary:     Vagina: Normal    Skin:     General: Skin is warm and dry  Neurological:      Mental Status: She is alert and oriented to person, place, and time     Psychiatric:         Mood and Affect: Mood normal          Behavior: Behavior normal       Comments: Pleasant demeanor       Fetal Assessment:  FHT: Baseline 135- 140 / Moderate 6 - 25 bpm / 15x15 accelerations, no decelerations, reactive  [place]: none        Lab Results   Component Value Date    WBC 13 71 (H) 02/05/2021    HGB 12 6 02/05/2021    HCT 38 4 02/05/2021    MCV 93 02/05/2021     02/05/2021       Lab Results   Component Value Date    CALCIUM 9 2 11/20/2020    K 4 2 11/20/2020    CO2 25 11/20/2020     11/20/2020    BUN 14 11/20/2020    CREATININE 0 78 11/20/2020       Lab Results   Component Value Date    ALT 23 11/20/2020    AST 14 11/20/2020    ALKPHOS 55 11/20/2020       Lauryn Vail MD  OBGYN, PGY-4  2/21/2021  5:07 AM

## 2021-02-22 LAB
GLUCOSE SERPL-MCNC: 109 MG/DL (ref 65–140)
GLUCOSE SERPL-MCNC: 109 MG/DL (ref 65–140)
GLUCOSE SERPL-MCNC: 123 MG/DL (ref 65–140)
GLUCOSE SERPL-MCNC: 132 MG/DL (ref 65–140)
GLUCOSE SERPL-MCNC: 147 MG/DL (ref 65–140)
GLUCOSE SERPL-MCNC: 154 MG/DL (ref 65–140)
GLUCOSE SERPL-MCNC: 160 MG/DL (ref 65–140)

## 2021-02-22 PROCEDURE — NC001 PR NO CHARGE: Performed by: OBSTETRICS & GYNECOLOGY

## 2021-02-22 PROCEDURE — 59025 FETAL NON-STRESS TEST: CPT | Performed by: OBSTETRICS & GYNECOLOGY

## 2021-02-22 PROCEDURE — 82948 REAGENT STRIP/BLOOD GLUCOSE: CPT

## 2021-02-22 PROCEDURE — 99233 SBSQ HOSP IP/OBS HIGH 50: CPT | Performed by: OBSTETRICS & GYNECOLOGY

## 2021-02-22 RX ADMIN — DOCUSATE SODIUM 100 MG: 100 CAPSULE, LIQUID FILLED ORAL at 10:13

## 2021-02-22 RX ADMIN — Medication 1 TABLET: at 10:12

## 2021-02-22 RX ADMIN — INSULIN ASPART 300 UNITS: 100 INJECTION, SOLUTION INTRAVENOUS; SUBCUTANEOUS at 19:31

## 2021-02-22 RX ADMIN — ACETAMINOPHEN 650 MG: 325 TABLET, FILM COATED ORAL at 19:35

## 2021-02-22 RX ADMIN — METFORMIN ER 500 MG 1500 MG: 500 TABLET ORAL at 22:11

## 2021-02-22 RX ADMIN — Medication 2000 UNITS: at 10:11

## 2021-02-22 RX ADMIN — OXYCODONE HYDROCHLORIDE AND ACETAMINOPHEN 1000 MG: 500 TABLET ORAL at 10:12

## 2021-02-22 RX ADMIN — ASPIRIN 81 MG CHEWABLE TABLET 162 MG: 81 TABLET CHEWABLE at 22:12

## 2021-02-22 RX ADMIN — FOLIC ACID 1 MG: 1 TABLET ORAL at 10:11

## 2021-02-22 RX ADMIN — ENOXAPARIN SODIUM 40 MG: 40 INJECTION SUBCUTANEOUS at 10:13

## 2021-02-22 RX ADMIN — DOCUSATE SODIUM 100 MG: 100 CAPSULE, LIQUID FILLED ORAL at 19:32

## 2021-02-22 NOTE — PROGRESS NOTES
BRIAN Attending Progress Note:    Assessment and Plan:  Ms Pipe Chang is a 27 y o  Veronica Ortega at [facility] Day: 32, admitted with PPROM  By issue:    Patient Active Problem List   Diagnosis     premature rupture of membranes (PPROM) with unknown onset of labor    Vaginal bleeding in pregnancy, second trimester    Encounter for supervision of pregnancy resulting from assisted reproductive technology in second trimester, antepartum    Oligohydramnios in poe pregnancy in second trimester    Pregnancy complicated by pre-existing type 1 diabetes, second trimester    Suspected fetal anomaly, antepartum    Fetal ascites causing disproportion    26 weeks gestation of pregnancy         Objective:  Patient Vitals for the past 24 hrs:   BP Temp Temp src Pulse Resp SpO2   21 0558 97/54 98 °F (36 7 °C) Oral 87 18 93 %   21 1540 119/79 98 °F (36 7 °C) Oral 90 18 99 %   21 1145 -- 98 2 °F (36 8 °C) Oral -- -- --       Past Medical History:   Diagnosis Date    Diabetes mellitus ([facility] [address] )     Female infertility     Varicella      No past surgical history on file      OB History    Para Term  AB Living   3 0 0 0 2 0   SAB TAB Ectopic Multiple Live Births   2 0 0 0 0      # Outcome Date GA Lbr [place]/2nd Weight Sex Delivery Anes PTL Lv   3 Current            2 SAB            1 SAB                Physical exam:    Objective   Constitutional: well-developed, well-nourished, in no acute distress, with vitals documented above  Neuro: awake, alert and oriented x3  Psychiatric: mood and affect are appropriate   Skin: skin is intact without rashes or lesions or jaundice  Abdominal exam: soft and nontender throughout, with no rebound or guarding, gravid, obese, with no hernias    I/O     None          Invasive Devices     Peripheral Intravenous Line            Long-Dwell Peripheral IV (Midline) 77/87/34 Left Cephalic 12 days          Line            Pump Device Insulin pump Anterior;Right Hip 4 days                        Results from last 7 days   Lab Units 02/22/21  0620 02/21/21  2207 02/21/21  1935 02/21/21  1851 02/21/21  1622 02/21/21  1201 02/21/21  9009 02/21/21  0612 02/20/21  2210 02/20/21 2001 02/20/21  1452 02/20/21  1143 02/20/21  0843 02/20/21  0635 02/19/21  2158   POC GLUCOSE mg/dl 109 160* 191* 180* 111 144* 95 106 149* 125 117 153* 106 104 141*         NST 02/22/21 Time:   For last 24 hrs  Baseline: 130's   Variability: GBTBV  Accelerations: Yes  Decelerations: No  Contractions: No   Assessment: Reactive        MEDS:   Medication Administration - last 24 hours from 02/21/2021 0807 to 02/22/2021 4900       Date/Time Order Dose Route Action Action by     02/21/2021 2213 aspirin chewable tablet 162 mg 162 mg Oral Given Elizabeth Villalba RN     02/21/2021 2213 metFORMIN (GLUCOPHAGE-XR) 24 hr tablet 1,500 mg 1,500 mg Oral Given Elizabeth Villalba RN     02/21/2021 1833 docusate sodium (COLACE) capsule 100 mg 100 mg Oral Given Vickye Snellen, RN     02/21/2021 1023 docusate sodium (COLACE) capsule 100 mg 100 mg Oral Given Vickye Snellen, RN     02/21/2021 1023 ascorbic acid (VITAMIN C) tablet 1,000 mg 1,000 mg Oral Given Vickye Snellen, RN     02/21/2021 1023 cholecalciferol (VITAMIN D3) tablet 2,000 Units 2,000 Units Oral Given Vickye Snellen, RN     52/02/3747 1650 folic acid (FOLVITE) tablet 1 mg 1 mg Oral Given Vickye Snellen, RN     02/21/2021 1022 prenatal multivitamin tablet 1 tablet 1 tablet Oral Given Vickye Snellen, RN     02/21/2021 1023 enoxaparin (LOVENOX) subcutaneous injection 40 mg 40 mg Subcutaneous Given Vickye Snellen, RN     02/22/2021 4287 PATIENT MAINTAINED INSULIN PUMP 1 each 1 each Subcutaneous Given Elizabeth Villalba RN     02/21/2021 2215 PATIENT MAINTAINED INSULIN PUMP 1 each 1 each Subcutaneous Given Elizabeth Villalba RN     02/21/2021 1400 PATIENT MAINTAINED INSULIN PUMP 1 each 1 each Subcutaneous Given Vickye Snellen, RN        Current Facility-Administered Medications   Medication Dose Route Frequency    acetaminophen (TYLENOL) tablet 650 mg  650 mg Oral Q4H PRN    ascorbic acid (VITAMIN C) tablet 1,000 mg  1,000 mg Oral Daily    aspirin chewable tablet 162 mg  162 mg Oral Daily    bisacodyl (DULCOLAX) EC tablet 5 mg  5 mg Oral Daily PRN    calcium carbonate (TUMS) chewable tablet 1,000 mg  1,000 mg Oral Daily PRN    cholecalciferol (VITAMIN D3) tablet 2,000 Units  2,000 Units Oral Daily    docusate sodium (COLACE) capsule 100 mg  100 mg Oral BID    enoxaparin (LOVENOX) subcutaneous injection 40 mg  40 mg Subcutaneous [id number]    folic acid (FOLVITE) tablet 1 mg  1 mg Oral Daily    insulin aspart (NovoLOG) FOR PUMP REFILLS 300 Units  300 Units Subcutaneous Insulin Pump Daily PRN    metFORMIN (GLUCOPHAGE-XR) 24 hr tablet 1,500 mg  1,500 mg Oral Daily    PATIENT MAINTAINED INSULIN PUMP 1 each  1 each Subcutaneous Q8H    polyethylene glycol (MIRALAX) packet 17 g  17 g Oral Daily PRN    prenatal multivitamin tablet 1 tablet  1 tablet Oral Daily     Invasive Devices     Peripheral Intravenous Line            Long-Dwell Peripheral IV (Midline) 52/21/20 Left Cephalic 12 days          Line            Pump Device Insulin pump Anterior;Right Hip 4 days              BS currently showing elevated FBS and preprandial lunch sugars that Lupe can not explain  Last nights elevated blood sugars were due to chips her  brought her that she told him to take home as they made her sugars go too high  She states her guardian sensor over nighttime does alert her to low sugars although when she checks them they are usually in the 70s to 80s which is normal   So to avoid this sensor going off even more, will change only the early morning basal rates from 3-6 by 15% to see if this improves her FBS    Once we control her fasting blood sugars then our next goal is to control her pre prandial lunch sugars that have also been elevated recently and may require an increase in her basal rate after breakfast  Leny Martinez will attempt to down load her meter tomorrow  Samir Razo MD    ----------------      -------------------------------    The floor/unit time was 25 minutes  The majority of time (>50%) was spent counseling and/or coordinating care with the patient and/or family members  At the conclusion of today's encounter, all questions were answered to her satisfaction  Thank you very much for this kind referral and please do not hesitate to contact me with any further questions or concerns      Samir Razo MD  Attending Physician, [place]

## 2021-02-22 NOTE — PLAN OF CARE
Problem: PAIN - ADULT  Goal: Verbalizes/displays adequate comfort level or baseline comfort level  Description: Interventions:  - Encourage patient to monitor pain and request assistance  - Assess pain using appropriate pain scale  - Administer analgesics based on type and severity of pain and evaluate response  - Implement non-pharmacological measures as appropriate and evaluate response  - Consider cultural and social influences on pain and pain management  - Notify physician/advanced practitioner if interventions unsuccessful or patient reports new pain  Outcome: Progressing     Problem: INFECTION - ADULT  Goal: Absence or prevention of progression during hospitalization  Description: INTERVENTIONS:  - Assess and monitor for signs and symptoms of infection  - Monitor lab/diagnostic results  - Monitor all insertion sites, i e  indwelling lines, tubes, and drains  - Monitor endotracheal if appropriate and nasal secretions for changes in amount and color  - [place] appropriate cooling/warming therapies per order  - Administer medications as ordered  - Instruct and encourage patient and family to use good hand hygiene technique  - Identify and instruct in appropriate isolation precautions for identified infection/condition  Outcome: Progressing     Problem: SAFETY ADULT  Goal: Patient will remain free of falls  Description: INTERVENTIONS:  - Assess patient frequently for physical needs  -  Identify cognitive and physical deficits and behaviors that affect risk of falls    -  [place] fall precautions as indicated by assessment   - Educate patient/family on patient safety including physical limitations  - Instruct patient to call for assistance with activity based on assessment  - Modify environment to reduce risk of injury  - Consider OT/PT consult to assist with strengthening/mobility  Outcome: Progressing  Goal: Maintain or return to baseline ADL function  Description: INTERVENTIONS:  -  Assess patient's ability to carry out ADLs; assess patient's baseline for ADL function and identify physical deficits which impact ability to perform ADLs (bathing, care of mouth/teeth, toileting, grooming, dressing, etc )  - Assess/evaluate cause of self-care deficits   - Assess range of motion  - Assess patient's mobility; develop plan if impaired  - Assess patient's need for assistive devices and provide as appropriate  - Encourage maximum independence but intervene and supervise when necessary  - Involve family in performance of ADLs  - Assess for home care needs following discharge   - Consider OT consult to assist with ADL evaluation and planning for discharge  - Provide patient education as appropriate  Outcome: Progressing  Goal: Maintain or return mobility status to optimal level  Description: INTERVENTIONS:  - Assess patient's baseline mobility status (ambulation, transfers, stairs, etc )    - Identify cognitive and physical deficits and behaviors that affect mobility  - Identify mobility aids required to assist with transfers and/or ambulation (gait belt, sit-to-stand, lift, walker, cane, etc )  - [place] fall precautions as indicated by assessment  - Record patient progress and toleration of activity level on Mobility SBAR; progress patient to next Phase/Stage  - Instruct patient to call for assistance with activity based on assessment  - Consider rehabilitation consult to assist with strengthening/weightbearing, etc   Outcome: Progressing     Problem: DISCHARGE PLANNING  Goal: Discharge to home or other facility with appropriate resources  Description: INTERVENTIONS:  - Identify barriers to discharge w/patient and caregiver  - Arrange for needed discharge resources and transportation as appropriate  - Identify discharge learning needs (meds, wound care, etc )  - Arrange for interpretive services to assist at discharge as needed  - Refer to Case Management Department for coordinating discharge planning if the patient needs post-hospital services based on physician/advanced practitioner order or complex needs related to functional status, cognitive ability, or social support system  Outcome: Progressing     Problem: Labor & Delivery  Goal: Manages discomfort  Description: Assess and monitor for signs and symptoms of discomfort  Assess patient's pain level regularly and per hospital policy  Administer medications as ordered  Support use of nonpharmacological methods to help control pain such as distraction, imagery, relaxation, and application of heat and cold  Collaborate with interdisciplinary team and patient to determine appropriate pain management plan  1  Include patient in decisions related to comfort  2  Offer non-pharmacological pain management interventions  3  Report ineffective pain management to physician  Outcome: Progressing  Goal: Patient vital signs are stable  Description: 1  Assess vital signs - vaginal delivery    Outcome: Progressing     Problem: ANTEPARTUM  Goal: Maintain pregnancy as long as maternal and/or fetal condition is stable  Description: INTERVENTIONS:  - Maternal surveillance  - Fetal surveillance  - Monitor uterine activity  - Medications as ordered  - Bedrest  Outcome: Progressing     Problem: DISCHARGE PLANNING - CARE MANAGEMENT  Goal: Discharge to post-acute care or home with appropriate resources  Description: INTERVENTIONS:  - Conduct assessment to determine patient/family and health care team treatment goals, and need for post-acute services based on payer coverage, community resources, and patient preferences, and barriers to discharge  - Address psychosocial, clinical, and financial barriers to discharge as identified in assessment in conjunction with the patient/family and health care team  - Arrange appropriate level of post-acute services according to patients   needs and preference and payer coverage in collaboration with the physician and health care team  - Communicate with and update the patient/family, physician, and health care team regarding progress on the discharge plan  - Arrange appropriate transportation to post-acute venues  Outcome: Progressing     Problem: Potential for Falls  Goal: Patient will remain free of falls  Description: INTERVENTIONS:  - Assess patient frequently for physical needs  -  Identify cognitive and physical deficits and behaviors that affect risk of falls    -  [place] fall precautions as indicated by assessment   - Educate patient/family on patient safety including physical limitations  - Instruct patient to call for assistance with activity based on assessment  - Modify environment to reduce risk of injury  - Consider OT/PT consult to assist with strengthening/mobility  Outcome: Progressing

## 2021-02-22 NOTE — UTILIZATION REVIEW
Continued Stay Review    Date: 02-22-21                      Current Patient Class: inpatient  Current Level of Care: medical    HPI:30 y o  female initially admitted on 01-28-21 @ 23 1/7 wks with PPROM  Since 17 week gestation    Assessment/Plan: HD # 24    PPROM: s/p latency antibiotics, magnesium sulfate x 12 hours, BTM 1/28-1/29, fetal ultrasound 2/9 showed fetus weighing 700g in liang breech presentation, improved fetal ascites, anhydramnios  Fluid check on 2/17 with no new changes  Fluid check tomorrow  2) T1DM: on insulin pump; basal rate 2 05 midnight to 8 am,  2 0 all other hours, insulin to carb ration 1:5 for breakfast and lunch, 1:4 with dinner and snacks, ISF 1 units for every 25 mg/dL over 100 mg /dL, fasting blood sugars 106-109, postprandial blood sugars 90s-190s, continue to monitor closely, Diet Jayson/CHO controlled consistent carbohydrate Diet Level 1 with 4 carb servings/60 grams CHO/meal  Advise patient to lessen snacking    3) IUP @ 26 weeks: NST TID  4) DVT PPx: SCDs, ambulation, lovenox 40 mg q day  Fetal Assessment: NST TID  FHT: Baseline 135- 140 / Moderate 6 - 25 bpm / 15x15 accelerations, no decelerations, reactive  [place]: none   Last GORDON 02-17-21 NONE  Delivery @ 34 wks or sooner if indicated   Pertinent Labs/Diagnostic Results:     Results from last 7 days   Lab Units 02/22/21  0817 02/22/21  0620 02/21/21  2207 02/21/21  1935 02/21/21  1851 02/21/21  1622 02/21/21  1201 02/21/21  0958 02/21/21  0612 02/20/21  2210 02/20/21 2001 02/20/21  1452   POC GLUCOSE mg/dl 123 109 160* 191* 180* 111 144* 95 106 149* 125 117       Vital Signs:   Date/Time  Temp  Pulse  Resp  BP  SpO2  O2 Device  Cardiac (WDL)  Patient Position - Orthostatic VS   02/22/21 0836  98 2 °F (36 8 °C)  102  20  109/66  --  --  --  --   02/22/21 0558  98 °F (36 7 °C)  87  18  97/54  93 %  None (Room air)  --  Lying   02/21/21 2234  --  --  --  --  --  --  WDL  --   02/21/21 1540  98 °F (36 7 °C)  90  18  119/79  99 %  None (Room air)  --  Sitting   02/21/21 1438  --  --  --  --  --  None (Room air)  --  --   02/21/21 1145  98 2 °F (36 8 °C)  --  --  --  --  --  --  --   02/21/21 0750  97 7 °F (36 5 °C)  86  18  97/53  98 %  None (Room air)  --  Lying   02/21/21 0600  97 6 °F (36 4 °C)  --  --  --  --  None (Room air)  --  --   02/21/21 0032  97 7 °F (36 5 °C)  87  18  94/50  97 %  None (Room air)  --  Lying   02/20/21 1948  97 8 °F (36 6 °C)  --  --  --  --  --             Medications:   Scheduled Medications:  vitamin C, 1,000 mg, Oral, Daily  aspirin, 162 mg, Oral, Daily  cholecalciferol, 2,000 Units, Oral, Daily  docusate sodium, 100 mg, Oral, BID  enoxaparin, 40 mg, Subcutaneous, [id number]  folic acid, 1 mg, Oral, Daily  metFORMIN, 1,500 mg, Oral, Daily  patient maintained insulin pump, 1 each, Subcutaneous, Q8H  prenatal multivitamin, 1 tablet, Oral, Daily      Continuous IV Infusions:     PRN Meds:  acetaminophen, 650 mg, Oral, Q4H PRN  bisacodyl, 5 mg, Oral, Daily PRN  calcium carbonate, 1,000 mg, Oral, Daily PRN  insulin aspart, 300 Units, Subcutaneous Insulin Pump, Daily PRN  polyethylene glycol, 17 g, Oral, Daily PRN        Discharge Plan:  Home after delivery    Network Utilization Review Department  ATTENTION: Please call with any questions or concerns to [phone] and carefully listen to the prompts so that you are directed to the right person  All voicemails are confidential   Opal Zamarripa all requests for admission clinical reviews, approved or denied determinations and any other requests to dedicated fax number below belonging to the campus where the patient is receiving treatment   List of dedicated fax numbers for the Facilities:  [address] DENIALS (Administrative/Medical Necessity) [phone]   [address] (Maternity/NICU/Pediatrics) [phone]   [address] [place] [address] 4309 [facility] [phone]   [facility] [address] [address] [place] [address] [address] [phone]   [place] [address] [phone]

## 2021-02-22 NOTE — PROGRESS NOTES
Progress Note - Maternal Fetal Medicine   Mackus Lino 27 y o  female MRN: [id number]  Unit/Bed#: -01 Encounter: [id number]    Assessment:  27 y o  [id number] at 26w4d admitted with PPROM  Hospital day 25    Plan:  1) PPROM: s/p latency antibiotics, magnesium sulfate x 12 hours, BTM 1/28-1/29, fetal ultrasound 2/9 showed fetus weighing 700g in liang breech presentation, improved fetal ascites, anhydramnios  Fluid check on 2/17 with no new changes  Fluid check tomorrow  2) T1DM: on insulin pump; basal rate 2 05 midnight to 8 am,  2 0 all other hours, insulin to carb ration 1:5 for breakfast and lunch, 1:4 with dinner and snacks, ISF 1 units for every 25 mg/dL over 100 mg /dL, fasting blood sugars 106-109, postprandial blood sugars 90s-190s, continue to monitor closely, Diet Jayson/CHO controlled consistent carbohydrate Diet Level 1 with 4 carb servings/60 grams CHO/meal  Advise patient to lessen snacking  3) IUP @ 26 weeks: NST TID  4) DVT PPx: SCDs, ambulation, lovenox 40 mg qday    Subjective/Objective   Chief Complaint:     No new complaints this morning    Subjective:     Patient currently has no new complaints this morning  Contractions: no  Loss of fluid: yes  Vaginal bleeding: no  Fetal movement: yes    Objective:     Vitals:   Temp:  [98 °F (36 7 °C)-98 2 °F (36 8 °C)] 98 °F (36 7 °C)  HR:  [87-90] 87  Resp:  [18] 18  BP: ()/(54-79) 97/54       Physical Exam  Vitals signs and nursing note reviewed  Constitutional:       Appearance: She is well-developed  Cardiovascular:      Rate and Rhythm: Normal rate and regular rhythm  Pulmonary:      Effort: Pulmonary effort is normal       Breath sounds: Normal breath sounds  Abdominal:      General: There is no distension  Tenderness: There is no abdominal tenderness  There is no guarding or rebound  Comments: gravid   Musculoskeletal: Normal range of motion  General: No tenderness     Neurological:      Mental Status: She is alert and oriented to person, place, and time         Fetal Assessment:  FHT: 145 / Moderate 6 - 25 bpm / reactive,no decels  [place]: none    Lab Results   Component Value Date    WBC 13 71 (H) 02/05/2021    HGB 12 6 02/05/2021    HCT 38 4 02/05/2021    MCV 93 02/05/2021     02/05/2021       Lab Results   Component Value Date    CALCIUM 9 2 11/20/2020    K 4 2 11/20/2020    CO2 25 11/20/2020     11/20/2020    BUN 14 11/20/2020    CREATININE 0 78 11/20/2020       Lab Results   Component Value Date    ALT 23 11/20/2020    AST 14 11/20/2020    ALKPHOS 55 11/20/2020       Sarah Beth Cannno MD  OBGYN, PGY-4  2/22/2021  8:13 AM

## 2021-02-23 ENCOUNTER — DOCUMENTATION (OUTPATIENT)
Dept: PERINATAL CARE | Facility: CLINIC | Age: 31
End: 2021-02-23

## 2021-02-23 DIAGNOSIS — Z3A.26 26 WEEKS GESTATION OF PREGNANCY: ICD-10-CM

## 2021-02-23 DIAGNOSIS — O24.012 PREGNANCY COMPLICATED BY PRE-EXISTING TYPE 1 DIABETES, SECOND TRIMESTER: Primary | ICD-10-CM

## 2021-02-23 DIAGNOSIS — O41.02X0 OLIGOHYDRAMNIOS IN SINGLETON PREGNANCY IN SECOND TRIMESTER: ICD-10-CM

## 2021-02-23 LAB
ABO GROUP BLD: NORMAL
BLD GP AB SCN SERPL QL: NEGATIVE
GLUCOSE SERPL-MCNC: 104 MG/DL (ref 65–140)
GLUCOSE SERPL-MCNC: 120 MG/DL (ref 65–140)
GLUCOSE SERPL-MCNC: 125 MG/DL (ref 65–140)
GLUCOSE SERPL-MCNC: 135 MG/DL (ref 65–140)
GLUCOSE SERPL-MCNC: 183 MG/DL (ref 65–140)
GLUCOSE SERPL-MCNC: 70 MG/DL (ref 65–140)
GLUCOSE SERPL-MCNC: 89 MG/DL (ref 65–140)
RH BLD: POSITIVE
SPECIMEN EXPIRATION DATE: NORMAL

## 2021-02-23 PROCEDURE — 86850 RBC ANTIBODY SCREEN: CPT | Performed by: STUDENT IN AN ORGANIZED HEALTH CARE EDUCATION/TRAINING PROGRAM

## 2021-02-23 PROCEDURE — 82948 REAGENT STRIP/BLOOD GLUCOSE: CPT

## 2021-02-23 PROCEDURE — 76816 OB US FOLLOW-UP PER FETUS: CPT | Performed by: OBSTETRICS & GYNECOLOGY

## 2021-02-23 PROCEDURE — 86900 BLOOD TYPING SEROLOGIC ABO: CPT | Performed by: STUDENT IN AN ORGANIZED HEALTH CARE EDUCATION/TRAINING PROGRAM

## 2021-02-23 PROCEDURE — 86901 BLOOD TYPING SEROLOGIC RH(D): CPT | Performed by: STUDENT IN AN ORGANIZED HEALTH CARE EDUCATION/TRAINING PROGRAM

## 2021-02-23 PROCEDURE — NC001 PR NO CHARGE: Performed by: OBSTETRICS & GYNECOLOGY

## 2021-02-23 PROCEDURE — 99232 SBSQ HOSP IP/OBS MODERATE 35: CPT | Performed by: OBSTETRICS & GYNECOLOGY

## 2021-02-23 RX ADMIN — METFORMIN ER 500 MG 1500 MG: 500 TABLET ORAL at 22:35

## 2021-02-23 RX ADMIN — Medication 1000 UNITS: at 09:27

## 2021-02-23 RX ADMIN — OXYCODONE HYDROCHLORIDE AND ACETAMINOPHEN 1000 MG: 500 TABLET ORAL at 09:27

## 2021-02-23 RX ADMIN — DOCUSATE SODIUM 100 MG: 100 CAPSULE, LIQUID FILLED ORAL at 09:28

## 2021-02-23 RX ADMIN — ENOXAPARIN SODIUM 40 MG: 40 INJECTION SUBCUTANEOUS at 09:28

## 2021-02-23 RX ADMIN — FOLIC ACID 1 MG: 1 TABLET ORAL at 09:27

## 2021-02-23 RX ADMIN — Medication 1 TABLET: at 09:28

## 2021-02-23 RX ADMIN — DOCUSATE SODIUM 100 MG: 100 CAPSULE, LIQUID FILLED ORAL at 18:39

## 2021-02-23 RX ADMIN — ASPIRIN 81 MG CHEWABLE TABLET 162 MG: 81 TABLET CHEWABLE at 22:35

## 2021-02-23 RX ADMIN — Medication 1000 UNITS: at 18:39

## 2021-02-23 NOTE — PLAN OF CARE
Problem: PAIN - ADULT  Goal: Verbalizes/displays adequate comfort level or baseline comfort level  Description: Interventions:  - Encourage patient to monitor pain and request assistance  - Assess pain using appropriate pain scale  - Administer analgesics based on type and severity of pain and evaluate response  - Implement non-pharmacological measures as appropriate and evaluate response  - Consider cultural and social influences on pain and pain management  - Notify physician/advanced practitioner if interventions unsuccessful or patient reports new pain  Outcome: Progressing     Problem: INFECTION - ADULT  Goal: Absence or prevention of progression during hospitalization  Description: INTERVENTIONS:  - Assess and monitor for signs and symptoms of infection  - Monitor lab/diagnostic results  - Monitor all insertion sites, i e  indwelling lines, tubes, and drains  - Monitor endotracheal if appropriate and nasal secretions for changes in amount and color  - [place] appropriate cooling/warming therapies per order  - Administer medications as ordered  - Instruct and encourage patient and family to use good hand hygiene technique  - Identify and instruct in appropriate isolation precautions for identified infection/condition  Outcome: Progressing     Problem: SAFETY ADULT  Goal: Patient will remain free of falls  Description: INTERVENTIONS:  - Assess patient frequently for physical needs  -  Identify cognitive and physical deficits and behaviors that affect risk of falls    -  [place] fall precautions as indicated by assessment   - Educate patient/family on patient safety including physical limitations  - Instruct patient to call for assistance with activity based on assessment  - Modify environment to reduce risk of injury  - Consider OT/PT consult to assist with strengthening/mobility  Outcome: Progressing  Goal: Maintain or return to baseline ADL function  Description: INTERVENTIONS:  -  Assess patient's ability to carry out ADLs; assess patient's baseline for ADL function and identify physical deficits which impact ability to perform ADLs (bathing, care of mouth/teeth, toileting, grooming, dressing, etc )  - Assess/evaluate cause of self-care deficits   - Assess range of motion  - Assess patient's mobility; develop plan if impaired  - Assess patient's need for assistive devices and provide as appropriate  - Encourage maximum independence but intervene and supervise when necessary  - Involve family in performance of ADLs  - Assess for home care needs following discharge   - Consider OT consult to assist with ADL evaluation and planning for discharge  - Provide patient education as appropriate  Outcome: Progressing  Goal: Maintain or return mobility status to optimal level  Description: INTERVENTIONS:  - Assess patient's baseline mobility status (ambulation, transfers, stairs, etc )    - Identify cognitive and physical deficits and behaviors that affect mobility  - Identify mobility aids required to assist with transfers and/or ambulation (gait belt, sit-to-stand, lift, walker, cane, etc )  - [place] fall precautions as indicated by assessment  - Record patient progress and toleration of activity level on Mobility SBAR; progress patient to next Phase/Stage  - Instruct patient to call for assistance with activity based on assessment  - Consider rehabilitation consult to assist with strengthening/weightbearing, etc   Outcome: Progressing     Problem: DISCHARGE PLANNING  Goal: Discharge to home or other facility with appropriate resources  Description: INTERVENTIONS:  - Identify barriers to discharge w/patient and caregiver  - Arrange for needed discharge resources and transportation as appropriate  - Identify discharge learning needs (meds, wound care, etc )  - Arrange for interpretive services to assist at discharge as needed  - Refer to Case Management Department for coordinating discharge planning if the patient needs post-hospital services based on physician/advanced practitioner order or complex needs related to functional status, cognitive ability, or social support system  Outcome: Progressing     Problem: Labor & Delivery  Goal: Manages discomfort  Description: Assess and monitor for signs and symptoms of discomfort  Assess patient's pain level regularly and per hospital policy  Administer medications as ordered  Support use of nonpharmacological methods to help control pain such as distraction, imagery, relaxation, and application of heat and cold  Collaborate with interdisciplinary team and patient to determine appropriate pain management plan  1  Include patient in decisions related to comfort  2  Offer non-pharmacological pain management interventions  3  Report ineffective pain management to physician  Outcome: Progressing  Goal: Patient vital signs are stable  Description: 1  Assess vital signs - vaginal delivery    Outcome: Progressing     Problem: ANTEPARTUM  Goal: Maintain pregnancy as long as maternal and/or fetal condition is stable  Description: INTERVENTIONS:  - Maternal surveillance  - Fetal surveillance  - Monitor uterine activity  - Medications as ordered  - Bedrest  Outcome: Progressing     Problem: DISCHARGE PLANNING - CARE MANAGEMENT  Goal: Discharge to post-acute care or home with appropriate resources  Description: INTERVENTIONS:  - Conduct assessment to determine patient/family and health care team treatment goals, and need for post-acute services based on payer coverage, community resources, and patient preferences, and barriers to discharge  - Address psychosocial, clinical, and financial barriers to discharge as identified in assessment in conjunction with the patient/family and health care team  - Arrange appropriate level of post-acute services according to patients   needs and preference and payer coverage in collaboration with the physician and health care team  - Communicate with and update the patient/family, physician, and health care team regarding progress on the discharge plan  - Arrange appropriate transportation to post-acute venues  Outcome: Progressing     Problem: Potential for Falls  Goal: Patient will remain free of falls  Description: INTERVENTIONS:  - Assess patient frequently for physical needs  -  Identify cognitive and physical deficits and behaviors that affect risk of falls    -  [place] fall precautions as indicated by assessment   - Educate patient/family on patient safety including physical limitations  - Instruct patient to call for assistance with activity based on assessment  - Modify environment to reduce risk of injury  - Consider OT/PT consult to assist with strengthening/mobility  Outcome: Progressing

## 2021-02-23 NOTE — PROGRESS NOTES
Progress Note - Maternal Fetal Medicine   Ashish Miranda 27 y o  female MRN: [id number]  Unit/Bed#: -01 Encounter: [id number]    Assessment:  27 y o  [id number] at 26w5d admitted with PPROM  Hospital day 26    Plan:  1) PPROM: s/p latency antibiotics, magnesium sulfate x 12 hours, BTM 1/28-1/29, fetal ultrasound 2/9 showed fetus weighing 700g in liang breech presentation, improved fetal ascites, anhydramnios  Fluid check on 2/17 with no new changes  Fluid check tomorrow  2) T1DM: on insulin pump; basal rate 2 05 midnight to 8 am,  2 0 all other hours, insulin to carb ration 1:5 for breakfast and lunch, 1:4 with dinner and snacks, ISF 1 units for every 25 mg/dL over 100 mg /dL, fasting blood sugars 104-109, postprandial blood sugars 109s-140s, continue to monitor closely, Diet [place]/CHO controlled consistent carbohydrate Diet Level 1 with 4 carb servings/60 grams CHO/meal  Advise patient to lessen snacking  3) IUP @ 26 weeks: NST TID  4) DVT PPx: SCDs, ambulation, lovenox 40 mg qday    Subjective/Objective   Chief Complaint:     No new complaints this morning    Subjective:     Patient currently has no new complaints this morning  Contractions: no  Loss of fluid: yes  Vaginal bleeding: no  Fetal movement: yes    Objective:     Vitals:   Temp:  [97 5 °F (36 4 °C)-98 3 °F (36 8 °C)] 98 2 °F (36 8 °C)  HR:  [] 85  Resp:  [18-20] 18  BP: (103-111)/(65-68) 111/68       Physical Exam  Vitals signs and nursing note reviewed  Constitutional:       Appearance: She is well-developed  Cardiovascular:      Rate and Rhythm: Normal rate and regular rhythm  Pulmonary:      Effort: Pulmonary effort is normal       Breath sounds: Normal breath sounds  Abdominal:      General: There is no distension  Tenderness: There is no abdominal tenderness  There is no guarding or rebound  Comments: gravid  Musculoskeletal: Normal range of motion  General: No tenderness     Neurological:      Mental Status: She is alert and oriented to person, place, and time         Fetal Assessment:  FHT: 140 / Moderate 6 - 25 bpm / reactive,no decels  [place]: none    Lab Results   Component Value Date    WBC 13 71 (H) 02/05/2021    HGB 12 6 02/05/2021    HCT 38 4 02/05/2021    MCV 93 02/05/2021     02/05/2021       Lab Results   Component Value Date    CALCIUM 9 2 11/20/2020    K 4 2 11/20/2020    CO2 25 11/20/2020     11/20/2020    BUN 14 11/20/2020    CREATININE 0 78 11/20/2020       Lab Results   Component Value Date    ALT 23 11/20/2020    AST 14 11/20/2020    ALKPHOS 55 11/20/2020       Sarah Beth Swain MD  OBGYN, PGY-4  2/23/2021  6:40 AM

## 2021-02-23 NOTE — PROGRESS NOTES
Ashish Miranda is a 27 y o  female on a/an Medtronic 670G insulin pump  Estimated Date of Delivery: 5/27/21   Currently: 26w5d  Encounter Diagnosis     ICD-10-CM    1  Pregnancy complicated by pre-existing type 1 diabetes, second trimester  O24 012    2  26 weeks gestation of pregnancy  Z3A 26    3  Oligohydramnios in poe pregnancy in second trimester  O41  02X0         Current Insulin pump settings:  Basal rate: MN@ 2 05 units/hour, 3 AM@ 2 30 units/hour, 8 AM@ 2 00 units/hour  Insulin to carb ratio:MN@ 8; 6 AM@ 5; 11 PM@ 8  Patient is using carb ratio 5 for breakfast and 4 for dinner using her own calculations vs insulin pump  Insulin sensitivity factor:25  BG target:100-120  Type of insulin:Nolovog  Self-monitoring blood glucose reported by patient: Refer to download  CGM: yes Guardian connect, transmitter out of warranty  Using finger sticks for dosing insulin  Glucose ranges: 60 to 140 range 72% of the time; >140 range 26% and <60 2%  Patient reported one episode of glucose reading less than 60 with symptoms post correction bolus  Diet:GDM diet with 3 meals and 3 snacks including recommended combination of carb, protein and fat per meal/snack  Also notified patient to call office with any issues  Plan:  Due to hyperglycemia during the day, increase 8 AM to MN basal from 2 00 to 2 30 units/hour  Due to post meal post meal glucose above 120, ICR to 4, decrease ISF to 20 and BG target 100  Encouraged to use bolus advisor before meals and avoid corrections post meal  Recommend eating 3 meals and 3 snacks a day  Continue SMBG before meals and 2 hours post meal  Prefers to have glucose readings above 70 to 75  Will send invite to patient to link insulin pump for review  Currently inpatient at [facility]

## 2021-02-23 NOTE — PLAN OF CARE
Problem: PAIN - ADULT  Goal: Verbalizes/displays adequate comfort level or baseline comfort level  Description: Interventions:  - Encourage patient to monitor pain and request assistance  - Assess pain using appropriate pain scale  - Administer analgesics based on type and severity of pain and evaluate response  - Implement non-pharmacological measures as appropriate and evaluate response  - Consider cultural and social influences on pain and pain management  - Notify physician/advanced practitioner if interventions unsuccessful or patient reports new pain  Outcome: Progressing     Problem: INFECTION - ADULT  Goal: Absence or prevention of progression during hospitalization  Description: INTERVENTIONS:  - Assess and monitor for signs and symptoms of infection  - Monitor lab/diagnostic results  - Monitor all insertion sites, i e  indwelling lines, tubes, and drains  - Monitor endotracheal if appropriate and nasal secretions for changes in amount and color  - [place] appropriate cooling/warming therapies per order  - Administer medications as ordered  - Instruct and encourage patient and family to use good hand hygiene technique  - Identify and instruct in appropriate isolation precautions for identified infection/condition  Outcome: Progressing     Problem: SAFETY ADULT  Goal: Patient will remain free of falls  Description: INTERVENTIONS:  - Assess patient frequently for physical needs  -  Identify cognitive and physical deficits and behaviors that affect risk of falls    -  [place] fall precautions as indicated by assessment   - Educate patient/family on patient safety including physical limitations  - Instruct patient to call for assistance with activity based on assessment  - Modify environment to reduce risk of injury  - Consider OT/PT consult to assist with strengthening/mobility  Outcome: Progressing  Goal: Maintain or return to baseline ADL function  Description: INTERVENTIONS:  -  Assess patient's ability to carry out ADLs; assess patient's baseline for ADL function and identify physical deficits which impact ability to perform ADLs (bathing, care of mouth/teeth, toileting, grooming, dressing, etc )  - Assess/evaluate cause of self-care deficits   - Assess range of motion  - Assess patient's mobility; develop plan if impaired  - Assess patient's need for assistive devices and provide as appropriate  - Encourage maximum independence but intervene and supervise when necessary  - Involve family in performance of ADLs  - Assess for home care needs following discharge   - Consider OT consult to assist with ADL evaluation and planning for discharge  - Provide patient education as appropriate  Outcome: Progressing  Goal: Maintain or return mobility status to optimal level  Description: INTERVENTIONS:  - Assess patient's baseline mobility status (ambulation, transfers, stairs, etc )    - Identify cognitive and physical deficits and behaviors that affect mobility  - Identify mobility aids required to assist with transfers and/or ambulation (gait belt, sit-to-stand, lift, walker, cane, etc )  - [place] fall precautions as indicated by assessment  - Record patient progress and toleration of activity level on Mobility SBAR; progress patient to next Phase/Stage  - Instruct patient to call for assistance with activity based on assessment  - Consider rehabilitation consult to assist with strengthening/weightbearing, etc   Outcome: Progressing     Problem: DISCHARGE PLANNING  Goal: Discharge to home or other facility with appropriate resources  Description: INTERVENTIONS:  - Identify barriers to discharge w/patient and caregiver  - Arrange for needed discharge resources and transportation as appropriate  - Identify discharge learning needs (meds, wound care, etc )  - Arrange for interpretive services to assist at discharge as needed  - Refer to Case Management Department for coordinating discharge planning if the patient needs post-hospital services based on physician/advanced practitioner order or complex needs related to functional status, cognitive ability, or social support system  Outcome: Progressing     Problem: Labor & Delivery  Goal: Manages discomfort  Description: Assess and monitor for signs and symptoms of discomfort  Assess patient's pain level regularly and per hospital policy  Administer medications as ordered  Support use of nonpharmacological methods to help control pain such as distraction, imagery, relaxation, and application of heat and cold  Collaborate with interdisciplinary team and patient to determine appropriate pain management plan  1  Include patient in decisions related to comfort  2  Offer non-pharmacological pain management interventions  3  Report ineffective pain management to physician  Outcome: Progressing  Goal: Patient vital signs are stable  Description: 1  Assess vital signs - vaginal delivery    Outcome: Progressing     Problem: ANTEPARTUM  Goal: Maintain pregnancy as long as maternal and/or fetal condition is stable  Description: INTERVENTIONS:  - Maternal surveillance  - Fetal surveillance  - Monitor uterine activity  - Medications as ordered  - Bedrest  Outcome: Progressing     Problem: DISCHARGE PLANNING - CARE MANAGEMENT  Goal: Discharge to post-acute care or home with appropriate resources  Description: INTERVENTIONS:  - Conduct assessment to determine patient/family and health care team treatment goals, and need for post-acute services based on payer coverage, community resources, and patient preferences, and barriers to discharge  - Address psychosocial, clinical, and financial barriers to discharge as identified in assessment in conjunction with the patient/family and health care team  - Arrange appropriate level of post-acute services according to patients   needs and preference and payer coverage in collaboration with the physician and health care team  - Communicate with and update the patient/family, physician, and health care team regarding progress on the discharge plan  - Arrange appropriate transportation to post-acute venues  Outcome: Progressing     Problem: Potential for Falls  Goal: Patient will remain free of falls  Description: INTERVENTIONS:  - Assess patient frequently for physical needs  -  Identify cognitive and physical deficits and behaviors that affect risk of falls    -  [place] fall precautions as indicated by assessment   - Educate patient/family on patient safety including physical limitations  - Instruct patient to call for assistance with activity based on assessment  - Modify environment to reduce risk of injury  - Consider OT/PT consult to assist with strengthening/mobility  Outcome: Progressing

## 2021-02-24 LAB
ALBUMIN SERPL BCP-MCNC: 2.5 G/DL (ref 3.5–5)
ALP SERPL-CCNC: 70 U/L (ref 46–116)
ALT SERPL W P-5'-P-CCNC: 15 U/L (ref 12–78)
ANION GAP SERPL CALCULATED.3IONS-SCNC: 12 MMOL/L (ref 4–13)
AST SERPL W P-5'-P-CCNC: 12 U/L (ref 5–45)
BILIRUB SERPL-MCNC: 0.22 MG/DL (ref 0.2–1)
BUN SERPL-MCNC: 9 MG/DL (ref 5–25)
CALCIUM ALBUM COR SERPL-MCNC: 9.9 MG/DL (ref 8.3–10.1)
CALCIUM SERPL-MCNC: 8.7 MG/DL (ref 8.3–10.1)
CHLORIDE SERPL-SCNC: 104 MMOL/L (ref 100–108)
CO2 SERPL-SCNC: 20 MMOL/L (ref 21–32)
CREAT SERPL-MCNC: 0.62 MG/DL (ref 0.6–1.3)
ERYTHROCYTE [DISTWIDTH] IN BLOOD BY AUTOMATED COUNT: 13.2 % (ref 11.6–15.1)
GFR SERPL CREATININE-BSD FRML MDRD: 121 ML/MIN/1.73SQ M
GLUCOSE SERPL-MCNC: 136 MG/DL (ref 65–140)
GLUCOSE SERPL-MCNC: 139 MG/DL (ref 65–140)
GLUCOSE SERPL-MCNC: 147 MG/DL (ref 65–140)
GLUCOSE SERPL-MCNC: 148 MG/DL (ref 65–140)
GLUCOSE SERPL-MCNC: 155 MG/DL (ref 65–140)
GLUCOSE SERPL-MCNC: 173 MG/DL (ref 65–140)
GLUCOSE SERPL-MCNC: 96 MG/DL (ref 65–140)
HCT VFR BLD AUTO: 37.9 % (ref 34.8–46.1)
HGB BLD-MCNC: 12.4 G/DL (ref 11.5–15.4)
MAGNESIUM SERPL-MCNC: 3.2 MG/DL (ref 1.6–2.6)
MCH RBC QN AUTO: 30.6 PG (ref 26.8–34.3)
MCHC RBC AUTO-ENTMCNC: 32.7 G/DL (ref 31.4–37.4)
MCV RBC AUTO: 94 FL (ref 82–98)
PLATELET # BLD AUTO: 228 THOUSANDS/UL (ref 149–390)
PMV BLD AUTO: 10.9 FL (ref 8.9–12.7)
POTASSIUM SERPL-SCNC: 3.8 MMOL/L (ref 3.5–5.3)
PROT SERPL-MCNC: 6.5 G/DL (ref 6.4–8.2)
RBC # BLD AUTO: 4.05 MILLION/UL (ref 3.81–5.12)
SODIUM SERPL-SCNC: 136 MMOL/L (ref 136–145)
URATE SERPL-MCNC: 3.1 MG/DL (ref 2–6.8)
WBC # BLD AUTO: 12.57 THOUSAND/UL (ref 4.31–10.16)

## 2021-02-24 PROCEDURE — 59025 FETAL NON-STRESS TEST: CPT | Performed by: OBSTETRICS & GYNECOLOGY

## 2021-02-24 PROCEDURE — NC001 PR NO CHARGE: Performed by: OBSTETRICS & GYNECOLOGY

## 2021-02-24 PROCEDURE — 83735 ASSAY OF MAGNESIUM: CPT | Performed by: OBSTETRICS & GYNECOLOGY

## 2021-02-24 PROCEDURE — 84550 ASSAY OF BLOOD/URIC ACID: CPT | Performed by: OBSTETRICS & GYNECOLOGY

## 2021-02-24 PROCEDURE — 85027 COMPLETE CBC AUTOMATED: CPT | Performed by: OBSTETRICS & GYNECOLOGY

## 2021-02-24 PROCEDURE — 87150 DNA/RNA AMPLIFIED PROBE: CPT | Performed by: STUDENT IN AN ORGANIZED HEALTH CARE EDUCATION/TRAINING PROGRAM

## 2021-02-24 PROCEDURE — 80053 COMPREHEN METABOLIC PANEL: CPT | Performed by: OBSTETRICS & GYNECOLOGY

## 2021-02-24 PROCEDURE — 99233 SBSQ HOSP IP/OBS HIGH 50: CPT | Performed by: OBSTETRICS & GYNECOLOGY

## 2021-02-24 PROCEDURE — 82948 REAGENT STRIP/BLOOD GLUCOSE: CPT

## 2021-02-24 RX ORDER — SODIUM CHLORIDE 9 MG/ML
125 INJECTION, SOLUTION INTRAVENOUS CONTINUOUS
Status: DISCONTINUED | OUTPATIENT
Start: 2021-02-24 | End: 2021-02-24

## 2021-02-24 RX ORDER — MAGNESIUM SULFATE HEPTAHYDRATE 40 MG/ML
4 INJECTION, SOLUTION INTRAVENOUS ONCE
Status: COMPLETED | OUTPATIENT
Start: 2021-02-24 | End: 2021-02-24

## 2021-02-24 RX ORDER — CALCIUM GLUCONATE 94 MG/ML
1 INJECTION, SOLUTION INTRAVENOUS
Status: DISCONTINUED | OUTPATIENT
Start: 2021-02-24 | End: 2021-03-16

## 2021-02-24 RX ORDER — MAGNESIUM SULFATE HEPTAHYDRATE 40 MG/ML
1 INJECTION, SOLUTION INTRAVENOUS CONTINUOUS
Status: DISCONTINUED | OUTPATIENT
Start: 2021-02-24 | End: 2021-02-24

## 2021-02-24 RX ADMIN — SODIUM CHLORIDE 125 ML/HR: 0.9 INJECTION, SOLUTION INTRAVENOUS at 12:00

## 2021-02-24 RX ADMIN — Medication 1000 UNITS: at 15:53

## 2021-02-24 RX ADMIN — Medication 2000 UNITS: at 12:03

## 2021-02-24 RX ADMIN — DOCUSATE SODIUM 100 MG: 100 CAPSULE, LIQUID FILLED ORAL at 16:25

## 2021-02-24 RX ADMIN — FOLIC ACID 1 MG: 1 TABLET ORAL at 12:03

## 2021-02-24 RX ADMIN — SODIUM CHLORIDE 999 ML/HR: 0.9 INJECTION, SOLUTION INTRAVENOUS at 08:15

## 2021-02-24 RX ADMIN — MAGNESIUM SULFATE IN WATER 1 G/HR: 40 INJECTION, SOLUTION INTRAVENOUS at 10:13

## 2021-02-24 RX ADMIN — Medication 1 TABLET: at 16:25

## 2021-02-24 RX ADMIN — ASPIRIN 81 MG CHEWABLE TABLET 162 MG: 81 TABLET CHEWABLE at 22:23

## 2021-02-24 RX ADMIN — METFORMIN ER 500 MG 1500 MG: 500 TABLET ORAL at 22:23

## 2021-02-24 RX ADMIN — OXYCODONE HYDROCHLORIDE AND ACETAMINOPHEN 1000 MG: 500 TABLET ORAL at 16:25

## 2021-02-24 RX ADMIN — VANCOMYCIN HYDROCHLORIDE 1750 MG: 1 INJECTION, POWDER, LYOPHILIZED, FOR SOLUTION INTRAVENOUS at 10:10

## 2021-02-24 RX ADMIN — MAGNESIUM SULFATE HEPTAHYDRATE 4 G: 40 INJECTION, SOLUTION INTRAVENOUS at 09:56

## 2021-02-24 NOTE — QUICK NOTE
Tracing reviewed, no decels for 2 hours, moderate variability present  She can resume PO intake, will remain on continuous EFM/toco for now  DC magnesium and vancomycin  D/w Dr Krzysztof Driver MD

## 2021-02-24 NOTE — PROGRESS NOTES
Progress Note - NICU   Anneliese Greenwood 27 y o  female MRN: [id number]  Unit/Bed#: -01 Encounter: [id number]    Ms Masha Youngblood is now 26+6 weeks gestation who remains admitted due to severe PPROM (since ~17 weeks) and T1DM, who has new-onset decelerations on EFM this AM concerning for early fetal distress and possible need for urgent delivery        I spent 30 minutes with Marbella Bangurar and her , Segundo Alvarado, at her bedside in L&D discussing her pregnancy to day and typical delivery and NICU course for babies born at 26-27 weeks GA in the context of PPROM and anhydramnios  We discussed the following topics:     Delivery room assessment: including presence of the neonatologist and/or NNP, need for intubation and possible surfactant administration in the DR and definitely after NICU admission  I discussed the need for the NICU team to evaluate breathing and heart rate very closely after delivery as well as temperature regulation using Neowrap and thermal mattress  Father asked if baby will need to be moved to the NICU quickly and we discussed the importance of the "levy hour" and that we would reunite parents with baby as soon as possible  Parents would like full resuscitation  We discussed the need for mechanical ventilation as well as the potential need for [place]  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, and the certainty that LG will have some degree of underdevelopment even though lung volumes are normal for gestation, noted yesterday  I explained that this condition can be quite challenging to manage when babies are born and especially in  babies but that the NICU team is fully prepared to manage it  Parents acknowledged their understanding and mother became tearful  We discussed in general that overall management in the NICU will encompass every organ and part of LG  I further explained that our process is transparent and that we will work hard to keep them updated as fully as they wish   We discussed the NICU visiting policy, lack of visiting hours, communication options, and [employer] cameras  Parents would like a tour of the NICU if they are able  We discussed the overall challenging rates of survival for infants born at 26 weeks especially within the context of anhydramnios and expected pulmonary hypoplasia  I explained that every case is different and that some babies respond differently to management  We also discussed that overall survival and survival without severe morbidities improves 10-20% with each completed week of gestation; however, this is significantly decreased for each week with ongoing anhydramnios  Parents expressed their understanding and are hopeful  We spoke about the first 1-2 weeks after delivery as being very informative for how babies may respond to intervention  I reiterated our support for both Lakeshia Gruber and Meom through the pregnancy and NICU experience  We spoke about how there will be good and bad days and that again, we will remain transparent with them  I did mention that plans could change regarding care goals and that we would be fully-supportive, and non-judgmental toward their wishes  I reassured them that we are available 24 hours a day and that they can reach out to the NICU team whenever they have questions or concerns  NICU Recommendations:  1  Agree with holding BMZ rescue course due to maternal diabetes  Benefits of steroids in the context of 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

## 2021-02-24 NOTE — PLAN OF CARE
Problem: PAIN - ADULT  Goal: Verbalizes/displays adequate comfort level or baseline comfort level  Description: Interventions:  - Encourage patient to monitor pain and request assistance  - Assess pain using appropriate pain scale  - Administer analgesics based on type and severity of pain and evaluate response  - Implement non-pharmacological measures as appropriate and evaluate response  - Consider cultural and social influences on pain and pain management  - Notify physician/advanced practitioner if interventions unsuccessful or patient reports new pain  Outcome: Progressing     Problem: INFECTION - ADULT  Goal: Absence or prevention of progression during hospitalization  Description: INTERVENTIONS:  - Assess and monitor for signs and symptoms of infection  - Monitor lab/diagnostic results  - Monitor all insertion sites, i e  indwelling lines, tubes, and drains  - Monitor endotracheal if appropriate and nasal secretions for changes in amount and color  - [place] appropriate cooling/warming therapies per order  - Administer medications as ordered  - Instruct and encourage patient and family to use good hand hygiene technique  - Identify and instruct in appropriate isolation precautions for identified infection/condition  Outcome: Progressing     Problem: SAFETY ADULT  Goal: Patient will remain free of falls  Description: INTERVENTIONS:  - Assess patient frequently for physical needs  -  Identify cognitive and physical deficits and behaviors that affect risk of falls    -  [place] fall precautions as indicated by assessment   - Educate patient/family on patient safety including physical limitations  - Instruct patient to call for assistance with activity based on assessment  - Modify environment to reduce risk of injury  - Consider OT/PT consult to assist with strengthening/mobility  Outcome: Progressing  Goal: Maintain or return to baseline ADL function  Description: INTERVENTIONS:  -  Assess patient's ability to carry out ADLs; assess patient's baseline for ADL function and identify physical deficits which impact ability to perform ADLs (bathing, care of mouth/teeth, toileting, grooming, dressing, etc )  - Assess/evaluate cause of self-care deficits   - Assess range of motion  - Assess patient's mobility; develop plan if impaired  - Assess patient's need for assistive devices and provide as appropriate  - Encourage maximum independence but intervene and supervise when necessary  - Involve family in performance of ADLs  - Assess for home care needs following discharge   - Consider OT consult to assist with ADL evaluation and planning for discharge  - Provide patient education as appropriate  Outcome: Progressing  Goal: Maintain or return mobility status to optimal level  Description: INTERVENTIONS:  - Assess patient's baseline mobility status (ambulation, transfers, stairs, etc )    - Identify cognitive and physical deficits and behaviors that affect mobility  - Identify mobility aids required to assist with transfers and/or ambulation (gait belt, sit-to-stand, lift, walker, cane, etc )  - [place] fall precautions as indicated by assessment  - Record patient progress and toleration of activity level on Mobility SBAR; progress patient to next Phase/Stage  - Instruct patient to call for assistance with activity based on assessment  - Consider rehabilitation consult to assist with strengthening/weightbearing, etc   Outcome: Progressing     Problem: DISCHARGE PLANNING  Goal: Discharge to home or other facility with appropriate resources  Description: INTERVENTIONS:  - Identify barriers to discharge w/patient and caregiver  - Arrange for needed discharge resources and transportation as appropriate  - Identify discharge learning needs (meds, wound care, etc )  - Arrange for interpretive services to assist at discharge as needed  - Refer to Case Management Department for coordinating discharge planning if the patient needs post-hospital services based on physician/advanced practitioner order or complex needs related to functional status, cognitive ability, or social support system  Outcome: Progressing     Problem: Labor & Delivery  Goal: Manages discomfort  Description: Assess and monitor for signs and symptoms of discomfort  Assess patient's pain level regularly and per hospital policy  Administer medications as ordered  Support use of nonpharmacological methods to help control pain such as distraction, imagery, relaxation, and application of heat and cold  Collaborate with interdisciplinary team and patient to determine appropriate pain management plan  1  Include patient in decisions related to comfort  2  Offer non-pharmacological pain management interventions  3  Report ineffective pain management to physician  Outcome: Progressing  Goal: Patient vital signs are stable  Description: 1  Assess vital signs - vaginal delivery    Outcome: Progressing     Problem: ANTEPARTUM  Goal: Maintain pregnancy as long as maternal and/or fetal condition is stable  Description: INTERVENTIONS:  - Maternal surveillance  - Fetal surveillance  - Monitor uterine activity  - Medications as ordered  - Bedrest  Outcome: Progressing     Problem: DISCHARGE PLANNING - CARE MANAGEMENT  Goal: Discharge to post-acute care or home with appropriate resources  Description: INTERVENTIONS:  - Conduct assessment to determine patient/family and health care team treatment goals, and need for post-acute services based on payer coverage, community resources, and patient preferences, and barriers to discharge  - Address psychosocial, clinical, and financial barriers to discharge as identified in assessment in conjunction with the patient/family and health care team  - Arrange appropriate level of post-acute services according to patients   needs and preference and payer coverage in collaboration with the physician and health care team  - Communicate with and update the patient/family, physician, and health care team regarding progress on the discharge plan  - Arrange appropriate transportation to post-acute venues  Outcome: Progressing     Problem: Potential for Falls  Goal: Patient will remain free of falls  Description: INTERVENTIONS:  - Assess patient frequently for physical needs  -  Identify cognitive and physical deficits and behaviors that affect risk of falls    -  [place] fall precautions as indicated by assessment   - Educate patient/family on patient safety including physical limitations  - Instruct patient to call for assistance with activity based on assessment  - Modify environment to reduce risk of injury  - Consider OT/PT consult to assist with strengthening/mobility  Outcome: Progressing

## 2021-02-24 NOTE — QUICK NOTE
Tracing reviewed, no decels sine 1040am  Patient comfortable  Will transition to intermittent fetal monitoring and return to antepartum floor given delivery does not appear to be imminent  D/w Dr Bhargavi Alcantar and Dr Kari Hills also updated    Vanessa Dukes MD

## 2021-02-24 NOTE — PLAN OF CARE
Problem: PAIN - ADULT  Goal: Verbalizes/displays adequate comfort level or baseline comfort level  Description: Interventions:  - Encourage patient to monitor pain and request assistance  - Assess pain using appropriate pain scale  - Administer analgesics based on type and severity of pain and evaluate response  - Implement non-pharmacological measures as appropriate and evaluate response  - Consider cultural and social influences on pain and pain management  - Notify physician/advanced practitioner if interventions unsuccessful or patient reports new pain  Outcome: Progressing     Problem: INFECTION - ADULT  Goal: Absence or prevention of progression during hospitalization  Description: INTERVENTIONS:  - Assess and monitor for signs and symptoms of infection  - Monitor lab/diagnostic results  - Monitor all insertion sites, i e  indwelling lines, tubes, and drains  - Monitor endotracheal if appropriate and nasal secretions for changes in amount and color  - [place] appropriate cooling/warming therapies per order  - Administer medications as ordered  - Instruct and encourage patient and family to use good hand hygiene technique  - Identify and instruct in appropriate isolation precautions for identified infection/condition  Outcome: Progressing     Problem: SAFETY ADULT  Goal: Patient will remain free of falls  Description: INTERVENTIONS:  - Assess patient frequently for physical needs  -  Identify cognitive and physical deficits and behaviors that affect risk of falls    -  [place] fall precautions as indicated by assessment   - Educate patient/family on patient safety including physical limitations  - Instruct patient to call for assistance with activity based on assessment  - Modify environment to reduce risk of injury  - Consider OT/PT consult to assist with strengthening/mobility  Outcome: Progressing  Goal: Maintain or return to baseline ADL function  Description: INTERVENTIONS:  -  Assess patient's ability to carry out ADLs; assess patient's baseline for ADL function and identify physical deficits which impact ability to perform ADLs (bathing, care of mouth/teeth, toileting, grooming, dressing, etc )  - Assess/evaluate cause of self-care deficits   - Assess range of motion  - Assess patient's mobility; develop plan if impaired  - Assess patient's need for assistive devices and provide as appropriate  - Encourage maximum independence but intervene and supervise when necessary  - Involve family in performance of ADLs  - Assess for home care needs following discharge   - Consider OT consult to assist with ADL evaluation and planning for discharge  - Provide patient education as appropriate  Outcome: Progressing  Goal: Maintain or return mobility status to optimal level  Description: INTERVENTIONS:  - Assess patient's baseline mobility status (ambulation, transfers, stairs, etc )    - Identify cognitive and physical deficits and behaviors that affect mobility  - Identify mobility aids required to assist with transfers and/or ambulation (gait belt, sit-to-stand, lift, walker, cane, etc )  - [place] fall precautions as indicated by assessment  - Record patient progress and toleration of activity level on Mobility SBAR; progress patient to next Phase/Stage  - Instruct patient to call for assistance with activity based on assessment  - Consider rehabilitation consult to assist with strengthening/weightbearing, etc   Outcome: Progressing     Problem: DISCHARGE PLANNING  Goal: Discharge to home or other facility with appropriate resources  Description: INTERVENTIONS:  - Identify barriers to discharge w/patient and caregiver  - Arrange for needed discharge resources and transportation as appropriate  - Identify discharge learning needs (meds, wound care, etc )  - Arrange for interpretive services to assist at discharge as needed  - Refer to Case Management Department for coordinating discharge planning if the patient needs post-hospital services based on physician/advanced practitioner order or complex needs related to functional status, cognitive ability, or social support system  Outcome: Progressing     Problem: Labor & Delivery  Goal: Manages discomfort  Description: Assess and monitor for signs and symptoms of discomfort  Assess patient's pain level regularly and per hospital policy  Administer medications as ordered  Support use of nonpharmacological methods to help control pain such as distraction, imagery, relaxation, and application of heat and cold  Collaborate with interdisciplinary team and patient to determine appropriate pain management plan  1  Include patient in decisions related to comfort  2  Offer non-pharmacological pain management interventions  3  Report ineffective pain management to physician  Outcome: Progressing  Goal: Patient vital signs are stable  Description: 1  Assess vital signs - vaginal delivery    Outcome: Progressing     Problem: ANTEPARTUM  Goal: Maintain pregnancy as long as maternal and/or fetal condition is stable  Description: INTERVENTIONS:  - Maternal surveillance  - Fetal surveillance  - Monitor uterine activity  - Medications as ordered  - Bedrest  Outcome: Progressing     Problem: DISCHARGE PLANNING - CARE MANAGEMENT  Goal: Discharge to post-acute care or home with appropriate resources  Description: INTERVENTIONS:  - Conduct assessment to determine patient/family and health care team treatment goals, and need for post-acute services based on payer coverage, community resources, and patient preferences, and barriers to discharge  - Address psychosocial, clinical, and financial barriers to discharge as identified in assessment in conjunction with the patient/family and health care team  - Arrange appropriate level of post-acute services according to patients   needs and preference and payer coverage in collaboration with the physician and health care team  - Communicate with and update the patient/family, physician, and health care team regarding progress on the discharge plan  - Arrange appropriate transportation to post-acute venues  Outcome: Progressing     Problem: Potential for Falls  Goal: Patient will remain free of falls  Description: INTERVENTIONS:  - Assess patient frequently for physical needs  -  Identify cognitive and physical deficits and behaviors that affect risk of falls    -  [place] fall precautions as indicated by assessment   - Educate patient/family on patient safety including physical limitations  - Instruct patient to call for assistance with activity based on assessment  - Modify environment to reduce risk of injury  - Consider OT/PT consult to assist with strengthening/mobility  Outcome: Progressing

## 2021-02-24 NOTE — QUICK NOTE
Pt seen/evaluated, now back in 330  Denies labor/abruption symptoms  Fetus is active  Tracing shows moderate variabilty, +accels, rare variable decelerations  Given tracing today she prefers continuous monitoring overnight rather than intermittent, which is reasonable  Will remain on continuous overnight and re-evaluate in the morning     Eliseo Tracy MD

## 2021-02-24 NOTE — PROGRESS NOTES
Progress Note - Maternal Fetal Medicine   Donivan Riedel 27 y o  female MRN: [id number]  Unit/Bed#: -01 Encounter: [id number]    Assessment:  27 y o  [id number] at 26w6d admitted with PPROM  Hospital day 27    Plan:  1) PPROM: s/p latency antibiotics, magnesium sulfate x 12 hours, BTM 1/28-1/29, growth US 2/24 56%ile, ascites and abdominal calcification noted  Normal lung areas  2) T1DM: on insulin pump, settings adjusted 2/23  Basal rate 2 3 units/hr 3am-MN, 2 05 units/hr MN-3am  ICR 1:4, ISF 20, BG target 100  Fasting blood sugars 89, postprandial blood sugars 130s-180s, continue to monitor closely, Diet J[place]/CHO controlled consistent carbohydrate Diet Level 1 with 4 carb servings/60 grams CHO/meal  Advise patient to lessen snacking  3) IUP @ 26 weeks: NST TID  4) DVT PPx: SCDs, ambulation, lovenox 40 mg qday    Subjective/Objective   Chief Complaint:     No new complaints this morning    Subjective:     Patient currently has no new complaints this morning  Contractions: no  Loss of fluid: yes  Vaginal bleeding: no  Fetal movement: yes    Objective:     Vitals:   Temp:  [98 °F (36 7 °C)-98 5 °F (36 9 °C)] 98 °F (36 7 °C)  HR:  [88-97] 88  Resp:  [18-20] 18  BP: ()/(51-70) 109/60       Physical Exam  Vitals signs and nursing note reviewed  Constitutional:       Appearance: She is well-developed  Cardiovascular:      Rate and Rhythm: Normal rate and regular rhythm  Pulmonary:      Effort: Pulmonary effort is normal       Breath sounds: Normal breath sounds  Abdominal:      General: There is no distension  Tenderness: There is no abdominal tenderness  There is no guarding or rebound  Comments: gravid  Musculoskeletal: Normal range of motion  General: No tenderness  Neurological:      Mental Status: She is alert and oriented to person, place, and time         Fetal Assessment:  FHT: 145 / Moderate 6 - 25 bpm / reactive,no decels  [place]: none    Lab Results   Component Value Date WBC 13 71 (H) 2021    HGB 12 6 2021    HCT 38 4 2021    MCV 93 2021     2021       Lab Results   Component Value Date    CALCIUM 9 2 2020    K 4 2 2020    CO2 25 2020     2020    BUN 14 2020    CREATININE 0 78 2020       Lab Results   Component Value Date    ALT 23 2020    AST 14 2020    ALKPHOS 55 2020       Thi Sarah Beth Velasquez MD  OBGYN, PGY-4  2021  6:16 AM       MFM Attending: Patient seen/evaluated on rounds and discussed with Dr Ernesto Garay  I have reviewed resident plan of care, and agree  In summary: Guero Jeff is a 27y o  year old  29w11d  Her current problems include:  Patient Active Problem List   Diagnosis     premature rupture of membranes (PPROM) with unknown onset of labor    Vaginal bleeding in pregnancy, second trimester    Encounter for supervision of pregnancy resulting from assisted reproductive technology in second trimester, antepartum    Oligohydramnios in poe pregnancy in second trimester    Pregnancy complicated by pre-existing type 1 diabetes, second trimester    Suspected fetal anomaly, antepartum    Fetal ascites causing disproportion    26 weeks gestation of pregnancy    Breech presentation     Fetal monitoring was reviewed and shows: baseline 145, moderate variability, intermittent variable decels (one per hour), toco quiet  Speculum exam performed, cervix not visualized, clear fluid in vagina, no fetal parts or cord in vagina  Recommendations are as follows: PPROM with intermittent variable decels today  NICU and Dr Ernesto Garay notified  Will leave on continuous EFM/toco, and start Magnesium and Vancomycin (GBS unknown and hives to cephalosporin)  Defer steroids as they were given 1 month ago due to maternal hyperglycemia risks  Pt to remain NPO in case  is indicated   Dr Lewis Paige to see her for updated consultation in case delivery is indicated  Hold Pedro Bautista MD    Evaluation and Management:   Time spent face-to-face with [facility] [employer] as well as in floor time coordinating care was 35 minutes

## 2021-02-25 LAB
GLUCOSE SERPL-MCNC: 104 MG/DL (ref 65–140)
GLUCOSE SERPL-MCNC: 112 MG/DL (ref 65–140)
GLUCOSE SERPL-MCNC: 125 MG/DL (ref 65–140)
GLUCOSE SERPL-MCNC: 126 MG/DL (ref 65–140)
GLUCOSE SERPL-MCNC: 154 MG/DL (ref 65–140)
GLUCOSE SERPL-MCNC: 180 MG/DL (ref 65–140)
GLUCOSE SERPL-MCNC: 197 MG/DL (ref 65–140)

## 2021-02-25 PROCEDURE — 59025 FETAL NON-STRESS TEST: CPT | Performed by: OBSTETRICS & GYNECOLOGY

## 2021-02-25 PROCEDURE — NC001 PR NO CHARGE: Performed by: OBSTETRICS & GYNECOLOGY

## 2021-02-25 PROCEDURE — 99232 SBSQ HOSP IP/OBS MODERATE 35: CPT | Performed by: OBSTETRICS & GYNECOLOGY

## 2021-02-25 PROCEDURE — 82948 REAGENT STRIP/BLOOD GLUCOSE: CPT

## 2021-02-25 RX ADMIN — Medication 1 TABLET: at 08:45

## 2021-02-25 RX ADMIN — Medication 2000 UNITS: at 08:45

## 2021-02-25 RX ADMIN — DOCUSATE SODIUM 100 MG: 100 CAPSULE, LIQUID FILLED ORAL at 18:49

## 2021-02-25 RX ADMIN — DOCUSATE SODIUM 100 MG: 100 CAPSULE, LIQUID FILLED ORAL at 08:45

## 2021-02-25 RX ADMIN — METFORMIN ER 500 MG 1500 MG: 500 TABLET ORAL at 23:10

## 2021-02-25 RX ADMIN — OXYCODONE HYDROCHLORIDE AND ACETAMINOPHEN 1000 MG: 500 TABLET ORAL at 08:44

## 2021-02-25 RX ADMIN — FOLIC ACID 1 MG: 1 TABLET ORAL at 08:46

## 2021-02-25 RX ADMIN — ASPIRIN 81 MG CHEWABLE TABLET 162 MG: 81 TABLET CHEWABLE at 23:10

## 2021-02-25 NOTE — PLAN OF CARE
Problem: PAIN - ADULT  Goal: Verbalizes/displays adequate comfort level or baseline comfort level  Description: Interventions:  - Encourage patient to monitor pain and request assistance  - Assess pain using appropriate pain scale  - Administer analgesics based on type and severity of pain and evaluate response  - Implement non-pharmacological measures as appropriate and evaluate response  - Consider cultural and social influences on pain and pain management  - Notify physician/advanced practitioner if interventions unsuccessful or patient reports new pain  Outcome: Progressing     Problem: INFECTION - ADULT  Goal: Absence or prevention of progression during hospitalization  Description: INTERVENTIONS:  - Assess and monitor for signs and symptoms of infection  - Monitor lab/diagnostic results  - Monitor all insertion sites, i e  indwelling lines, tubes, and drains  - Monitor endotracheal if appropriate and nasal secretions for changes in amount and color  - [place] appropriate cooling/warming therapies per order  - Administer medications as ordered  - Instruct and encourage patient and family to use good hand hygiene technique  - Identify and instruct in appropriate isolation precautions for identified infection/condition  Outcome: Progressing     Problem: SAFETY ADULT  Goal: Patient will remain free of falls  Description: INTERVENTIONS:  - Assess patient frequently for physical needs  -  Identify cognitive and physical deficits and behaviors that affect risk of falls    -  [place] fall precautions as indicated by assessment   - Educate patient/family on patient safety including physical limitations  - Instruct patient to call for assistance with activity based on assessment  - Modify environment to reduce risk of injury  - Consider OT/PT consult to assist with strengthening/mobility  Outcome: Progressing  Goal: Maintain or return to baseline ADL function  Description: INTERVENTIONS:  -  Assess patient's ability to carry out ADLs; assess patient's baseline for ADL function and identify physical deficits which impact ability to perform ADLs (bathing, care of mouth/teeth, toileting, grooming, dressing, etc )  - Assess/evaluate cause of self-care deficits   - Assess range of motion  - Assess patient's mobility; develop plan if impaired  - Assess patient's need for assistive devices and provide as appropriate  - Encourage maximum independence but intervene and supervise when necessary  - Involve family in performance of ADLs  - Assess for home care needs following discharge   - Consider OT consult to assist with ADL evaluation and planning for discharge  - Provide patient education as appropriate  Outcome: Progressing  Goal: Maintain or return mobility status to optimal level  Description: INTERVENTIONS:  - Assess patient's baseline mobility status (ambulation, transfers, stairs, etc )    - Identify cognitive and physical deficits and behaviors that affect mobility  - Identify mobility aids required to assist with transfers and/or ambulation (gait belt, sit-to-stand, lift, walker, cane, etc )  - [place] fall precautions as indicated by assessment  - Record patient progress and toleration of activity level on Mobility SBAR; progress patient to next Phase/Stage  - Instruct patient to call for assistance with activity based on assessment  - Consider rehabilitation consult to assist with strengthening/weightbearing, etc   Outcome: Progressing     Problem: DISCHARGE PLANNING  Goal: Discharge to home or other facility with appropriate resources  Description: INTERVENTIONS:  - Identify barriers to discharge w/patient and caregiver  - Arrange for needed discharge resources and transportation as appropriate  - Identify discharge learning needs (meds, wound care, etc )  - Arrange for interpretive services to assist at discharge as needed  - Refer to Case Management Department for coordinating discharge planning if the patient needs post-hospital services based on physician/advanced practitioner order or complex needs related to functional status, cognitive ability, or social support system  Outcome: Progressing     Problem: Labor & Delivery  Goal: Manages discomfort  Description: Assess and monitor for signs and symptoms of discomfort  Assess patient's pain level regularly and per hospital policy  Administer medications as ordered  Support use of nonpharmacological methods to help control pain such as distraction, imagery, relaxation, and application of heat and cold  Collaborate with interdisciplinary team and patient to determine appropriate pain management plan  1  Include patient in decisions related to comfort  2  Offer non-pharmacological pain management interventions  3  Report ineffective pain management to physician  Outcome: Progressing  Goal: Patient vital signs are stable  Description: 1  Assess vital signs - vaginal delivery    Outcome: Progressing     Problem: ANTEPARTUM  Goal: Maintain pregnancy as long as maternal and/or fetal condition is stable  Description: INTERVENTIONS:  - Maternal surveillance  - Fetal surveillance  - Monitor uterine activity  - Medications as ordered  - Bedrest  Outcome: Progressing     Problem: DISCHARGE PLANNING - CARE MANAGEMENT  Goal: Discharge to post-acute care or home with appropriate resources  Description: INTERVENTIONS:  - Conduct assessment to determine patient/family and health care team treatment goals, and need for post-acute services based on payer coverage, community resources, and patient preferences, and barriers to discharge  - Address psychosocial, clinical, and financial barriers to discharge as identified in assessment in conjunction with the patient/family and health care team  - Arrange appropriate level of post-acute services according to patients   needs and preference and payer coverage in collaboration with the physician and health care team  - Communicate with and update the patient/family, physician, and health care team regarding progress on the discharge plan  - Arrange appropriate transportation to post-acute venues  Outcome: Progressing     Problem: Potential for Falls  Goal: Patient will remain free of falls  Description: INTERVENTIONS:  - Assess patient frequently for physical needs  -  Identify cognitive and physical deficits and behaviors that affect risk of falls    -  [place] fall precautions as indicated by assessment   - Educate patient/family on patient safety including physical limitations  - Instruct patient to call for assistance with activity based on assessment  - Modify environment to reduce risk of injury  - Consider OT/PT consult to assist with strengthening/mobility  Outcome: Progressing

## 2021-02-25 NOTE — PROGRESS NOTES
Progress Note - Maternal Fetal Medicine   Vincenzo Yadav 27 y o  female MRN: [id number]  Unit/Bed#: -01 Encounter: [id number]    Assessment:  27 y o  [id number] at 27w0d admitted with PPROM  Hospital day 28    Plan:  1) PPROM: s/p latency antibiotics, magnesium sulfate x 12 hours, BTM 1/28-1/29, fetal ultrasound 2/9 showed fetus weighing 1033g in liang breech presentation, improved fetal ascites, anhydramnios  2) T1DM: on insulin pump, settings adjusted 2/23  Basal rate 2 3 units/hr 3am-MN, 2 05 units/hr MN-3am  ICR 1:4, ISF 20, BG target 100  fasting blood sugars 104-109, postprandial blood sugars 104-136, continue to monitor closely, Diet Jayson/CHO controlled consistent carbohydrate Diet Level 1 with 4 carb servings/60 grams CHO/meal  Advise patient to lessen snacking  3) IUP @ 26 weeks: NST TID  4) DVT PPx: SCDs, ambulation, lovenox 40 mg qday    Subjective/Objective   Chief Complaint:     No new complaints this morning    Subjective:     Patient currently has no new complaints this morning  Contractions: no  Loss of fluid: yes  Vaginal bleeding: no  Fetal movement: yes    Objective:     Vitals:   Temp:  [97 5 °F (36 4 °C)-98 2 °F (36 8 °C)] 98 1 °F (36 7 °C)  HR:  [85-95] 85  Resp:  [18-20] 18  BP: ()/(53-80) 96/53       Physical Exam  Vitals signs and nursing note reviewed  Constitutional:       Appearance: She is well-developed  Cardiovascular:      Rate and Rhythm: Normal rate and regular rhythm  Pulmonary:      Effort: Pulmonary effort is normal       Breath sounds: Normal breath sounds  Abdominal:      General: There is no distension  Tenderness: There is no abdominal tenderness  There is no guarding or rebound  Comments: gravid  Musculoskeletal: Normal range of motion  General: No tenderness  Neurological:      Mental Status: She is alert and oriented to person, place, and time         Fetal Assessment:  FHT: 135 / Moderate 6 - 25 bpm / reactive,no decels since yesterday  [place]: none    Lab Results   Component Value Date    WBC 12 57 (H) 02/24/2021    HGB 12 4 02/24/2021    HCT 37 9 02/24/2021    MCV 94 02/24/2021     02/24/2021       Lab Results   Component Value Date    CALCIUM 8 7 02/24/2021    K 3 8 02/24/2021    CO2 20 (L) 02/24/2021     02/24/2021    BUN 9 02/24/2021    CREATININE 0 62 02/24/2021       Lab Results   Component Value Date    ALT 15 02/24/2021    AST 12 02/24/2021    ALKPHOS 70 02/24/2021       Thi Sarah Beth Godinez MD  OBGYN, PGY-4  2/25/2021  6:51 AM

## 2021-02-26 LAB
ABO GROUP BLD: NORMAL
BLD GP AB SCN SERPL QL: NEGATIVE
GLUCOSE SERPL-MCNC: 127 MG/DL (ref 65–140)
GLUCOSE SERPL-MCNC: 140 MG/DL (ref 65–140)
GLUCOSE SERPL-MCNC: 163 MG/DL (ref 65–140)
GLUCOSE SERPL-MCNC: 171 MG/DL (ref 65–140)
GLUCOSE SERPL-MCNC: 92 MG/DL (ref 65–140)
GLUCOSE SERPL-MCNC: 97 MG/DL (ref 65–140)
GP B STREP DNA SPEC QL NAA+PROBE: NEGATIVE
RH BLD: POSITIVE
SPECIMEN EXPIRATION DATE: NORMAL

## 2021-02-26 PROCEDURE — 59025 FETAL NON-STRESS TEST: CPT | Performed by: OBSTETRICS & GYNECOLOGY

## 2021-02-26 PROCEDURE — 86900 BLOOD TYPING SEROLOGIC ABO: CPT | Performed by: STUDENT IN AN ORGANIZED HEALTH CARE EDUCATION/TRAINING PROGRAM

## 2021-02-26 PROCEDURE — 82948 REAGENT STRIP/BLOOD GLUCOSE: CPT

## 2021-02-26 PROCEDURE — 86850 RBC ANTIBODY SCREEN: CPT | Performed by: STUDENT IN AN ORGANIZED HEALTH CARE EDUCATION/TRAINING PROGRAM

## 2021-02-26 PROCEDURE — 99233 SBSQ HOSP IP/OBS HIGH 50: CPT | Performed by: OBSTETRICS & GYNECOLOGY

## 2021-02-26 PROCEDURE — NC001 PR NO CHARGE: Performed by: OBSTETRICS & GYNECOLOGY

## 2021-02-26 PROCEDURE — 86901 BLOOD TYPING SEROLOGIC RH(D): CPT | Performed by: STUDENT IN AN ORGANIZED HEALTH CARE EDUCATION/TRAINING PROGRAM

## 2021-02-26 RX ADMIN — ASPIRIN 81 MG CHEWABLE TABLET 162 MG: 81 TABLET CHEWABLE at 21:42

## 2021-02-26 RX ADMIN — DOCUSATE SODIUM 100 MG: 100 CAPSULE, LIQUID FILLED ORAL at 18:27

## 2021-02-26 RX ADMIN — Medication 1 TABLET: at 09:27

## 2021-02-26 RX ADMIN — FOLIC ACID 1 MG: 1 TABLET ORAL at 09:31

## 2021-02-26 RX ADMIN — Medication 2000 UNITS: at 09:27

## 2021-02-26 RX ADMIN — DOCUSATE SODIUM 100 MG: 100 CAPSULE, LIQUID FILLED ORAL at 09:27

## 2021-02-26 RX ADMIN — METFORMIN ER 500 MG 1500 MG: 500 TABLET ORAL at 21:42

## 2021-02-26 RX ADMIN — OXYCODONE HYDROCHLORIDE AND ACETAMINOPHEN 1000 MG: 500 TABLET ORAL at 09:33

## 2021-02-26 NOTE — UTILIZATION REVIEW
Continued Stay Review    Date:21                          Current Patient Class: inpatient  Current Level of Care: medical    HPI:30 y o  female initially admitted on 21 @ 23 week with PPROM since 17 week gestation      Assessment/Plan: HD # 29  27 1/7 wks with PPROM,  GORDON check 21 1 6 cm was the largest pocket Infant breech and ascites remains  S/p IV MGSO4,  BTM x 1 course latency antibiotics  T1DM: on insulin pump, settings adjusted   Basal rate 2 3 units/hr 3am-MN, 2 05 units/hr MN-3am  ICR 1:4, ISF 20, BG target 100  fasting blood sugars 104-136, postprandial blood sugars 120-180, continue to monitor closely, Diet [place]/CHO controlled consistent carbohydrate Diet Level 1 with 4 carb servings/60 grams CHO/meal   Lovenex d/c 21 Patient continues to leak was originally clear and now more brown as of 21 PM     BTM  closer to 30 weeks as we have a single rescue course to give and expected fetal benefit is greater after 30 weeks  q4h monitoring for 1 hour at a time, with PRN monitoring for any decreased movement or change in symptoms  Continue to defer Lovenox as delivery would be by        Fetal Assessment:  FHT: 135bpm / Moderate 6 - 25 bpm / reactive, 4min prolonged decel @ @2345, 0600  [place]: none      Pertinent Labs/Diagnostic Results:       Results from last 7 days   Lab Units 21  1023   WBC Thousand/uL 12 57*   HEMOGLOBIN g/dL 12 4   HEMATOCRIT % 37 9   PLATELETS Thousands/uL 228         Results from last 7 days   Lab Units 21  1023   SODIUM mmol/L 136   POTASSIUM mmol/L 3 8   CHLORIDE mmol/L 104   CO2 mmol/L 20*   ANION GAP mmol/L 12   BUN mg/dL 9   CREATININE mg/dL 0 62   EGFR ml/min/1 73sq m 121   CALCIUM mg/dL 8 7   MAGNESIUM mg/dL 3 2*     Results from last 7 days   Lab Units 21  1023   AST U/L 12   ALT U/L 15   ALK PHOS U/L 70   TOTAL PROTEIN g/dL 6 5   ALBUMIN g/dL 2 5*   TOTAL BILIRUBIN mg/dL 0 22     Results from last 7 days   Lab Units 02/25/21  2306 02/25/21  2023 02/25/21  1532 02/25/21  1319 02/25/21  1103 02/25/21  0750 02/25/21  0644 02/24/21  2120 02/24/21  1805 02/24/21  1550 02/24/21  1225 02/24/21  1044   POC GLUCOSE mg/dl 154* 197* 180* 126 125 112 104 173* 96 139 155* 147*     Results from last 7 days   Lab Units 02/24/21  1023   GLUCOSE RANDOM mg/dL 148*     Vital Signs:   Date/Time  Temp  Pulse  Resp  BP  SpO2  O2 Device  Cardiac (WDL)  Patient Position - Orthostatic VS   02/26/21 0802  98 1 °F (36 7 °C)  87  20  106/70  99 %  --  --  --   02/26/21 0715  --  --  --  --  --  --  --  --   Comment rows:   OBSERV: according to the patient, baby is active, denies abdominal pain  at 02/26/21 0715   02/26/21 0600  --  91  --  --  98 %  --  --  --   02/26/21 0500  97 8 °F (36 6 °C)  --  --  --  --  --  --  --   02/26/21 0000  --  --  --  --  --  --  --  --   Comment rows:   OBSERV: Dr Jagdeep Session at bedside with pt at 02/26/21 0000   02/25/21 2348  --  --  --  --  --  --  --  --   Comment rows:   OBSERV: Charge nurse Ronda and Dr Arden Bryant at bedside post deceleration at 02/25/21 2348   02/25/21 2323  98 1 °F (36 7 °C)  81  18  110/76  --  --  --  Lying   02/25/21 2200  --  --  --  --  --  --  WDL  --   02/25/21 2000  98 °F (36 7 °C)  --  --  --  --  --  --  --   02/25/21 1614  --  --  --  --  --  --  --  --   Comment rows:   OBSERV: offers no complaints  at 02/25/21 1614   02/25/21 1600  97 9 °F (36 6 °C)  88  18  98/55  96 %  None (Room air)  --  Sitting   02/25/21 1530  --  --  --  --  --  --  --  --   Comment rows:   OBSERV: patient expressed that her insulin cartridge ran out during her earlier bolus, she then had to change the cartridge   at 02/25/21 1530         Medications:   Scheduled Medications:  vitamin C, 1,000 mg, Oral, Daily  aspirin, 162 mg, Oral, Daily  cholecalciferol, 2,000 Units, Oral, Daily  docusate sodium, 100 mg, Oral, BID  folic acid, 1 mg, Oral, Daily  metFORMIN, 1,500 mg, Oral, Daily  patient maintained insulin pump, 1 each, Subcutaneous, Q8H  prenatal multivitamin, 1 tablet, Oral, Daily      Continuous IV Infusions:  calcium gluconate, 1 g, Intravenous, Demand      PRN Meds:  acetaminophen, 650 mg, Oral, Q4H PRN  bisacodyl, 5 mg, Oral, Daily PRN  calcium carbonate, 1,000 mg, Oral, Daily PRN  calcium gluconate, 1 g, Intravenous, Demand  insulin aspart, 300 Units, Subcutaneous Insulin Pump, Daily PRN  polyethylene glycol, 17 g, Oral, Daily PRN        Discharge Plan: home after delivery    Network Utilization Review Department  ATTENTION: Please call with any questions or concerns to [phone] and carefully listen to the prompts so that you are directed to the right person  All voicemails are confidential   Tom Pronto all requests for admission clinical reviews, approved or denied determinations and any other requests to dedicated fax number below belonging to the campus where the patient is receiving treatment   List of dedicated fax numbers for the Facilities:  [address] DENIALS (Administrative/Medical Necessity) [phone]   [address] (Maternity/NICU/Pediatrics) [phone] [address] Damian Rey 7298 (  Alicia Hilario "Jinny" 103) [address] [place] [address] [address] [place] [address] [phone]

## 2021-02-26 NOTE — PLAN OF CARE
Problem: PAIN - ADULT  Goal: Verbalizes/displays adequate comfort level or baseline comfort level  Description: Interventions:  - Encourage patient to monitor pain and request assistance  - Assess pain using appropriate pain scale  - Administer analgesics based on type and severity of pain and evaluate response  - Implement non-pharmacological measures as appropriate and evaluate response  - Consider cultural and social influences on pain and pain management  - Notify physician/advanced practitioner if interventions unsuccessful or patient reports new pain  Outcome: Progressing     Problem: INFECTION - ADULT  Goal: Absence or prevention of progression during hospitalization  Description: INTERVENTIONS:  - Assess and monitor for signs and symptoms of infection  - Monitor lab/diagnostic results  - Monitor all insertion sites, i e  indwelling lines, tubes, and drains  - Monitor endotracheal if appropriate and nasal secretions for changes in amount and color  - [place] appropriate cooling/warming therapies per order  - Administer medications as ordered  - Instruct and encourage patient and family to use good hand hygiene technique  - Identify and instruct in appropriate isolation precautions for identified infection/condition  Outcome: Progressing     Problem: SAFETY ADULT  Goal: Patient will remain free of falls  Description: INTERVENTIONS:  - Assess patient frequently for physical needs  -  Identify cognitive and physical deficits and behaviors that affect risk of falls    -  [place] fall precautions as indicated by assessment   - Educate patient/family on patient safety including physical limitations  - Instruct patient to call for assistance with activity based on assessment  - Modify environment to reduce risk of injury  - Consider OT/PT consult to assist with strengthening/mobility  Outcome: Progressing  Goal: Maintain or return to baseline ADL function  Description: INTERVENTIONS:  -  Assess patient's ability to carry out ADLs; assess patient's baseline for ADL function and identify physical deficits which impact ability to perform ADLs (bathing, care of mouth/teeth, toileting, grooming, dressing, etc )  - Assess/evaluate cause of self-care deficits   - Assess range of motion  - Assess patient's mobility; develop plan if impaired  - Assess patient's need for assistive devices and provide as appropriate  - Encourage maximum independence but intervene and supervise when necessary  - Involve family in performance of ADLs  - Assess for home care needs following discharge   - Consider OT consult to assist with ADL evaluation and planning for discharge  - Provide patient education as appropriate  Outcome: Progressing  Goal: Maintain or return mobility status to optimal level  Description: INTERVENTIONS:  - Assess patient's baseline mobility status (ambulation, transfers, stairs, etc )    - Identify cognitive and physical deficits and behaviors that affect mobility  - Identify mobility aids required to assist with transfers and/or ambulation (gait belt, sit-to-stand, lift, walker, cane, etc )  - [place] fall precautions as indicated by assessment  - Record patient progress and toleration of activity level on Mobility SBAR; progress patient to next Phase/Stage  - Instruct patient to call for assistance with activity based on assessment  - Consider rehabilitation consult to assist with strengthening/weightbearing, etc   Outcome: Progressing     Problem: DISCHARGE PLANNING  Goal: Discharge to home or other facility with appropriate resources  Description: INTERVENTIONS:  - Identify barriers to discharge w/patient and caregiver  - Arrange for needed discharge resources and transportation as appropriate  - Identify discharge learning needs (meds, wound care, etc )  - Arrange for interpretive services to assist at discharge as needed  - Refer to Case Management Department for coordinating discharge planning if the patient needs post-hospital services based on physician/advanced practitioner order or complex needs related to functional status, cognitive ability, or social support system  Outcome: Progressing     Problem: Labor & Delivery  Goal: Manages discomfort  Description: Assess and monitor for signs and symptoms of discomfort  Assess patient's pain level regularly and per hospital policy  Administer medications as ordered  Support use of nonpharmacological methods to help control pain such as distraction, imagery, relaxation, and application of heat and cold  Collaborate with interdisciplinary team and patient to determine appropriate pain management plan  1  Include patient in decisions related to comfort  2  Offer non-pharmacological pain management interventions  3  Report ineffective pain management to physician  Outcome: Progressing  Goal: Patient vital signs are stable  Description: 1  Assess vital signs - vaginal delivery    Outcome: Progressing     Problem: ANTEPARTUM  Goal: Maintain pregnancy as long as maternal and/or fetal condition is stable  Description: INTERVENTIONS:  - Maternal surveillance  - Fetal surveillance  - Monitor uterine activity  - Medications as ordered  - Bedrest  Outcome: Progressing     Problem: DISCHARGE PLANNING - CARE MANAGEMENT  Goal: Discharge to post-acute care or home with appropriate resources  Description: INTERVENTIONS:  - Conduct assessment to determine patient/family and health care team treatment goals, and need for post-acute services based on payer coverage, community resources, and patient preferences, and barriers to discharge  - Address psychosocial, clinical, and financial barriers to discharge as identified in assessment in conjunction with the patient/family and health care team  - Arrange appropriate level of post-acute services according to patients   needs and preference and payer coverage in collaboration with the physician and health care team  - Communicate with and update the patient/family, physician, and health care team regarding progress on the discharge plan  - Arrange appropriate transportation to post-acute venues  Outcome: Progressing     Problem: Potential for Falls  Goal: Patient will remain free of falls  Description: INTERVENTIONS:  - Assess patient frequently for physical needs  -  Identify cognitive and physical deficits and behaviors that affect risk of falls    -  [place] fall precautions as indicated by assessment   - Educate patient/family on patient safety including physical limitations  - Instruct patient to call for assistance with activity based on assessment  - Modify environment to reduce risk of injury  - Consider OT/PT consult to assist with strengthening/mobility  Outcome: Progressing

## 2021-02-26 NOTE — PROGRESS NOTES
Progress Note - Maternal Fetal Medicine   Anneliese Greenwood 27 y o  female MRN: [id number]  Unit/Bed#: -01 Encounter: [id number]    Assessment:  27 y o  [id number] at 27w1d admitted with PPROM  Hospital day 29    Plan:  1) PPROM: s/p latency antibiotics, magnesium sulfate x 12 hours, BTM 1/28-1/29, fetal ultrasound 2/23 showed fetus weighing 1033g in liang breech presentation, improved fetal ascites, anhydramnios, type and screen today  2) T1DM: on insulin pump, settings adjusted 2/23  Basal rate 2 3 units/hr 3am-MN, 2 05 units/hr MN-3am  ICR 1:4, ISF 20, BG target 100  fasting blood sugars 104-136, postprandial blood sugars 120-180, continue to monitor closely, Diet [place]/CHO controlled consistent carbohydrate Diet Level 1 with 4 carb servings/60 grams CHO/meal  3) IUP @ 27 weeks: continuous fetal monitoring; consider rescue corticosteroids  4) DVT PPx: SCDs, ambulation, consider lovenox 40 mg qday    Subjective/Objective   Chief Complaint:     My fluid changed color last night    Subjective:     Patient reports that she noticed a change in color; fluid was originally clear and now more brown    Contractions: no  Loss of fluid: yes  Vaginal bleeding: no  Fetal movement: yes    Objective:     Vitals:   Temp:  [97 8 °F (36 6 °C)-98 1 °F (36 7 °C)] 97 8 °F (36 6 °C)  HR:  [81-91] 91  Resp:  [18] 18  BP: ()/(55-76) 110/76       Physical Exam  Vitals signs and nursing note reviewed  Constitutional:       Appearance: She is well-developed  Cardiovascular:      Rate and Rhythm: Normal rate and regular rhythm  Pulmonary:      Effort: Pulmonary effort is normal       Breath sounds: Normal breath sounds  Abdominal:      General: There is no distension  Tenderness: There is no abdominal tenderness  There is no guarding or rebound  Comments: gravid  Musculoskeletal: Normal range of motion  General: No tenderness     Neurological:      Mental Status: She is alert and oriented to person, place, and time        Fetal Assessment:  FHT: 135bpm / Moderate 6 - 25 bpm / reactive, 4min prolonged decel @ @2345, 0600  [place]: none    Lab Results   Component Value Date    WBC 12 57 (H) 02/24/2021    HGB 12 4 02/24/2021    HCT 37 9 02/24/2021    MCV 94 02/24/2021     02/24/2021       Lab Results   Component Value Date    CALCIUM 8 7 02/24/2021    K 3 8 02/24/2021    CO2 20 (L) 02/24/2021     02/24/2021    BUN 9 02/24/2021    CREATININE 0 62 02/24/2021       Lab Results   Component Value Date    ALT 15 02/24/2021    AST 12 02/24/2021    ALKPHOS 70 02/24/2021       Sarah Beth Vega MD  OBGYN, PGY-4  2/26/2021  6:43 AM

## 2021-02-27 LAB
GLUCOSE SERPL-MCNC: 114 MG/DL (ref 65–140)
GLUCOSE SERPL-MCNC: 114 MG/DL (ref 65–140)
GLUCOSE SERPL-MCNC: 123 MG/DL (ref 65–140)
GLUCOSE SERPL-MCNC: 151 MG/DL (ref 65–140)
GLUCOSE SERPL-MCNC: 71 MG/DL (ref 65–140)
GLUCOSE SERPL-MCNC: 80 MG/DL (ref 65–140)

## 2021-02-27 PROCEDURE — 99231 SBSQ HOSP IP/OBS SF/LOW 25: CPT | Performed by: OBSTETRICS & GYNECOLOGY

## 2021-02-27 PROCEDURE — NC001 PR NO CHARGE: Performed by: OBSTETRICS & GYNECOLOGY

## 2021-02-27 PROCEDURE — 59025 FETAL NON-STRESS TEST: CPT | Performed by: OBSTETRICS & GYNECOLOGY

## 2021-02-27 PROCEDURE — 82948 REAGENT STRIP/BLOOD GLUCOSE: CPT

## 2021-02-27 RX ADMIN — DOCUSATE SODIUM 100 MG: 100 CAPSULE, LIQUID FILLED ORAL at 18:43

## 2021-02-27 RX ADMIN — OXYCODONE HYDROCHLORIDE AND ACETAMINOPHEN 1000 MG: 500 TABLET ORAL at 08:05

## 2021-02-27 RX ADMIN — FOLIC ACID 1 MG: 1 TABLET ORAL at 08:05

## 2021-02-27 RX ADMIN — ASPIRIN 81 MG CHEWABLE TABLET 162 MG: 81 TABLET CHEWABLE at 21:54

## 2021-02-27 RX ADMIN — Medication 2000 UNITS: at 14:05

## 2021-02-27 RX ADMIN — Medication 1 TABLET: at 08:05

## 2021-02-27 RX ADMIN — DOCUSATE SODIUM 100 MG: 100 CAPSULE, LIQUID FILLED ORAL at 08:05

## 2021-02-27 RX ADMIN — METFORMIN ER 500 MG 1500 MG: 500 TABLET ORAL at 21:54

## 2021-02-27 NOTE — PROGRESS NOTES
Progress Note - Maternal Fetal Medicine   Larry Waldron 27 y o  female MRN: [id number]  Unit/Bed#: -01 Encounter: [id number]    Assessment:  27 y o  [id number] at 27w2d admitted with PPROM  Hospital day 30    Plan:  1) PPROM: s/p latency antibiotics, magnesium sulfate x 12 hours, BTM 1/28-1/29, fetal ultrasound 2/23 showed fetus weighing 1033g in liang breech presentation, improved fetal ascites, anhydramnios, type and screen last done 2/26, keep active q3d    2) T1DM: on insulin pump, settings adjusted 2/23  Basal rate 2 3 units/hr 3am-MN, 2 05 units/hr MN-3am  ICR 1:4, ISF 20, BG target 100  fasting blood sugars , postprandial blood sugars , continue to monitor closely, Diet Jayson/CHO controlled consistent carbohydrate Diet Level 1 with 4 carb servings/60 grams CHO/meal  3) IUP @ 27 weeks: continuous fetal monitoring  GBS negative 2/24/21, needs re-collection at 32 weeks  4) DVT PPx: SCDs, ambulation, will hold off on lovenox at this time    Subjective/Objective   Chief Complaint:     I am doing okay    Subjective:     Patient reports that her fluid has cleared up at this point  Otherwise no other complaints  Contractions: no  Loss of fluid: yes  Vaginal bleeding: no  Fetal movement: yes    Objective:     Vitals:   Temp:  [97 7 °F (36 5 °C)-98 3 °F (36 8 °C)] 98 °F (36 7 °C)  HR:  [71-98] 71  Resp:  [18-20] 18  BP: (106-128)/(62-80) 128/80       Physical Exam  Vitals signs and nursing note reviewed  Constitutional:       Appearance: She is well-developed  Cardiovascular:      Rate and Rhythm: Normal rate and regular rhythm  Pulmonary:      Effort: Pulmonary effort is normal       Breath sounds: Normal breath sounds  Abdominal:      General: There is no distension  Tenderness: There is no abdominal tenderness  There is no guarding or rebound  Comments: gravid  Musculoskeletal: Normal range of motion  General: No tenderness     Neurological:      Mental Status: She is alert and oriented to person, place, and time  Fetal Assessment:  FHT: 135bpm / Moderate 6 - 25 bpm / reactive, 3min prolonged decel @, ; no decel at 0049, baby fell off the monitor  [place]: none    Lab Results   Component Value Date    WBC 12 57 (H) 2021    HGB 12 4 2021    HCT 37 9 2021    MCV 94 2021     2021       Lab Results   Component Value Date    CALCIUM 8 7 2021    K 3 8 2021    CO2 20 (L) 2021     2021    BUN 9 2021    CREATININE 0 62 2021       Lab Results   Component Value Date    ALT 15 2021    AST 12 2021    ALKPHOS 70 2021       Thi Sarah Beth Baker MD  OBGYN, PGY-4  2021  5:18 AM    MFM Attending: Patient discussed in multi-disciplinary board rounds, and also seen/evaluated by myself  I have reviewed resident plan of care, and agree  In summary: Fred Ojeda is a 27y o  year old  27w3d  Her current problems include:  Patient Active Problem List   Diagnosis     premature rupture of membranes (PPROM) with unknown onset of labor    Vaginal bleeding in pregnancy, second trimester    Encounter for supervision of pregnancy resulting from assisted reproductive technology in second trimester, antepartum    Oligohydramnios in poe pregnancy in second trimester    Pregnancy complicated by pre-existing type 1 diabetes, second trimester    Suspected fetal anomaly, antepartum    Fetal ascites causing disproportion    Breech presentation    27 weeks gestation of pregnancy     Fetal monitoring was reviewed and shows: baseline 135, moderate variability, intermittent variable decls (usually one per NST), toco quiet  Abdomen soft, gravid, and nontender  Recommendations are as follows: PPROM with no contraindication to expectant management   Continue TID NSTs with additional PRN or prolonged monitoring for any labor complaints or decreased fetal movement, or repetitive decelerations on NST  She feels active fetal movement  Blood glucose control improved with more consistent schedule and diet  Ambulation encouraged for DVT prophylaxis, she is also using SCDs while in bed  Peace Zavala MD    Evaluation and Management:   Time spent face-to-face with [facility] as well as in floor time coordinating care was 15 minutes

## 2021-02-27 NOTE — PLAN OF CARE
Problem: PAIN - ADULT  Goal: Verbalizes/displays adequate comfort level or baseline comfort level  Description: Interventions:  - Encourage patient to monitor pain and request assistance  - Assess pain using appropriate pain scale  - Administer analgesics based on type and severity of pain and evaluate response  - Implement non-pharmacological measures as appropriate and evaluate response  - Consider cultural and social influences on pain and pain management  - Notify physician/advanced practitioner if interventions unsuccessful or patient reports new pain  Outcome: Progressing     Problem: INFECTION - ADULT  Goal: Absence or prevention of progression during hospitalization  Description: INTERVENTIONS:  - Assess and monitor for signs and symptoms of infection  - Monitor lab/diagnostic results  - Monitor all insertion sites, i e  indwelling lines, tubes, and drains  - Monitor endotracheal if appropriate and nasal secretions for changes in amount and color  - [place] appropriate cooling/warming therapies per order  - Administer medications as ordered  - Instruct and encourage patient and family to use good hand hygiene technique  - Identify and instruct in appropriate isolation precautions for identified infection/condition  Outcome: Progressing     Problem: SAFETY ADULT  Goal: Patient will remain free of falls  Description: INTERVENTIONS:  - Assess patient frequently for physical needs  -  Identify cognitive and physical deficits and behaviors that affect risk of falls    -  [place] fall precautions as indicated by assessment   - Educate patient/family on patient safety including physical limitations  - Instruct patient to call for assistance with activity based on assessment  - Modify environment to reduce risk of injury  - Consider OT/PT consult to assist with strengthening/mobility  Outcome: Progressing  Goal: Maintain or return to baseline ADL function  Description: INTERVENTIONS:  -  Assess patient's ability to carry out ADLs; assess patient's baseline for ADL function and identify physical deficits which impact ability to perform ADLs (bathing, care of mouth/teeth, toileting, grooming, dressing, etc )  - Assess/evaluate cause of self-care deficits   - Assess range of motion  - Assess patient's mobility; develop plan if impaired  - Assess patient's need for assistive devices and provide as appropriate  - Encourage maximum independence but intervene and supervise when necessary  - Involve family in performance of ADLs  - Assess for home care needs following discharge   - Consider OT consult to assist with ADL evaluation and planning for discharge  - Provide patient education as appropriate  Outcome: Progressing  Goal: Maintain or return mobility status to optimal level  Description: INTERVENTIONS:  - Assess patient's baseline mobility status (ambulation, transfers, stairs, etc )    - Identify cognitive and physical deficits and behaviors that affect mobility  - Identify mobility aids required to assist with transfers and/or ambulation (gait belt, sit-to-stand, lift, walker, cane, etc )  - [place] fall precautions as indicated by assessment  - Record patient progress and toleration of activity level on Mobility SBAR; progress patient to next Phase/Stage  - Instruct patient to call for assistance with activity based on assessment  - Consider rehabilitation consult to assist with strengthening/weightbearing, etc   Outcome: Progressing     Problem: DISCHARGE PLANNING  Goal: Discharge to home or other facility with appropriate resources  Description: INTERVENTIONS:  - Identify barriers to discharge w/patient and caregiver  - Arrange for needed discharge resources and transportation as appropriate  - Identify discharge learning needs (meds, wound care, etc )  - Arrange for interpretive services to assist at discharge as needed  - Refer to Case Management Department for coordinating discharge planning if the patient needs post-hospital services based on physician/advanced practitioner order or complex needs related to functional status, cognitive ability, or social support system  Outcome: Progressing     Problem: Labor & Delivery  Goal: Manages discomfort  Description: Assess and monitor for signs and symptoms of discomfort  Assess patient's pain level regularly and per hospital policy  Administer medications as ordered  Support use of nonpharmacological methods to help control pain such as distraction, imagery, relaxation, and application of heat and cold  Collaborate with interdisciplinary team and patient to determine appropriate pain management plan  1  Include patient in decisions related to comfort  2  Offer non-pharmacological pain management interventions  3  Report ineffective pain management to physician  Outcome: Progressing  Goal: Patient vital signs are stable  Description: 1  Assess vital signs - vaginal delivery    Outcome: Progressing     Problem: ANTEPARTUM  Goal: Maintain pregnancy as long as maternal and/or fetal condition is stable  Description: INTERVENTIONS:  - Maternal surveillance  - Fetal surveillance  - Monitor uterine activity  - Medications as ordered  - Bedrest  Outcome: Progressing     Problem: DISCHARGE PLANNING - CARE MANAGEMENT  Goal: Discharge to post-acute care or home with appropriate resources  Description: INTERVENTIONS:  - Conduct assessment to determine patient/family and health care team treatment goals, and need for post-acute services based on payer coverage, community resources, and patient preferences, and barriers to discharge  - Address psychosocial, clinical, and financial barriers to discharge as identified in assessment in conjunction with the patient/family and health care team  - Arrange appropriate level of post-acute services according to patients   needs and preference and payer coverage in collaboration with the physician and health care team  - Communicate with and update the patient/family, physician, and health care team regarding progress on the discharge plan  - Arrange appropriate transportation to post-acute venues  Outcome: Progressing     Problem: Potential for Falls  Goal: Patient will remain free of falls  Description: INTERVENTIONS:  - Assess patient frequently for physical needs  -  Identify cognitive and physical deficits and behaviors that affect risk of falls    -  [place] fall precautions as indicated by assessment   - Educate patient/family on patient safety including physical limitations  - Instruct patient to call for assistance with activity based on assessment  - Modify environment to reduce risk of injury  - Consider OT/PT consult to assist with strengthening/mobility  Outcome: Progressing

## 2021-02-28 LAB
GLUCOSE SERPL-MCNC: 114 MG/DL (ref 65–140)
GLUCOSE SERPL-MCNC: 133 MG/DL (ref 65–140)
GLUCOSE SERPL-MCNC: 146 MG/DL (ref 65–140)
GLUCOSE SERPL-MCNC: 163 MG/DL (ref 65–140)
GLUCOSE SERPL-MCNC: 82 MG/DL (ref 65–140)
GLUCOSE SERPL-MCNC: 91 MG/DL (ref 65–140)
GLUCOSE SERPL-MCNC: 91 MG/DL (ref 65–140)

## 2021-02-28 PROCEDURE — 59025 FETAL NON-STRESS TEST: CPT | Performed by: OBSTETRICS & GYNECOLOGY

## 2021-02-28 PROCEDURE — 82948 REAGENT STRIP/BLOOD GLUCOSE: CPT

## 2021-02-28 PROCEDURE — NC001 PR NO CHARGE: Performed by: OBSTETRICS & GYNECOLOGY

## 2021-02-28 PROCEDURE — 99231 SBSQ HOSP IP/OBS SF/LOW 25: CPT | Performed by: OBSTETRICS & GYNECOLOGY

## 2021-02-28 RX ADMIN — FOLIC ACID 1 MG: 1 TABLET ORAL at 12:17

## 2021-02-28 RX ADMIN — Medication 1 TABLET: at 08:22

## 2021-02-28 RX ADMIN — METFORMIN ER 500 MG 1500 MG: 500 TABLET ORAL at 22:00

## 2021-02-28 RX ADMIN — DOCUSATE SODIUM 100 MG: 100 CAPSULE, LIQUID FILLED ORAL at 18:07

## 2021-02-28 RX ADMIN — ASPIRIN 81 MG CHEWABLE TABLET 162 MG: 81 TABLET CHEWABLE at 22:00

## 2021-02-28 RX ADMIN — DOCUSATE SODIUM 100 MG: 100 CAPSULE, LIQUID FILLED ORAL at 08:22

## 2021-02-28 RX ADMIN — Medication 2000 UNITS: at 08:23

## 2021-02-28 RX ADMIN — OXYCODONE HYDROCHLORIDE AND ACETAMINOPHEN 1000 MG: 500 TABLET ORAL at 08:22

## 2021-02-28 NOTE — PROGRESS NOTES
Progress Note - Maternal Fetal Medicine   Larry Wadlron 27 y o  female MRN: [id number]  Unit/Bed#: -01 Encounter: [id number]    Assessment:  27 y o  [id number] at 27w3d admitted with PPROM  Hospital day 31    Plan:  1) PPROM: s/p latency antibiotics, magnesium sulfate x 12 hours, BTM 1/28-1/29, fetal ultrasound 2/23 showed fetus weighing 1033g in liang breech presentation, improved fetal ascites, anhydramnios, type and screen due 3/1  2) T1DM: on insulin pump, settings adjusted 2/23  Basal rate 2 3 units/hr 3am-MN, 2 05 units/hr MN-3am  ICR 1:4, ISF 20, BG target 100  fasting blood sugars 70-80, postprandial blood sugars 114-151, continue to monitor closely, Diet Jayson/CHO controlled consistent carbohydrate Diet Level 1 with 4 carb servings/60 grams CHO/meal  3) IUP @ 27 weeks: continuous fetal monitoring; consider rescue corticosteroids  4) DVT PPx: SCDs, ambulation, consider lovenox 40 mg qday    Subjective/Objective   Chief Complaint:     I'm doing okay    Subjective:     Patient reports no new changes  Fluid leaking is still clear    Contractions: no  Loss of fluid: yes  Vaginal bleeding: no  Fetal movement: yes    Objective:     Vitals:   Temp:  [97 8 °F (36 6 °C)-98 3 °F (36 8 °C)] 97 8 °F (36 6 °C)  HR:  [80-93] 80  Resp:  [18] 18  BP: (110-125)/(70-73) 110/73       Physical Exam  Vitals signs and nursing note reviewed  Constitutional:       Appearance: She is well-developed  Cardiovascular:      Rate and Rhythm: Normal rate and regular rhythm  Pulmonary:      Effort: Pulmonary effort is normal       Breath sounds: Normal breath sounds  Abdominal:      General: There is no distension  Tenderness: There is no abdominal tenderness  There is no guarding or rebound  Comments: gravid  Musculoskeletal: Normal range of motion  General: No tenderness  Neurological:      Mental Status: She is alert and oriented to person, place, and time         Fetal Assessment:  FHT: 135bpm / Moderate 6 - 25 bpm / reactive, 4min prolonged decel  @2216  [place]: none    Lab Results   Component Value Date    WBC 12 57 (H) 02/24/2021    HGB 12 4 02/24/2021    HCT 37 9 02/24/2021    MCV 94 02/24/2021     02/24/2021       Lab Results   Component Value Date    CALCIUM 8 7 02/24/2021    K 3 8 02/24/2021    CO2 20 (L) 02/24/2021     02/24/2021    BUN 9 02/24/2021    CREATININE 0 62 02/24/2021       Lab Results   Component Value Date    ALT 15 02/24/2021    AST 12 02/24/2021    ALKPHOS 70 02/24/2021       Thi Sarah Beth Jane MD  OBGYN, PGY-4  2/28/2021  6:41 AM

## 2021-02-28 NOTE — PLAN OF CARE
Problem: PAIN - ADULT  Goal: Verbalizes/displays adequate comfort level or baseline comfort level  Description: Interventions:  - Encourage patient to monitor pain and request assistance  - Assess pain using appropriate pain scale  - Administer analgesics based on type and severity of pain and evaluate response  - Implement non-pharmacological measures as appropriate and evaluate response  - Consider cultural and social influences on pain and pain management  - Notify physician/advanced practitioner if interventions unsuccessful or patient reports new pain  Outcome: Progressing     Problem: INFECTION - ADULT  Goal: Absence or prevention of progression during hospitalization  Description: INTERVENTIONS:  - Assess and monitor for signs and symptoms of infection  - Monitor lab/diagnostic results  - Monitor all insertion sites, i e  indwelling lines, tubes, and drains  - Monitor endotracheal if appropriate and nasal secretions for changes in amount and color  - [place] appropriate cooling/warming therapies per order  - Administer medications as ordered  - Instruct and encourage patient and family to use good hand hygiene technique  - Identify and instruct in appropriate isolation precautions for identified infection/condition  Outcome: Progressing     Problem: SAFETY ADULT  Goal: Patient will remain free of falls  Description: INTERVENTIONS:  - Assess patient frequently for physical needs  -  Identify cognitive and physical deficits and behaviors that affect risk of falls    -  [place] fall precautions as indicated by assessment   - Educate patient/family on patient safety including physical limitations  - Instruct patient to call for assistance with activity based on assessment  - Modify environment to reduce risk of injury  - Consider OT/PT consult to assist with strengthening/mobility  Outcome: Progressing  Goal: Maintain or return to baseline ADL function  Description: INTERVENTIONS:  -  Assess patient's ability to carry out ADLs; assess patient's baseline for ADL function and identify physical deficits which impact ability to perform ADLs (bathing, care of mouth/teeth, toileting, grooming, dressing, etc )  - Assess/evaluate cause of self-care deficits   - Assess range of motion  - Assess patient's mobility; develop plan if impaired  - Assess patient's need for assistive devices and provide as appropriate  - Encourage maximum independence but intervene and supervise when necessary  - Involve family in performance of ADLs  - Assess for home care needs following discharge   - Consider OT consult to assist with ADL evaluation and planning for discharge  - Provide patient education as appropriate  Outcome: Progressing  Goal: Maintain or return mobility status to optimal level  Description: INTERVENTIONS:  - Assess patient's baseline mobility status (ambulation, transfers, stairs, etc )    - Identify cognitive and physical deficits and behaviors that affect mobility  - Identify mobility aids required to assist with transfers and/or ambulation (gait belt, sit-to-stand, lift, walker, cane, etc )  - [place] fall precautions as indicated by assessment  - Record patient progress and toleration of activity level on Mobility SBAR; progress patient to next Phase/Stage  - Instruct patient to call for assistance with activity based on assessment  - Consider rehabilitation consult to assist with strengthening/weightbearing, etc   Outcome: Progressing     Problem: DISCHARGE PLANNING  Goal: Discharge to home or other facility with appropriate resources  Description: INTERVENTIONS:  - Identify barriers to discharge w/patient and caregiver  - Arrange for needed discharge resources and transportation as appropriate  - Identify discharge learning needs (meds, wound care, etc )  - Arrange for interpretive services to assist at discharge as needed  - Refer to Case Management Department for coordinating discharge planning if the patient needs post-hospital services based on physician/advanced practitioner order or complex needs related to functional status, cognitive ability, or social support system  Outcome: Progressing     Problem: Labor & Delivery  Goal: Manages discomfort  Description: Assess and monitor for signs and symptoms of discomfort  Assess patient's pain level regularly and per hospital policy  Administer medications as ordered  Support use of nonpharmacological methods to help control pain such as distraction, imagery, relaxation, and application of heat and cold  Collaborate with interdisciplinary team and patient to determine appropriate pain management plan  1  Include patient in decisions related to comfort  2  Offer non-pharmacological pain management interventions  3  Report ineffective pain management to physician  Outcome: Progressing  Goal: Patient vital signs are stable  Description: 1  Assess vital signs - vaginal delivery    Outcome: Progressing     Problem: ANTEPARTUM  Goal: Maintain pregnancy as long as maternal and/or fetal condition is stable  Description: INTERVENTIONS:  - Maternal surveillance  - Fetal surveillance  - Monitor uterine activity  - Medications as ordered  - Bedrest  Outcome: Progressing     Problem: DISCHARGE PLANNING - CARE MANAGEMENT  Goal: Discharge to post-acute care or home with appropriate resources  Description: INTERVENTIONS:  - Conduct assessment to determine patient/family and health care team treatment goals, and need for post-acute services based on payer coverage, community resources, and patient preferences, and barriers to discharge  - Address psychosocial, clinical, and financial barriers to discharge as identified in assessment in conjunction with the patient/family and health care team  - Arrange appropriate level of post-acute services according to patients   needs and preference and payer coverage in collaboration with the physician and health care team  - Communicate with and update the patient/family, physician, and health care team regarding progress on the discharge plan  - Arrange appropriate transportation to post-acute venues  Outcome: Progressing     Problem: Potential for Falls  Goal: Patient will remain free of falls  Description: INTERVENTIONS:  - Assess patient frequently for physical needs  -  Identify cognitive and physical deficits and behaviors that affect risk of falls    -  [place] fall precautions as indicated by assessment   - Educate patient/family on patient safety including physical limitations  - Instruct patient to call for assistance with activity based on assessment  - Modify environment to reduce risk of injury  - Consider OT/PT consult to assist with strengthening/mobility  Outcome: Progressing

## 2021-03-01 LAB
ABO GROUP BLD: NORMAL
BLD GP AB SCN SERPL QL: NEGATIVE
GLUCOSE SERPL-MCNC: 109 MG/DL (ref 65–140)
GLUCOSE SERPL-MCNC: 129 MG/DL (ref 65–140)
GLUCOSE SERPL-MCNC: 130 MG/DL (ref 65–140)
GLUCOSE SERPL-MCNC: 140 MG/DL (ref 65–140)
GLUCOSE SERPL-MCNC: 78 MG/DL (ref 65–140)
GLUCOSE SERPL-MCNC: 95 MG/DL (ref 65–140)
RH BLD: POSITIVE
SPECIMEN EXPIRATION DATE: NORMAL

## 2021-03-01 PROCEDURE — 86850 RBC ANTIBODY SCREEN: CPT | Performed by: OBSTETRICS & GYNECOLOGY

## 2021-03-01 PROCEDURE — 59025 FETAL NON-STRESS TEST: CPT | Performed by: OBSTETRICS & GYNECOLOGY

## 2021-03-01 PROCEDURE — 86900 BLOOD TYPING SEROLOGIC ABO: CPT | Performed by: OBSTETRICS & GYNECOLOGY

## 2021-03-01 PROCEDURE — 82948 REAGENT STRIP/BLOOD GLUCOSE: CPT

## 2021-03-01 PROCEDURE — 86901 BLOOD TYPING SEROLOGIC RH(D): CPT | Performed by: OBSTETRICS & GYNECOLOGY

## 2021-03-01 PROCEDURE — NC001 PR NO CHARGE: Performed by: OBSTETRICS & GYNECOLOGY

## 2021-03-01 PROCEDURE — 99232 SBSQ HOSP IP/OBS MODERATE 35: CPT | Performed by: OBSTETRICS & GYNECOLOGY

## 2021-03-01 RX ADMIN — OXYCODONE HYDROCHLORIDE AND ACETAMINOPHEN 1000 MG: 500 TABLET ORAL at 09:20

## 2021-03-01 RX ADMIN — DOCUSATE SODIUM 100 MG: 100 CAPSULE, LIQUID FILLED ORAL at 18:18

## 2021-03-01 RX ADMIN — DOCUSATE SODIUM 100 MG: 100 CAPSULE, LIQUID FILLED ORAL at 09:20

## 2021-03-01 RX ADMIN — ASPIRIN 81 MG CHEWABLE TABLET 162 MG: 81 TABLET CHEWABLE at 22:06

## 2021-03-01 RX ADMIN — METFORMIN ER 500 MG 1500 MG: 500 TABLET ORAL at 22:06

## 2021-03-01 RX ADMIN — Medication 1 TABLET: at 09:20

## 2021-03-01 RX ADMIN — FOLIC ACID 1 MG: 1 TABLET ORAL at 10:07

## 2021-03-01 RX ADMIN — INSULIN ASPART 300 UNITS: 100 INJECTION, SOLUTION INTRAVENOUS; SUBCUTANEOUS at 22:18

## 2021-03-01 RX ADMIN — Medication 2000 UNITS: at 10:07

## 2021-03-01 NOTE — PROGRESS NOTES
Progress Note - Maternal Fetal Medicine   Skyler Ghotra 27 y o  female MRN: [id number]  Unit/Bed#: -01 Encounter: [id number]    Assessment:  27 y o  [id number] at 27w4d admitted with PPROM  Hospital day 32    Plan:  1) PPROM: s/p latency antibiotics, magnesium sulfate x 12 hours, BTM 1/28-1/29, fetal ultrasound 2/23 showed fetus weighing 1033g in liang breech presentation, improved fetal ascites, anhydramnios, type and screen due 3/1  2) T1DM: on insulin pump, settings adjusted 2/23  Basal rate 2 3 units/hr 3am-MN, 2 05 units/hr MN-3am  ICR 1:4, ISF 20, BG target 100  fasting blood sugars 70-95, postprandial blood sugars 130-163, continue to monitor closely, Diet Jayson/CHO controlled consistent carbohydrate Diet Level 1 with 4 carb servings/60 grams CHO/meal  3) IUP @ 27 weeks: continuous fetal monitoring  4) DVT PPx: SCDs, ambulation    Subjective/Objective   Chief Complaint:     I'm doing okay    Subjective:     Patient reports no new changes  Fluid leaking is still clear    Contractions: no  Loss of fluid: yes  Vaginal bleeding: no  Fetal movement: yes    Objective:     Vitals:   Temp:  [97 8 °F (36 6 °C)-98 2 °F (36 8 °C)] 98 °F (36 7 °C)  HR:  [83-95] 83  Resp:  [18] 18  BP: (109-118)/(66-71) 109/71       Physical Exam  Vitals signs and nursing note reviewed  Constitutional:       Appearance: She is well-developed  Cardiovascular:      Rate and Rhythm: Normal rate and regular rhythm  Pulmonary:      Effort: Pulmonary effort is normal       Breath sounds: Normal breath sounds  Abdominal:      General: There is no distension  Tenderness: There is no abdominal tenderness  There is no guarding or rebound  Comments: gravid  Musculoskeletal: Normal range of motion  General: No tenderness  Neurological:      Mental Status: She is alert and oriented to person, place, and time         Fetal Assessment:  FHT: 135bpm / Moderate 6 - 25 bpm / reactive, no decels noted  [place]: none    Lab Results Component Value Date    WBC 12 57 (H) 02/24/2021    HGB 12 4 02/24/2021    HCT 37 9 02/24/2021    MCV 94 02/24/2021     02/24/2021       Lab Results   Component Value Date    CALCIUM 8 7 02/24/2021    K 3 8 02/24/2021    CO2 20 (L) 02/24/2021     02/24/2021    BUN 9 02/24/2021    CREATININE 0 62 02/24/2021       Lab Results   Component Value Date    ALT 15 02/24/2021    AST 12 02/24/2021    ALKPHOS 70 02/24/2021       Thi Sarah Beth Nagel MD  OBGYN, PGY-4  3/1/2021  6:34 AM

## 2021-03-01 NOTE — PLAN OF CARE
Problem: PAIN - ADULT  Goal: Verbalizes/displays adequate comfort level or baseline comfort level  Description: Interventions:  - Encourage patient to monitor pain and request assistance  - Assess pain using appropriate pain scale  - Administer analgesics based on type and severity of pain and evaluate response  - Implement non-pharmacological measures as appropriate and evaluate response  - Consider cultural and social influences on pain and pain management  - Notify physician/advanced practitioner if interventions unsuccessful or patient reports new pain  Outcome: Progressing     Problem: INFECTION - ADULT  Goal: Absence or prevention of progression during hospitalization  Description: INTERVENTIONS:  - Assess and monitor for signs and symptoms of infection  - Monitor lab/diagnostic results  - Monitor all insertion sites, i e  indwelling lines, tubes, and drains  - Monitor endotracheal if appropriate and nasal secretions for changes in amount and color  - [place] appropriate cooling/warming therapies per order  - Administer medications as ordered  - Instruct and encourage patient and family to use good hand hygiene technique  - Identify and instruct in appropriate isolation precautions for identified infection/condition  Outcome: Progressing     Problem: SAFETY ADULT  Goal: Patient will remain free of falls  Description: INTERVENTIONS:  - Assess patient frequently for physical needs  -  Identify cognitive and physical deficits and behaviors that affect risk of falls    -  [place] fall precautions as indicated by assessment   - Educate patient/family on patient safety including physical limitations  - Instruct patient to call for assistance with activity based on assessment  - Modify environment to reduce risk of injury  - Consider OT/PT consult to assist with strengthening/mobility  Outcome: Progressing  Goal: Maintain or return to baseline ADL function  Description: INTERVENTIONS:  -  Assess patient's ability to carry out ADLs; assess patient's baseline for ADL function and identify physical deficits which impact ability to perform ADLs (bathing, care of mouth/teeth, toileting, grooming, dressing, etc )  - Assess/evaluate cause of self-care deficits   - Assess range of motion  - Assess patient's mobility; develop plan if impaired  - Assess patient's need for assistive devices and provide as appropriate  - Encourage maximum independence but intervene and supervise when necessary  - Involve family in performance of ADLs  - Assess for home care needs following discharge   - Consider OT consult to assist with ADL evaluation and planning for discharge  - Provide patient education as appropriate  Outcome: Progressing  Goal: Maintain or return mobility status to optimal level  Description: INTERVENTIONS:  - Assess patient's baseline mobility status (ambulation, transfers, stairs, etc )    - Identify cognitive and physical deficits and behaviors that affect mobility  - Identify mobility aids required to assist with transfers and/or ambulation (gait belt, sit-to-stand, lift, walker, cane, etc )  - [place] fall precautions as indicated by assessment  - Record patient progress and toleration of activity level on Mobility SBAR; progress patient to next Phase/Stage  - Instruct patient to call for assistance with activity based on assessment  - Consider rehabilitation consult to assist with strengthening/weightbearing, etc   Outcome: Progressing     Problem: DISCHARGE PLANNING  Goal: Discharge to home or other facility with appropriate resources  Description: INTERVENTIONS:  - Identify barriers to discharge w/patient and caregiver  - Arrange for needed discharge resources and transportation as appropriate  - Identify discharge learning needs (meds, wound care, etc )  - Arrange for interpretive services to assist at discharge as needed  - Refer to Case Management Department for coordinating discharge planning if the patient needs post-hospital services based on physician/advanced practitioner order or complex needs related to functional status, cognitive ability, or social support system  Outcome: Progressing     Problem: Labor & Delivery  Goal: Manages discomfort  Description: Assess and monitor for signs and symptoms of discomfort  Assess patient's pain level regularly and per hospital policy  Administer medications as ordered  Support use of nonpharmacological methods to help control pain such as distraction, imagery, relaxation, and application of heat and cold  Collaborate with interdisciplinary team and patient to determine appropriate pain management plan  1  Include patient in decisions related to comfort  2  Offer non-pharmacological pain management interventions  3  Report ineffective pain management to physician  Outcome: Progressing  Goal: Patient vital signs are stable  Description: 1  Assess vital signs - vaginal delivery    Outcome: Progressing     Problem: ANTEPARTUM  Goal: Maintain pregnancy as long as maternal and/or fetal condition is stable  Description: INTERVENTIONS:  - Maternal surveillance  - Fetal surveillance  - Monitor uterine activity  - Medications as ordered  - Bedrest  Outcome: Progressing     Problem: DISCHARGE PLANNING - CARE MANAGEMENT  Goal: Discharge to post-acute care or home with appropriate resources  Description: INTERVENTIONS:  - Conduct assessment to determine patient/family and health care team treatment goals, and need for post-acute services based on payer coverage, community resources, and patient preferences, and barriers to discharge  - Address psychosocial, clinical, and financial barriers to discharge as identified in assessment in conjunction with the patient/family and health care team  - Arrange appropriate level of post-acute services according to patients   needs and preference and payer coverage in collaboration with the physician and health care team  - Communicate with and update the patient/family, physician, and health care team regarding progress on the discharge plan  - Arrange appropriate transportation to post-acute venues  Outcome: Progressing     Problem: Potential for Falls  Goal: Patient will remain free of falls  Description: INTERVENTIONS:  - Assess patient frequently for physical needs  -  Identify cognitive and physical deficits and behaviors that affect risk of falls    -  [place] fall precautions as indicated by assessment   - Educate patient/family on patient safety including physical limitations  - Instruct patient to call for assistance with activity based on assessment  - Modify environment to reduce risk of injury  - Consider OT/PT consult to assist with strengthening/mobility  Outcome: Progressing

## 2021-03-02 ENCOUNTER — DOCUMENTATION (OUTPATIENT)
Dept: PERINATAL CARE | Facility: CLINIC | Age: 31
End: 2021-03-02

## 2021-03-02 DIAGNOSIS — O41.02X0 OLIGOHYDRAMNIOS IN SINGLETON PREGNANCY IN SECOND TRIMESTER: ICD-10-CM

## 2021-03-02 DIAGNOSIS — Z3A.27 27 WEEKS GESTATION OF PREGNANCY: ICD-10-CM

## 2021-03-02 DIAGNOSIS — O24.012 PREGNANCY COMPLICATED BY PRE-EXISTING TYPE 1 DIABETES, SECOND TRIMESTER: Primary | ICD-10-CM

## 2021-03-02 LAB
GLUCOSE SERPL-MCNC: 105 MG/DL (ref 65–140)
GLUCOSE SERPL-MCNC: 131 MG/DL (ref 65–140)
GLUCOSE SERPL-MCNC: 93 MG/DL (ref 65–140)
GLUCOSE SERPL-MCNC: 95 MG/DL (ref 65–140)

## 2021-03-02 PROCEDURE — 59025 FETAL NON-STRESS TEST: CPT | Performed by: OBSTETRICS & GYNECOLOGY

## 2021-03-02 PROCEDURE — 99232 SBSQ HOSP IP/OBS MODERATE 35: CPT | Performed by: OBSTETRICS & GYNECOLOGY

## 2021-03-02 PROCEDURE — 82948 REAGENT STRIP/BLOOD GLUCOSE: CPT

## 2021-03-02 PROCEDURE — NC001 PR NO CHARGE: Performed by: OBSTETRICS & GYNECOLOGY

## 2021-03-02 RX ADMIN — FOLIC ACID 1 MG: 1 TABLET ORAL at 09:17

## 2021-03-02 RX ADMIN — Medication 1 TABLET: at 09:17

## 2021-03-02 RX ADMIN — ASPIRIN 81 MG CHEWABLE TABLET 162 MG: 81 TABLET CHEWABLE at 22:08

## 2021-03-02 RX ADMIN — METFORMIN ER 500 MG 1500 MG: 500 TABLET ORAL at 22:08

## 2021-03-02 RX ADMIN — DOCUSATE SODIUM 100 MG: 100 CAPSULE, LIQUID FILLED ORAL at 18:33

## 2021-03-02 RX ADMIN — Medication 2000 UNITS: at 09:17

## 2021-03-02 RX ADMIN — DOCUSATE SODIUM 100 MG: 100 CAPSULE, LIQUID FILLED ORAL at 09:16

## 2021-03-02 RX ADMIN — OXYCODONE HYDROCHLORIDE AND ACETAMINOPHEN 1000 MG: 500 TABLET ORAL at 09:16

## 2021-03-02 NOTE — PLAN OF CARE
Problem: PAIN - ADULT  Goal: Verbalizes/displays adequate comfort level or baseline comfort level  Description: Interventions:  - Encourage patient to monitor pain and request assistance  - Assess pain using appropriate pain scale  - Administer analgesics based on type and severity of pain and evaluate response  - Implement non-pharmacological measures as appropriate and evaluate response  - Consider cultural and social influences on pain and pain management  - Notify physician/advanced practitioner if interventions unsuccessful or patient reports new pain  Outcome: Progressing     Problem: INFECTION - ADULT  Goal: Absence or prevention of progression during hospitalization  Description: INTERVENTIONS:  - Assess and monitor for signs and symptoms of infection  - Monitor lab/diagnostic results  - Monitor all insertion sites, i e  indwelling lines, tubes, and drains  - Monitor endotracheal if appropriate and nasal secretions for changes in amount and color  - [place] appropriate cooling/warming therapies per order  - Administer medications as ordered  - Instruct and encourage patient and family to use good hand hygiene technique  - Identify and instruct in appropriate isolation precautions for identified infection/condition  Outcome: Progressing     Problem: SAFETY ADULT  Goal: Patient will remain free of falls  Description: INTERVENTIONS:  - Assess patient frequently for physical needs  -  Identify cognitive and physical deficits and behaviors that affect risk of falls    -  [place] fall precautions as indicated by assessment   - Educate patient/family on patient safety including physical limitations  - Instruct patient to call for assistance with activity based on assessment  - Modify environment to reduce risk of injury  - Consider OT/PT consult to assist with strengthening/mobility  Outcome: Progressing  Goal: Maintain or return to baseline ADL function  Description: INTERVENTIONS:  -  Assess patient's ability to carry out ADLs; assess patient's baseline for ADL function and identify physical deficits which impact ability to perform ADLs (bathing, care of mouth/teeth, toileting, grooming, dressing, etc )  - Assess/evaluate cause of self-care deficits   - Assess range of motion  - Assess patient's mobility; develop plan if impaired  - Assess patient's need for assistive devices and provide as appropriate  - Encourage maximum independence but intervene and supervise when necessary  - Involve family in performance of ADLs  - Assess for home care needs following discharge   - Consider OT consult to assist with ADL evaluation and planning for discharge  - Provide patient education as appropriate  Outcome: Progressing  Goal: Maintain or return mobility status to optimal level  Description: INTERVENTIONS:  - Assess patient's baseline mobility status (ambulation, transfers, stairs, etc )    - Identify cognitive and physical deficits and behaviors that affect mobility  - Identify mobility aids required to assist with transfers and/or ambulation (gait belt, sit-to-stand, lift, walker, cane, etc )  - [place] fall precautions as indicated by assessment  - Record patient progress and toleration of activity level on Mobility SBAR; progress patient to next Phase/Stage  - Instruct patient to call for assistance with activity based on assessment  - Consider rehabilitation consult to assist with strengthening/weightbearing, etc   Outcome: Progressing     Problem: DISCHARGE PLANNING  Goal: Discharge to home or other facility with appropriate resources  Description: INTERVENTIONS:  - Identify barriers to discharge w/patient and caregiver  - Arrange for needed discharge resources and transportation as appropriate  - Identify discharge learning needs (meds, wound care, etc )  - Arrange for interpretive services to assist at discharge as needed  - Refer to Case Management Department for coordinating discharge planning if the patient needs post-hospital services based on physician/advanced practitioner order or complex needs related to functional status, cognitive ability, or social support system  Outcome: Progressing     Problem: Labor & Delivery  Goal: Manages discomfort  Description: Assess and monitor for signs and symptoms of discomfort  Assess patient's pain level regularly and per hospital policy  Administer medications as ordered  Support use of nonpharmacological methods to help control pain such as distraction, imagery, relaxation, and application of heat and cold  Collaborate with interdisciplinary team and patient to determine appropriate pain management plan  1  Include patient in decisions related to comfort  2  Offer non-pharmacological pain management interventions  3  Report ineffective pain management to physician  Outcome: Progressing  Goal: Patient vital signs are stable  Description: 1  Assess vital signs - vaginal delivery    Outcome: Progressing     Problem: ANTEPARTUM  Goal: Maintain pregnancy as long as maternal and/or fetal condition is stable  Description: INTERVENTIONS:  - Maternal surveillance  - Fetal surveillance  - Monitor uterine activity  - Medications as ordered  - Bedrest  Outcome: Progressing     Problem: DISCHARGE PLANNING - CARE MANAGEMENT  Goal: Discharge to post-acute care or home with appropriate resources  Description: INTERVENTIONS:  - Conduct assessment to determine patient/family and health care team treatment goals, and need for post-acute services based on payer coverage, community resources, and patient preferences, and barriers to discharge  - Address psychosocial, clinical, and financial barriers to discharge as identified in assessment in conjunction with the patient/family and health care team  - Arrange appropriate level of post-acute services according to patients   needs and preference and payer coverage in collaboration with the physician and health care team  - Communicate with and update the patient/family, physician, and health care team regarding progress on the discharge plan  - Arrange appropriate transportation to post-acute venues  Outcome: Progressing     Problem: Potential for Falls  Goal: Patient will remain free of falls  Description: INTERVENTIONS:  - Assess patient frequently for physical needs  -  Identify cognitive and physical deficits and behaviors that affect risk of falls    -  [place] fall precautions as indicated by assessment   - Educate patient/family on patient safety including physical limitations  - Instruct patient to call for assistance with activity based on assessment  - Modify environment to reduce risk of injury  - Consider OT/PT consult to assist with strengthening/mobility  Outcome: Progressing

## 2021-03-02 NOTE — PROGRESS NOTES
Progress Note - Maternal Fetal Medicine   Chris Chacon 27 y o  female MRN: [id number]  Unit/Bed#: -01 Encounter: [id number]    Assessment:  27 y o  [id number] at 27w5d admitted with PPROM  Hospital day 33    Plan:  1) PPROM: s/p latency antibiotics, magnesium sulfate x 12 hours, BTM 1/28-1/29, fetal ultrasound 2/23 showed fetus weighing 1033g in liang breech presentation, improved fetal ascites, anhydramnios, type and screen due 3/4  2) T1DM: on insulin pump, settings adjusted 2/23  Basal rate 2 3 units/hr 3am-MN, 2 05 units/hr MN-3am  ICR 1:4, ISF 20, BG target 100  fasting blood sugars 80-90, postprandial blood sugars , continue to monitor closely, Diet [place]/CHO controlled consistent carbohydrate Diet Level 1 with 4 carb servings/60 grams CHO/meal  3) IUP @ 27 weeks: continuous fetal monitoring  4) DVT PPx: SCDs, ambulation    Subjective/Objective   Chief Complaint:     I'm doing okay    Subjective:     Patient reports no new changes  Fluid leaking is still clear    Contractions: no  Loss of fluid: yes  Vaginal bleeding: no  Fetal movement: yes    Objective:     Vitals:   Temp:  [97 7 °F (36 5 °C)-98 6 °F (37 °C)] 98 1 °F (36 7 °C)  HR:  [] 87  Resp:  [18] 18  BP: (113-120)/(70-74) 119/70       Physical Exam  Vitals signs and nursing note reviewed  Constitutional:       Appearance: She is well-developed  Cardiovascular:      Rate and Rhythm: Normal rate and regular rhythm  Pulmonary:      Effort: Pulmonary effort is normal       Breath sounds: Normal breath sounds  Abdominal:      General: There is no distension  Tenderness: There is no abdominal tenderness  There is no guarding or rebound  Comments: gravid  Musculoskeletal: Normal range of motion  General: No tenderness  Neurological:      Mental Status: She is alert and oriented to person, place, and time         Fetal Assessment:  FHT: 135bpm / Moderate 6 - 25 bpm / reactive, decel noted at 0622  [place]: none    Lab Results   Component Value Date    WBC 12 57 (H) 02/24/2021    HGB 12 4 02/24/2021    HCT 37 9 02/24/2021    MCV 94 02/24/2021     02/24/2021       Lab Results   Component Value Date    CALCIUM 8 7 02/24/2021    K 3 8 02/24/2021    CO2 20 (L) 02/24/2021     02/24/2021    BUN 9 02/24/2021    CREATININE 0 62 02/24/2021       Lab Results   Component Value Date    ALT 15 02/24/2021    AST 12 02/24/2021    ALKPHOS 70 02/24/2021       Thi Sarah Beth Pina, MD  OBGYN, PGY-4  3/2/2021  6:54 AM

## 2021-03-02 NOTE — PLAN OF CARE
Problem: PAIN - ADULT  Goal: Verbalizes/displays adequate comfort level or baseline comfort level  Description: Interventions:  - Encourage patient to monitor pain and request assistance  - Assess pain using appropriate pain scale  - Administer analgesics based on type and severity of pain and evaluate response  - Implement non-pharmacological measures as appropriate and evaluate response  - Consider cultural and social influences on pain and pain management  - Notify physician/advanced practitioner if interventions unsuccessful or patient reports new pain  Outcome: Progressing     Problem: INFECTION - ADULT  Goal: Absence or prevention of progression during hospitalization  Description: INTERVENTIONS:  - Assess and monitor for signs and symptoms of infection  - Monitor lab/diagnostic results  - Monitor all insertion sites, i e  indwelling lines, tubes, and drains  - Monitor endotracheal if appropriate and nasal secretions for changes in amount and color  - [place] appropriate cooling/warming therapies per order  - Administer medications as ordered  - Instruct and encourage patient and family to use good hand hygiene technique  - Identify and instruct in appropriate isolation precautions for identified infection/condition  Outcome: Progressing     Problem: SAFETY ADULT  Goal: Patient will remain free of falls  Description: INTERVENTIONS:  - Assess patient frequently for physical needs  -  Identify cognitive and physical deficits and behaviors that affect risk of falls    -  [place] fall precautions as indicated by assessment   - Educate patient/family on patient safety including physical limitations  - Instruct patient to call for assistance with activity based on assessment  - Modify environment to reduce risk of injury  - Consider OT/PT consult to assist with strengthening/mobility  Outcome: Progressing  Goal: Maintain or return to baseline ADL function  Description: INTERVENTIONS:  -  Assess patient's ability to carry out ADLs; assess patient's baseline for ADL function and identify physical deficits which impact ability to perform ADLs (bathing, care of mouth/teeth, toileting, grooming, dressing, etc )  - Assess/evaluate cause of self-care deficits   - Assess range of motion  - Assess patient's mobility; develop plan if impaired  - Assess patient's need for assistive devices and provide as appropriate  - Encourage maximum independence but intervene and supervise when necessary  - Involve family in performance of ADLs  - Assess for home care needs following discharge   - Consider OT consult to assist with ADL evaluation and planning for discharge  - Provide patient education as appropriate  Outcome: Progressing  Goal: Maintain or return mobility status to optimal level  Description: INTERVENTIONS:  - Assess patient's baseline mobility status (ambulation, transfers, stairs, etc )    - Identify cognitive and physical deficits and behaviors that affect mobility  - Identify mobility aids required to assist with transfers and/or ambulation (gait belt, sit-to-stand, lift, walker, cane, etc )  - [place] fall precautions as indicated by assessment  - Record patient progress and toleration of activity level on Mobility SBAR; progress patient to next Phase/Stage  - Instruct patient to call for assistance with activity based on assessment  - Consider rehabilitation consult to assist with strengthening/weightbearing, etc   Outcome: Progressing     Problem: DISCHARGE PLANNING  Goal: Discharge to home or other facility with appropriate resources  Description: INTERVENTIONS:  - Identify barriers to discharge w/patient and caregiver  - Arrange for needed discharge resources and transportation as appropriate  - Identify discharge learning needs (meds, wound care, etc )  - Arrange for interpretive services to assist at discharge as needed  - Refer to Case Management Department for coordinating discharge planning if the patient needs post-hospital services based on physician/advanced practitioner order or complex needs related to functional status, cognitive ability, or social support system  Outcome: Progressing     Problem: Labor & Delivery  Goal: Manages discomfort  Description: Assess and monitor for signs and symptoms of discomfort  Assess patient's pain level regularly and per hospital policy  Administer medications as ordered  Support use of nonpharmacological methods to help control pain such as distraction, imagery, relaxation, and application of heat and cold  Collaborate with interdisciplinary team and patient to determine appropriate pain management plan  1  Include patient in decisions related to comfort  2  Offer non-pharmacological pain management interventions  3  Report ineffective pain management to physician  Outcome: Progressing  Goal: Patient vital signs are stable  Description: 1  Assess vital signs - vaginal delivery    Outcome: Progressing     Problem: ANTEPARTUM  Goal: Maintain pregnancy as long as maternal and/or fetal condition is stable  Description: INTERVENTIONS:  - Maternal surveillance  - Fetal surveillance  - Monitor uterine activity  - Medications as ordered  - Bedrest  Outcome: Progressing     Problem: DISCHARGE PLANNING - CARE MANAGEMENT  Goal: Discharge to post-acute care or home with appropriate resources  Description: INTERVENTIONS:  - Conduct assessment to determine patient/family and health care team treatment goals, and need for post-acute services based on payer coverage, community resources, and patient preferences, and barriers to discharge  - Address psychosocial, clinical, and financial barriers to discharge as identified in assessment in conjunction with the patient/family and health care team  - Arrange appropriate level of post-acute services according to patients   needs and preference and payer coverage in collaboration with the physician and health care team  - Communicate with and update the patient/family, physician, and health care team regarding progress on the discharge plan  - Arrange appropriate transportation to post-acute venues  Outcome: Progressing     Problem: Potential for Falls  Goal: Patient will remain free of falls  Description: INTERVENTIONS:  - Assess patient frequently for physical needs  -  Identify cognitive and physical deficits and behaviors that affect risk of falls    -  [place] fall precautions as indicated by assessment   - Educate patient/family on patient safety including physical limitations  - Instruct patient to call for assistance with activity based on assessment  - Modify environment to reduce risk of injury  - Consider OT/PT consult to assist with strengthening/mobility  Outcome: Progressing

## 2021-03-03 LAB
GLUCOSE SERPL-MCNC: 103 MG/DL (ref 65–140)
GLUCOSE SERPL-MCNC: 104 MG/DL (ref 65–140)
GLUCOSE SERPL-MCNC: 114 MG/DL (ref 65–140)
GLUCOSE SERPL-MCNC: 157 MG/DL (ref 65–140)
GLUCOSE SERPL-MCNC: 161 MG/DL (ref 65–140)
GLUCOSE SERPL-MCNC: 77 MG/DL (ref 65–140)
GLUCOSE SERPL-MCNC: 87 MG/DL (ref 65–140)
GLUCOSE SERPL-MCNC: 89 MG/DL (ref 65–140)
GLUCOSE SERPL-MCNC: 89 MG/DL (ref 65–140)

## 2021-03-03 PROCEDURE — 82948 REAGENT STRIP/BLOOD GLUCOSE: CPT

## 2021-03-03 PROCEDURE — 99232 SBSQ HOSP IP/OBS MODERATE 35: CPT | Performed by: OBSTETRICS & GYNECOLOGY

## 2021-03-03 PROCEDURE — 59025 FETAL NON-STRESS TEST: CPT | Performed by: OBSTETRICS & GYNECOLOGY

## 2021-03-03 PROCEDURE — 76816 OB US FOLLOW-UP PER FETUS: CPT | Performed by: OBSTETRICS & GYNECOLOGY

## 2021-03-03 PROCEDURE — NC001 PR NO CHARGE: Performed by: OBSTETRICS & GYNECOLOGY

## 2021-03-03 RX ADMIN — OXYCODONE HYDROCHLORIDE AND ACETAMINOPHEN 1000 MG: 500 TABLET ORAL at 08:54

## 2021-03-03 RX ADMIN — ASPIRIN 81 MG CHEWABLE TABLET 162 MG: 81 TABLET CHEWABLE at 23:09

## 2021-03-03 RX ADMIN — FOLIC ACID 1 MG: 1 TABLET ORAL at 08:55

## 2021-03-03 RX ADMIN — DOCUSATE SODIUM 100 MG: 100 CAPSULE, LIQUID FILLED ORAL at 18:12

## 2021-03-03 RX ADMIN — METFORMIN ER 500 MG 1500 MG: 500 TABLET ORAL at 23:10

## 2021-03-03 RX ADMIN — Medication 1 TABLET: at 08:54

## 2021-03-03 RX ADMIN — Medication 2000 UNITS: at 08:55

## 2021-03-03 RX ADMIN — SODIUM CHLORIDE 999 ML/HR: 0.9 INJECTION, SOLUTION INTRAVENOUS at 22:30

## 2021-03-03 RX ADMIN — DOCUSATE SODIUM 100 MG: 100 CAPSULE, LIQUID FILLED ORAL at 08:54

## 2021-03-03 NOTE — UTILIZATION REVIEW
Continued Stay Review    Date: 03-03-21                         Current Patient Class: *inpatient Current Level of Care: medical    HPI:30 y o  female initially admitted on 01-28-21 with PPROM @ 23 4/7 wks rupture since 17 wks gestation  Assessment/Plan: HD # 34  27 6/7 wks   Continues to leak clear fluid, denies cramping contractions or abdominal tenderness, (+) fetal movement noted1) PPROM: s/p latency antibiotics, magnesium sulfate x 12 hours, BTM 1/28-1/29, fetal ultrasound 2/23 showed fetus weighing 1033g in liang breech presentation, improved fetal ascites, anhydramnios 1 6 cm , type and screen due 3/4  IgM antibodies for CMV tomorrow  2) T1DM: on insulin pump, settings adjusted 2/23  Basal rate 2 3 units/hr 3am-MN, 2 05 units/hr MN-3am  ICR 1:4, ISF 20, BG target 100  fasting blood sugars 95, postprandial blood sugars 100-130, continue to monitor closely, Diet Jayson/CHO controlled consistent carbohydrate Diet Level 1 with 4 carb servings/60 grams CHO/meal; follow up hba1c tomorrow  3) IUP @ 27 weeks: continuous fetal monitoring  4) DVT PPx: SCDs, ambulation    Fetal Assessment:  NST TID   FHT: 145bpm / Moderate 6 - 25 bpm / reactive, no decels  [place]: none  US and GORDON check today    Pertinent Labs/Diagnostic Results:       Results from last 7 days   Lab Units 03/02/21  1505 03/02/21  1253 03/02/21  1131 03/02/21  0743 03/01/21  2042 03/01/21  1828 03/01/21  1511 03/01/21  1243 03/01/21  1031 03/01/21  0620 02/28/21  2043 02/28/21  1841   POC GLUCOSE mg/dl 131 105 93 95 130 129 109 78 140 95 163* 91       Vital Signs:   Comment rows:   OBSERV: After meal  at 03/03/21 1100   Comment rows:   OBSERV: Midline Flushed at 0900 with NS 10 ml at 03/03/21 0900   Comment rows:   OBSERV: Before Breakfast Blood Sugar 77 at 03/03/21 0814   Comment rows:   OBSERV: Fasting Blood sugar 89 at 03/03/21 0647   Comment rows:   OBSERV: 2hr pp/HS  at 03/02/21 2130   Comment rows:   OBSERV: ac Blood Glucose 87 at 03/02/21 0267   Comment rows:   OBSERV: pt offers no concerns at this time, positive FM and no pain at 03/02/21 1500         Medications:   Scheduled Medications:  vitamin C, 1,000 mg, Oral, Daily  aspirin, 162 mg, Oral, Daily  cholecalciferol, 2,000 Units, Oral, Daily  docusate sodium, 100 mg, Oral, BID  folic acid, 1 mg, Oral, Daily  metFORMIN, 1,500 mg, Oral, Daily  patient maintained insulin pump, 1 each, Subcutaneous, Q8H  prenatal multivitamin, 1 tablet, Oral, Daily      Continuous IV Infusions:  calcium gluconate, 1 g, Intravenous, Demand      PRN Meds:  acetaminophen, 650 mg, Oral, Q4H PRN  bisacodyl, 5 mg, Oral, Daily PRN  calcium carbonate, 1,000 mg, Oral, Daily PRN  calcium gluconate, 1 g, Intravenous, Demand  insulin aspart, 300 Units, Subcutaneous Insulin Pump, Daily PRN  polyethylene glycol, 17 g, Oral, Daily PRN        Discharge Plan: home after delivery     Network Utilization Review Department  ATTENTION: Please call with any questions or concerns to [phone] and carefully listen to the prompts so that you are directed to the right person  All voicemails are confidential   Giovanny Los all requests for admission clinical reviews, approved or denied determinations and any other requests to dedicated fax number below belonging to the campus where the patient is receiving treatment   List of dedicated fax numbers for the Facilities:  [address] DENIALS (Administrative/Medical Necessity) [phone]   [address] [place] [address] (Maternity/NICU/Pediatrics) 261 [facility] [address] Damian Rey 9667 (  Alicia Hilario "Jinny" 103) [address] [address] [facility] 1481 [phone]   [place] [address] [phone]

## 2021-03-03 NOTE — PLAN OF CARE
Problem: PAIN - ADULT  Goal: Verbalizes/displays adequate comfort level or baseline comfort level  Description: Interventions:  - Encourage patient to monitor pain and request assistance  - Assess pain using appropriate pain scale  - Administer analgesics based on type and severity of pain and evaluate response  - Implement non-pharmacological measures as appropriate and evaluate response  - Consider cultural and social influences on pain and pain management  - Notify physician/advanced practitioner if interventions unsuccessful or patient reports new pain  Outcome: Progressing     Problem: INFECTION - ADULT  Goal: Absence or prevention of progression during hospitalization  Description: INTERVENTIONS:  - Assess and monitor for signs and symptoms of infection  - Monitor lab/diagnostic results  - Monitor all insertion sites, i e  indwelling lines, tubes, and drains  - Monitor endotracheal if appropriate and nasal secretions for changes in amount and color  - [place] appropriate cooling/warming therapies per order  - Administer medications as ordered  - Instruct and encourage patient and family to use good hand hygiene technique  - Identify and instruct in appropriate isolation precautions for identified infection/condition  Outcome: Progressing     Problem: SAFETY ADULT  Goal: Patient will remain free of falls  Description: INTERVENTIONS:  - Assess patient frequently for physical needs  -  Identify cognitive and physical deficits and behaviors that affect risk of falls    -  [place] fall precautions as indicated by assessment   - Educate patient/family on patient safety including physical limitations  - Instruct patient to call for assistance with activity based on assessment  - Modify environment to reduce risk of injury  - Consider OT/PT consult to assist with strengthening/mobility  Outcome: Progressing  Goal: Maintain or return to baseline ADL function  Description: INTERVENTIONS:  -  Assess patient's ability to carry out ADLs; assess patient's baseline for ADL function and identify physical deficits which impact ability to perform ADLs (bathing, care of mouth/teeth, toileting, grooming, dressing, etc )  - Assess/evaluate cause of self-care deficits   - Assess range of motion  - Assess patient's mobility; develop plan if impaired  - Assess patient's need for assistive devices and provide as appropriate  - Encourage maximum independence but intervene and supervise when necessary  - Involve family in performance of ADLs  - Assess for home care needs following discharge   - Consider OT consult to assist with ADL evaluation and planning for discharge  - Provide patient education as appropriate  Outcome: Progressing  Goal: Maintain or return mobility status to optimal level  Description: INTERVENTIONS:  - Assess patient's baseline mobility status (ambulation, transfers, stairs, etc )    - Identify cognitive and physical deficits and behaviors that affect mobility  - Identify mobility aids required to assist with transfers and/or ambulation (gait belt, sit-to-stand, lift, walker, cane, etc )  - [place] fall precautions as indicated by assessment  - Record patient progress and toleration of activity level on Mobility SBAR; progress patient to next Phase/Stage  - Instruct patient to call for assistance with activity based on assessment  - Consider rehabilitation consult to assist with strengthening/weightbearing, etc   Outcome: Progressing     Problem: DISCHARGE PLANNING  Goal: Discharge to home or other facility with appropriate resources  Description: INTERVENTIONS:  - Identify barriers to discharge w/patient and caregiver  - Arrange for needed discharge resources and transportation as appropriate  - Identify discharge learning needs (meds, wound care, etc )  - Arrange for interpretive services to assist at discharge as needed  - Refer to Case Management Department for coordinating discharge planning if the patient needs post-hospital services based on physician/advanced practitioner order or complex needs related to functional status, cognitive ability, or social support system  Outcome: Progressing     Problem: Labor & Delivery  Goal: Manages discomfort  Description: Assess and monitor for signs and symptoms of discomfort  Assess patient's pain level regularly and per hospital policy  Administer medications as ordered  Support use of nonpharmacological methods to help control pain such as distraction, imagery, relaxation, and application of heat and cold  Collaborate with interdisciplinary team and patient to determine appropriate pain management plan  1  Include patient in decisions related to comfort  2  Offer non-pharmacological pain management interventions  3  Report ineffective pain management to physician  Outcome: Progressing  Goal: Patient vital signs are stable  Description: 1  Assess vital signs - vaginal delivery    Outcome: Progressing     Problem: ANTEPARTUM  Goal: Maintain pregnancy as long as maternal and/or fetal condition is stable  Description: INTERVENTIONS:  - Maternal surveillance  - Fetal surveillance  - Monitor uterine activity  - Medications as ordered  - Bedrest  Outcome: Progressing     Problem: DISCHARGE PLANNING - CARE MANAGEMENT  Goal: Discharge to post-acute care or home with appropriate resources  Description: INTERVENTIONS:  - Conduct assessment to determine patient/family and health care team treatment goals, and need for post-acute services based on payer coverage, community resources, and patient preferences, and barriers to discharge  - Address psychosocial, clinical, and financial barriers to discharge as identified in assessment in conjunction with the patient/family and health care team  - Arrange appropriate level of post-acute services according to patients   needs and preference and payer coverage in collaboration with the physician and health care team  - Communicate with and update the patient/family, physician, and health care team regarding progress on the discharge plan  - Arrange appropriate transportation to post-acute venues  Outcome: Progressing     Problem: Potential for Falls  Goal: Patient will remain free of falls  Description: INTERVENTIONS:  - Assess patient frequently for physical needs  -  Identify cognitive and physical deficits and behaviors that affect risk of falls    -  [place] fall precautions as indicated by assessment   - Educate patient/family on patient safety including physical limitations  - Instruct patient to call for assistance with activity based on assessment  - Modify environment to reduce risk of injury  - Consider OT/PT consult to assist with strengthening/mobility  Outcome: Progressing

## 2021-03-03 NOTE — PROGRESS NOTES
Progress Note - Maternal Fetal Medicine   Chris Chacon 27 y o  female MRN: [id number]  Unit/Bed#: -01 Encounter: [id number]    Assessment:  27 y o  [id number] at 27w6d admitted with PPROM  Hospital day 34    Plan:  1) PPROM: s/p latency antibiotics, magnesium sulfate x 12 hours, BTM 1/28-1/29, fetal ultrasound 2/23 showed fetus weighing 1033g in liang breech presentation, improved fetal ascites, anhydramnios, type and screen due 3/4  IgM antibodies for CMV tomorrow  2) T1DM: on insulin pump, settings adjusted 2/23  Basal rate 2 3 units/hr 3am-MN, 2 05 units/hr MN-3am  ICR 1:4, ISF 20, BG target 100  fasting blood sugars 95, postprandial blood sugars 100-130, continue to monitor closely, Diet Jayson/CHO controlled consistent carbohydrate Diet Level 1 with 4 carb servings/60 grams CHO/meal; follow up hba1c tomorrow  3) IUP @ 27 weeks: continuous fetal monitoring  4) DVT PPx: SCDs, ambulation    Subjective/Objective   Chief Complaint:     I'm doing okay    Subjective:     Patient reports no new changes  Fluid leaking is still clear    Contractions: no  Loss of fluid: yes  Vaginal bleeding: no  Fetal movement: yes    Objective:     Vitals:   Temp:  [97 8 °F (36 6 °C)-98 1 °F (36 7 °C)] 97 9 °F (36 6 °C)  HR:  [] 91  Resp:  [18] 18  BP: (113-117)/(67-75) 116/68       Physical Exam  Vitals signs and nursing note reviewed  Constitutional:       Appearance: She is well-developed  Cardiovascular:      Rate and Rhythm: Normal rate and regular rhythm  Pulmonary:      Effort: Pulmonary effort is normal       Breath sounds: Normal breath sounds  Abdominal:      General: There is no distension  Tenderness: There is no abdominal tenderness  There is no guarding or rebound  Comments: gravid  Musculoskeletal: Normal range of motion  General: No tenderness  Neurological:      Mental Status: She is alert and oriented to person, place, and time         Fetal Assessment:  FHT: 145bpm / Moderate 6 - 25 bpm / reactive, no decels  [place]: none    Lab Results   Component Value Date    WBC 12 57 (H) 02/24/2021    HGB 12 4 02/24/2021    HCT 37 9 02/24/2021    MCV 94 02/24/2021     02/24/2021       Lab Results   Component Value Date    CALCIUM 8 7 02/24/2021    K 3 8 02/24/2021    CO2 20 (L) 02/24/2021     02/24/2021    BUN 9 02/24/2021    CREATININE 0 62 02/24/2021       Lab Results   Component Value Date    ALT 15 02/24/2021    AST 12 02/24/2021    ALKPHOS 70 02/24/2021       Sarah Beth Rivera MD  OBGYN, PGY-4  3/3/2021  6:14 AM

## 2021-03-03 NOTE — PLAN OF CARE
Problem: PAIN - ADULT  Goal: Verbalizes/displays adequate comfort level or baseline comfort level  Description: Interventions:  - Encourage patient to monitor pain and request assistance  - Assess pain using appropriate pain scale  - Administer analgesics based on type and severity of pain and evaluate response  - Implement non-pharmacological measures as appropriate and evaluate response  - Consider cultural and social influences on pain and pain management  - Notify physician/advanced practitioner if interventions unsuccessful or patient reports new pain  Outcome: Progressing     Problem: INFECTION - ADULT  Goal: Absence or prevention of progression during hospitalization  Description: INTERVENTIONS:  - Assess and monitor for signs and symptoms of infection  - Monitor lab/diagnostic results  - Monitor all insertion sites, i e  indwelling lines, tubes, and drains  - Monitor endotracheal if appropriate and nasal secretions for changes in amount and color  - [place] appropriate cooling/warming therapies per order  - Administer medications as ordered  - Instruct and encourage patient and family to use good hand hygiene technique  - Identify and instruct in appropriate isolation precautions for identified infection/condition  Outcome: Progressing     Problem: SAFETY ADULT  Goal: Patient will remain free of falls  Description: INTERVENTIONS:  - Assess patient frequently for physical needs  -  Identify cognitive and physical deficits and behaviors that affect risk of falls    -  [place] fall precautions as indicated by assessment   - Educate patient/family on patient safety including physical limitations  - Instruct patient to call for assistance with activity based on assessment  - Modify environment to reduce risk of injury  - Consider OT/PT consult to assist with strengthening/mobility  Outcome: Progressing  Goal: Maintain or return to baseline ADL function  Description: INTERVENTIONS:  -  Assess patient's ability to carry out ADLs; assess patient's baseline for ADL function and identify physical deficits which impact ability to perform ADLs (bathing, care of mouth/teeth, toileting, grooming, dressing, etc )  - Assess/evaluate cause of self-care deficits   - Assess range of motion  - Assess patient's mobility; develop plan if impaired  - Assess patient's need for assistive devices and provide as appropriate  - Encourage maximum independence but intervene and supervise when necessary  - Involve family in performance of ADLs  - Assess for home care needs following discharge   - Consider OT consult to assist with ADL evaluation and planning for discharge  - Provide patient education as appropriate  Outcome: Progressing  Goal: Maintain or return mobility status to optimal level  Description: INTERVENTIONS:  - Assess patient's baseline mobility status (ambulation, transfers, stairs, etc )    - Identify cognitive and physical deficits and behaviors that affect mobility  - Identify mobility aids required to assist with transfers and/or ambulation (gait belt, sit-to-stand, lift, walker, cane, etc )  - [place] fall precautions as indicated by assessment  - Record patient progress and toleration of activity level on Mobility SBAR; progress patient to next Phase/Stage  - Instruct patient to call for assistance with activity based on assessment  - Consider rehabilitation consult to assist with strengthening/weightbearing, etc   Outcome: Progressing     Problem: DISCHARGE PLANNING  Goal: Discharge to home or other facility with appropriate resources  Description: INTERVENTIONS:  - Identify barriers to discharge w/patient and caregiver  - Arrange for needed discharge resources and transportation as appropriate  - Identify discharge learning needs (meds, wound care, etc )  - Arrange for interpretive services to assist at discharge as needed  - Refer to Case Management Department for coordinating discharge planning if the patient needs post-hospital services based on physician/advanced practitioner order or complex needs related to functional status, cognitive ability, or social support system  Outcome: Progressing     Problem: Labor & Delivery  Goal: Manages discomfort  Description: Assess and monitor for signs and symptoms of discomfort  Assess patient's pain level regularly and per hospital policy  Administer medications as ordered  Support use of nonpharmacological methods to help control pain such as distraction, imagery, relaxation, and application of heat and cold  Collaborate with interdisciplinary team and patient to determine appropriate pain management plan  1  Include patient in decisions related to comfort  2  Offer non-pharmacological pain management interventions  3  Report ineffective pain management to physician  Outcome: Progressing  Goal: Patient vital signs are stable  Description: 1  Assess vital signs - vaginal delivery    Outcome: Progressing     Problem: ANTEPARTUM  Goal: Maintain pregnancy as long as maternal and/or fetal condition is stable  Description: INTERVENTIONS:  - Maternal surveillance  - Fetal surveillance  - Monitor uterine activity  - Medications as ordered  - Bedrest  Outcome: Progressing     Problem: DISCHARGE PLANNING - CARE MANAGEMENT  Goal: Discharge to post-acute care or home with appropriate resources  Description: INTERVENTIONS:  - Conduct assessment to determine patient/family and health care team treatment goals, and need for post-acute services based on payer coverage, community resources, and patient preferences, and barriers to discharge  - Address psychosocial, clinical, and financial barriers to discharge as identified in assessment in conjunction with the patient/family and health care team  - Arrange appropriate level of post-acute services according to patients   needs and preference and payer coverage in collaboration with the physician and health care team  - Communicate with and update the patient/family, physician, and health care team regarding progress on the discharge plan  - Arrange appropriate transportation to post-acute venues  Outcome: Progressing     Problem: Potential for Falls  Goal: Patient will remain free of falls  Description: INTERVENTIONS:  - Assess patient frequently for physical needs  -  Identify cognitive and physical deficits and behaviors that affect risk of falls    -  [place] fall precautions as indicated by assessment   - Educate patient/family on patient safety including physical limitations  - Instruct patient to call for assistance with activity based on assessment  - Modify environment to reduce risk of injury  - Consider OT/PT consult to assist with strengthening/mobility  Outcome: Progressing

## 2021-03-03 NOTE — PROGRESS NOTES
Dillan Langston is a 27 y o  female on a/an Medtronic 670G insulin pump  Estimated Date of Delivery: 5/27/21   Currently: 27w6d  Encounter Diagnosis     ICD-10-CM    1  Pregnancy complicated by pre-existing type 1 diabetes, second trimester  O24 012    2  27 weeks gestation of pregnancy  Z3A 27    3  Oligohydramnios in poe pregnancy in second trimester  O41  02X0         Current Insulin pump settings:  Basal rate: MN@ 2 05 units/hour, 3 AM@ 2 30 units/hour, 8 AM@ 2 30 units/hour  Insulin to carb ratio:4  Insulin sensitivity factor:20  BG target:100  Type of insulin:Nolovog  Self-monitoring blood glucose reported by patient: Refer to download  CGM: yes Guardian connect, transmitter out of warranty  Using finger sticks for dosing insulin  Glucose ranges: 60 to 140 range 65% of the time; >140 range 33% and <60 2%  Patient reported last Wednesday, 2/24/21 having a scare with increase in glucose readings and now readings have improved  Reports hospital finger sticks may not correlate at times with post meal glucose readings  Diet:GDM diet with 3 meals and 3 snacks including recommended combination of carb, protein and fat per meal/snack  Also notified patient to call office with any issues  Plan:  Due to hyperglycemia during the day, increase 8 AM to MN basal from 2 30 to 2 50 units/hour, change made 3/2/21  Has decreased amount of times using bolus insulin and trying to use bolus advisor  Continue with rest of settings for now, if 2 hours post meal above 120, consider decreasing ICR and ISF with next download  Continue GDM diet and SMBG before meals and 2 hours post meal  Prefers to have glucose readings above 70 to 75  Currently inpatient at [facility]

## 2021-03-04 LAB
ABO GROUP BLD: NORMAL
BASOPHILS # BLD AUTO: 0.03 THOUSANDS/ΜL (ref 0–0.1)
BASOPHILS NFR BLD AUTO: 0 % (ref 0–1)
BLD GP AB SCN SERPL QL: NEGATIVE
EOSINOPHIL # BLD AUTO: 0.1 THOUSAND/ΜL (ref 0–0.61)
EOSINOPHIL NFR BLD AUTO: 1 % (ref 0–6)
ERYTHROCYTE [DISTWIDTH] IN BLOOD BY AUTOMATED COUNT: 13.2 % (ref 11.6–15.1)
EST. AVERAGE GLUCOSE BLD GHB EST-MCNC: 128 MG/DL
GLUCOSE SERPL-MCNC: 140 MG/DL (ref 65–140)
GLUCOSE SERPL-MCNC: 68 MG/DL (ref 65–140)
GLUCOSE SERPL-MCNC: 73 MG/DL (ref 65–140)
HBA1C MFR BLD: 6.1 %
HCT VFR BLD AUTO: 38.2 % (ref 34.8–46.1)
HGB BLD-MCNC: 12.2 G/DL (ref 11.5–15.4)
IMM GRANULOCYTES # BLD AUTO: 0.15 THOUSAND/UL (ref 0–0.2)
IMM GRANULOCYTES NFR BLD AUTO: 1 % (ref 0–2)
LYMPHOCYTES # BLD AUTO: 3.36 THOUSANDS/ΜL (ref 0.6–4.47)
LYMPHOCYTES NFR BLD AUTO: 24 % (ref 14–44)
MCH RBC QN AUTO: 30.3 PG (ref 26.8–34.3)
MCHC RBC AUTO-ENTMCNC: 31.9 G/DL (ref 31.4–37.4)
MCV RBC AUTO: 95 FL (ref 82–98)
MONOCYTES # BLD AUTO: 0.74 THOUSAND/ΜL (ref 0.17–1.22)
MONOCYTES NFR BLD AUTO: 5 % (ref 4–12)
NEUTROPHILS # BLD AUTO: 9.76 THOUSANDS/ΜL (ref 1.85–7.62)
NEUTS SEG NFR BLD AUTO: 69 % (ref 43–75)
NRBC BLD AUTO-RTO: 0 /100 WBCS
PLATELET # BLD AUTO: 210 THOUSANDS/UL (ref 149–390)
PMV BLD AUTO: 11.6 FL (ref 8.9–12.7)
RBC # BLD AUTO: 4.02 MILLION/UL (ref 3.81–5.12)
RH BLD: POSITIVE
SPECIMEN EXPIRATION DATE: NORMAL
WBC # BLD AUTO: 14.14 THOUSAND/UL (ref 4.31–10.16)

## 2021-03-04 PROCEDURE — 82948 REAGENT STRIP/BLOOD GLUCOSE: CPT

## 2021-03-04 PROCEDURE — 86900 BLOOD TYPING SEROLOGIC ABO: CPT | Performed by: OBSTETRICS & GYNECOLOGY

## 2021-03-04 PROCEDURE — 86901 BLOOD TYPING SEROLOGIC RH(D): CPT | Performed by: OBSTETRICS & GYNECOLOGY

## 2021-03-04 PROCEDURE — 86645 CMV ANTIBODY IGM: CPT | Performed by: OBSTETRICS & GYNECOLOGY

## 2021-03-04 PROCEDURE — 59025 FETAL NON-STRESS TEST: CPT | Performed by: OBSTETRICS & GYNECOLOGY

## 2021-03-04 PROCEDURE — 83036 HEMOGLOBIN GLYCOSYLATED A1C: CPT | Performed by: OBSTETRICS & GYNECOLOGY

## 2021-03-04 PROCEDURE — 99232 SBSQ HOSP IP/OBS MODERATE 35: CPT | Performed by: OBSTETRICS & GYNECOLOGY

## 2021-03-04 PROCEDURE — 86644 CMV ANTIBODY: CPT | Performed by: OBSTETRICS & GYNECOLOGY

## 2021-03-04 PROCEDURE — NC001 PR NO CHARGE: Performed by: OBSTETRICS & GYNECOLOGY

## 2021-03-04 PROCEDURE — 86850 RBC ANTIBODY SCREEN: CPT | Performed by: OBSTETRICS & GYNECOLOGY

## 2021-03-04 PROCEDURE — 85025 COMPLETE CBC W/AUTO DIFF WBC: CPT | Performed by: STUDENT IN AN ORGANIZED HEALTH CARE EDUCATION/TRAINING PROGRAM

## 2021-03-04 RX ORDER — DIAPER,BRIEF,INFANT-TODD,DISP
EACH MISCELLANEOUS 4 TIMES DAILY PRN
Status: DISCONTINUED | OUTPATIENT
Start: 2021-03-04 | End: 2021-03-16

## 2021-03-04 RX ORDER — SODIUM CHLORIDE 9 MG/ML
999 INJECTION, SOLUTION INTRAVENOUS ONCE
Status: COMPLETED | OUTPATIENT
Start: 2021-03-03 | End: 2021-03-04

## 2021-03-04 RX ADMIN — METFORMIN ER 500 MG 1500 MG: 500 TABLET ORAL at 21:49

## 2021-03-04 RX ADMIN — DOCUSATE SODIUM 100 MG: 100 CAPSULE, LIQUID FILLED ORAL at 08:11

## 2021-03-04 RX ADMIN — FOLIC ACID 1 MG: 1 TABLET ORAL at 08:11

## 2021-03-04 RX ADMIN — OXYCODONE HYDROCHLORIDE AND ACETAMINOPHEN 1000 MG: 500 TABLET ORAL at 08:11

## 2021-03-04 RX ADMIN — DOCUSATE SODIUM 100 MG: 100 CAPSULE, LIQUID FILLED ORAL at 17:19

## 2021-03-04 RX ADMIN — Medication 2000 UNITS: at 10:29

## 2021-03-04 RX ADMIN — Medication 1 TABLET: at 08:11

## 2021-03-04 RX ADMIN — HYDROCORTISONE: 1 CREAM TOPICAL at 17:20

## 2021-03-04 RX ADMIN — ASPIRIN 81 MG CHEWABLE TABLET 162 MG: 81 TABLET CHEWABLE at 21:48

## 2021-03-04 NOTE — PROGRESS NOTES
Progress Note - Maternal Fetal Medicine   Keli Horne 27 y o  female MRN: [id number]  Unit/Bed#: -01 Encounter: [id number]    Assessment:  27 y o  [id number] at 28w0d admitted with PPROM  Hospital day 35    Plan:  1) PPROM: s/p latency antibiotics, magnesium sulfate x 12 hours, BTM 1/28-1/29, fetal ultrasound 2/23 showed fetus weighing 1033g in liang breech presentation  Last fluid check 3/3: oligohydramnios (2 3cm), improved fetal ascites  Type and screen due today  F/u IgM antibodies for CMV, hgb a1c   2) T1DM: on insulin pump, settings adjusted 2/23  Basal rate 2 3 units/hr 3am-MN, 2 05 units/hr MN-3am  ICR 1:4, ISF 20, BG target 100  fasting blood sugars 73-89, postprandial blood sugars 100-157, continue to monitor closely, Diet Jayson/CHO controlled consistent carbohydrate Diet Level 1 with 4 carb servings/60 grams CHO/meal; follow up hba1c tomorrow  3) IUP @ 28 weeks: continuous fetal monitoring  4) DVT PPx: SCDs, ambulation    Subjective/Objective   Chief Complaint:     I'm doing okay    Subjective:     Patient reports no new changes  Fluid leaking is still clear    Contractions: no  Loss of fluid: yes  Vaginal bleeding: no  Fetal movement: yes    Objective:     Vitals:   Temp:  [97 8 °F (36 6 °C)-98 2 °F (36 8 °C)] 97 9 °F (36 6 °C)  HR:  [] 90  Resp:  [16-18] 16  BP: (115-125)/(60-79) 119/60       Physical Exam  Vitals signs and nursing note reviewed  Constitutional:       Appearance: She is well-developed  Cardiovascular:      Rate and Rhythm: Normal rate and regular rhythm  Pulmonary:      Effort: Pulmonary effort is normal       Breath sounds: Normal breath sounds  Abdominal:      General: There is no distension  Tenderness: There is no abdominal tenderness  There is no guarding or rebound  Comments: gravid  Musculoskeletal: Normal range of motion  General: No tenderness  Neurological:      Mental Status: She is alert and oriented to person, place, and time  Fetal Assessment:  FHT: 135bpm / Moderate 6 - 25 bpm / reactive, @2230 prolonged decel for 5 min  [place]: none    Lab Results   Component Value Date    WBC 14 14 (H) 03/04/2021    HGB 12 2 03/04/2021    HCT 38 2 03/04/2021    MCV 95 03/04/2021     03/04/2021       Lab Results   Component Value Date    CALCIUM 8 7 02/24/2021    K 3 8 02/24/2021    CO2 20 (L) 02/24/2021     02/24/2021    BUN 9 02/24/2021    CREATININE 0 62 02/24/2021       Lab Results   Component Value Date    ALT 15 02/24/2021    AST 12 02/24/2021    ALKPHOS 70 02/24/2021       Sarah Beth Wong MD  OBGYN, PGY-4  3/4/2021  7:03 AM

## 2021-03-04 NOTE — PLAN OF CARE
Problem: PAIN - ADULT  Goal: Verbalizes/displays adequate comfort level or baseline comfort level  Description: Interventions:  - Encourage patient to monitor pain and request assistance  - Assess pain using appropriate pain scale  - Administer analgesics based on type and severity of pain and evaluate response  - Implement non-pharmacological measures as appropriate and evaluate response  - Consider cultural and social influences on pain and pain management  - Notify physician/advanced practitioner if interventions unsuccessful or patient reports new pain  Outcome: Progressing     Problem: INFECTION - ADULT  Goal: Absence or prevention of progression during hospitalization  Description: INTERVENTIONS:  - Assess and monitor for signs and symptoms of infection  - Monitor lab/diagnostic results  - Monitor all insertion sites, i e  indwelling lines, tubes, and drains  - Monitor endotracheal if appropriate and nasal secretions for changes in amount and color  - [place] appropriate cooling/warming therapies per order  - Administer medications as ordered  - Instruct and encourage patient and family to use good hand hygiene technique  - Identify and instruct in appropriate isolation precautions for identified infection/condition  Outcome: Progressing     Problem: SAFETY ADULT  Goal: Patient will remain free of falls  Description: INTERVENTIONS:  - Assess patient frequently for physical needs  -  Identify cognitive and physical deficits and behaviors that affect risk of falls    -  [place] fall precautions as indicated by assessment   - Educate patient/family on patient safety including physical limitations  - Instruct patient to call for assistance with activity based on assessment  - Modify environment to reduce risk of injury  - Consider OT/PT consult to assist with strengthening/mobility  Outcome: Progressing  Goal: Maintain or return to baseline ADL function  Description: INTERVENTIONS:  -  Assess patient's ability to carry out ADLs; assess patient's baseline for ADL function and identify physical deficits which impact ability to perform ADLs (bathing, care of mouth/teeth, toileting, grooming, dressing, etc )  - Assess/evaluate cause of self-care deficits   - Assess range of motion  - Assess patient's mobility; develop plan if impaired  - Assess patient's need for assistive devices and provide as appropriate  - Encourage maximum independence but intervene and supervise when necessary  - Involve family in performance of ADLs  - Assess for home care needs following discharge   - Consider OT consult to assist with ADL evaluation and planning for discharge  - Provide patient education as appropriate  Outcome: Progressing  Goal: Maintain or return mobility status to optimal level  Description: INTERVENTIONS:  - Assess patient's baseline mobility status (ambulation, transfers, stairs, etc )    - Identify cognitive and physical deficits and behaviors that affect mobility  - Identify mobility aids required to assist with transfers and/or ambulation (gait belt, sit-to-stand, lift, walker, cane, etc )  - [place] fall precautions as indicated by assessment  - Record patient progress and toleration of activity level on Mobility SBAR; progress patient to next Phase/Stage  - Instruct patient to call for assistance with activity based on assessment  - Consider rehabilitation consult to assist with strengthening/weightbearing, etc   Outcome: Progressing     Problem: DISCHARGE PLANNING  Goal: Discharge to home or other facility with appropriate resources  Description: INTERVENTIONS:  - Identify barriers to discharge w/patient and caregiver  - Arrange for needed discharge resources and transportation as appropriate  - Identify discharge learning needs (meds, wound care, etc )  - Arrange for interpretive services to assist at discharge as needed  - Refer to Case Management Department for coordinating discharge planning if the patient needs post-hospital services based on physician/advanced practitioner order or complex needs related to functional status, cognitive ability, or social support system  Outcome: Progressing     Problem: Labor & Delivery  Goal: Manages discomfort  Description: Assess and monitor for signs and symptoms of discomfort  Assess patient's pain level regularly and per hospital policy  Administer medications as ordered  Support use of nonpharmacological methods to help control pain such as distraction, imagery, relaxation, and application of heat and cold  Collaborate with interdisciplinary team and patient to determine appropriate pain management plan  1  Include patient in decisions related to comfort  2  Offer non-pharmacological pain management interventions  3  Report ineffective pain management to physician  Outcome: Progressing  Goal: Patient vital signs are stable  Description: 1  Assess vital signs - vaginal delivery    Outcome: Progressing     Problem: ANTEPARTUM  Goal: Maintain pregnancy as long as maternal and/or fetal condition is stable  Description: INTERVENTIONS:  - Maternal surveillance  - Fetal surveillance  - Monitor uterine activity  - Medications as ordered  - Bedrest  Outcome: Progressing     Problem: DISCHARGE PLANNING - CARE MANAGEMENT  Goal: Discharge to post-acute care or home with appropriate resources  Description: INTERVENTIONS:  - Conduct assessment to determine patient/family and health care team treatment goals, and need for post-acute services based on payer coverage, community resources, and patient preferences, and barriers to discharge  - Address psychosocial, clinical, and financial barriers to discharge as identified in assessment in conjunction with the patient/family and health care team  - Arrange appropriate level of post-acute services according to patients   needs and preference and payer coverage in collaboration with the physician and health care team  - Communicate with and update the patient/family, physician, and health care team regarding progress on the discharge plan  - Arrange appropriate transportation to post-acute venues  Outcome: Progressing     Problem: Potential for Falls  Goal: Patient will remain free of falls  Description: INTERVENTIONS:  - Assess patient frequently for physical needs  -  Identify cognitive and physical deficits and behaviors that affect risk of falls    -  [place] fall precautions as indicated by assessment   - Educate patient/family on patient safety including physical limitations  - Instruct patient to call for assistance with activity based on assessment  - Modify environment to reduce risk of injury  - Consider OT/PT consult to assist with strengthening/mobility  Outcome: Progressing

## 2021-03-05 LAB
CMV IGG SERPL IA-ACNC: 8.8 U/ML (ref 0–0.59)
CMV IGM SERPL IA-ACNC: <30 AU/ML (ref 0–29.9)
GLUCOSE SERPL-MCNC: 100 MG/DL (ref 65–140)
GLUCOSE SERPL-MCNC: 117 MG/DL (ref 65–140)
GLUCOSE SERPL-MCNC: 122 MG/DL (ref 65–140)
GLUCOSE SERPL-MCNC: 128 MG/DL (ref 65–140)
GLUCOSE SERPL-MCNC: 146 MG/DL (ref 65–140)
GLUCOSE SERPL-MCNC: 159 MG/DL (ref 65–140)
GLUCOSE SERPL-MCNC: 189 MG/DL (ref 65–140)
GLUCOSE SERPL-MCNC: 86 MG/DL (ref 65–140)

## 2021-03-05 PROCEDURE — 59025 FETAL NON-STRESS TEST: CPT | Performed by: OBSTETRICS & GYNECOLOGY

## 2021-03-05 PROCEDURE — 99232 SBSQ HOSP IP/OBS MODERATE 35: CPT | Performed by: OBSTETRICS & GYNECOLOGY

## 2021-03-05 PROCEDURE — 82948 REAGENT STRIP/BLOOD GLUCOSE: CPT

## 2021-03-05 RX ADMIN — OXYCODONE HYDROCHLORIDE AND ACETAMINOPHEN 1000 MG: 500 TABLET ORAL at 09:54

## 2021-03-05 RX ADMIN — Medication 2000 UNITS: at 09:54

## 2021-03-05 RX ADMIN — FOLIC ACID 1 MG: 1 TABLET ORAL at 09:54

## 2021-03-05 RX ADMIN — METFORMIN ER 500 MG 1500 MG: 500 TABLET ORAL at 22:45

## 2021-03-05 RX ADMIN — ASPIRIN 81 MG CHEWABLE TABLET 162 MG: 81 TABLET CHEWABLE at 22:45

## 2021-03-05 RX ADMIN — Medication 1 TABLET: at 09:54

## 2021-03-05 RX ADMIN — DOCUSATE SODIUM 100 MG: 100 CAPSULE, LIQUID FILLED ORAL at 20:27

## 2021-03-05 RX ADMIN — DOCUSATE SODIUM 100 MG: 100 CAPSULE, LIQUID FILLED ORAL at 09:54

## 2021-03-05 NOTE — PROGRESS NOTES
Progress Note - Maternal Fetal Medicine   Tab Mood 27 y o  female MRN: [id number]  Unit/Bed#:  330-01 Encounter: [id number]    Assessment:  27 y o  [id number] at 28w1d admitted with PPROM  Hospital day 36    Plan:  1) PPROM: s/p latency antibiotics, magnesium sulfate x 12 hours, BTM 1/28-1/29, fetal ultrasound 2/23 showed fetus weighing 1033g in liang breech presentation  Last fluid check 3/3: oligohydramnios (2 3cm), improved fetal ascites  2) T1DM: on insulin pump, settings adjusted 2/23  Basal rate 2 3 units/hr 3am-MN, 2 05 units/hr MN-3am  ICR 1:4, ISF 20, BG target 100  fasting blood sugars 73-77, postprandial blood sugars 60-160s, continue to monitor closely, Diet J[place]/CHO controlled consistent carbohydrate Diet Level 1 with 4 carb servings/60 grams CHO/meal; Hba1c: 6 1    3) IUP @ 28 weeks: continuous fetal monitoring  4) DVT PPx: SCDs, ambulation    Subjective/Objective   Chief Complaint:     I'm doing okay    Subjective:     Patient reports no new changes  Fluid leaking is still clear    Contractions: no  Loss of fluid: yes  Vaginal bleeding: no  Fetal movement: yes    Objective:     Vitals:   Temp:  [97 9 °F (36 6 °C)-98 3 °F (36 8 °C)] 97 9 °F (36 6 °C)  HR:  [] 85  Resp:  [18-20] 18  BP: ()/(54-70) 115/61       Physical Exam  Vitals signs and nursing note reviewed  Constitutional:       Appearance: She is well-developed  Cardiovascular:      Rate and Rhythm: Normal rate and regular rhythm  Pulmonary:      Effort: Pulmonary effort is normal       Breath sounds: Normal breath sounds  Abdominal:      General: There is no distension  Tenderness: There is no abdominal tenderness  There is no guarding or rebound  Comments: gravid  Musculoskeletal: Normal range of motion  General: No tenderness  Neurological:      Mental Status: She is alert and oriented to person, place, and time         Fetal Assessment:  FHT: 145bpm / Moderate 6 - 25 bpm / reactive, @2225 prolonged decel for 5 min  [place]: none    Lab Results   Component Value Date    WBC 14 14 (H) 03/04/2021    HGB 12 2 03/04/2021    HCT 38 2 03/04/2021    MCV 95 03/04/2021     03/04/2021       Lab Results   Component Value Date    CALCIUM 8 7 02/24/2021    K 3 8 02/24/2021    CO2 20 (L) 02/24/2021     02/24/2021    BUN 9 02/24/2021    CREATININE 0 62 02/24/2021       Lab Results   Component Value Date    ALT 15 02/24/2021    AST 12 02/24/2021    ALKPHOS 70 02/24/2021       Thi Thi Gerhard Boxer, MD  OBGYN, PGY-4  3/5/2021  6:48 AM

## 2021-03-05 NOTE — PLAN OF CARE
Problem: PAIN - ADULT  Goal: Verbalizes/displays adequate comfort level or baseline comfort level  Description: Interventions:  - Encourage patient to monitor pain and request assistance  - Assess pain using appropriate pain scale  - Administer analgesics based on type and severity of pain and evaluate response  - Implement non-pharmacological measures as appropriate and evaluate response  - Consider cultural and social influences on pain and pain management  - Notify physician/advanced practitioner if interventions unsuccessful or patient reports new pain  Outcome: Progressing     Problem: INFECTION - ADULT  Goal: Absence or prevention of progression during hospitalization  Description: INTERVENTIONS:  - Assess and monitor for signs and symptoms of infection  - Monitor lab/diagnostic results  - Monitor all insertion sites, i e  indwelling lines, tubes, and drains  - Monitor endotracheal if appropriate and nasal secretions for changes in amount and color  - [place] appropriate cooling/warming therapies per order  - Administer medications as ordered  - Instruct and encourage patient and family to use good hand hygiene technique  - Identify and instruct in appropriate isolation precautions for identified infection/condition  Outcome: Progressing     Problem: SAFETY ADULT  Goal: Patient will remain free of falls  Description: INTERVENTIONS:  - Assess patient frequently for physical needs  -  Identify cognitive and physical deficits and behaviors that affect risk of falls    -  [place] fall precautions as indicated by assessment   - Educate patient/family on patient safety including physical limitations  - Instruct patient to call for assistance with activity based on assessment  - Modify environment to reduce risk of injury  - Consider OT/PT consult to assist with strengthening/mobility  Outcome: Progressing  Goal: Maintain or return to baseline ADL function  Description: INTERVENTIONS:  -  Assess patient's ability to carry out ADLs; assess patient's baseline for ADL function and identify physical deficits which impact ability to perform ADLs (bathing, care of mouth/teeth, toileting, grooming, dressing, etc )  - Assess/evaluate cause of self-care deficits   - Assess range of motion  - Assess patient's mobility; develop plan if impaired  - Assess patient's need for assistive devices and provide as appropriate  - Encourage maximum independence but intervene and supervise when necessary  - Involve family in performance of ADLs  - Assess for home care needs following discharge   - Consider OT consult to assist with ADL evaluation and planning for discharge  - Provide patient education as appropriate  Outcome: Progressing  Goal: Maintain or return mobility status to optimal level  Description: INTERVENTIONS:  - Assess patient's baseline mobility status (ambulation, transfers, stairs, etc )    - Identify cognitive and physical deficits and behaviors that affect mobility  - Identify mobility aids required to assist with transfers and/or ambulation (gait belt, sit-to-stand, lift, walker, cane, etc )  - [place] fall precautions as indicated by assessment  - Record patient progress and toleration of activity level on Mobility SBAR; progress patient to next Phase/Stage  - Instruct patient to call for assistance with activity based on assessment  - Consider rehabilitation consult to assist with strengthening/weightbearing, etc   Outcome: Progressing     Problem: DISCHARGE PLANNING  Goal: Discharge to home or other facility with appropriate resources  Description: INTERVENTIONS:  - Identify barriers to discharge w/patient and caregiver  - Arrange for needed discharge resources and transportation as appropriate  - Identify discharge learning needs (meds, wound care, etc )  - Arrange for interpretive services to assist at discharge as needed  - Refer to Case Management Department for coordinating discharge planning if the patient needs post-hospital services based on physician/advanced practitioner order or complex needs related to functional status, cognitive ability, or social support system  Outcome: Progressing     Problem: Labor & Delivery  Goal: Manages discomfort  Description: Assess and monitor for signs and symptoms of discomfort  Assess patient's pain level regularly and per hospital policy  Administer medications as ordered  Support use of nonpharmacological methods to help control pain such as distraction, imagery, relaxation, and application of heat and cold  Collaborate with interdisciplinary team and patient to determine appropriate pain management plan  1  Include patient in decisions related to comfort  2  Offer non-pharmacological pain management interventions  3  Report ineffective pain management to physician  Outcome: Progressing  Goal: Patient vital signs are stable  Description: 1  Assess vital signs - vaginal delivery    Outcome: Progressing     Problem: ANTEPARTUM  Goal: Maintain pregnancy as long as maternal and/or fetal condition is stable  Description: INTERVENTIONS:  - Maternal surveillance  - Fetal surveillance  - Monitor uterine activity  - Medications as ordered  - Bedrest  Outcome: Progressing     Problem: DISCHARGE PLANNING - CARE MANAGEMENT  Goal: Discharge to post-acute care or home with appropriate resources  Description: INTERVENTIONS:  - Conduct assessment to determine patient/family and health care team treatment goals, and need for post-acute services based on payer coverage, community resources, and patient preferences, and barriers to discharge  - Address psychosocial, clinical, and financial barriers to discharge as identified in assessment in conjunction with the patient/family and health care team  - Arrange appropriate level of post-acute services according to patients   needs and preference and payer coverage in collaboration with the physician and health care team  - Communicate with and update the patient/family, physician, and health care team regarding progress on the discharge plan  - Arrange appropriate transportation to post-acute venues  Outcome: Progressing     Problem: Potential for Falls  Goal: Patient will remain free of falls  Description: INTERVENTIONS:  - Assess patient frequently for physical needs  -  Identify cognitive and physical deficits and behaviors that affect risk of falls    -  [place] fall precautions as indicated by assessment   - Educate patient/family on patient safety including physical limitations  - Instruct patient to call for assistance with activity based on assessment  - Modify environment to reduce risk of injury  - Consider OT/PT consult to assist with strengthening/mobility  Outcome: Progressing

## 2021-03-06 PROBLEM — O41.03X0 OLIGOHYDRAMNIOS ANTEPARTUM, THIRD TRIMESTER, NOT APPLICABLE OR UNSPECIFIED FETUS: Status: ACTIVE | Noted: 2021-01-13

## 2021-03-06 LAB
ABO GROUP BLD: NORMAL
BASOPHILS # BLD AUTO: 0.03 THOUSANDS/ΜL (ref 0–0.1)
BASOPHILS NFR BLD AUTO: 0 % (ref 0–1)
BLD GP AB SCN SERPL QL: NEGATIVE
EOSINOPHIL # BLD AUTO: 0.04 THOUSAND/ΜL (ref 0–0.61)
EOSINOPHIL NFR BLD AUTO: 0 % (ref 0–6)
ERYTHROCYTE [DISTWIDTH] IN BLOOD BY AUTOMATED COUNT: 13.2 % (ref 11.6–15.1)
GLUCOSE SERPL-MCNC: 100 MG/DL (ref 65–140)
GLUCOSE SERPL-MCNC: 120 MG/DL (ref 65–140)
GLUCOSE SERPL-MCNC: 140 MG/DL (ref 65–140)
GLUCOSE SERPL-MCNC: 153 MG/DL (ref 65–140)
GLUCOSE SERPL-MCNC: 167 MG/DL (ref 65–140)
GLUCOSE SERPL-MCNC: 179 MG/DL (ref 65–140)
GLUCOSE SERPL-MCNC: 79 MG/DL (ref 65–140)
GLUCOSE SERPL-MCNC: 80 MG/DL (ref 65–140)
HCT VFR BLD AUTO: 37.2 % (ref 34.8–46.1)
HGB BLD-MCNC: 12 G/DL (ref 11.5–15.4)
IMM GRANULOCYTES # BLD AUTO: 0.13 THOUSAND/UL (ref 0–0.2)
IMM GRANULOCYTES NFR BLD AUTO: 1 % (ref 0–2)
LYMPHOCYTES # BLD AUTO: 2.08 THOUSANDS/ΜL (ref 0.6–4.47)
LYMPHOCYTES NFR BLD AUTO: 16 % (ref 14–44)
MCH RBC QN AUTO: 30.2 PG (ref 26.8–34.3)
MCHC RBC AUTO-ENTMCNC: 32.3 G/DL (ref 31.4–37.4)
MCV RBC AUTO: 94 FL (ref 82–98)
MONOCYTES # BLD AUTO: 0.68 THOUSAND/ΜL (ref 0.17–1.22)
MONOCYTES NFR BLD AUTO: 5 % (ref 4–12)
NEUTROPHILS # BLD AUTO: 9.97 THOUSANDS/ΜL (ref 1.85–7.62)
NEUTS SEG NFR BLD AUTO: 78 % (ref 43–75)
NRBC BLD AUTO-RTO: 0 /100 WBCS
PLATELET # BLD AUTO: 199 THOUSANDS/UL (ref 149–390)
PMV BLD AUTO: 11.3 FL (ref 8.9–12.7)
RBC # BLD AUTO: 3.97 MILLION/UL (ref 3.81–5.12)
RH BLD: POSITIVE
SPECIMEN EXPIRATION DATE: NORMAL
WBC # BLD AUTO: 12.93 THOUSAND/UL (ref 4.31–10.16)

## 2021-03-06 PROCEDURE — 82948 REAGENT STRIP/BLOOD GLUCOSE: CPT

## 2021-03-06 PROCEDURE — 99231 SBSQ HOSP IP/OBS SF/LOW 25: CPT | Performed by: OBSTETRICS & GYNECOLOGY

## 2021-03-06 PROCEDURE — 59025 FETAL NON-STRESS TEST: CPT | Performed by: OBSTETRICS & GYNECOLOGY

## 2021-03-06 PROCEDURE — 86901 BLOOD TYPING SEROLOGIC RH(D): CPT | Performed by: OBSTETRICS & GYNECOLOGY

## 2021-03-06 PROCEDURE — 86900 BLOOD TYPING SEROLOGIC ABO: CPT | Performed by: OBSTETRICS & GYNECOLOGY

## 2021-03-06 PROCEDURE — NC001 PR NO CHARGE: Performed by: OBSTETRICS & GYNECOLOGY

## 2021-03-06 PROCEDURE — 86850 RBC ANTIBODY SCREEN: CPT | Performed by: OBSTETRICS & GYNECOLOGY

## 2021-03-06 PROCEDURE — 85025 COMPLETE CBC W/AUTO DIFF WBC: CPT | Performed by: OBSTETRICS & GYNECOLOGY

## 2021-03-06 RX ADMIN — FOLIC ACID 1 MG: 1 TABLET ORAL at 08:29

## 2021-03-06 RX ADMIN — DOCUSATE SODIUM 100 MG: 100 CAPSULE, LIQUID FILLED ORAL at 08:28

## 2021-03-06 RX ADMIN — Medication 1 TABLET: at 08:28

## 2021-03-06 RX ADMIN — Medication 2000 UNITS: at 10:27

## 2021-03-06 RX ADMIN — ASPIRIN 81 MG CHEWABLE TABLET 162 MG: 81 TABLET CHEWABLE at 22:05

## 2021-03-06 RX ADMIN — DOCUSATE SODIUM 100 MG: 100 CAPSULE, LIQUID FILLED ORAL at 18:43

## 2021-03-06 RX ADMIN — METFORMIN ER 500 MG 1500 MG: 500 TABLET ORAL at 22:04

## 2021-03-06 RX ADMIN — OXYCODONE HYDROCHLORIDE AND ACETAMINOPHEN 1000 MG: 500 TABLET ORAL at 08:28

## 2021-03-06 NOTE — DISCHARGE SUMMARY
Discharge Summary - Gynecology  Fred Ojeda 27 y o  female MRN: [id number]  Unit/Bed#: -01 Encounter: [id number]    Admission Date: 2021   Discharge Date: 21    Attending Physician:   Sparkle David Physician(s):   BRIAN  NICU    Admitting Diagnosis:   PPROM  Type 1 DM  IUP at 23 weeks  Fetal ascites  Oligohydramnios    Discharge Diagnosis:   1LTCS  Type 1 DM    Procedures Performed:   LTCS    HPI:    Hospital Course - Problem Based:  Patient is a 26 yo  at 23w0d who was admitted for known PPROM and anhydraminos at 17 weeks  Patient received latency antibiotics, course of BTM -, and IV magnesium on admission  Patient received intermittent montiroing and weekly ultrasounds to monitor fluid level and fetal ascites  Her glucose was also monitored and required uptitration of insulin with her insulin pump as the pregnancy progressed  She was inpatient for observation for 47 days until she ultimately went into labor at 29w5d  Due to fetal breech presentation, patient had primary  section  She delivered a viable male  on 3/16/2021 @ 65  Apgars were 6 and 8 at 1 min and 5 min respectively  Post-operatively, Dequankathrine Black was meeting her milestones appropriately  She was discharged on POD# 4 in good condition and was ambulating, voiding, and passing flatus       Lab Results:   Lab Results   Component Value Date    WBC 14 14 (H) 2021    HGB 12 2 2021    HCT 38 2 2021    MCV 95 2021     2021     Lab Results   Component Value Date    CALCIUM 8 7 2021    K 3 8 2021    CO2 20 (L) 2021     2021    BUN 9 2021    CREATININE 0 62 2021     Lab Results   Component Value Date/Time    POCGLU 189 (H) 2021 08:30 PM    POCGLU 117 2021 06:14 PM    POCGLU 159 (H) 2021 03:20 PM    POCGLU 100 2021 12:45 PM    POCGLU 146 (H) 2021 10:24 AM     No results found for: PTT  No results found for: INR, PROTIME    Complications:  None  Condition at Discharge:   good     Discharge Medications:   See after visit summary for reconciled discharge medications provided to patient and family  Discharge instructions: See after visit summary for complete information  Do not place anything (no partner, tampons or douche) in your vagina for 6 weeks  You may walk for exercise for the first 6 weeks then gradually return to your usual activities     Please do not drive until tolerating pain and off of narcotics     You may take baths or shower per your preference     Please look at your incision in the mirror daily and call for redness or tenderness or increased warmth   Call us for increasing redness or steady drainage from the incision     Please call us for temperature > 100 4*F or 38* C, worsening pain or a foul discharge  Provisions for Follow-Up Care:   See after visit summary for information related to follow-up care and any pertinent home health orders        Disposition: Home  Planned Readmission: No    Rafael Almanza MD, PGY-3  3/22/2021  5:38 PM

## 2021-03-06 NOTE — PLAN OF CARE
Problem: PAIN - ADULT  Goal: Verbalizes/displays adequate comfort level or baseline comfort level  Description: Interventions:  - Encourage patient to monitor pain and request assistance  - Assess pain using appropriate pain scale  - Administer analgesics based on type and severity of pain and evaluate response  - Implement non-pharmacological measures as appropriate and evaluate response  - Consider cultural and social influences on pain and pain management  - Notify physician/advanced practitioner if interventions unsuccessful or patient reports new pain  Outcome: Progressing     Problem: INFECTION - ADULT  Goal: Absence or prevention of progression during hospitalization  Description: INTERVENTIONS:  - Assess and monitor for signs and symptoms of infection  - Monitor lab/diagnostic results  - Monitor all insertion sites, i e  indwelling lines, tubes, and drains  - Monitor endotracheal if appropriate and nasal secretions for changes in amount and color  - [place] appropriate cooling/warming therapies per order  - Administer medications as ordered  - Instruct and encourage patient and family to use good hand hygiene technique  - Identify and instruct in appropriate isolation precautions for identified infection/condition  Outcome: Progressing     Problem: SAFETY ADULT  Goal: Patient will remain free of falls  Description: INTERVENTIONS:  - Assess patient frequently for physical needs  -  Identify cognitive and physical deficits and behaviors that affect risk of falls    -  [place] fall precautions as indicated by assessment   - Educate patient/family on patient safety including physical limitations  - Instruct patient to call for assistance with activity based on assessment  - Modify environment to reduce risk of injury  - Consider OT/PT consult to assist with strengthening/mobility  Outcome: Progressing  Goal: Maintain or return to baseline ADL function  Description: INTERVENTIONS:  -  Assess patient's ability to carry out ADLs; assess patient's baseline for ADL function and identify physical deficits which impact ability to perform ADLs (bathing, care of mouth/teeth, toileting, grooming, dressing, etc )  - Assess/evaluate cause of self-care deficits   - Assess range of motion  - Assess patient's mobility; develop plan if impaired  - Assess patient's need for assistive devices and provide as appropriate  - Encourage maximum independence but intervene and supervise when necessary  - Involve family in performance of ADLs  - Assess for home care needs following discharge   - Consider OT consult to assist with ADL evaluation and planning for discharge  - Provide patient education as appropriate  Outcome: Progressing  Goal: Maintain or return mobility status to optimal level  Description: INTERVENTIONS:  - Assess patient's baseline mobility status (ambulation, transfers, stairs, etc )    - Identify cognitive and physical deficits and behaviors that affect mobility  - Identify mobility aids required to assist with transfers and/or ambulation (gait belt, sit-to-stand, lift, walker, cane, etc )  - [place] fall precautions as indicated by assessment  - Record patient progress and toleration of activity level on Mobility SBAR; progress patient to next Phase/Stage  - Instruct patient to call for assistance with activity based on assessment  - Consider rehabilitation consult to assist with strengthening/weightbearing, etc   Outcome: Progressing     Problem: DISCHARGE PLANNING  Goal: Discharge to home or other facility with appropriate resources  Description: INTERVENTIONS:  - Identify barriers to discharge w/patient and caregiver  - Arrange for needed discharge resources and transportation as appropriate  - Identify discharge learning needs (meds, wound care, etc )  - Arrange for interpretive services to assist at discharge as needed  - Refer to Case Management Department for coordinating discharge planning if the patient needs post-hospital services based on physician/advanced practitioner order or complex needs related to functional status, cognitive ability, or social support system  Outcome: Progressing     Problem: Labor & Delivery  Goal: Manages discomfort  Description: Assess and monitor for signs and symptoms of discomfort  Assess patient's pain level regularly and per hospital policy  Administer medications as ordered  Support use of nonpharmacological methods to help control pain such as distraction, imagery, relaxation, and application of heat and cold  Collaborate with interdisciplinary team and patient to determine appropriate pain management plan  1  Include patient in decisions related to comfort  2  Offer non-pharmacological pain management interventions  3  Report ineffective pain management to physician  Outcome: Progressing  Goal: Patient vital signs are stable  Description: 1  Assess vital signs - vaginal delivery    Outcome: Progressing     Problem: ANTEPARTUM  Goal: Maintain pregnancy as long as maternal and/or fetal condition is stable  Description: INTERVENTIONS:  - Maternal surveillance  - Fetal surveillance  - Monitor uterine activity  - Medications as ordered  - Bedrest  Outcome: Progressing     Problem: DISCHARGE PLANNING - CARE MANAGEMENT  Goal: Discharge to post-acute care or home with appropriate resources  Description: INTERVENTIONS:  - Conduct assessment to determine patient/family and health care team treatment goals, and need for post-acute services based on payer coverage, community resources, and patient preferences, and barriers to discharge  - Address psychosocial, clinical, and financial barriers to discharge as identified in assessment in conjunction with the patient/family and health care team  - Arrange appropriate level of post-acute services according to patients   needs and preference and payer coverage in collaboration with the physician and health care team  - Communicate with and update the patient/family, physician, and health care team regarding progress on the discharge plan  - Arrange appropriate transportation to post-acute venues  Outcome: Progressing     Problem: Potential for Falls  Goal: Patient will remain free of falls  Description: INTERVENTIONS:  - Assess patient frequently for physical needs  -  Identify cognitive and physical deficits and behaviors that affect risk of falls    -  [place] fall precautions as indicated by assessment   - Educate patient/family on patient safety including physical limitations  - Instruct patient to call for assistance with activity based on assessment  - Modify environment to reduce risk of injury  - Consider OT/PT consult to assist with strengthening/mobility  Outcome: Progressing

## 2021-03-06 NOTE — PROGRESS NOTES
Progress Note - Maternal Fetal Medicine   Carmen Linares 27 y o  female MRN: [id number]  Unit/Bed#: -01 Encounter: [id number]    Assessment:  27 y o  [id number] at 28w2d admitted with PPROM  Hospital day 37    Plan:  1) PPROM:   - s/p latency antibiotics, magnesium sulfate x 12 hours, BTM 1/28-1/29  - Fetal ultrasound 2/23 showed fetus weighing 1033g in liang breech presentation  Last fluid check 3/3: oligohydramnios (2 3cm), improved fetal ascites  - TORCH titers wnl    2) T1DM:   - on insulin pump, settings adjusted 2/23    - Basal rate 2 3 units/hr 3am-MN, 2 05 units/hr MN-3am  ICR 1:4, ISF 20, BG target 100  fasting blood sugars 73-77, postprandial blood sugars 60-160s, continue to monitor closely, Diet [place]/CHO controlled consistent carbohydrate Diet Level 1 with 4 carb servings/60 grams CHO/meal; Hba1c: 6 1      3) IUP @ 28 weeks:   - continuous fetal monitoring  - S/P NICU consult    4) DVT PPx: SCDs, ambulation    Subjective/Objective     Subjective:     Patient has some new abdominal tenderness today  She denies contractions or vaginal bleeding  She has some clear discharge  She reports good fetal movement  Objective:     Vitals:   Temp:  [97 9 °F (36 6 °C)-98 3 °F (36 8 °C)] 98 2 °F (36 8 °C)  HR:  [] 94  Resp:  [18-20] 18  BP: (113-134)/(71-76) 113/71       Physical Exam  Vitals signs reviewed  Exam conducted with a chaperone present  Constitutional:       General: She is not in acute distress  Appearance: Normal appearance  She is normal weight  She is not ill-appearing or toxic-appearing  Cardiovascular:      Rate and Rhythm: Normal rate  Pulmonary:      Effort: Pulmonary effort is normal  No respiratory distress  Abdominal:      General: Abdomen is flat  There is no distension  Palpations: Abdomen is soft  There is no mass  Tenderness: There is abdominal tenderness (Very mild; worsens with uterine displacement; No fundal tenderness)  There is no guarding  Neurological:      Mental Status: She is alert           Lab Results   Component Value Date    WBC 14 14 (H) 03/04/2021    HGB 12 2 03/04/2021    HCT 38 2 03/04/2021    MCV 95 03/04/2021     03/04/2021       Lab Results   Component Value Date    CALCIUM 8 7 02/24/2021    K 3 8 02/24/2021    CO2 20 (L) 02/24/2021     02/24/2021    BUN 9 02/24/2021    CREATININE 0 62 02/24/2021       Lab Results   Component Value Date    ALT 15 02/24/2021    AST 12 02/24/2021    ALKPHOS 70 02/24/2021       Abbey Coello MD  OBGYN, PGY-3  3/6/2021  7:51 AM

## 2021-03-07 DIAGNOSIS — O24.012 PREGNANCY COMPLICATED BY PRE-EXISTING TYPE 1 DIABETES, SECOND TRIMESTER: ICD-10-CM

## 2021-03-07 DIAGNOSIS — Z3A.28 28 WEEKS GESTATION OF PREGNANCY: Primary | ICD-10-CM

## 2021-03-07 LAB
GLUCOSE SERPL-MCNC: 119 MG/DL (ref 65–140)
GLUCOSE SERPL-MCNC: 122 MG/DL (ref 65–140)
GLUCOSE SERPL-MCNC: 122 MG/DL (ref 65–140)
GLUCOSE SERPL-MCNC: 141 MG/DL (ref 65–140)
GLUCOSE SERPL-MCNC: 156 MG/DL (ref 65–140)
GLUCOSE SERPL-MCNC: 85 MG/DL (ref 65–140)
GLUCOSE SERPL-MCNC: 86 MG/DL (ref 65–140)

## 2021-03-07 PROCEDURE — 59025 FETAL NON-STRESS TEST: CPT | Performed by: OBSTETRICS & GYNECOLOGY

## 2021-03-07 PROCEDURE — 99233 SBSQ HOSP IP/OBS HIGH 50: CPT | Performed by: OBSTETRICS & GYNECOLOGY

## 2021-03-07 PROCEDURE — 82948 REAGENT STRIP/BLOOD GLUCOSE: CPT

## 2021-03-07 PROCEDURE — NC001 PR NO CHARGE: Performed by: OBSTETRICS & GYNECOLOGY

## 2021-03-07 RX ORDER — BLOOD SUGAR DIAGNOSTIC
STRIP MISCELLANEOUS
Qty: 200 EACH | Refills: 0 | Status: SHIPPED | OUTPATIENT
Start: 2021-03-07

## 2021-03-07 RX ADMIN — Medication 1 TABLET: at 08:17

## 2021-03-07 RX ADMIN — DOCUSATE SODIUM 100 MG: 100 CAPSULE, LIQUID FILLED ORAL at 17:23

## 2021-03-07 RX ADMIN — DOCUSATE SODIUM 100 MG: 100 CAPSULE, LIQUID FILLED ORAL at 08:17

## 2021-03-07 RX ADMIN — ASPIRIN 81 MG CHEWABLE TABLET 162 MG: 81 TABLET CHEWABLE at 22:29

## 2021-03-07 RX ADMIN — FOLIC ACID 1 MG: 1 TABLET ORAL at 08:17

## 2021-03-07 RX ADMIN — METFORMIN ER 500 MG 1500 MG: 500 TABLET ORAL at 22:29

## 2021-03-07 RX ADMIN — OXYCODONE HYDROCHLORIDE AND ACETAMINOPHEN 1000 MG: 500 TABLET ORAL at 08:17

## 2021-03-07 RX ADMIN — ENOXAPARIN SODIUM 40 MG: 40 INJECTION SUBCUTANEOUS at 14:26

## 2021-03-07 RX ADMIN — Medication 2000 UNITS: at 08:16

## 2021-03-07 NOTE — PROGRESS NOTES
Progress Note - Maternal Fetal Medicine   Elina Kinney 27 y o  female MRN: [id number]  Unit/Bed#: -01 Encounter: [id number]    Assessment:  27 y o  [id number] at 28w3d admitted with PPROM  Hospital day 38    Plan:  1) PPROM:   - s/p latency antibiotics, magnesium sulfate x 12 hours, BTM 1/28-1/29  - Fetal ultrasound 2/23 showed fetus weighing 1033g in liang breech presentation  Last fluid check 3/3: oligohydramnios (2 3cm), improved fetal ascites  - TORCH titers wnl  - No signs or symptoms of infection at this time; WBC yesterday was 12 93    2) T1DM:   - on insulin pump, settings adjusted 2/23    - Basal rate 2 3 units/hr 3am-MN, 2 05 units/hr MN-3am  ICR 1:4, ISF 20, BG target 100  fasting blood sugars 73-77, postprandial blood sugars 60-160s, continue to monitor closely, Diet [place]/CHO controlled consistent carbohydrate Diet Level 1 with 4 carb servings/60 grams CHO/meal; Hba1c: 6 1      3) IUP @ 28 weeks:   - continuous fetal monitoring  - S/P NICU consult    4) DVT PPx: SCDs, ambulation    Subjective/Objective     Subjective:     Samara Clements states that she has discovered the source of her new abdominal pain from yesterday - she states that she has been sleeping on her side with her abdomen unsupported and therefore has been feeling round ligament pain  She denies contractions or vaginal bleeding  She has some clear discharge that is normal for her at this point  She reports good fetal movement  Objective:     Vitals:   Temp:  [97 1 °F (36 2 °C)-99 1 °F (37 3 °C)] 97 1 °F (36 2 °C)  HR:  [] 76  Resp:  [16-18] 16  BP: (113-119)/(59-72) 119/72       Physical Exam  Vitals signs reviewed  Exam conducted with a chaperone present  Constitutional:       General: She is not in acute distress  Appearance: Normal appearance  She is normal weight  She is not ill-appearing or toxic-appearing  Cardiovascular:      Rate and Rhythm: Normal rate     Pulmonary:      Effort: Pulmonary effort is normal  No respiratory distress  Abdominal:      General: Abdomen is flat  There is no distension  Palpations: Abdomen is soft  There is no mass  Tenderness: There is no abdominal tenderness  There is no guarding  Neurological:      Mental Status: She is alert           Lab Results   Component Value Date    WBC 12 93 (H) 03/06/2021    HGB 12 0 03/06/2021    HCT 37 2 03/06/2021    MCV 94 03/06/2021     03/06/2021       Lab Results   Component Value Date    CALCIUM 8 7 02/24/2021    K 3 8 02/24/2021    CO2 20 (L) 02/24/2021     02/24/2021    BUN 9 02/24/2021    CREATININE 0 62 02/24/2021       Lab Results   Component Value Date    ALT 15 02/24/2021    AST 12 02/24/2021    ALKPHOS 70 02/24/2021       Edgardo Cervantes MD  OBGYN, PGY-3  3/7/2021  5:45 AM

## 2021-03-07 NOTE — PLAN OF CARE
Problem: PAIN - ADULT  Goal: Verbalizes/displays adequate comfort level or baseline comfort level  Description: Interventions:  - Encourage patient to monitor pain and request assistance  - Assess pain using appropriate pain scale  - Administer analgesics based on type and severity of pain and evaluate response  - Implement non-pharmacological measures as appropriate and evaluate response  - Consider cultural and social influences on pain and pain management  - Notify physician/advanced practitioner if interventions unsuccessful or patient reports new pain  Outcome: Progressing     Problem: INFECTION - ADULT  Goal: Absence or prevention of progression during hospitalization  Description: INTERVENTIONS:  - Assess and monitor for signs and symptoms of infection  - Monitor lab/diagnostic results  - Monitor all insertion sites, i e  indwelling lines, tubes, and drains  - Monitor endotracheal if appropriate and nasal secretions for changes in amount and color  - [place] appropriate cooling/warming therapies per order  - Administer medications as ordered  - Instruct and encourage patient and family to use good hand hygiene technique  - Identify and instruct in appropriate isolation precautions for identified infection/condition  Outcome: Progressing     Problem: SAFETY ADULT  Goal: Patient will remain free of falls  Description: INTERVENTIONS:  - Assess patient frequently for physical needs  -  Identify cognitive and physical deficits and behaviors that affect risk of falls    -  [place] fall precautions as indicated by assessment   - Educate patient/family on patient safety including physical limitations  - Instruct patient to call for assistance with activity based on assessment  - Modify environment to reduce risk of injury  - Consider OT/PT consult to assist with strengthening/mobility  Outcome: Progressing  Goal: Maintain or return to baseline ADL function  Description: INTERVENTIONS:  -  Assess patient's ability to carry out ADLs; assess patient's baseline for ADL function and identify physical deficits which impact ability to perform ADLs (bathing, care of mouth/teeth, toileting, grooming, dressing, etc )  - Assess/evaluate cause of self-care deficits   - Assess range of motion  - Assess patient's mobility; develop plan if impaired  - Assess patient's need for assistive devices and provide as appropriate  - Encourage maximum independence but intervene and supervise when necessary  - Involve family in performance of ADLs  - Assess for home care needs following discharge   - Consider OT consult to assist with ADL evaluation and planning for discharge  - Provide patient education as appropriate  Outcome: Progressing  Goal: Maintain or return mobility status to optimal level  Description: INTERVENTIONS:  - Assess patient's baseline mobility status (ambulation, transfers, stairs, etc )    - Identify cognitive and physical deficits and behaviors that affect mobility  - Identify mobility aids required to assist with transfers and/or ambulation (gait belt, sit-to-stand, lift, walker, cane, etc )  - [place] fall precautions as indicated by assessment  - Record patient progress and toleration of activity level on Mobility SBAR; progress patient to next Phase/Stage  - Instruct patient to call for assistance with activity based on assessment  - Consider rehabilitation consult to assist with strengthening/weightbearing, etc   Outcome: Progressing     Problem: DISCHARGE PLANNING  Goal: Discharge to home or other facility with appropriate resources  Description: INTERVENTIONS:  - Identify barriers to discharge w/patient and caregiver  - Arrange for needed discharge resources and transportation as appropriate  - Identify discharge learning needs (meds, wound care, etc )  - Arrange for interpretive services to assist at discharge as needed  - Refer to Case Management Department for coordinating discharge planning if the patient needs post-hospital services based on physician/advanced practitioner order or complex needs related to functional status, cognitive ability, or social support system  Outcome: Progressing     Problem: Labor & Delivery  Goal: Manages discomfort  Description: Assess and monitor for signs and symptoms of discomfort  Assess patient's pain level regularly and per hospital policy  Administer medications as ordered  Support use of nonpharmacological methods to help control pain such as distraction, imagery, relaxation, and application of heat and cold  Collaborate with interdisciplinary team and patient to determine appropriate pain management plan  1  Include patient in decisions related to comfort  2  Offer non-pharmacological pain management interventions  3  Report ineffective pain management to physician  Outcome: Progressing  Goal: Patient vital signs are stable  Description: 1  Assess vital signs - vaginal delivery    Outcome: Progressing     Problem: ANTEPARTUM  Goal: Maintain pregnancy as long as maternal and/or fetal condition is stable  Description: INTERVENTIONS:  - Maternal surveillance  - Fetal surveillance  - Monitor uterine activity  - Medications as ordered  - Bedrest  Outcome: Progressing     Problem: DISCHARGE PLANNING - CARE MANAGEMENT  Goal: Discharge to post-acute care or home with appropriate resources  Description: INTERVENTIONS:  - Conduct assessment to determine patient/family and health care team treatment goals, and need for post-acute services based on payer coverage, community resources, and patient preferences, and barriers to discharge  - Address psychosocial, clinical, and financial barriers to discharge as identified in assessment in conjunction with the patient/family and health care team  - Arrange appropriate level of post-acute services according to patients   needs and preference and payer coverage in collaboration with the physician and health care team  - Communicate with and update the patient/family, physician, and health care team regarding progress on the discharge plan  - Arrange appropriate transportation to post-acute venues  Outcome: Progressing     Problem: Potential for Falls  Goal: Patient will remain free of falls  Description: INTERVENTIONS:  - Assess patient frequently for physical needs  -  Identify cognitive and physical deficits and behaviors that affect risk of falls    -  [place] fall precautions as indicated by assessment   - Educate patient/family on patient safety including physical limitations  - Instruct patient to call for assistance with activity based on assessment  - Modify environment to reduce risk of injury  - Consider OT/PT consult to assist with strengthening/mobility  Outcome: Progressing

## 2021-03-08 ENCOUNTER — DOCUMENTATION (OUTPATIENT)
Dept: PERINATAL CARE | Facility: CLINIC | Age: 31
End: 2021-03-08

## 2021-03-08 DIAGNOSIS — O24.013 TYPE 1 DIABETES MELLITUS AFFECTING PREGNANCY IN THIRD TRIMESTER, ANTEPARTUM: Primary | ICD-10-CM

## 2021-03-08 DIAGNOSIS — Z3A.28 28 WEEKS GESTATION OF PREGNANCY: ICD-10-CM

## 2021-03-08 PROBLEM — O09.813 PREGNANCY RESULTING FROM IN VITRO FERTILIZATION IN THIRD TRIMESTER: Status: ACTIVE | Noted: 2020-12-29

## 2021-03-08 LAB
GLUCOSE SERPL-MCNC: 103 MG/DL (ref 65–140)
GLUCOSE SERPL-MCNC: 126 MG/DL (ref 65–140)
GLUCOSE SERPL-MCNC: 165 MG/DL (ref 65–140)
GLUCOSE SERPL-MCNC: 192 MG/DL (ref 65–140)
GLUCOSE SERPL-MCNC: 76 MG/DL (ref 65–140)
GLUCOSE SERPL-MCNC: 91 MG/DL (ref 65–140)
GLUCOSE SERPL-MCNC: 95 MG/DL (ref 65–140)

## 2021-03-08 PROCEDURE — NC001 PR NO CHARGE: Performed by: OBSTETRICS & GYNECOLOGY

## 2021-03-08 PROCEDURE — 99232 SBSQ HOSP IP/OBS MODERATE 35: CPT | Performed by: OBSTETRICS & GYNECOLOGY

## 2021-03-08 PROCEDURE — 59025 FETAL NON-STRESS TEST: CPT | Performed by: OBSTETRICS & GYNECOLOGY

## 2021-03-08 PROCEDURE — 82948 REAGENT STRIP/BLOOD GLUCOSE: CPT

## 2021-03-08 RX ADMIN — DOCUSATE SODIUM 100 MG: 100 CAPSULE, LIQUID FILLED ORAL at 18:27

## 2021-03-08 RX ADMIN — METFORMIN ER 500 MG 1500 MG: 500 TABLET ORAL at 22:05

## 2021-03-08 RX ADMIN — ENOXAPARIN SODIUM 40 MG: 40 INJECTION SUBCUTANEOUS at 09:56

## 2021-03-08 RX ADMIN — HYDROCORTISONE: 1 CREAM TOPICAL at 22:53

## 2021-03-08 RX ADMIN — DOCUSATE SODIUM 100 MG: 100 CAPSULE, LIQUID FILLED ORAL at 09:57

## 2021-03-08 RX ADMIN — Medication 1 TABLET: at 09:57

## 2021-03-08 RX ADMIN — FOLIC ACID 1 MG: 1 TABLET ORAL at 09:58

## 2021-03-08 RX ADMIN — OXYCODONE HYDROCHLORIDE AND ACETAMINOPHEN 1000 MG: 500 TABLET ORAL at 09:57

## 2021-03-08 RX ADMIN — Medication 2000 UNITS: at 09:58

## 2021-03-08 RX ADMIN — ASPIRIN 81 MG CHEWABLE TABLET 162 MG: 81 TABLET CHEWABLE at 22:05

## 2021-03-08 NOTE — QUICK NOTE
Quick Note    I assessed the PICC line ([place] peripheral IV line)  There was noted to be leakage on distal part of catheter  No endurance and signs of infection       MD JANEEN Horton, PGY-3  3/7/2021  11:21 PM

## 2021-03-08 NOTE — PLAN OF CARE
Problem: PAIN - ADULT  Goal: Verbalizes/displays adequate comfort level or baseline comfort level  Description: Interventions:  - Encourage patient to monitor pain and request assistance  - Assess pain using appropriate pain scale  - Administer analgesics based on type and severity of pain and evaluate response  - Implement non-pharmacological measures as appropriate and evaluate response  - Consider cultural and social influences on pain and pain management  - Notify physician/advanced practitioner if interventions unsuccessful or patient reports new pain  Outcome: Progressing     Problem: INFECTION - ADULT  Goal: Absence or prevention of progression during hospitalization  Description: INTERVENTIONS:  - Assess and monitor for signs and symptoms of infection  - Monitor lab/diagnostic results  - Monitor all insertion sites, i e  indwelling lines, tubes, and drains  - Monitor endotracheal if appropriate and nasal secretions for changes in amount and color  - [place] appropriate cooling/warming therapies per order  - Administer medications as ordered  - Instruct and encourage patient and family to use good hand hygiene technique  - Identify and instruct in appropriate isolation precautions for identified infection/condition  Outcome: Progressing     Problem: SAFETY ADULT  Goal: Patient will remain free of falls  Description: INTERVENTIONS:  - Assess patient frequently for physical needs  -  Identify cognitive and physical deficits and behaviors that affect risk of falls    -  [place] fall precautions as indicated by assessment   - Educate patient/family on patient safety including physical limitations  - Instruct patient to call for assistance with activity based on assessment  - Modify environment to reduce risk of injury  - Consider OT/PT consult to assist with strengthening/mobility  Outcome: Progressing  Goal: Maintain or return to baseline ADL function  Description: INTERVENTIONS:  -  Assess patient's ability to carry out ADLs; assess patient's baseline for ADL function and identify physical deficits which impact ability to perform ADLs (bathing, care of mouth/teeth, toileting, grooming, dressing, etc )  - Assess/evaluate cause of self-care deficits   - Assess range of motion  - Assess patient's mobility; develop plan if impaired  - Assess patient's need for assistive devices and provide as appropriate  - Encourage maximum independence but intervene and supervise when necessary  - Involve family in performance of ADLs  - Assess for home care needs following discharge   - Consider OT consult to assist with ADL evaluation and planning for discharge  - Provide patient education as appropriate  Outcome: Progressing  Goal: Maintain or return mobility status to optimal level  Description: INTERVENTIONS:  - Assess patient's baseline mobility status (ambulation, transfers, stairs, etc )    - Identify cognitive and physical deficits and behaviors that affect mobility  - Identify mobility aids required to assist with transfers and/or ambulation (gait belt, sit-to-stand, lift, walker, cane, etc )  - [place] fall precautions as indicated by assessment  - Record patient progress and toleration of activity level on Mobility SBAR; progress patient to next Phase/Stage  - Instruct patient to call for assistance with activity based on assessment  - Consider rehabilitation consult to assist with strengthening/weightbearing, etc   Outcome: Progressing     Problem: DISCHARGE PLANNING  Goal: Discharge to home or other facility with appropriate resources  Description: INTERVENTIONS:  - Identify barriers to discharge w/patient and caregiver  - Arrange for needed discharge resources and transportation as appropriate  - Identify discharge learning needs (meds, wound care, etc )  - Arrange for interpretive services to assist at discharge as needed  - Refer to Case Management Department for coordinating discharge planning if the patient needs post-hospital services based on physician/advanced practitioner order or complex needs related to functional status, cognitive ability, or social support system  Outcome: Progressing     Problem: Labor & Delivery  Goal: Manages discomfort  Description: Assess and monitor for signs and symptoms of discomfort  Assess patient's pain level regularly and per hospital policy  Administer medications as ordered  Support use of nonpharmacological methods to help control pain such as distraction, imagery, relaxation, and application of heat and cold  Collaborate with interdisciplinary team and patient to determine appropriate pain management plan  1  Include patient in decisions related to comfort  2  Offer non-pharmacological pain management interventions  3  Report ineffective pain management to physician  Outcome: Progressing  Goal: Patient vital signs are stable  Description: 1  Assess vital signs - vaginal delivery    Outcome: Progressing     Problem: ANTEPARTUM  Goal: Maintain pregnancy as long as maternal and/or fetal condition is stable  Description: INTERVENTIONS:  - Maternal surveillance  - Fetal surveillance  - Monitor uterine activity  - Medications as ordered  - Bedrest  Outcome: Progressing     Problem: DISCHARGE PLANNING - CARE MANAGEMENT  Goal: Discharge to post-acute care or home with appropriate resources  Description: INTERVENTIONS:  - Conduct assessment to determine patient/family and health care team treatment goals, and need for post-acute services based on payer coverage, community resources, and patient preferences, and barriers to discharge  - Address psychosocial, clinical, and financial barriers to discharge as identified in assessment in conjunction with the patient/family and health care team  - Arrange appropriate level of post-acute services according to patients   needs and preference and payer coverage in collaboration with the physician and health care team  - Communicate with and update the patient/family, physician, and health care team regarding progress on the discharge plan  - Arrange appropriate transportation to post-acute venues  Outcome: Progressing     Problem: Potential for Falls  Goal: Patient will remain free of falls  Description: INTERVENTIONS:  - Assess patient frequently for physical needs  -  Identify cognitive and physical deficits and behaviors that affect risk of falls    -  [place] fall precautions as indicated by assessment   - Educate patient/family on patient safety including physical limitations  - Instruct patient to call for assistance with activity based on assessment  - Modify environment to reduce risk of injury  - Consider OT/PT consult to assist with strengthening/mobility  Outcome: Progressing

## 2021-03-08 NOTE — PROGRESS NOTES
Anneliese Greenwood is a 27 y o  female on a/an Medtronic 670G insulin pump  Estimated Date of Delivery: 5/27/21   Currently: 27w6d  Encounter Diagnosis     ICD-10-CM    1  Type 1 diabetes mellitus affecting pregnancy in third trimester, antepartum  O24 013    2  28 weeks gestation of pregnancy  Z3A 28         Current Insulin pump settings:  Basal rate: MN@ 2 05 units/hour, 3 AM@ 2 30 units/hour, 8 AM@ 2 50 units/hour  Insulin to carb ratio:4  Insulin sensitivity factor:20  BG target:100  Type of insulin:Nolovog  Self-monitoring blood glucose reported by patient: Unable to attach download  CGM: yes Guardian connect, transmitter out of warranty  Using finger sticks for dosing insulin  Glucose ranges: 60 to 140 range 65% of the time; >140 range 34% and <60 1%  Patient reported last Wednesday, 2/24/21 having a scare with increase in glucose readings and now readings have improved  Reports hospital finger sticks may not correlate at times with post meal glucose readings  Diet:GDM diet with 3 meals and 3 snacks including recommended combination of carb, protein and fat per meal/snack  Also notified patient to call office with any issues  Plan:  Due to 2 hours PP>120, ICR decreased to 3 and ISF decreased to 15  Currently using 140 units TDD a day  May need an adjustment to basal settings during the day with next download  Continue GDM diet and SMBG before meals and 2 hours post meal  Prefers to have glucose readings above 70 to 75  Currently inpatient at [facility]

## 2021-03-08 NOTE — PROGRESS NOTES
Progress Note - Maternal Fetal Medicine   Migdalia Matthew 27 y o  female MRN: [id number]  Unit/Bed#: -01 Encounter: [id number]    Assessment:  27 y o  [id number] at 28w4d admitted with PPROM  Hospital day 44    Plan:  1) PPROM:   - s/p latency antibiotics, magnesium sulfate x 12 hours, BTM 1/28-1/29  - Fetal ultrasound 2/23 showed fetus weighing 1033g in liang breech presentation  Last fluid check 3/3: oligohydramnios (2 3cm), improved fetal ascites  - TORCH titers wnl  - No signs or symptoms of infection at this time    2) T1DM:   - on insulin pump, settings adjusted 3/8/2021  - 2 05 Midnight to 3, 2 3 u at 3 am to 8 am and 2 u at 8 am to midnight  - Basal rate 2 3 units/hr 3am-MN, 2 05 units/hr MN-3am  -  Insulin to carb ratio 1:4 for breakfast, 1:3 for dinner and decrease her correction factor 1:15  - Diet Jayson/CHO controlled consistent carbohydrate Diet Level 1 with 4 carb servings/60 grams CHO/meal  - Fasting blood sugar this AM 85      3) IUP @ 28 weeks:   - continuous fetal monitoring  - S/P NICU consult, patient wanting everything done    4) IV access  - PICC line noted to be leaking with use   - Will plan to have PICC team reassess this morning for possible replacement    5) DVT PPx: SCDs, ambulation, Lovenox 40mg daily    Subjective/Objective     Subjective: Zeeshan Boyd is feeling well this morning  She has no complaints at this time  She reports that the round ligament pain that she had yesterday is improved  She continues to have leaking of clear fluid and endorses fetal movement  She denies vaginal bleeding or contractions  She denies fevers/chills  Objective:     Vitals:   Temp:  [96 3 °F (35 7 °C)-98 3 °F (36 8 °C)] 98 1 °F (36 7 °C)  HR:  [] 87  Resp:  [14-18] 18  BP: (108-117)/(63-72) 117/72     FHT: 130 bpm, moderate variability, accelerations, no decelerations  [place]: No contractions      Physical Exam  Vitals signs reviewed  Exam conducted with a chaperone present     Constitutional: General: She is not in acute distress  Appearance: Normal appearance  She is normal weight  She is not ill-appearing or toxic-appearing  Cardiovascular:      Rate and Rhythm: Normal rate  Pulmonary:      Effort: Pulmonary effort is normal  No respiratory distress  Abdominal:      General: Abdomen is flat  There is no distension  Palpations: Abdomen is soft  There is no mass  Tenderness: There is no abdominal tenderness  There is no guarding  Neurological:      Mental Status: She is alert           Lab Results   Component Value Date    WBC 12 93 (H) 03/06/2021    HGB 12 0 03/06/2021    HCT 37 2 03/06/2021    MCV 94 03/06/2021     03/06/2021       Lab Results   Component Value Date    CALCIUM 8 7 02/24/2021    K 3 8 02/24/2021    CO2 20 (L) 02/24/2021     02/24/2021    BUN 9 02/24/2021    CREATININE 0 62 02/24/2021       Lab Results   Component Value Date    ALT 15 02/24/2021    AST 12 02/24/2021    ALKPHOS 70 02/24/2021       Stephanie Fallon MD  OBGYN, PGY-3  3/8/2021  6:04 AM

## 2021-03-08 NOTE — QUICK NOTE
Midline access leaking at insertion site with saline flush  No overt signs of infection noted at this time  Will discontinue midline access at this time and insert peripheral access at this time  PICC team to insert PICC line in       Cassandra Heimlich, MD, PGY-3  3/8/2021  2:04 PM

## 2021-03-08 NOTE — UTILIZATION REVIEW
Continued Stay Review    Date: 3/8/2021                          Current Patient Class: IP  [facility]    HPI:30 y o  female initially admitted on  2021 with PPROM @ 23 4/7 wks rupture since 17 wks gestation    Assessment/Plan: 27 y o   at 28w4d admitted with PPROM  Had round ligament pain yesterday - improved today  She continues to have leaking of clear fluid and endorses fetal movement  She denies vaginal bleeding or contractions  PPROM - s/p latency antibiotics, magnesium sulfate x 12 hours, BTM -  - Fetal ultrasound  showed fetus weighing 1033g in liang breech presentation  Last fluid check 3/3: oligohydramnios (2 3cm), improved fetal ascites  - TORCH titers wnl   - No signs or symptoms of infection at this time   T1DM: - on insulin pump, settings adjusted 3/8/2021  - 2 05 Midnight to 3, 2 3 u at 3 am to 8 am and 2 u at 8 am to midnight    - Basal rate 2 3 units/hr 3am-MN, 2 05 units/hr MN-3am  -- Fasting blood sugar this AM 85   IUP @ 28 weeks:  continuous fetal monitoring- S/P NICU consult, patient wanting everything done   IV access- PICC line noted to be leaking with use - Will plan to have PICC team reassess this morning for possible replacement   DVT PPx: SCDs, ambulation, Lovenox 40mg daily      Pertinent Labs/Diagnostic Results:   Results from last 7 days   Lab Units 21  1024 21  0609   WBC Thousand/uL 12 93* 14 14*   HEMOGLOBIN g/dL 12 0 12 2   HEMATOCRIT % 37 2 38 2   PLATELETS Thousands/uL 199 210   NEUTROS ABS Thousands/µL 9 97* 9 76*     Results from last 7 days   Lab Units 21  1045 21  0805 21  0617 21  2105 21  1858 21  1721 21  1432 21  1054 21  0803 21  0607 21  2039 21  1846   POC GLUCOSE mg/dl 165* 91 95 141* 122 119 122 156* 85 86 179* 80     Results from last 7 days   Lab Units 21  0608   HEMOGLOBIN A1C % 6 1*   EAG mg/dl 128     Vital Signs:   21 1237  98 4 °F (36 9 °C)  --  --  --  --  -- --   03/08/21 0802  97 6 °F (36 4 °C)  97  18  115/74  96 %  None (Room air) Sitting     Medications:   Scheduled Medications:  vitamin C, 1,000 mg, Oral, Daily  aspirin, 162 mg, Oral, Daily  cholecalciferol, 2,000 Units, Oral, Daily  docusate sodium, 100 mg, Oral, BID  enoxaparin, 40 mg, Subcutaneous, J46A IRAJ  folic acid, 1 mg, Oral, Daily  metFORMIN, 1,500 mg, Oral, Daily  patient maintained insulin pump, 1 each, Subcutaneous, Q8H  prenatal multivitamin, 1 tablet, Oral, Daily    Continuous IV Infusions:  PRN Meds:  acetaminophen, 650 mg, Oral, Q4H PRN  bisacodyl, 5 mg, Oral, Daily PRN  calcium carbonate, 1,000 mg, Oral, Daily PRN  calcium gluconate, 1 g, Intravenous, Demand  hydrocortisone, , Topical, 4x Daily PRN  insulin aspart, 300 Units, Subcutaneous Insulin Pump, Daily PRN  polyethylene glycol, 17 g, Oral, Daily PRN    Discharge Plan: home after delivery    Network Utilization Review Department  ATTENTION: Please call with any questions or concerns to [phone] and carefully listen to the prompts so that you are directed to the right person  All voicemails are confidential   Marie John all requests for admission clinical reviews, approved or denied determinations and any other requests to dedicated fax number below belonging to the campus where the patient is receiving treatment   List of dedicated fax numbers for the Facilities:  [address] DENIALS (Administrative/Medical Necessity) [phone]   [address] (Maternity/NICU/Pediatrics) [phone]   [address] Damian Rey 0917 (Renetta Hilario "Jinny" 103) [address] [facility] [phone] 187 [facility] [place] Agu[address] [place]) [address] [place], [place] [address] [phone]

## 2021-03-09 ENCOUNTER — APPOINTMENT (INPATIENT)
Dept: RADIOLOGY | Facility: HOSPITAL | Age: 31
End: 2021-03-09
Payer: COMMERCIAL

## 2021-03-09 LAB
ABO GROUP BLD: NORMAL
BLD GP AB SCN SERPL QL: NEGATIVE
GLUCOSE SERPL-MCNC: 119 MG/DL (ref 65–140)
GLUCOSE SERPL-MCNC: 135 MG/DL (ref 65–140)
GLUCOSE SERPL-MCNC: 162 MG/DL (ref 65–140)
GLUCOSE SERPL-MCNC: 165 MG/DL (ref 65–140)
GLUCOSE SERPL-MCNC: 67 MG/DL (ref 65–140)
GLUCOSE SERPL-MCNC: 98 MG/DL (ref 65–140)
RH BLD: POSITIVE
SPECIMEN EXPIRATION DATE: NORMAL

## 2021-03-09 PROCEDURE — 36569 INSJ PICC 5 YR+ W/O IMAGING: CPT

## 2021-03-09 PROCEDURE — 02HV33Z INSERTION OF INFUSION DEVICE INTO SUPERIOR VENA CAVA, PERCUTANEOUS APPROACH: ICD-10-PCS | Performed by: OBSTETRICS & GYNECOLOGY

## 2021-03-09 PROCEDURE — 99232 SBSQ HOSP IP/OBS MODERATE 35: CPT | Performed by: OBSTETRICS & GYNECOLOGY

## 2021-03-09 PROCEDURE — 59025 FETAL NON-STRESS TEST: CPT | Performed by: OBSTETRICS & GYNECOLOGY

## 2021-03-09 PROCEDURE — 71045 X-RAY EXAM CHEST 1 VIEW: CPT

## 2021-03-09 PROCEDURE — 86901 BLOOD TYPING SEROLOGIC RH(D): CPT | Performed by: OBSTETRICS & GYNECOLOGY

## 2021-03-09 PROCEDURE — 82948 REAGENT STRIP/BLOOD GLUCOSE: CPT

## 2021-03-09 PROCEDURE — 86850 RBC ANTIBODY SCREEN: CPT | Performed by: OBSTETRICS & GYNECOLOGY

## 2021-03-09 PROCEDURE — C1751 CATH, INF, PER/CENT/MIDLINE: HCPCS

## 2021-03-09 PROCEDURE — NC001 PR NO CHARGE: Performed by: OBSTETRICS & GYNECOLOGY

## 2021-03-09 PROCEDURE — 86900 BLOOD TYPING SEROLOGIC ABO: CPT | Performed by: OBSTETRICS & GYNECOLOGY

## 2021-03-09 RX ADMIN — ENOXAPARIN SODIUM 40 MG: 40 INJECTION SUBCUTANEOUS at 09:27

## 2021-03-09 RX ADMIN — Medication 1 TABLET: at 09:27

## 2021-03-09 RX ADMIN — Medication 2000 UNITS: at 09:27

## 2021-03-09 RX ADMIN — OXYCODONE HYDROCHLORIDE AND ACETAMINOPHEN 1000 MG: 500 TABLET ORAL at 09:27

## 2021-03-09 RX ADMIN — DOCUSATE SODIUM 100 MG: 100 CAPSULE, LIQUID FILLED ORAL at 09:27

## 2021-03-09 RX ADMIN — ASPIRIN 81 MG CHEWABLE TABLET 162 MG: 81 TABLET CHEWABLE at 22:25

## 2021-03-09 RX ADMIN — FOLIC ACID 1 MG: 1 TABLET ORAL at 09:27

## 2021-03-09 RX ADMIN — METFORMIN ER 500 MG 1500 MG: 500 TABLET ORAL at 22:25

## 2021-03-09 RX ADMIN — DOCUSATE SODIUM 100 MG: 100 CAPSULE, LIQUID FILLED ORAL at 17:56

## 2021-03-09 NOTE — PROGRESS NOTES
Progress Note - Maternal Fetal Medicine   Brit Danielson 27 y o  female MRN: [id number]  Unit/Bed#: -01 Encounter: [id number]    Assessment:  27 y o  [id number] at 28w5d admitted with PPROM  Hospital day 40    Plan:  1) PPROM:   - s/p latency antibiotics, magnesium sulfate x 12 hours, BTM 1/28-1/29  - Fetal ultrasound 2/23 showed fetus weighing 1033g in liang breech presentation  Last fluid check 3/3: oligohydramnios (2 3cm), improved fetal ascites  - Next growth scan due 3/10/2021  - TORCH titers wnl  - No signs or symptoms of infection at this time    2) T1DM  - on insulin pump, settings adjusted 3/8/2021  - 2 05 Midnight to 3, 2 3 u at 3 am to 8 am and 2 u at 8 am to midnight  - Basal rate 2 3 units/hr 3am-MN, 2 05 units/hr MN-3am  -  Insulin to carb ratio 1:4 for breakfast, 1:3 for dinner and decrease her correction factor 1:15  - Diet Jayson/CHO controlled consistent carbohydrate Diet Level 1 with 4 carb servings/60 grams CHO/meal      3) IUP @ 28 weeks:   - NST TID   - S/P NICU consult, patient wanting everything done    4) IV access  - Midline access removed yesterday  - PICC team to insert PICC line this AM    5) DVT PPx: SCDs, ambulation, Lovenox 40mg daily    Subjective/Objective     Subjective: Patient reporting that she is feeling well this morning  She continues to endorse clear vaginal fluid and appropriate fetal movement  She denies contractions, pressure, vaginal bleeding, fevers/chills  She finds the peripheral IV in her hand very annoying and wants it removed as soon as possible  Objective:     Vitals:   Temp:  [97 6 °F (36 4 °C)-98 4 °F (36 9 °C)] 98 1 °F (36 7 °C)  HR:  [92-97] 92  Resp:  [18] 18  BP: (112-115)/(62-74) 112/62     NST at 2200 on 3/8/2021  FHT: 140 bpm, moderate variability, accelerations, no decelerations  [place]: No contractions      Physical Exam  Vitals signs reviewed  Exam conducted with a chaperone present  Constitutional:       General: She is not in acute distress  Appearance: Normal appearance  She is normal weight  She is not ill-appearing or toxic-appearing  Cardiovascular:      Rate and Rhythm: Normal rate  Pulmonary:      Effort: Pulmonary effort is normal  No respiratory distress  Abdominal:      General: Abdomen is flat  There is no distension  Palpations: Abdomen is soft  There is no mass  Tenderness: There is no abdominal tenderness  There is no guarding  Neurological:      Mental Status: She is alert           Lab Results   Component Value Date    WBC 12 93 (H) 03/06/2021    HGB 12 0 03/06/2021    HCT 37 2 03/06/2021    MCV 94 03/06/2021     03/06/2021       Lab Results   Component Value Date    CALCIUM 8 7 02/24/2021    K 3 8 02/24/2021    CO2 20 (L) 02/24/2021     02/24/2021    BUN 9 02/24/2021    CREATININE 0 62 02/24/2021       Lab Results   Component Value Date    ALT 15 02/24/2021    AST 12 02/24/2021    ALKPHOS 70 02/24/2021       Dinesh Gaitan MD  OBGYN, PGY-3  3/9/2021  6:09 AM

## 2021-03-09 NOTE — PROCEDURES
Insert PICC line    Date/Time: 3/9/2021 12:29 PM  Performed by: James Arriola RN  Authorized by: Keyshawn Lubin MD     Patient location:  Bedside  Other Assisting Provider: No    Consent:     Consent obtained:  Written    Consent given by:  Patient    Risks discussed:  Arterial puncture, bleeding, infection, incorrect placement, nerve damage and pneumothorax (Discussed by MD)    Alternatives discussed: Discussed by MD   Universal protocol:     Procedure explained and questions answered to patient or proxy's satisfaction: yes      Relevant documents present and verified: yes      Test results available and properly labeled: yes      Radiology Images displayed and confirmed  If images not available, report reviewed: yes      Required blood products, implants, devices, and special equipment available: yes      Site/side marked: yes      Immediately prior to procedure, a time out was called: yes      Patient identity confirmed:  Verbally with patient, arm band, hospital-assigned identification number and provided demographic data  Pre-procedure details:     Hand hygiene: Hand hygiene performed prior to insertion      Sterile barrier technique: All elements of maximal sterile technique followed      Skin preparation:  ChloraPrep    Skin preparation agent: Skin preparation agent completely dried prior to procedure    Indications:     PICC line indications: no peripheral vascular access    Anesthesia (see MAR for exact dosages):      Anesthesia method:  Local infiltration    Local anesthetic:  Lidocaine 1% w/o epi (3 mL 1% lidocaine admin subcutaneous and subdermal)  Procedure details:     Location:  [place]    Vessel type: vein      Laterality:  Right    Site selection rationale:  Largest, most patent vessel    Approach: percutaneous technique used      Patient position:  Flat    Procedural supplies:  Single lumen ([facility] Ref# [id number] Lot# [id number] Exp: 3/31/2022)    Catheter size:  4 Fr    Landmarks identified: yes      Ultrasound guidance: yes      Ultrasound image availability:  Not saved    Sterile ultrasound techniques: Sterile gel and sterile probe covers were used      Number of attempts:  1    Successful placement: yes      Vessel of catheter tip end:  Chest Xray needed to confirm placement (Sherlock 3CG used during insertion )    Total catheter length (cm):  36    Catheter out on skin (cm):  0    Max flow rate:  999 ml/hr     Arm circumference:  32 5  Post-procedure details:     Post-procedure:  Dressing applied and securement device placed    Assessment:  Blood return through all ports, no pneumothorax on x-ray, placement verification pending x-ray result and free fluid flow    Post-procedure complications: none      Patient tolerance of procedure:   Tolerated well, no immediate complications    Observer: Yes      Observer name:  Shola Thompson RN

## 2021-03-09 NOTE — PLAN OF CARE
Problem: PAIN - ADULT  Goal: Verbalizes/displays adequate comfort level or baseline comfort level  Description: Interventions:  - Encourage patient to monitor pain and request assistance  - Assess pain using appropriate pain scale  - Administer analgesics based on type and severity of pain and evaluate response  - Implement non-pharmacological measures as appropriate and evaluate response  - Consider cultural and social influences on pain and pain management  - Notify physician/advanced practitioner if interventions unsuccessful or patient reports new pain  Outcome: Progressing     Problem: INFECTION - ADULT  Goal: Absence or prevention of progression during hospitalization  Description: INTERVENTIONS:  - Assess and monitor for signs and symptoms of infection  - Monitor lab/diagnostic results  - Monitor all insertion sites, i e  indwelling lines, tubes, and drains  - Monitor endotracheal if appropriate and nasal secretions for changes in amount and color  - [place] appropriate cooling/warming therapies per order  - Administer medications as ordered  - Instruct and encourage patient and family to use good hand hygiene technique  - Identify and instruct in appropriate isolation precautions for identified infection/condition  Outcome: Progressing     Problem: SAFETY ADULT  Goal: Patient will remain free of falls  Description: INTERVENTIONS:  - Assess patient frequently for physical needs  -  Identify cognitive and physical deficits and behaviors that affect risk of falls    -  [place] fall precautions as indicated by assessment   - Educate patient/family on patient safety including physical limitations  - Instruct patient to call for assistance with activity based on assessment  - Modify environment to reduce risk of injury  - Consider OT/PT consult to assist with strengthening/mobility  Outcome: Progressing  Goal: Maintain or return to baseline ADL function  Description: INTERVENTIONS:  -  Assess patient's ability to carry out ADLs; assess patient's baseline for ADL function and identify physical deficits which impact ability to perform ADLs (bathing, care of mouth/teeth, toileting, grooming, dressing, etc )  - Assess/evaluate cause of self-care deficits   - Assess range of motion  - Assess patient's mobility; develop plan if impaired  - Assess patient's need for assistive devices and provide as appropriate  - Encourage maximum independence but intervene and supervise when necessary  - Involve family in performance of ADLs  - Assess for home care needs following discharge   - Consider OT consult to assist with ADL evaluation and planning for discharge  - Provide patient education as appropriate  Outcome: Progressing  Goal: Maintain or return mobility status to optimal level  Description: INTERVENTIONS:  - Assess patient's baseline mobility status (ambulation, transfers, stairs, etc )    - Identify cognitive and physical deficits and behaviors that affect mobility  - Identify mobility aids required to assist with transfers and/or ambulation (gait belt, sit-to-stand, lift, walker, cane, etc )  - [place] fall precautions as indicated by assessment  - Record patient progress and toleration of activity level on Mobility SBAR; progress patient to next Phase/Stage  - Instruct patient to call for assistance with activity based on assessment  - Consider rehabilitation consult to assist with strengthening/weightbearing, etc   Outcome: Progressing     Problem: DISCHARGE PLANNING  Goal: Discharge to home or other facility with appropriate resources  Description: INTERVENTIONS:  - Identify barriers to discharge w/patient and caregiver  - Arrange for needed discharge resources and transportation as appropriate  - Identify discharge learning needs (meds, wound care, etc )  - Arrange for interpretive services to assist at discharge as needed  - Refer to Case Management Department for coordinating discharge planning if the patient needs post-hospital services based on physician/advanced practitioner order or complex needs related to functional status, cognitive ability, or social support system  Outcome: Progressing     Problem: ANTEPARTUM  Goal: Maintain pregnancy as long as maternal and/or fetal condition is stable  Description: INTERVENTIONS:  - Maternal surveillance  - Fetal surveillance  - Monitor uterine activity  - Medications as ordered  - Bedrest  Outcome: Progressing     Problem: DISCHARGE PLANNING - CARE MANAGEMENT  Goal: Discharge to post-acute care or home with appropriate resources  Description: INTERVENTIONS:  - Conduct assessment to determine patient/family and health care team treatment goals, and need for post-acute services based on payer coverage, community resources, and patient preferences, and barriers to discharge  - Address psychosocial, clinical, and financial barriers to discharge as identified in assessment in conjunction with the patient/family and health care team  - Arrange appropriate level of post-acute services according to patients   needs and preference and payer coverage in collaboration with the physician and health care team  - Communicate with and update the patient/family, physician, and health care team regarding progress on the discharge plan  - Arrange appropriate transportation to post-acute venues  Outcome: Progressing     Problem: Potential for Falls  Goal: Patient will remain free of falls  Description: INTERVENTIONS:  - Assess patient frequently for physical needs  -  Identify cognitive and physical deficits and behaviors that affect risk of falls    -  [place] fall precautions as indicated by assessment   - Educate patient/family on patient safety including physical limitations  - Instruct patient to call for assistance with activity based on assessment  - Modify environment to reduce risk of injury  - Consider OT/PT consult to assist with strengthening/mobility  Outcome: Progressing

## 2021-03-10 LAB
GLUCOSE SERPL-MCNC: 110 MG/DL (ref 65–140)
GLUCOSE SERPL-MCNC: 117 MG/DL (ref 65–140)
GLUCOSE SERPL-MCNC: 140 MG/DL (ref 65–140)
GLUCOSE SERPL-MCNC: 179 MG/DL (ref 65–140)
GLUCOSE SERPL-MCNC: 76 MG/DL (ref 65–140)
GLUCOSE SERPL-MCNC: 89 MG/DL (ref 65–140)
GLUCOSE SERPL-MCNC: 89 MG/DL (ref 65–140)

## 2021-03-10 PROCEDURE — 82948 REAGENT STRIP/BLOOD GLUCOSE: CPT

## 2021-03-10 PROCEDURE — 59025 FETAL NON-STRESS TEST: CPT | Performed by: OBSTETRICS & GYNECOLOGY

## 2021-03-10 PROCEDURE — 99232 SBSQ HOSP IP/OBS MODERATE 35: CPT | Performed by: OBSTETRICS & GYNECOLOGY

## 2021-03-10 PROCEDURE — NC001 PR NO CHARGE: Performed by: OBSTETRICS & GYNECOLOGY

## 2021-03-10 PROCEDURE — 76816 OB US FOLLOW-UP PER FETUS: CPT | Performed by: OBSTETRICS & GYNECOLOGY

## 2021-03-10 RX ORDER — BACITRACIN, NEOMYCIN, POLYMYXIN B 400; 3.5; 5 [USP'U]/G; MG/G; [USP'U]/G
1 OINTMENT TOPICAL 2 TIMES DAILY
Status: DISCONTINUED | OUTPATIENT
Start: 2021-03-10 | End: 2021-03-20 | Stop reason: HOSPADM

## 2021-03-10 RX ADMIN — Medication 1 TABLET: at 10:38

## 2021-03-10 RX ADMIN — FOLIC ACID 1 MG: 1 TABLET ORAL at 10:39

## 2021-03-10 RX ADMIN — METFORMIN ER 500 MG 1500 MG: 500 TABLET ORAL at 22:13

## 2021-03-10 RX ADMIN — ASPIRIN 81 MG CHEWABLE TABLET 162 MG: 81 TABLET CHEWABLE at 22:13

## 2021-03-10 RX ADMIN — OXYCODONE HYDROCHLORIDE AND ACETAMINOPHEN 1000 MG: 500 TABLET ORAL at 10:38

## 2021-03-10 RX ADMIN — BACITRACIN, NEOMYCIN, POLYMYXIN B 1 SMALL APPLICATION: 400; 3.5; 5 OINTMENT TOPICAL at 22:13

## 2021-03-10 RX ADMIN — DOCUSATE SODIUM 100 MG: 100 CAPSULE, LIQUID FILLED ORAL at 10:39

## 2021-03-10 RX ADMIN — ENOXAPARIN SODIUM 40 MG: 40 INJECTION SUBCUTANEOUS at 10:39

## 2021-03-10 RX ADMIN — DOCUSATE SODIUM 100 MG: 100 CAPSULE, LIQUID FILLED ORAL at 20:38

## 2021-03-10 RX ADMIN — Medication 2000 UNITS: at 10:39

## 2021-03-10 NOTE — PLAN OF CARE
Problem: PAIN - ADULT  Goal: Verbalizes/displays adequate comfort level or baseline comfort level  Description: Interventions:  - Encourage patient to monitor pain and request assistance  - Assess pain using appropriate pain scale  - Administer analgesics based on type and severity of pain and evaluate response  - Implement non-pharmacological measures as appropriate and evaluate response  - Consider cultural and social influences on pain and pain management  - Notify physician/advanced practitioner if interventions unsuccessful or patient reports new pain  Outcome: Progressing     Problem: INFECTION - ADULT  Goal: Absence or prevention of progression during hospitalization  Description: INTERVENTIONS:  - Assess and monitor for signs and symptoms of infection  - Monitor lab/diagnostic results  - Monitor all insertion sites, i e  indwelling lines, tubes, and drains  - Monitor endotracheal if appropriate and nasal secretions for changes in amount and color  - [place] appropriate cooling/warming therapies per order  - Administer medications as ordered  - Instruct and encourage patient and family to use good hand hygiene technique  - Identify and instruct in appropriate isolation precautions for identified infection/condition  Outcome: Progressing     Problem: SAFETY ADULT  Goal: Patient will remain free of falls  Description: INTERVENTIONS:  - Assess patient frequently for physical needs  -  Identify cognitive and physical deficits and behaviors that affect risk of falls    -  [place] fall precautions as indicated by assessment   - Educate patient/family on patient safety including physical limitations  - Instruct patient to call for assistance with activity based on assessment  - Modify environment to reduce risk of injury  - Consider OT/PT consult to assist with strengthening/mobility  Outcome: Progressing  Goal: Maintain or return to baseline ADL function  Description: INTERVENTIONS:  -  Assess patient's ability to carry out ADLs; assess patient's baseline for ADL function and identify physical deficits which impact ability to perform ADLs (bathing, care of mouth/teeth, toileting, grooming, dressing, etc )  - Assess/evaluate cause of self-care deficits   - Assess range of motion  - Assess patient's mobility; develop plan if impaired  - Assess patient's need for assistive devices and provide as appropriate  - Encourage maximum independence but intervene and supervise when necessary  - Involve family in performance of ADLs  - Assess for home care needs following discharge   - Consider OT consult to assist with ADL evaluation and planning for discharge  - Provide patient education as appropriate  Outcome: Progressing  Goal: Maintain or return mobility status to optimal level  Description: INTERVENTIONS:  - Assess patient's baseline mobility status (ambulation, transfers, stairs, etc )    - Identify cognitive and physical deficits and behaviors that affect mobility  - Identify mobility aids required to assist with transfers and/or ambulation (gait belt, sit-to-stand, lift, walker, cane, etc )  - [place] fall precautions as indicated by assessment  - Record patient progress and toleration of activity level on Mobility SBAR; progress patient to next Phase/Stage  - Instruct patient to call for assistance with activity based on assessment  - Consider rehabilitation consult to assist with strengthening/weightbearing, etc   Outcome: Progressing     Problem: DISCHARGE PLANNING  Goal: Discharge to home or other facility with appropriate resources  Description: INTERVENTIONS:  - Identify barriers to discharge w/patient and caregiver  - Arrange for needed discharge resources and transportation as appropriate  - Identify discharge learning needs (meds, wound care, etc )  - Arrange for interpretive services to assist at discharge as needed  - Refer to Case Management Department for coordinating discharge planning if the patient needs post-hospital services based on physician/advanced practitioner order or complex needs related to functional status, cognitive ability, or social support system  Outcome: Progressing     Problem: Labor & Delivery  Goal: Manages discomfort  Description: Assess and monitor for signs and symptoms of discomfort  Assess patient's pain level regularly and per hospital policy  Administer medications as ordered  Support use of nonpharmacological methods to help control pain such as distraction, imagery, relaxation, and application of heat and cold  Collaborate with interdisciplinary team and patient to determine appropriate pain management plan  1  Include patient in decisions related to comfort  2  Offer non-pharmacological pain management interventions  3  Report ineffective pain management to physician  Outcome: Progressing  Goal: Patient vital signs are stable  Description: 1  Assess vital signs - vaginal delivery    Outcome: Progressing     Problem: ANTEPARTUM  Goal: Maintain pregnancy as long as maternal and/or fetal condition is stable  Description: INTERVENTIONS:  - Maternal surveillance  - Fetal surveillance  - Monitor uterine activity  - Medications as ordered  - Bedrest  Outcome: Progressing     Problem: DISCHARGE PLANNING - CARE MANAGEMENT  Goal: Discharge to post-acute care or home with appropriate resources  Description: INTERVENTIONS:  - Conduct assessment to determine patient/family and health care team treatment goals, and need for post-acute services based on payer coverage, community resources, and patient preferences, and barriers to discharge  - Address psychosocial, clinical, and financial barriers to discharge as identified in assessment in conjunction with the patient/family and health care team  - Arrange appropriate level of post-acute services according to patients   needs and preference and payer coverage in collaboration with the physician and health care team  - Communicate with and update the patient/family, physician, and health care team regarding progress on the discharge plan  - Arrange appropriate transportation to post-acute venues  Outcome: Progressing     Problem: Potential for Falls  Goal: Patient will remain free of falls  Description: INTERVENTIONS:  - Assess patient frequently for physical needs  -  Identify cognitive and physical deficits and behaviors that affect risk of falls    -  [place] fall precautions as indicated by assessment   - Educate patient/family on patient safety including physical limitations  - Instruct patient to call for assistance with activity based on assessment  - Modify environment to reduce risk of injury  - Consider OT/PT consult to assist with strengthening/mobility  Outcome: Progressing

## 2021-03-10 NOTE — PLAN OF CARE
Problem: PAIN - ADULT  Goal: Verbalizes/displays adequate comfort level or baseline comfort level  Description: Interventions:  - Encourage patient to monitor pain and request assistance  - Assess pain using appropriate pain scale  - Administer analgesics based on type and severity of pain and evaluate response  - Implement non-pharmacological measures as appropriate and evaluate response  - Consider cultural and social influences on pain and pain management  - Notify physician/advanced practitioner if interventions unsuccessful or patient reports new pain  Outcome: Progressing     Problem: INFECTION - ADULT  Goal: Absence or prevention of progression during hospitalization  Description: INTERVENTIONS:  - Assess and monitor for signs and symptoms of infection  - Monitor lab/diagnostic results  - Monitor all insertion sites, i e  indwelling lines, tubes, and drains  - Monitor endotracheal if appropriate and nasal secretions for changes in amount and color  - [place] appropriate cooling/warming therapies per order  - Administer medications as ordered  - Instruct and encourage patient and family to use good hand hygiene technique  - Identify and instruct in appropriate isolation precautions for identified infection/condition  Outcome: Progressing     Problem: SAFETY ADULT  Goal: Patient will remain free of falls  Description: INTERVENTIONS:  - Assess patient frequently for physical needs  -  Identify cognitive and physical deficits and behaviors that affect risk of falls    -  [place] fall precautions as indicated by assessment   - Educate patient/family on patient safety including physical limitations  - Instruct patient to call for assistance with activity based on assessment  - Modify environment to reduce risk of injury  - Consider OT/PT consult to assist with strengthening/mobility  Outcome: Progressing  Goal: Maintain or return to baseline ADL function  Description: INTERVENTIONS:  -  Assess patient's ability to carry out ADLs; assess patient's baseline for ADL function and identify physical deficits which impact ability to perform ADLs (bathing, care of mouth/teeth, toileting, grooming, dressing, etc )  - Assess/evaluate cause of self-care deficits   - Assess range of motion  - Assess patient's mobility; develop plan if impaired  - Assess patient's need for assistive devices and provide as appropriate  - Encourage maximum independence but intervene and supervise when necessary  - Involve family in performance of ADLs  - Assess for home care needs following discharge   - Consider OT consult to assist with ADL evaluation and planning for discharge  - Provide patient education as appropriate  Outcome: Progressing  Goal: Maintain or return mobility status to optimal level  Description: INTERVENTIONS:  - Assess patient's baseline mobility status (ambulation, transfers, stairs, etc )    - Identify cognitive and physical deficits and behaviors that affect mobility  - Identify mobility aids required to assist with transfers and/or ambulation (gait belt, sit-to-stand, lift, walker, cane, etc )  - [place] fall precautions as indicated by assessment  - Record patient progress and toleration of activity level on Mobility SBAR; progress patient to next Phase/Stage  - Instruct patient to call for assistance with activity based on assessment  - Consider rehabilitation consult to assist with strengthening/weightbearing, etc   Outcome: Progressing     Problem: DISCHARGE PLANNING  Goal: Discharge to home or other facility with appropriate resources  Description: INTERVENTIONS:  - Identify barriers to discharge w/patient and caregiver  - Arrange for needed discharge resources and transportation as appropriate  - Identify discharge learning needs (meds, wound care, etc )  - Arrange for interpretive services to assist at discharge as needed  - Refer to Case Management Department for coordinating discharge planning if the patient needs post-hospital services based on physician/advanced practitioner order or complex needs related to functional status, cognitive ability, or social support system  Outcome: Progressing     Problem: Labor & Delivery  Goal: Manages discomfort  Description: Assess and monitor for signs and symptoms of discomfort  Assess patient's pain level regularly and per hospital policy  Administer medications as ordered  Support use of nonpharmacological methods to help control pain such as distraction, imagery, relaxation, and application of heat and cold  Collaborate with interdisciplinary team and patient to determine appropriate pain management plan  1  Include patient in decisions related to comfort  2  Offer non-pharmacological pain management interventions  3  Report ineffective pain management to physician  Outcome: Progressing  Goal: Patient vital signs are stable  Description: 1  Assess vital signs - vaginal delivery    Outcome: Progressing     Problem: ANTEPARTUM  Goal: Maintain pregnancy as long as maternal and/or fetal condition is stable  Description: INTERVENTIONS:  - Maternal surveillance  - Fetal surveillance  - Monitor uterine activity  - Medications as ordered  - Bedrest  Outcome: Progressing     Problem: DISCHARGE PLANNING - CARE MANAGEMENT  Goal: Discharge to post-acute care or home with appropriate resources  Description: INTERVENTIONS:  - Conduct assessment to determine patient/family and health care team treatment goals, and need for post-acute services based on payer coverage, community resources, and patient preferences, and barriers to discharge  - Address psychosocial, clinical, and financial barriers to discharge as identified in assessment in conjunction with the patient/family and health care team  - Arrange appropriate level of post-acute services according to patients   needs and preference and payer coverage in collaboration with the physician and health care team  - Communicate with and update the patient/family, physician, and health care team regarding progress on the discharge plan  - Arrange appropriate transportation to post-acute venues  Outcome: Progressing     Problem: Potential for Falls  Goal: Patient will remain free of falls  Description: INTERVENTIONS:  - Assess patient frequently for physical needs  -  Identify cognitive and physical deficits and behaviors that affect risk of falls    -  [place] fall precautions as indicated by assessment   - Educate patient/family on patient safety including physical limitations  - Instruct patient to call for assistance with activity based on assessment  - Modify environment to reduce risk of injury  - Consider OT/PT consult to assist with strengthening/mobility  Outcome: Progressing

## 2021-03-10 NOTE — PROGRESS NOTES
Progress Note - Maternal Fetal Medicine   Carmen Linares 27 y o  female MRN: [id number]  Unit/Bed#: -01 Encounter: [id number]    Assessment:  27 y o  [id number] at 28w6d admitted with PPROM  Hospital day 41    Plan:  1) PPROM:   - s/p latency antibiotics, magnesium sulfate x 12 hours, BTM 1/28-1/29  - Fetal ultrasound 2/23 showed fetus weighing 1033g in liang breech presentation  Last fluid check 3/3: oligohydramnios (2 3cm), improved fetal ascites  - Next growth scan due 3/10/2021  - TORCH titers wnl  - No signs or symptoms of infection at this time    2) T1DM  - on insulin pump, settings adjusted 3/8/2021  - 2 05 Midnight to 3, 2 3 u at 3 am to 8 am and 2 u at 8 am to midnight  - Basal rate 2 3 units/hr 3am-MN, 2 05 units/hr MN-3am  -  Insulin to carb ratio 1:4 for breakfast, 1:3 for dinner and decrease her correction factor 1:15  - Diet [place]/CHO controlled consistent carbohydrate Diet Level 1 with 4 carb servings/60 grams CHO/meal  - 2h PP following lunch and dinner were 165/167      3) IUP @ 28 weeks:   - NST TID   - S/P NICU consult, patient wanting everything done    4) IV access  - PICC line in place    5) DVT PPx: SCDs, ambulation, Lovenox 40mg daily    Subjective/Objective     Subjective: Patient reporting that she is feeling well this morning  She has no concerns overnight  Continues leaking of fluid and appropriate FM  No contractions or vaginal bleeding  Objective:     Vitals:   Temp:  [97 6 °F (36 4 °C)-98 9 °F (37 2 °C)] 98 °F (36 7 °C)  HR:  [85-99] 86  Resp:  [17-20] 18  BP: (104-120)/(59-81) 109/59     NST ongoing  FHT: 135 bpm, moderate variability, accelerations, no decelerations  [place]: No contractions      Physical Exam  Vitals signs reviewed  Exam conducted with a chaperone present  Constitutional:       General: She is not in acute distress  Appearance: Normal appearance  She is normal weight  She is not ill-appearing or toxic-appearing     Cardiovascular:      Rate and Rhythm: Normal rate  Pulmonary:      Effort: Pulmonary effort is normal  No respiratory distress  Abdominal:      General: Abdomen is flat  There is no distension  Palpations: Abdomen is soft  There is no mass  Tenderness: There is no abdominal tenderness  There is no guarding  Neurological:      Mental Status: She is alert           Lab Results   Component Value Date    WBC 12 93 (H) 03/06/2021    HGB 12 0 03/06/2021    HCT 37 2 03/06/2021    MCV 94 03/06/2021     03/06/2021       Lab Results   Component Value Date    CALCIUM 8 7 02/24/2021    K 3 8 02/24/2021    CO2 20 (L) 02/24/2021     02/24/2021    BUN 9 02/24/2021    CREATININE 0 62 02/24/2021       Lab Results   Component Value Date    ALT 15 02/24/2021    AST 12 02/24/2021    ALKPHOS 70 02/24/2021       Suly Villalobos MD  OBGYN, PGY-3  3/10/2021  6:23 AM

## 2021-03-11 LAB
GLUCOSE SERPL-MCNC: 101 MG/DL (ref 65–140)
GLUCOSE SERPL-MCNC: 116 MG/DL (ref 65–140)
GLUCOSE SERPL-MCNC: 117 MG/DL (ref 65–140)
GLUCOSE SERPL-MCNC: 157 MG/DL (ref 65–140)
GLUCOSE SERPL-MCNC: 158 MG/DL (ref 65–140)
GLUCOSE SERPL-MCNC: 161 MG/DL (ref 65–140)
GLUCOSE SERPL-MCNC: 85 MG/DL (ref 65–140)

## 2021-03-11 PROCEDURE — 59025 FETAL NON-STRESS TEST: CPT | Performed by: OBSTETRICS & GYNECOLOGY

## 2021-03-11 PROCEDURE — 82948 REAGENT STRIP/BLOOD GLUCOSE: CPT

## 2021-03-11 PROCEDURE — NC001 PR NO CHARGE: Performed by: OBSTETRICS & GYNECOLOGY

## 2021-03-11 PROCEDURE — 99232 SBSQ HOSP IP/OBS MODERATE 35: CPT | Performed by: OBSTETRICS & GYNECOLOGY

## 2021-03-11 RX ADMIN — Medication 1 TABLET: at 09:15

## 2021-03-11 RX ADMIN — ENOXAPARIN SODIUM 40 MG: 40 INJECTION SUBCUTANEOUS at 09:15

## 2021-03-11 RX ADMIN — BACITRACIN, NEOMYCIN, POLYMYXIN B 1 SMALL APPLICATION: 400; 3.5; 5 OINTMENT TOPICAL at 09:15

## 2021-03-11 RX ADMIN — OXYCODONE HYDROCHLORIDE AND ACETAMINOPHEN 1000 MG: 500 TABLET ORAL at 09:15

## 2021-03-11 RX ADMIN — METFORMIN ER 500 MG 1500 MG: 500 TABLET ORAL at 22:03

## 2021-03-11 RX ADMIN — ASPIRIN 81 MG CHEWABLE TABLET 162 MG: 81 TABLET CHEWABLE at 22:03

## 2021-03-11 RX ADMIN — DOCUSATE SODIUM 100 MG: 100 CAPSULE, LIQUID FILLED ORAL at 09:15

## 2021-03-11 RX ADMIN — FOLIC ACID 1 MG: 1 TABLET ORAL at 09:15

## 2021-03-11 RX ADMIN — Medication 2000 UNITS: at 09:14

## 2021-03-11 RX ADMIN — DOCUSATE SODIUM 100 MG: 100 CAPSULE, LIQUID FILLED ORAL at 18:09

## 2021-03-11 RX ADMIN — BACITRACIN, NEOMYCIN, POLYMYXIN B 1 SMALL APPLICATION: 400; 3.5; 5 OINTMENT TOPICAL at 18:10

## 2021-03-11 NOTE — PROGRESS NOTES
Progress Note - Maternal Fetal Medicine   Hunter Zabala 27 y o  female MRN: [id number]  Unit/Bed#: -01 Encounter: [id number]    Assessment:  27 y o  [id number] at 29w0d admitted with PPROM  Hospital day 42    Plan:  1) PPROM:   - s/p latency antibiotics, magnesium sulfate x 12 hours, BTM 1/28-1/29  - Fetal ultrasound 3/11 showed fetus weighing 1462g in liang breech presentation  Last fluid check 3/11: oligohydramnios (4 6cm), stable fetal ascites  - TORCH titers wnl  - No signs or symptoms of infection at this time    2) T1DM  - on insulin pump, settings adjusted 3/8/2021  - 2 05 Midnight to 3, 2 3 u at 3 am to 8 am and 2 u at 8 am to midnight  - Basal rate 2 3 units/hr 3am-MN, 2 05 units/hr MN-3am  -  Insulin to carb ratio 1:4 for breakfast, 1:3 for dinner and decrease her correction factor 1:15  - Diet Jayson/CHO controlled consistent carbohydrate Diet Level 1 with 4 carb servings/60 grams CHO/meal      3) IUP @ 28 weeks:   - NST TID   - NST with one deceleration at approximately 2230 yesterday evening, unknown duration or rené due to broken tracing; spontaneous resolution with reactive tracing for 1 hour following deceleration  - S/P NICU consult, patient wanting everything done    4) IV access  - PICC line in place    5) DVT PPx: SCDs, ambulation, Lovenox 40mg daily    Subjective/Objective     Subjective: Patient reporting that she is feeling well this morning  FHT with one deceleration yesterday evening, no symptoms at that time  Spontaneous resolution of deceleration and reactive tracing for 1 hour following deceleration  Patient was anxious with this episode but reports no change in her symptoms at that time or now  Continues leaking of fluid and appropriate FM  No contractions or vaginal bleeding      Objective:     Vitals:   Temp:  [97 9 °F (36 6 °C)-98 8 °F (37 1 °C)] 98 2 °F (36 8 °C)  HR:  [] 95  Resp:  [18-20] 18  BP: (106-123)/(55-73) 113/55     NST ongoing at 3/11/2021  FHT: 135 bpm, moderate variability, accelerations, no decelerations  [place]: No contractions      Physical Exam  Vitals signs reviewed  Exam conducted with a chaperone present  Constitutional:       General: She is not in acute distress  Appearance: Normal appearance  She is normal weight  She is not ill-appearing or toxic-appearing  Cardiovascular:      Rate and Rhythm: Normal rate  Pulmonary:      Effort: Pulmonary effort is normal  No respiratory distress  Abdominal:      General: Abdomen is flat  There is no distension  Palpations: Abdomen is soft  There is no mass  Tenderness: There is no abdominal tenderness  There is no guarding  Neurological:      Mental Status: She is alert           Lab Results   Component Value Date    WBC 12 93 (H) 03/06/2021    HGB 12 0 03/06/2021    HCT 37 2 03/06/2021    MCV 94 03/06/2021     03/06/2021       Lab Results   Component Value Date    CALCIUM 8 7 02/24/2021    K 3 8 02/24/2021    CO2 20 (L) 02/24/2021     02/24/2021    BUN 9 02/24/2021    CREATININE 0 62 02/24/2021       Lab Results   Component Value Date    ALT 15 02/24/2021    AST 12 02/24/2021    ALKPHOS 70 02/24/2021       Dee Dee Foreman MD  OBGYN, PGY-3  3/11/2021  5:48 AM

## 2021-03-11 NOTE — PLAN OF CARE
Problem: PAIN - ADULT  Goal: Verbalizes/displays adequate comfort level or baseline comfort level  Description: Interventions:  - Encourage patient to monitor pain and request assistance  - Assess pain using appropriate pain scale  - Administer analgesics based on type and severity of pain and evaluate response  - Implement non-pharmacological measures as appropriate and evaluate response  - Consider cultural and social influences on pain and pain management  - Notify physician/advanced practitioner if interventions unsuccessful or patient reports new pain  Outcome: Progressing     Problem: INFECTION - ADULT  Goal: Absence or prevention of progression during hospitalization  Description: INTERVENTIONS:  - Assess and monitor for signs and symptoms of infection  - Monitor lab/diagnostic results  - Monitor all insertion sites, i e  indwelling lines, tubes, and drains  - Monitor endotracheal if appropriate and nasal secretions for changes in amount and color  - [place] appropriate cooling/warming therapies per order  - Administer medications as ordered  - Instruct and encourage patient and family to use good hand hygiene technique  - Identify and instruct in appropriate isolation precautions for identified infection/condition  Outcome: Progressing     Problem: SAFETY ADULT  Goal: Patient will remain free of falls  Description: INTERVENTIONS:  - Assess patient frequently for physical needs  -  Identify cognitive and physical deficits and behaviors that affect risk of falls    -  [place] fall precautions as indicated by assessment   - Educate patient/family on patient safety including physical limitations  - Instruct patient to call for assistance with activity based on assessment  - Modify environment to reduce risk of injury  - Consider OT/PT consult to assist with strengthening/mobility  Outcome: Progressing  Goal: Maintain or return to baseline ADL function  Description: INTERVENTIONS:  -  Assess patient's ability to carry out ADLs; assess patient's baseline for ADL function and identify physical deficits which impact ability to perform ADLs (bathing, care of mouth/teeth, toileting, grooming, dressing, etc )  - Assess/evaluate cause of self-care deficits   - Assess range of motion  - Assess patient's mobility; develop plan if impaired  - Assess patient's need for assistive devices and provide as appropriate  - Encourage maximum independence but intervene and supervise when necessary  - Involve family in performance of ADLs  - Assess for home care needs following discharge   - Consider OT consult to assist with ADL evaluation and planning for discharge  - Provide patient education as appropriate  Outcome: Progressing  Goal: Maintain or return mobility status to optimal level  Description: INTERVENTIONS:  - Assess patient's baseline mobility status (ambulation, transfers, stairs, etc )    - Identify cognitive and physical deficits and behaviors that affect mobility  - Identify mobility aids required to assist with transfers and/or ambulation (gait belt, sit-to-stand, lift, walker, cane, etc )  - [place] fall precautions as indicated by assessment  - Record patient progress and toleration of activity level on Mobility SBAR; progress patient to next Phase/Stage  - Instruct patient to call for assistance with activity based on assessment  - Consider rehabilitation consult to assist with strengthening/weightbearing, etc   Outcome: Progressing     Problem: DISCHARGE PLANNING  Goal: Discharge to home or other facility with appropriate resources  Description: INTERVENTIONS:  - Identify barriers to discharge w/patient and caregiver  - Arrange for needed discharge resources and transportation as appropriate  - Identify discharge learning needs (meds, wound care, etc )  - Arrange for interpretive services to assist at discharge as needed  - Refer to Case Management Department for coordinating discharge planning if the patient needs post-hospital services based on physician/advanced practitioner order or complex needs related to functional status, cognitive ability, or social support system  Outcome: Progressing     Problem: ANTEPARTUM  Goal: Maintain pregnancy as long as maternal and/or fetal condition is stable  Description: INTERVENTIONS:  - Maternal surveillance  - Fetal surveillance  - Monitor uterine activity  - Medications as ordered  - Bedrest  Outcome: Progressing     Problem: DISCHARGE PLANNING - CARE MANAGEMENT  Goal: Discharge to post-acute care or home with appropriate resources  Description: INTERVENTIONS:  - Conduct assessment to determine patient/family and health care team treatment goals, and need for post-acute services based on payer coverage, community resources, and patient preferences, and barriers to discharge  - Address psychosocial, clinical, and financial barriers to discharge as identified in assessment in conjunction with the patient/family and health care team  - Arrange appropriate level of post-acute services according to patients   needs and preference and payer coverage in collaboration with the physician and health care team  - Communicate with and update the patient/family, physician, and health care team regarding progress on the discharge plan  - Arrange appropriate transportation to post-acute venues  Outcome: Progressing     Problem: Potential for Falls  Goal: Patient will remain free of falls  Description: INTERVENTIONS:  - Assess patient frequently for physical needs  -  Identify cognitive and physical deficits and behaviors that affect risk of falls    -  [place] fall precautions as indicated by assessment   - Educate patient/family on patient safety including physical limitations  - Instruct patient to call for assistance with activity based on assessment  - Modify environment to reduce risk of injury  - Consider OT/PT consult to assist with strengthening/mobility  Outcome: Progressing

## 2021-03-12 LAB
ABO GROUP BLD: NORMAL
BLD GP AB SCN SERPL QL: NEGATIVE
GLUCOSE SERPL-MCNC: 109 MG/DL (ref 65–140)
GLUCOSE SERPL-MCNC: 118 MG/DL (ref 65–140)
GLUCOSE SERPL-MCNC: 142 MG/DL (ref 65–140)
GLUCOSE SERPL-MCNC: 149 MG/DL (ref 65–140)
GLUCOSE SERPL-MCNC: 172 MG/DL (ref 65–140)
GLUCOSE SERPL-MCNC: 182 MG/DL (ref 65–140)
GLUCOSE SERPL-MCNC: 96 MG/DL (ref 65–140)
RH BLD: POSITIVE
SPECIMEN EXPIRATION DATE: NORMAL

## 2021-03-12 PROCEDURE — 82948 REAGENT STRIP/BLOOD GLUCOSE: CPT

## 2021-03-12 PROCEDURE — 86901 BLOOD TYPING SEROLOGIC RH(D): CPT | Performed by: OBSTETRICS & GYNECOLOGY

## 2021-03-12 PROCEDURE — 86850 RBC ANTIBODY SCREEN: CPT | Performed by: OBSTETRICS & GYNECOLOGY

## 2021-03-12 PROCEDURE — 99232 SBSQ HOSP IP/OBS MODERATE 35: CPT | Performed by: OBSTETRICS & GYNECOLOGY

## 2021-03-12 PROCEDURE — 59025 FETAL NON-STRESS TEST: CPT | Performed by: OBSTETRICS & GYNECOLOGY

## 2021-03-12 PROCEDURE — 86900 BLOOD TYPING SEROLOGIC ABO: CPT | Performed by: OBSTETRICS & GYNECOLOGY

## 2021-03-12 PROCEDURE — NC001 PR NO CHARGE: Performed by: OBSTETRICS & GYNECOLOGY

## 2021-03-12 RX ADMIN — FOLIC ACID 1 MG: 1 TABLET ORAL at 08:00

## 2021-03-12 RX ADMIN — DOCUSATE SODIUM 100 MG: 100 CAPSULE, LIQUID FILLED ORAL at 20:46

## 2021-03-12 RX ADMIN — DOCUSATE SODIUM 100 MG: 100 CAPSULE, LIQUID FILLED ORAL at 08:00

## 2021-03-12 RX ADMIN — OXYCODONE HYDROCHLORIDE AND ACETAMINOPHEN 1000 MG: 500 TABLET ORAL at 08:00

## 2021-03-12 RX ADMIN — ENOXAPARIN SODIUM 40 MG: 40 INJECTION SUBCUTANEOUS at 08:00

## 2021-03-12 RX ADMIN — Medication 2000 UNITS: at 08:00

## 2021-03-12 RX ADMIN — ASPIRIN 81 MG CHEWABLE TABLET 162 MG: 81 TABLET CHEWABLE at 22:23

## 2021-03-12 RX ADMIN — BACITRACIN, NEOMYCIN, POLYMYXIN B 1 SMALL APPLICATION: 400; 3.5; 5 OINTMENT TOPICAL at 08:00

## 2021-03-12 RX ADMIN — METFORMIN ER 500 MG 1500 MG: 500 TABLET ORAL at 22:23

## 2021-03-12 RX ADMIN — Medication 1 TABLET: at 08:00

## 2021-03-12 NOTE — PROGRESS NOTES
Progress Note - Maternal Fetal Medicine   Fred Ojeda 27 y o  female MRN: [id number]  Unit/Bed#: -01 Encounter: [id number]    Assessment:  27 y o  [id number] at 29w1d admitted with PPROM  Hospital day 37    Plan:  1) PPROM:   - s/p latency antibiotics, magnesium sulfate x 12 hours, BTM 1/28-1/29  - Fetal ultrasound 3/11 showed fetus weighing 1462g in liang breech presentation  Last fluid check 3/11: oligohydramnios (4 6cm), stable fetal ascites  - TORCH titers wnl  - No signs or symptoms of infection at this time    2) T1DM  - on insulin pump, settings adjusted 3/8/2021  - 2 05 Midnight to 3, 2 3 u at 3 am to 8 am, 2 5 u at 8 am to 4pm, 2 6u from 4pm to 8pm, 2 5u from 8pm to 12am  - Basal rate 2 3 units/hr 3am-MN, 2 05 units/hr MN-3am  -  Insulin to carb ratio 1:4 for breakfast, 1:3 for dinner and decrease her correction factor 1:15  - Diet Jayson/CHO controlled consistent carbohydrate Diet Level 1 with 4 carb servings/60 grams CHO/meal      3) IUP @ 28 weeks:   - NST TID   - S/P NICU consult, patient wanting everything done    4) IV access  - PICC line in place    5) DVT PPx: SCDs, ambulation, Lovenox 40mg daily    Subjective/Objective     Subjective: Patient reporting that she is feeling well this morning  She noticed some "bumps" in her groin and is concerned that these are genital warts that she has had in the past   Continues leaking of fluid and appropriate FM  No contractions or vaginal bleeding  Objective:     Vitals:   Temp:  [97 4 °F (36 3 °C)-97 9 °F (36 6 °C)] 97 9 °F (36 6 °C)  HR:  [] 98  Resp:  [18] 18  BP: (109-117)/(60-67) 109/67     NST ongoing at 0600 on 3/12/2021  FHT: 140 bpm, moderate variability, accelerations, no decelerations  [place]: No contractions      Physical Exam  Vitals signs reviewed  Exam conducted with a chaperone present  Constitutional:       General: She is not in acute distress  Appearance: Normal appearance  She is normal weight   She is not ill-appearing or toxic-appearing  Cardiovascular:      Rate and Rhythm: Normal rate  Pulmonary:      Effort: Pulmonary effort is normal  No respiratory distress  Abdominal:      General: Abdomen is flat  There is no distension  Palpations: Abdomen is soft  There is no mass  Tenderness: There is no abdominal tenderness  There is no guarding  Neurological:      Mental Status: She is alert           Lab Results   Component Value Date    WBC 12 93 (H) 03/06/2021    HGB 12 0 03/06/2021    HCT 37 2 03/06/2021    MCV 94 03/06/2021     03/06/2021       Lab Results   Component Value Date    CALCIUM 8 7 02/24/2021    K 3 8 02/24/2021    CO2 20 (L) 02/24/2021     02/24/2021    BUN 9 02/24/2021    CREATININE 0 62 02/24/2021       Lab Results   Component Value Date    ALT 15 02/24/2021    AST 12 02/24/2021    ALKPHOS 70 02/24/2021       Elisabeth Sharpe MD  OBGYN, PGY-3  3/12/2021  6:02 AM

## 2021-03-12 NOTE — PLAN OF CARE
Problem: PAIN - ADULT  Goal: Verbalizes/displays adequate comfort level or baseline comfort level  Description: Interventions:  - Encourage patient to monitor pain and request assistance  - Assess pain using appropriate pain scale  - Administer analgesics based on type and severity of pain and evaluate response  - Implement non-pharmacological measures as appropriate and evaluate response  - Consider cultural and social influences on pain and pain management  - Notify physician/advanced practitioner if interventions unsuccessful or patient reports new pain  Outcome: Progressing     Problem: INFECTION - ADULT  Goal: Absence or prevention of progression during hospitalization  Description: INTERVENTIONS:  - Assess and monitor for signs and symptoms of infection  - Monitor lab/diagnostic results  - Monitor all insertion sites, i e  indwelling lines, tubes, and drains  - Monitor endotracheal if appropriate and nasal secretions for changes in amount and color  - [place] appropriate cooling/warming therapies per order  - Administer medications as ordered  - Instruct and encourage patient and family to use good hand hygiene technique  - Identify and instruct in appropriate isolation precautions for identified infection/condition  Outcome: Progressing     Problem: SAFETY ADULT  Goal: Patient will remain free of falls  Description: INTERVENTIONS:  - Assess patient frequently for physical needs  -  Identify cognitive and physical deficits and behaviors that affect risk of falls    -  [place] fall precautions as indicated by assessment   - Educate patient/family on patient safety including physical limitations  - Instruct patient to call for assistance with activity based on assessment  - Modify environment to reduce risk of injury  - Consider OT/PT consult to assist with strengthening/mobility  Outcome: Progressing  Goal: Maintain or return to baseline ADL function  Description: INTERVENTIONS:  -  Assess patient's ability to carry out ADLs; assess patient's baseline for ADL function and identify physical deficits which impact ability to perform ADLs (bathing, care of mouth/teeth, toileting, grooming, dressing, etc )  - Assess/evaluate cause of self-care deficits   - Assess range of motion  - Assess patient's mobility; develop plan if impaired  - Assess patient's need for assistive devices and provide as appropriate  - Encourage maximum independence but intervene and supervise when necessary  - Involve family in performance of ADLs  - Assess for home care needs following discharge   - Consider OT consult to assist with ADL evaluation and planning for discharge  - Provide patient education as appropriate  Outcome: Progressing  Goal: Maintain or return mobility status to optimal level  Description: INTERVENTIONS:  - Assess patient's baseline mobility status (ambulation, transfers, stairs, etc )    - Identify cognitive and physical deficits and behaviors that affect mobility  - Identify mobility aids required to assist with transfers and/or ambulation (gait belt, sit-to-stand, lift, walker, cane, etc )  - [place] fall precautions as indicated by assessment  - Record patient progress and toleration of activity level on Mobility SBAR; progress patient to next Phase/Stage  - Instruct patient to call for assistance with activity based on assessment  - Consider rehabilitation consult to assist with strengthening/weightbearing, etc   Outcome: Progressing     Problem: DISCHARGE PLANNING  Goal: Discharge to home or other facility with appropriate resources  Description: INTERVENTIONS:  - Identify barriers to discharge w/patient and caregiver  - Arrange for needed discharge resources and transportation as appropriate  - Identify discharge learning needs (meds, wound care, etc )  - Arrange for interpretive services to assist at discharge as needed  - Refer to Case Management Department for coordinating discharge planning if the patient needs post-hospital services based on physician/advanced practitioner order or complex needs related to functional status, cognitive ability, or social support system  Outcome: Progressing     Problem: Labor & Delivery  Goal: Manages discomfort  Description: Assess and monitor for signs and symptoms of discomfort  Assess patient's pain level regularly and per hospital policy  Administer medications as ordered  Support use of nonpharmacological methods to help control pain such as distraction, imagery, relaxation, and application of heat and cold  Collaborate with interdisciplinary team and patient to determine appropriate pain management plan  1  Include patient in decisions related to comfort  2  Offer non-pharmacological pain management interventions  3  Report ineffective pain management to physician  Outcome: Progressing  Goal: Patient vital signs are stable  Description: 1  Assess vital signs - vaginal delivery    Outcome: Progressing     Problem: ANTEPARTUM  Goal: Maintain pregnancy as long as maternal and/or fetal condition is stable  Description: INTERVENTIONS:  - Maternal surveillance  - Fetal surveillance  - Monitor uterine activity  - Medications as ordered  - Bedrest  Outcome: Progressing     Problem: DISCHARGE PLANNING - CARE MANAGEMENT  Goal: Discharge to post-acute care or home with appropriate resources  Description: INTERVENTIONS:  - Conduct assessment to determine patient/family and health care team treatment goals, and need for post-acute services based on payer coverage, community resources, and patient preferences, and barriers to discharge  - Address psychosocial, clinical, and financial barriers to discharge as identified in assessment in conjunction with the patient/family and health care team  - Arrange appropriate level of post-acute services according to patients   needs and preference and payer coverage in collaboration with the physician and health care team  - Communicate with and update the patient/family, physician, and health care team regarding progress on the discharge plan  - Arrange appropriate transportation to post-acute venues  Outcome: Progressing     Problem: Potential for Falls  Goal: Patient will remain free of falls  Description: INTERVENTIONS:  - Assess patient frequently for physical needs  -  Identify cognitive and physical deficits and behaviors that affect risk of falls    -  [place] fall precautions as indicated by assessment   - Educate patient/family on patient safety including physical limitations  - Instruct patient to call for assistance with activity based on assessment  - Modify environment to reduce risk of injury  - Consider OT/PT consult to assist with strengthening/mobility  Outcome: Progressing

## 2021-03-12 NOTE — PROGRESS NOTES
To bedside to evaluate "bumps in groin"  Patient reports that she felt the bumps when she was showering yesterday  She does not have any pain in the area    Area inspected  Two small bumps palpated on posterior, inferior portion of labia majora; appears to be clogged hair follicle  No erythema noted    Encouraged warm compress to affected area  Patient to report any worsening of symptoms    Elham Hagen MD, PGY-3  3/12/2021  6:19 PM

## 2021-03-13 PROBLEM — O32.9XX0: Status: ACTIVE | Noted: 2021-02-23

## 2021-03-13 LAB
GLUCOSE SERPL-MCNC: 106 MG/DL (ref 65–140)
GLUCOSE SERPL-MCNC: 111 MG/DL (ref 65–140)
GLUCOSE SERPL-MCNC: 122 MG/DL (ref 65–140)
GLUCOSE SERPL-MCNC: 132 MG/DL (ref 65–140)
GLUCOSE SERPL-MCNC: 138 MG/DL (ref 65–140)
GLUCOSE SERPL-MCNC: 210 MG/DL (ref 65–140)
GLUCOSE SERPL-MCNC: 98 MG/DL (ref 65–140)

## 2021-03-13 PROCEDURE — 59025 FETAL NON-STRESS TEST: CPT | Performed by: OBSTETRICS & GYNECOLOGY

## 2021-03-13 PROCEDURE — 99232 SBSQ HOSP IP/OBS MODERATE 35: CPT | Performed by: OBSTETRICS & GYNECOLOGY

## 2021-03-13 PROCEDURE — NC001 PR NO CHARGE: Performed by: OBSTETRICS & GYNECOLOGY

## 2021-03-13 PROCEDURE — 82948 REAGENT STRIP/BLOOD GLUCOSE: CPT

## 2021-03-13 RX ADMIN — DOCUSATE SODIUM 100 MG: 100 CAPSULE, LIQUID FILLED ORAL at 09:08

## 2021-03-13 RX ADMIN — Medication 2000 UNITS: at 09:07

## 2021-03-13 RX ADMIN — METFORMIN ER 500 MG 1500 MG: 500 TABLET ORAL at 21:55

## 2021-03-13 RX ADMIN — BACITRACIN, NEOMYCIN, POLYMYXIN B 1 SMALL APPLICATION: 400; 3.5; 5 OINTMENT TOPICAL at 09:09

## 2021-03-13 RX ADMIN — BACITRACIN, NEOMYCIN, POLYMYXIN B 1 SMALL APPLICATION: 400; 3.5; 5 OINTMENT TOPICAL at 18:04

## 2021-03-13 RX ADMIN — DOCUSATE SODIUM 100 MG: 100 CAPSULE, LIQUID FILLED ORAL at 18:04

## 2021-03-13 RX ADMIN — FOLIC ACID 1 MG: 1 TABLET ORAL at 09:09

## 2021-03-13 RX ADMIN — ASPIRIN 81 MG CHEWABLE TABLET 162 MG: 81 TABLET CHEWABLE at 21:55

## 2021-03-13 RX ADMIN — ENOXAPARIN SODIUM 40 MG: 40 INJECTION SUBCUTANEOUS at 09:08

## 2021-03-13 RX ADMIN — Medication 1 TABLET: at 09:09

## 2021-03-13 RX ADMIN — OXYCODONE HYDROCHLORIDE AND ACETAMINOPHEN 1000 MG: 500 TABLET ORAL at 09:07

## 2021-03-13 NOTE — PLAN OF CARE
Problem: PAIN - ADULT  Goal: Verbalizes/displays adequate comfort level or baseline comfort level  Description: Interventions:  - Encourage patient to monitor pain and request assistance  - Assess pain using appropriate pain scale  - Administer analgesics based on type and severity of pain and evaluate response  - Implement non-pharmacological measures as appropriate and evaluate response  - Consider cultural and social influences on pain and pain management  - Notify physician/advanced practitioner if interventions unsuccessful or patient reports new pain  Outcome: Progressing     Problem: INFECTION - ADULT  Goal: Absence or prevention of progression during hospitalization  Description: INTERVENTIONS:  - Assess and monitor for signs and symptoms of infection  - Monitor lab/diagnostic results  - Monitor all insertion sites, i e  indwelling lines, tubes, and drains  - Monitor endotracheal if appropriate and nasal secretions for changes in amount and color  - [place] appropriate cooling/warming therapies per order  - Administer medications as ordered  - Instruct and encourage patient and family to use good hand hygiene technique  - Identify and instruct in appropriate isolation precautions for identified infection/condition  Outcome: Progressing     Problem: SAFETY ADULT  Goal: Patient will remain free of falls  Description: INTERVENTIONS:  - Assess patient frequently for physical needs  -  Identify cognitive and physical deficits and behaviors that affect risk of falls    -  [place] fall precautions as indicated by assessment   - Educate patient/family on patient safety including physical limitations  - Instruct patient to call for assistance with activity based on assessment  - Modify environment to reduce risk of injury  - Consider OT/PT consult to assist with strengthening/mobility  Outcome: Progressing  Goal: Maintain or return to baseline ADL function  Description: INTERVENTIONS:  -  Assess patient's ability to carry out ADLs; assess patient's baseline for ADL function and identify physical deficits which impact ability to perform ADLs (bathing, care of mouth/teeth, toileting, grooming, dressing, etc )  - Assess/evaluate cause of self-care deficits   - Assess range of motion  - Assess patient's mobility; develop plan if impaired  - Assess patient's need for assistive devices and provide as appropriate  - Encourage maximum independence but intervene and supervise when necessary  - Involve family in performance of ADLs  - Assess for home care needs following discharge   - Consider OT consult to assist with ADL evaluation and planning for discharge  - Provide patient education as appropriate  Outcome: Progressing  Goal: Maintain or return mobility status to optimal level  Description: INTERVENTIONS:  - Assess patient's baseline mobility status (ambulation, transfers, stairs, etc )    - Identify cognitive and physical deficits and behaviors that affect mobility  - Identify mobility aids required to assist with transfers and/or ambulation (gait belt, sit-to-stand, lift, walker, cane, etc )  - [place] fall precautions as indicated by assessment  - Record patient progress and toleration of activity level on Mobility SBAR; progress patient to next Phase/Stage  - Instruct patient to call for assistance with activity based on assessment  - Consider rehabilitation consult to assist with strengthening/weightbearing, etc   Outcome: Progressing     Problem: DISCHARGE PLANNING  Goal: Discharge to home or other facility with appropriate resources  Description: INTERVENTIONS:  - Identify barriers to discharge w/patient and caregiver  - Arrange for needed discharge resources and transportation as appropriate  - Identify discharge learning needs (meds, wound care, etc )  - Arrange for interpretive services to assist at discharge as needed  - Refer to Case Management Department for coordinating discharge planning if the patient needs post-hospital services based on physician/advanced practitioner order or complex needs related to functional status, cognitive ability, or social support system  Outcome: Progressing     Problem: Labor & Delivery  Goal: Manages discomfort  Description: Assess and monitor for signs and symptoms of discomfort  Assess patient's pain level regularly and per hospital policy  Administer medications as ordered  Support use of nonpharmacological methods to help control pain such as distraction, imagery, relaxation, and application of heat and cold  Collaborate with interdisciplinary team and patient to determine appropriate pain management plan  1  Include patient in decisions related to comfort  2  Offer non-pharmacological pain management interventions  3  Report ineffective pain management to physician  Outcome: Progressing  Goal: Patient vital signs are stable  Description: 1  Assess vital signs - vaginal delivery    Outcome: Progressing     Problem: ANTEPARTUM  Goal: Maintain pregnancy as long as maternal and/or fetal condition is stable  Description: INTERVENTIONS:  - Maternal surveillance  - Fetal surveillance  - Monitor uterine activity  - Medications as ordered  - Bedrest  Outcome: Progressing     Problem: DISCHARGE PLANNING - CARE MANAGEMENT  Goal: Discharge to post-acute care or home with appropriate resources  Description: INTERVENTIONS:  - Conduct assessment to determine patient/family and health care team treatment goals, and need for post-acute services based on payer coverage, community resources, and patient preferences, and barriers to discharge  - Address psychosocial, clinical, and financial barriers to discharge as identified in assessment in conjunction with the patient/family and health care team  - Arrange appropriate level of post-acute services according to patients   needs and preference and payer coverage in collaboration with the physician and health care team  - Communicate with and update the patient/family, physician, and health care team regarding progress on the discharge plan  - Arrange appropriate transportation to post-acute venues  Outcome: Progressing     Problem: Potential for Falls  Goal: Patient will remain free of falls  Description: INTERVENTIONS:  - Assess patient frequently for physical needs  -  Identify cognitive and physical deficits and behaviors that affect risk of falls    -  [place] fall precautions as indicated by assessment   - Educate patient/family on patient safety including physical limitations  - Instruct patient to call for assistance with activity based on assessment  - Modify environment to reduce risk of injury  - Consider OT/PT consult to assist with strengthening/mobility  Outcome: Progressing

## 2021-03-14 LAB
GLUCOSE SERPL-MCNC: 111 MG/DL (ref 65–140)
GLUCOSE SERPL-MCNC: 111 MG/DL (ref 65–140)
GLUCOSE SERPL-MCNC: 130 MG/DL (ref 65–140)
GLUCOSE SERPL-MCNC: 146 MG/DL (ref 65–140)
GLUCOSE SERPL-MCNC: 153 MG/DL (ref 65–140)
GLUCOSE SERPL-MCNC: 89 MG/DL (ref 65–140)

## 2021-03-14 PROCEDURE — NC001 PR NO CHARGE: Performed by: OBSTETRICS & GYNECOLOGY

## 2021-03-14 PROCEDURE — 82948 REAGENT STRIP/BLOOD GLUCOSE: CPT

## 2021-03-14 PROCEDURE — 59025 FETAL NON-STRESS TEST: CPT | Performed by: OBSTETRICS & GYNECOLOGY

## 2021-03-14 PROCEDURE — 99232 SBSQ HOSP IP/OBS MODERATE 35: CPT | Performed by: OBSTETRICS & GYNECOLOGY

## 2021-03-14 RX ADMIN — ENOXAPARIN SODIUM 40 MG: 40 INJECTION SUBCUTANEOUS at 09:48

## 2021-03-14 RX ADMIN — DOCUSATE SODIUM 100 MG: 100 CAPSULE, LIQUID FILLED ORAL at 18:42

## 2021-03-14 RX ADMIN — Medication 2000 UNITS: at 09:47

## 2021-03-14 RX ADMIN — BACITRACIN, NEOMYCIN, POLYMYXIN B 1 SMALL APPLICATION: 400; 3.5; 5 OINTMENT TOPICAL at 09:49

## 2021-03-14 RX ADMIN — BACITRACIN, NEOMYCIN, POLYMYXIN B 1 SMALL APPLICATION: 400; 3.5; 5 OINTMENT TOPICAL at 18:42

## 2021-03-14 RX ADMIN — Medication 1 TABLET: at 09:50

## 2021-03-14 RX ADMIN — DOCUSATE SODIUM 100 MG: 100 CAPSULE, LIQUID FILLED ORAL at 09:48

## 2021-03-14 RX ADMIN — ASPIRIN 81 MG CHEWABLE TABLET 162 MG: 81 TABLET CHEWABLE at 22:31

## 2021-03-14 RX ADMIN — METFORMIN ER 500 MG 1500 MG: 500 TABLET ORAL at 22:31

## 2021-03-14 RX ADMIN — OXYCODONE HYDROCHLORIDE AND ACETAMINOPHEN 1000 MG: 500 TABLET ORAL at 09:46

## 2021-03-14 RX ADMIN — FOLIC ACID 1 MG: 1 TABLET ORAL at 09:49

## 2021-03-14 NOTE — PLAN OF CARE
Problem: PAIN - ADULT  Goal: Verbalizes/displays adequate comfort level or baseline comfort level  Description: Interventions:  - Encourage patient to monitor pain and request assistance  - Assess pain using appropriate pain scale  - Administer analgesics based on type and severity of pain and evaluate response  - Implement non-pharmacological measures as appropriate and evaluate response  - Consider cultural and social influences on pain and pain management  - Notify physician/advanced practitioner if interventions unsuccessful or patient reports new pain  Outcome: Progressing     Problem: INFECTION - ADULT  Goal: Absence or prevention of progression during hospitalization  Description: INTERVENTIONS:  - Assess and monitor for signs and symptoms of infection  - Monitor lab/diagnostic results  - Monitor all insertion sites, i e  indwelling lines, tubes, and drains  - Monitor endotracheal if appropriate and nasal secretions for changes in amount and color  - [place] appropriate cooling/warming therapies per order  - Administer medications as ordered  - Instruct and encourage patient and family to use good hand hygiene technique  - Identify and instruct in appropriate isolation precautions for identified infection/condition  Outcome: Progressing     Problem: SAFETY ADULT  Goal: Patient will remain free of falls  Description: INTERVENTIONS:  - Assess patient frequently for physical needs  -  Identify cognitive and physical deficits and behaviors that affect risk of falls    -  [place] fall precautions as indicated by assessment   - Educate patient/family on patient safety including physical limitations  - Instruct patient to call for assistance with activity based on assessment  - Modify environment to reduce risk of injury  - Consider OT/PT consult to assist with strengthening/mobility  Outcome: Progressing  Goal: Maintain or return to baseline ADL function  Description: INTERVENTIONS:  -  Assess patient's ability to carry out ADLs; assess patient's baseline for ADL function and identify physical deficits which impact ability to perform ADLs (bathing, care of mouth/teeth, toileting, grooming, dressing, etc )  - Assess/evaluate cause of self-care deficits   - Assess range of motion  - Assess patient's mobility; develop plan if impaired  - Assess patient's need for assistive devices and provide as appropriate  - Encourage maximum independence but intervene and supervise when necessary  - Involve family in performance of ADLs  - Assess for home care needs following discharge   - Consider OT consult to assist with ADL evaluation and planning for discharge  - Provide patient education as appropriate  Outcome: Progressing  Goal: Maintain or return mobility status to optimal level  Description: INTERVENTIONS:  - Assess patient's baseline mobility status (ambulation, transfers, stairs, etc )    - Identify cognitive and physical deficits and behaviors that affect mobility  - Identify mobility aids required to assist with transfers and/or ambulation (gait belt, sit-to-stand, lift, walker, cane, etc )  - [place] fall precautions as indicated by assessment  - Record patient progress and toleration of activity level on Mobility SBAR; progress patient to next Phase/Stage  - Instruct patient to call for assistance with activity based on assessment  - Consider rehabilitation consult to assist with strengthening/weightbearing, etc   Outcome: Progressing     Problem: DISCHARGE PLANNING  Goal: Discharge to home or other facility with appropriate resources  Description: INTERVENTIONS:  - Identify barriers to discharge w/patient and caregiver  - Arrange for needed discharge resources and transportation as appropriate  - Identify discharge learning needs (meds, wound care, etc )  - Arrange for interpretive services to assist at discharge as needed  - Refer to Case Management Department for coordinating discharge planning if the patient needs post-hospital services based on physician/advanced practitioner order or complex needs related to functional status, cognitive ability, or social support system  Outcome: Progressing     Problem: Labor & Delivery  Goal: Manages discomfort  Description: Assess and monitor for signs and symptoms of discomfort  Assess patient's pain level regularly and per hospital policy  Administer medications as ordered  Support use of nonpharmacological methods to help control pain such as distraction, imagery, relaxation, and application of heat and cold  Collaborate with interdisciplinary team and patient to determine appropriate pain management plan  1  Include patient in decisions related to comfort  2  Offer non-pharmacological pain management interventions  3  Report ineffective pain management to physician  Outcome: Progressing  Goal: Patient vital signs are stable  Description: 1  Assess vital signs - vaginal delivery    Outcome: Progressing     Problem: ANTEPARTUM  Goal: Maintain pregnancy as long as maternal and/or fetal condition is stable  Description: INTERVENTIONS:  - Maternal surveillance  - Fetal surveillance  - Monitor uterine activity  - Medications as ordered  - Bedrest  Outcome: Progressing     Problem: DISCHARGE PLANNING - CARE MANAGEMENT  Goal: Discharge to post-acute care or home with appropriate resources  Description: INTERVENTIONS:  - Conduct assessment to determine patient/family and health care team treatment goals, and need for post-acute services based on payer coverage, community resources, and patient preferences, and barriers to discharge  - Address psychosocial, clinical, and financial barriers to discharge as identified in assessment in conjunction with the patient/family and health care team  - Arrange appropriate level of post-acute services according to patients   needs and preference and payer coverage in collaboration with the physician and health care team  - Communicate with and update the patient/family, physician, and health care team regarding progress on the discharge plan  - Arrange appropriate transportation to post-acute venues  Outcome: Progressing     Problem: Potential for Falls  Goal: Patient will remain free of falls  Description: INTERVENTIONS:  - Assess patient frequently for physical needs  -  Identify cognitive and physical deficits and behaviors that affect risk of falls    -  [place] fall precautions as indicated by assessment   - Educate patient/family on patient safety including physical limitations  - Instruct patient to call for assistance with activity based on assessment  - Modify environment to reduce risk of injury  - Consider OT/PT consult to assist with strengthening/mobility  Outcome: Progressing

## 2021-03-14 NOTE — PROGRESS NOTES
Progress Note - Maternal Fetal Medicine   Zulma Hoang 27 y o  female MRN: [id number]  Unit/Bed#: -01 Encounter: [id number]    Assessment:  27 y o  [id number] at 29w3d admitted with PPROM  Hospital day 39    Plan:    PPROM  S/p latency antibiotics, magnesium sulfate x 12 hours, BTM 1/28-1/29  Fetal ultrasound 3/11:  Fetus weighing 1462g in liang breech presentation  Last fluid check 3/11:  Oligohydramnios (MVP 4 6cm), stable, fetal ascites  TORCH titers WNL  No signs and symptoms of infection at this time    T1DM  On insulin pump, settings adjusted 3/13/21  2 05 Midnight to 3, 2 4 u at 3 am to 8 am, 2 5 u at 8 am to 4pm, 2 7u from 4pm to 8pm, 2 6u from 8pm to 12am  Basal rate 2 3 units/hr 3am-MN, 2 05 units/hr MN-3am  Insulin to carb ratio 1:4 for breakfast, 1:3 for dinner and decrease her correction factor  1:15  Diet [place]/CHO controlled consistent carbohydrate Diet Level 1 with 4 carb servings/60 grams CHO/meal    IUP at 29 weeks  NST TID  S/p NICU consult    IV access  PICC line in place, placed 3/9/21    DVT ppx: SCDs, ambulation, lovenox 40mg daily      Subjective/Objective   Chief Complaint:     Nathaly Bowman is feeling well, no concerns today  Reports continued leaking but no vaginal bleeding, contractions, or decreased fetal movement  Objective:     Vitals:   Temp:  [97 °F (36 1 °C)-98 6 °F (37 °C)] 98 1 °F (36 7 °C)  HR:  [92-97] 92  Resp:  [18-20] 18  BP: (120-121)/(58-69) 121/69       Physical Exam  Constitutional:       General: She is not in acute distress  Appearance: She is well-developed  She is not diaphoretic  Pulmonary:      Effort: Pulmonary effort is normal  No respiratory distress  Abdominal:      General: There is no distension  Palpations: Abdomen is soft  Tenderness: There is no abdominal tenderness  There is no guarding or rebound  Neurological:      Mental Status: She is alert and oriented to person, place, and time           Fetal Assessment:  FHT: 140 / Moderate 6 - 25 bpm / 10x10, reactive  [place]: none    Lab, Imaging and other studies: I have personally reviewed pertinent reports        Lab Results   Component Value Date    WBC 12 93 (H) 03/06/2021    HGB 12 0 03/06/2021    HCT 37 2 03/06/2021    MCV 94 03/06/2021     03/06/2021       Lab Results   Component Value Date    CALCIUM 8 7 02/24/2021    K 3 8 02/24/2021    CO2 20 (L) 02/24/2021     02/24/2021    BUN 9 02/24/2021    CREATININE 0 62 02/24/2021       Lab Results   Component Value Date    ALT 15 02/24/2021    AST 12 02/24/2021    ALKPHOS 70 02/24/2021       Rohini Mcfarlane MD  OBGYN, PGY-4  3/14/2021  6:44 AM

## 2021-03-15 LAB
ABO GROUP BLD: NORMAL
BLD GP AB SCN SERPL QL: NEGATIVE
GLUCOSE SERPL-MCNC: 106 MG/DL (ref 65–140)
GLUCOSE SERPL-MCNC: 117 MG/DL (ref 65–140)
GLUCOSE SERPL-MCNC: 151 MG/DL (ref 65–140)
GLUCOSE SERPL-MCNC: 171 MG/DL (ref 65–140)
GLUCOSE SERPL-MCNC: 185 MG/DL (ref 65–140)
GLUCOSE SERPL-MCNC: 98 MG/DL (ref 65–140)
RH BLD: POSITIVE
SPECIMEN EXPIRATION DATE: NORMAL

## 2021-03-15 PROCEDURE — 86900 BLOOD TYPING SEROLOGIC ABO: CPT | Performed by: OBSTETRICS & GYNECOLOGY

## 2021-03-15 PROCEDURE — 86901 BLOOD TYPING SEROLOGIC RH(D): CPT | Performed by: OBSTETRICS & GYNECOLOGY

## 2021-03-15 PROCEDURE — 82948 REAGENT STRIP/BLOOD GLUCOSE: CPT

## 2021-03-15 PROCEDURE — 86850 RBC ANTIBODY SCREEN: CPT | Performed by: OBSTETRICS & GYNECOLOGY

## 2021-03-15 PROCEDURE — 99231 SBSQ HOSP IP/OBS SF/LOW 25: CPT | Performed by: OBSTETRICS & GYNECOLOGY

## 2021-03-15 PROCEDURE — NC001 PR NO CHARGE: Performed by: OBSTETRICS & GYNECOLOGY

## 2021-03-15 RX ADMIN — ENOXAPARIN SODIUM 40 MG: 40 INJECTION SUBCUTANEOUS at 09:27

## 2021-03-15 RX ADMIN — DOCUSATE SODIUM 100 MG: 100 CAPSULE, LIQUID FILLED ORAL at 09:27

## 2021-03-15 RX ADMIN — Medication 2000 UNITS: at 09:27

## 2021-03-15 RX ADMIN — BACITRACIN, NEOMYCIN, POLYMYXIN B 1 SMALL APPLICATION: 400; 3.5; 5 OINTMENT TOPICAL at 09:27

## 2021-03-15 RX ADMIN — METFORMIN ER 500 MG 1500 MG: 500 TABLET ORAL at 22:08

## 2021-03-15 RX ADMIN — OXYCODONE HYDROCHLORIDE AND ACETAMINOPHEN 1000 MG: 500 TABLET ORAL at 09:27

## 2021-03-15 RX ADMIN — Medication 1 TABLET: at 09:27

## 2021-03-15 RX ADMIN — ASPIRIN 81 MG CHEWABLE TABLET 162 MG: 81 TABLET CHEWABLE at 22:08

## 2021-03-15 RX ADMIN — DOCUSATE SODIUM 100 MG: 100 CAPSULE, LIQUID FILLED ORAL at 18:18

## 2021-03-15 RX ADMIN — INSULIN ASPART 300 UNITS: 100 INJECTION, SOLUTION INTRAVENOUS; SUBCUTANEOUS at 09:26

## 2021-03-15 RX ADMIN — FOLIC ACID 1 MG: 1 TABLET ORAL at 09:31

## 2021-03-15 NOTE — UTILIZATION REVIEW
Continued Stay Review    Date: *03-15-21                        Current Patient Class: inpatient  Current Level of Care: medical    HPI:30 y o  female initially admitted on *01-28-21 with PPROM @ 23 1/7 wks  Ruptured at 17 week gestation      Assessment/Plan: HD# 46 @ 29 week gestation   Last US 03-10-21    AMNIOTIC FLUID   Q1: 3 7      Q2: 0 9      Q3:          Q4:   GORDON Total = 4 6 cm   Amniotic Fluid: OLIGOHYDRAMNIOS  Patient continue to leak, denies s/s of labor or infection (+) fetal movement   NST TID   PICC line 03-09-21  SCD & lovenox   Fetal Assessment:  FHT: 135bpm / Moderate 6 - 25 bpm / 10x10, reactive  [place]: none      Pertinent Labs/Diagnostic Results:     Results from last 7 days   Lab Units 03/15/21  0608 03/14/21  2147 03/14/21  1923 03/14/21  1701 03/14/21  1146 03/14/21  0923 03/14/21  0506 03/13/21  2059 03/13/21  1813 03/13/21  1536 03/13/21  1316 03/13/21  1147   POC GLUCOSE mg/dl 117 146* 153* 89 130 111 111 210* 111 132 138 122       Vital Signs:    Date/Time  Temp  Pulse  Resp  BP  SpO2  O2 Device  Cardiac (WDL)  Patient Position - Orthostatic VS   03/15/21 0824  97 9 °F (36 6 °C)  100  18  116/68  98 %  None (Room air)  --  Sitting   03/15/21 0601  98 °F (36 7 °C)  --  --  --  --  --  --  --   03/15/21 0000  98 °F (36 7 °C)  95  18  119/75  100 %  --  --  --   03/14/21 2152  --  --  --  --  --  --  WDL  --   03/14/21 1942  98 °F (36 7 °C)  --  --  --  --  --  --  --   03/14/21 1612  97 8 °F (36 6 °C)  95  18  121/82  97 %  None (Room air)  --  Sitting   03/14/21 1410  --  --  --  --  --  None (Room air)  WDL  --   03/14/21 1126  98 2 °F (36 8 °C)  --  --  --  --  --  --           Medications:   Scheduled Medications:  vitamin C, 1,000 mg, Oral, Daily  aspirin, 162 mg, Oral, Daily  cholecalciferol, 2,000 Units, Oral, Daily  docusate sodium, 100 mg, Oral, BID  enoxaparin, 40 mg, Subcutaneous, K90B IRAJ  folic acid, 1 mg, Oral, Daily  metFORMIN, 1,500 mg, Oral, Daily  neomycin-bacitracin-polymyxin b, 1 small application, Topical, BID  patient maintained insulin pump, 1 each, Subcutaneous, Q8H  prenatal multivitamin, 1 tablet, Oral, Daily      Continuous IV Infusions:  calcium gluconate, 1 g, Intravenous, Demand      PRN Meds:  acetaminophen, 650 mg, Oral, Q4H PRN  bisacodyl, 5 mg, Oral, Daily PRN  calcium carbonate, 1,000 mg, Oral, Daily PRN  calcium gluconate, 1 g, Intravenous, Demand  hydrocortisone, , Topical, 4x Daily PRN  insulin aspart, 300 Units, Subcutaneous Insulin Pump, Daily PRN  polyethylene glycol, 17 g, Oral, Daily PRN        Discharge Plan:  Home after delivery    Network Utilization Review Department  ATTENTION: Please call with any questions or concerns to [phone] and carefully listen to the prompts so that you are directed to the right person  All voicemails are confidential   [facility] [place] all requests for admission clinical reviews, approved or denied determinations and any other requests to dedicated fax number below belonging to the campus where the patient is receiving treatment   List of dedicated fax numbers for the Facilities:  [address] DENIALS (Administrative/Medical Necessity) [phone]   [address] (Maternity/NICU/Pediatrics) [phone] [address] Damian Rey 1163 (Renetta Cortes [facility]) [address] [place] [address] Cal Aguilar 1481 [address] [place] [address] [phone]

## 2021-03-15 NOTE — PLAN OF CARE
Problem: PAIN - ADULT  Goal: Verbalizes/displays adequate comfort level or baseline comfort level  Description: Interventions:  - Encourage patient to monitor pain and request assistance  - Assess pain using appropriate pain scale  - Administer analgesics based on type and severity of pain and evaluate response  - Implement non-pharmacological measures as appropriate and evaluate response  - Consider cultural and social influences on pain and pain management  - Notify physician/advanced practitioner if interventions unsuccessful or patient reports new pain  Outcome: Progressing     Problem: INFECTION - ADULT  Goal: Absence or prevention of progression during hospitalization  Description: INTERVENTIONS:  - Assess and monitor for signs and symptoms of infection  - Monitor lab/diagnostic results  - Monitor all insertion sites, i e  indwelling lines, tubes, and drains  - Monitor endotracheal if appropriate and nasal secretions for changes in amount and color  - [place] appropriate cooling/warming therapies per order  - Administer medications as ordered  - Instruct and encourage patient and family to use good hand hygiene technique  - Identify and instruct in appropriate isolation precautions for identified infection/condition  Outcome: Progressing     Problem: SAFETY ADULT  Goal: Patient will remain free of falls  Description: INTERVENTIONS:  - Assess patient frequently for physical needs  -  Identify cognitive and physical deficits and behaviors that affect risk of falls    -  [place] fall precautions as indicated by assessment   - Educate patient/family on patient safety including physical limitations  - Instruct patient to call for assistance with activity based on assessment  - Modify environment to reduce risk of injury  - Consider OT/PT consult to assist with strengthening/mobility  Outcome: Progressing  Goal: Maintain or return to baseline ADL function  Description: INTERVENTIONS:  -  Assess patient's ability to carry out ADLs; assess patient's baseline for ADL function and identify physical deficits which impact ability to perform ADLs (bathing, care of mouth/teeth, toileting, grooming, dressing, etc )  - Assess/evaluate cause of self-care deficits   - Assess range of motion  - Assess patient's mobility; develop plan if impaired  - Assess patient's need for assistive devices and provide as appropriate  - Encourage maximum independence but intervene and supervise when necessary  - Involve family in performance of ADLs  - Assess for home care needs following discharge   - Consider OT consult to assist with ADL evaluation and planning for discharge  - Provide patient education as appropriate  Outcome: Progressing  Goal: Maintain or return mobility status to optimal level  Description: INTERVENTIONS:  - Assess patient's baseline mobility status (ambulation, transfers, stairs, etc )    - Identify cognitive and physical deficits and behaviors that affect mobility  - Identify mobility aids required to assist with transfers and/or ambulation (gait belt, sit-to-stand, lift, walker, cane, etc )  - [place] fall precautions as indicated by assessment  - Record patient progress and toleration of activity level on Mobility SBAR; progress patient to next Phase/Stage  - Instruct patient to call for assistance with activity based on assessment  - Consider rehabilitation consult to assist with strengthening/weightbearing, etc   Outcome: Progressing     Problem: DISCHARGE PLANNING  Goal: Discharge to home or other facility with appropriate resources  Description: INTERVENTIONS:  - Identify barriers to discharge w/patient and caregiver  - Arrange for needed discharge resources and transportation as appropriate  - Identify discharge learning needs (meds, wound care, etc )  - Arrange for interpretive services to assist at discharge as needed  - Refer to Case Management Department for coordinating discharge planning if the patient needs post-hospital services based on physician/advanced practitioner order or complex needs related to functional status, cognitive ability, or social support system  Outcome: Progressing     Problem: Labor & Delivery  Goal: Manages discomfort  Description: Assess and monitor for signs and symptoms of discomfort  Assess patient's pain level regularly and per hospital policy  Administer medications as ordered  Support use of nonpharmacological methods to help control pain such as distraction, imagery, relaxation, and application of heat and cold  Collaborate with interdisciplinary team and patient to determine appropriate pain management plan  1  Include patient in decisions related to comfort  2  Offer non-pharmacological pain management interventions  3  Report ineffective pain management to physician  Outcome: Progressing  Goal: Patient vital signs are stable  Description: 1  Assess vital signs - vaginal delivery    Outcome: Progressing     Problem: ANTEPARTUM  Goal: Maintain pregnancy as long as maternal and/or fetal condition is stable  Description: INTERVENTIONS:  - Maternal surveillance  - Fetal surveillance  - Monitor uterine activity  - Medications as ordered  - Bedrest  Outcome: Progressing     Problem: DISCHARGE PLANNING - CARE MANAGEMENT  Goal: Discharge to post-acute care or home with appropriate resources  Description: INTERVENTIONS:  - Conduct assessment to determine patient/family and health care team treatment goals, and need for post-acute services based on payer coverage, community resources, and patient preferences, and barriers to discharge  - Address psychosocial, clinical, and financial barriers to discharge as identified in assessment in conjunction with the patient/family and health care team  - Arrange appropriate level of post-acute services according to patients   needs and preference and payer coverage in collaboration with the physician and health care team  - Communicate with and update the patient/family, physician, and health care team regarding progress on the discharge plan  - Arrange appropriate transportation to post-acute venues  Outcome: Progressing     Problem: Potential for Falls  Goal: Patient will remain free of falls  Description: INTERVENTIONS:  - Assess patient frequently for physical needs  -  Identify cognitive and physical deficits and behaviors that affect risk of falls    -  [place] fall precautions as indicated by assessment   - Educate patient/family on patient safety including physical limitations  - Instruct patient to call for assistance with activity based on assessment  - Modify environment to reduce risk of injury  - Consider OT/PT consult to assist with strengthening/mobility  Outcome: Progressing

## 2021-03-15 NOTE — PROGRESS NOTES
Progress Note - Maternal Fetal Medicine   Reema Willams 27 y o  female MRN: [id number]  Unit/Bed#: -01 Encounter: [id number]    Assessment:  27 y o  [id number] at 29w4d admitted with PPROM  Hospital day 55    Plan:    PPROM  S/p latency antibiotics, magnesium sulfate x 12 hours, BTM 1/28-1/29  Fetal ultrasound 3/11:  Fetus weighing 1462g in liang breech presentation  Last fluid check 3/11:  Oligohydramnios (MVP 4 6cm), stable, fetal ascites  TORCH titers WNL  No signs and symptoms of infection at this time    T1DM  On insulin pump, settings adjusted 3/13/21  2 05 Midnight to 3, 2 4 u at 3 am to 8 am, 2 5 u at 8 am to 4pm, 2 7u from 4pm to 8pm, 2 6u from 8pm to 12am  Basal rate 2 3 units/hr 3am-MN, 2 05 units/hr MN-3am  Insulin to carb ratio 1:4 for breakfast, 1:3 for dinner and decrease her correction factor  1:15  Diet Jayson/CHO controlled consistent carbohydrate Diet Level 1 with 4 carb servings/60 grams CHO/meal    IUP at 29 weeks  NST TID  S/p NICU consult    IV access  PICC line in place, placed 3/9/21  No evidence of infection    DVT ppx: SCDs, ambulation, lovenox 40mg daily      Subjective/Objective   Chief Complaint:     Nelly Gallo is feeling well, no concerns today  Reports continued leaking but no vaginal bleeding, contractions, or decreased fetal movement  Objective:     Vitals:   Temp:  [97 8 °F (36 6 °C)-98 2 °F (36 8 °C)] 98 °F (36 7 °C)  HR:  [84-95] 95  Resp:  [18-20] 18  BP: (107-121)/(55-82) 119/75       Physical Exam  Constitutional:       General: She is not in acute distress  Appearance: She is well-developed  She is not diaphoretic  Pulmonary:      Effort: Pulmonary effort is normal  No respiratory distress  Abdominal:      General: There is no distension  Palpations: Abdomen is soft  Tenderness: There is no abdominal tenderness  There is no guarding or rebound  Neurological:      Mental Status: She is alert and oriented to person, place, and time           Fetal Assessment:  FHT: 135bpm / Moderate 6 - 25 bpm / 10x10, reactive  Olga: none    Lab, Imaging and other studies: I have personally reviewed pertinent reports        Lab Results   Component Value Date    WBC 12 93 (H) 03/06/2021    HGB 12 0 03/06/2021    HCT 37 2 03/06/2021    MCV 94 03/06/2021     03/06/2021       Lab Results   Component Value Date    CALCIUM 8 7 02/24/2021    K 3 8 02/24/2021    CO2 20 (L) 02/24/2021     02/24/2021    BUN 9 02/24/2021    CREATININE 0 62 02/24/2021       Lab Results   Component Value Date    ALT 15 02/24/2021    AST 12 02/24/2021    ALKPHOS 70 02/24/2021       Delma Jensen MD  OBGYN, PGY-3  3/15/2021  6:19 AM

## 2021-03-16 ENCOUNTER — ANESTHESIA EVENT (INPATIENT)
Dept: LABOR AND DELIVERY | Facility: HOSPITAL | Age: 31
End: 2021-03-16
Payer: COMMERCIAL

## 2021-03-16 ENCOUNTER — ANESTHESIA (INPATIENT)
Dept: LABOR AND DELIVERY | Facility: HOSPITAL | Age: 31
End: 2021-03-16
Payer: COMMERCIAL

## 2021-03-16 PROBLEM — Z98.891 S/P PRIMARY LOW TRANSVERSE C-SECTION: Status: ACTIVE | Noted: 2021-03-16

## 2021-03-16 LAB
BASE EXCESS BLDCOA CALC-SCNC: -3.8 MMOL/L (ref 3–11)
BASE EXCESS BLDCOV CALC-SCNC: -4 MMOL/L (ref 1–9)
BASOPHILS # BLD AUTO: 0.05 THOUSANDS/ΜL (ref 0–0.1)
BASOPHILS NFR BLD AUTO: 0 % (ref 0–1)
DME PARACHUTE DELIVERY DATE REQUESTED: NORMAL
DME PARACHUTE ITEM DESCRIPTION: NORMAL
DME PARACHUTE ORDER STATUS: NORMAL
DME PARACHUTE SUPPLIER NAME: NORMAL
DME PARACHUTE SUPPLIER PHONE: NORMAL
EOSINOPHIL # BLD AUTO: 0.09 THOUSAND/ΜL (ref 0–0.61)
EOSINOPHIL NFR BLD AUTO: 1 % (ref 0–6)
ERYTHROCYTE [DISTWIDTH] IN BLOOD BY AUTOMATED COUNT: 13.7 % (ref 11.6–15.1)
GLUCOSE SERPL-MCNC: 111 MG/DL (ref 65–140)
GLUCOSE SERPL-MCNC: 78 MG/DL (ref 65–140)
GLUCOSE SERPL-MCNC: 81 MG/DL (ref 65–140)
GLUCOSE SERPL-MCNC: 95 MG/DL (ref 65–140)
GLUCOSE SERPL-MCNC: 96 MG/DL (ref 65–140)
HCO3 BLDCOA-SCNC: 24.5 MMOL/L (ref 17.3–27.3)
HCO3 BLDCOV-SCNC: 23.8 MMOL/L (ref 12.2–28.6)
HCT VFR BLD AUTO: 38.8 % (ref 34.8–46.1)
HGB BLD-MCNC: 12.7 G/DL (ref 11.5–15.4)
HOLD SPECIMEN: NORMAL
IMM GRANULOCYTES # BLD AUTO: 0.2 THOUSAND/UL (ref 0–0.2)
IMM GRANULOCYTES NFR BLD AUTO: 1 % (ref 0–2)
LYMPHOCYTES # BLD AUTO: 2.77 THOUSANDS/ΜL (ref 0.6–4.47)
LYMPHOCYTES NFR BLD AUTO: 14 % (ref 14–44)
MCH RBC QN AUTO: 30.8 PG (ref 26.8–34.3)
MCHC RBC AUTO-ENTMCNC: 32.7 G/DL (ref 31.4–37.4)
MCV RBC AUTO: 94 FL (ref 82–98)
MONOCYTES # BLD AUTO: 0.87 THOUSAND/ΜL (ref 0.17–1.22)
MONOCYTES NFR BLD AUTO: 4 % (ref 4–12)
NEUTROPHILS # BLD AUTO: 15.95 THOUSANDS/ΜL (ref 1.85–7.62)
NEUTS SEG NFR BLD AUTO: 80 % (ref 43–75)
NRBC BLD AUTO-RTO: 0 /100 WBCS
O2 CT VFR BLDCOA CALC: 2.9 ML/DL
OXYHGB MFR BLDCOA: 14.2 %
OXYHGB MFR BLDCOV: 22.9 %
PCO2 BLDCOA: 58.1 MM[HG] (ref 30–60)
PCO2 BLDCOV: 54.7 MM HG (ref 27–43)
PH BLDCOA: 7.24 [PH] (ref 7.23–7.43)
PH BLDCOV: 7.26 [PH] (ref 7.19–7.49)
PLATELET # BLD AUTO: 191 THOUSANDS/UL (ref 149–390)
PMV BLD AUTO: 11.4 FL (ref 8.9–12.7)
PO2 BLDCOA: 11 MM HG (ref 5–25)
PO2 BLDCOV: 14.5 MM HG (ref 15–45)
RBC # BLD AUTO: 4.12 MILLION/UL (ref 3.81–5.12)
SAO2 % BLDCOV: 4.6 ML/DL
WBC # BLD AUTO: 19.93 THOUSAND/UL (ref 4.31–10.16)

## 2021-03-16 PROCEDURE — 88307 TISSUE EXAM BY PATHOLOGIST: CPT | Performed by: PATHOLOGY

## 2021-03-16 PROCEDURE — 85025 COMPLETE CBC W/AUTO DIFF WBC: CPT | Performed by: OBSTETRICS & GYNECOLOGY

## 2021-03-16 PROCEDURE — 82805 BLOOD GASES W/O2 SATURATION: CPT | Performed by: OBSTETRICS & GYNECOLOGY

## 2021-03-16 PROCEDURE — 59510 CESAREAN DELIVERY: CPT | Performed by: OBSTETRICS & GYNECOLOGY

## 2021-03-16 PROCEDURE — 82948 REAGENT STRIP/BLOOD GLUCOSE: CPT

## 2021-03-16 RX ORDER — ONDANSETRON 2 MG/ML
INJECTION INTRAMUSCULAR; INTRAVENOUS AS NEEDED
Status: DISCONTINUED | OUTPATIENT
Start: 2021-03-16 | End: 2021-03-16

## 2021-03-16 RX ORDER — HYDROMORPHONE HCL/PF 1 MG/ML
0.5 SYRINGE (ML) INJECTION EVERY 2 HOUR PRN
Status: DISCONTINUED | OUTPATIENT
Start: 2021-03-16 | End: 2021-03-20 | Stop reason: HOSPADM

## 2021-03-16 RX ORDER — MAGNESIUM HYDROXIDE/ALUMINUM HYDROXICE/SIMETHICONE 120; 1200; 1200 MG/30ML; MG/30ML; MG/30ML
15 SUSPENSION ORAL EVERY 6 HOURS PRN
Status: DISCONTINUED | OUTPATIENT
Start: 2021-03-16 | End: 2021-03-20 | Stop reason: HOSPADM

## 2021-03-16 RX ORDER — DOCUSATE SODIUM 100 MG/1
100 CAPSULE, LIQUID FILLED ORAL 2 TIMES DAILY
Status: DISCONTINUED | OUTPATIENT
Start: 2021-03-16 | End: 2021-03-20 | Stop reason: HOSPADM

## 2021-03-16 RX ORDER — SODIUM CHLORIDE 9 MG/ML
INJECTION, SOLUTION INTRAVENOUS CONTINUOUS PRN
Status: DISCONTINUED | OUTPATIENT
Start: 2021-03-16 | End: 2021-03-16

## 2021-03-16 RX ORDER — DEXAMETHASONE SODIUM PHOSPHATE 4 MG/ML
8 INJECTION, SOLUTION INTRA-ARTICULAR; INTRALESIONAL; INTRAMUSCULAR; INTRAVENOUS; SOFT TISSUE ONCE AS NEEDED
Status: ACTIVE | OUTPATIENT
Start: 2021-03-16 | End: 2021-03-17

## 2021-03-16 RX ORDER — MORPHINE SULFATE 4 MG/ML
4 INJECTION, SOLUTION INTRAMUSCULAR; INTRAVENOUS
Status: DISCONTINUED | OUTPATIENT
Start: 2021-03-16 | End: 2021-03-16

## 2021-03-16 RX ORDER — BUPIVACAINE HYDROCHLORIDE 7.5 MG/ML
INJECTION, SOLUTION INTRASPINAL AS NEEDED
Status: DISCONTINUED | OUTPATIENT
Start: 2021-03-16 | End: 2021-03-16

## 2021-03-16 RX ORDER — NALOXONE HYDROCHLORIDE 0.4 MG/ML
0.1 INJECTION, SOLUTION INTRAMUSCULAR; INTRAVENOUS; SUBCUTANEOUS
Status: ACTIVE | OUTPATIENT
Start: 2021-03-16 | End: 2021-03-17

## 2021-03-16 RX ORDER — ONDANSETRON 2 MG/ML
4 INJECTION INTRAMUSCULAR; INTRAVENOUS EVERY 4 HOURS PRN
Status: DISPENSED | OUTPATIENT
Start: 2021-03-16 | End: 2021-03-17

## 2021-03-16 RX ORDER — SENNOSIDES 8.6 MG
1 TABLET ORAL DAILY PRN
Status: DISCONTINUED | OUTPATIENT
Start: 2021-03-16 | End: 2021-03-20 | Stop reason: HOSPADM

## 2021-03-16 RX ORDER — OXYTOCIN/RINGER'S LACTATE 30/500 ML
62.5 PLASTIC BAG, INJECTION (ML) INTRAVENOUS
Status: DISCONTINUED | OUTPATIENT
Start: 2021-03-16 | End: 2021-03-20 | Stop reason: HOSPADM

## 2021-03-16 RX ORDER — OXYTOCIN/RINGER'S LACTATE 30/500 ML
PLASTIC BAG, INJECTION (ML) INTRAVENOUS CONTINUOUS PRN
Status: DISCONTINUED | OUTPATIENT
Start: 2021-03-16 | End: 2021-03-16

## 2021-03-16 RX ORDER — ONDANSETRON 2 MG/ML
4 INJECTION INTRAMUSCULAR; INTRAVENOUS ONCE AS NEEDED
Status: DISCONTINUED | OUTPATIENT
Start: 2021-03-16 | End: 2021-03-16

## 2021-03-16 RX ORDER — SODIUM CHLORIDE, SODIUM LACTATE, POTASSIUM CHLORIDE, CALCIUM CHLORIDE 600; 310; 30; 20 MG/100ML; MG/100ML; MG/100ML; MG/100ML
INJECTION, SOLUTION INTRAVENOUS CONTINUOUS PRN
Status: DISCONTINUED | OUTPATIENT
Start: 2021-03-16 | End: 2021-03-16

## 2021-03-16 RX ORDER — METOCLOPRAMIDE HYDROCHLORIDE 5 MG/ML
5 INJECTION INTRAMUSCULAR; INTRAVENOUS EVERY 6 HOURS PRN
Status: ACTIVE | OUTPATIENT
Start: 2021-03-16 | End: 2021-03-17

## 2021-03-16 RX ORDER — KETOROLAC TROMETHAMINE 30 MG/ML
30 INJECTION, SOLUTION INTRAMUSCULAR; INTRAVENOUS EVERY 6 HOURS
Status: DISCONTINUED | OUTPATIENT
Start: 2021-03-16 | End: 2021-03-18

## 2021-03-16 RX ORDER — DIPHENHYDRAMINE HYDROCHLORIDE 50 MG/ML
25 INJECTION INTRAMUSCULAR; INTRAVENOUS EVERY 6 HOURS PRN
Status: ACTIVE | OUTPATIENT
Start: 2021-03-16 | End: 2021-03-17

## 2021-03-16 RX ORDER — ACETAMINOPHEN 325 MG/1
650 TABLET ORAL EVERY 6 HOURS
Status: DISCONTINUED | OUTPATIENT
Start: 2021-03-16 | End: 2021-03-20 | Stop reason: HOSPADM

## 2021-03-16 RX ORDER — SODIUM CHLORIDE, SODIUM LACTATE, POTASSIUM CHLORIDE, CALCIUM CHLORIDE 600; 310; 30; 20 MG/100ML; MG/100ML; MG/100ML; MG/100ML
125 INJECTION, SOLUTION INTRAVENOUS CONTINUOUS
Status: DISCONTINUED | OUTPATIENT
Start: 2021-03-16 | End: 2021-03-20 | Stop reason: HOSPADM

## 2021-03-16 RX ORDER — FENTANYL CITRATE/PF 50 MCG/ML
25 SYRINGE (ML) INJECTION
Status: DISCONTINUED | OUTPATIENT
Start: 2021-03-16 | End: 2021-03-16

## 2021-03-16 RX ORDER — MORPHINE SULFATE 0.5 MG/ML
INJECTION, SOLUTION EPIDURAL; INTRATHECAL; INTRAVENOUS AS NEEDED
Status: DISCONTINUED | OUTPATIENT
Start: 2021-03-16 | End: 2021-03-16

## 2021-03-16 RX ORDER — DIAPER,BRIEF,INFANT-TODD,DISP
1 EACH MISCELLANEOUS 4 TIMES DAILY PRN
Status: DISCONTINUED | OUTPATIENT
Start: 2021-03-16 | End: 2021-03-20 | Stop reason: HOSPADM

## 2021-03-16 RX ORDER — CALCIUM CARBONATE 200(500)MG
1000 TABLET,CHEWABLE ORAL DAILY PRN
Status: DISCONTINUED | OUTPATIENT
Start: 2021-03-16 | End: 2021-03-20 | Stop reason: HOSPADM

## 2021-03-16 RX ORDER — CLINDAMYCIN PHOSPHATE 900 MG/50ML
900 INJECTION INTRAVENOUS ONCE
Status: COMPLETED | OUTPATIENT
Start: 2021-03-16 | End: 2021-03-16

## 2021-03-16 RX ADMIN — KETOROLAC TROMETHAMINE 30 MG: 30 INJECTION, SOLUTION INTRAMUSCULAR at 15:34

## 2021-03-16 RX ADMIN — KETOROLAC TROMETHAMINE 30 MG: 30 INJECTION, SOLUTION INTRAMUSCULAR at 09:20

## 2021-03-16 RX ADMIN — ONDANSETRON 4 MG: 2 INJECTION INTRAMUSCULAR; INTRAVENOUS at 08:12

## 2021-03-16 RX ADMIN — SODIUM CHLORIDE 500 ML: 0.9 INJECTION, SOLUTION INTRAVENOUS at 05:10

## 2021-03-16 RX ADMIN — SODIUM CHLORIDE: 0.9 INJECTION, SOLUTION INTRAVENOUS at 07:35

## 2021-03-16 RX ADMIN — DOCUSATE SODIUM 100 MG: 100 CAPSULE, LIQUID FILLED ORAL at 09:07

## 2021-03-16 RX ADMIN — CLINDAMYCIN IN 5 PERCENT DEXTROSE 900 MG: 18 INJECTION, SOLUTION INTRAVENOUS at 07:41

## 2021-03-16 RX ADMIN — Medication 250 MILLI-UNITS/MIN: at 08:03

## 2021-03-16 RX ADMIN — GENTAMICIN SULFATE 270 MG: 40 INJECTION, SOLUTION INTRAMUSCULAR; INTRAVENOUS at 07:50

## 2021-03-16 RX ADMIN — KETOROLAC TROMETHAMINE 30 MG: 30 INJECTION, SOLUTION INTRAMUSCULAR at 21:39

## 2021-03-16 RX ADMIN — SODIUM CHLORIDE 1000 ML: 0.9 INJECTION, SOLUTION INTRAVENOUS at 06:50

## 2021-03-16 RX ADMIN — METFORMIN ER 500 MG 1500 MG: 500 TABLET ORAL at 23:44

## 2021-03-16 RX ADMIN — SODIUM CHLORIDE, SODIUM LACTATE, POTASSIUM CHLORIDE, AND CALCIUM CHLORIDE 125 ML/HR: .6; .31; .03; .02 INJECTION, SOLUTION INTRAVENOUS at 17:16

## 2021-03-16 RX ADMIN — ACETAMINOPHEN 650 MG: 325 TABLET, FILM COATED ORAL at 23:44

## 2021-03-16 RX ADMIN — Medication 62.5 MILLI-UNITS/MIN: at 09:58

## 2021-03-16 RX ADMIN — SODIUM CHLORIDE, SODIUM LACTATE, POTASSIUM CHLORIDE, AND CALCIUM CHLORIDE 125 ML/HR: .6; .31; .03; .02 INJECTION, SOLUTION INTRAVENOUS at 09:07

## 2021-03-16 RX ADMIN — ACETAMINOPHEN 650 MG: 325 TABLET, FILM COATED ORAL at 18:00

## 2021-03-16 RX ADMIN — AZITHROMYCIN MONOHYDRATE 500 MG: 500 INJECTION, POWDER, LYOPHILIZED, FOR SOLUTION INTRAVENOUS at 07:41

## 2021-03-16 RX ADMIN — PHENYLEPHRINE HYDROCHLORIDE 20 MCG/MIN: 10 INJECTION INTRAVENOUS at 07:41

## 2021-03-16 RX ADMIN — ENOXAPARIN SODIUM 40 MG: 40 INJECTION SUBCUTANEOUS at 21:39

## 2021-03-16 RX ADMIN — BUPIVACAINE HYDROCHLORIDE IN DEXTROSE 1.8 ML: 7.5 INJECTION, SOLUTION SUBARACHNOID at 08:22

## 2021-03-16 RX ADMIN — SODIUM CHLORIDE, SODIUM LACTATE, POTASSIUM CHLORIDE, AND CALCIUM CHLORIDE: .6; .31; .03; .02 INJECTION, SOLUTION INTRAVENOUS at 07:35

## 2021-03-16 RX ADMIN — ONDANSETRON 4 MG: 2 INJECTION INTRAMUSCULAR; INTRAVENOUS at 12:17

## 2021-03-16 RX ADMIN — BACITRACIN, NEOMYCIN, POLYMYXIN B 1 SMALL APPLICATION: 400; 3.5; 5 OINTMENT TOPICAL at 09:07

## 2021-03-16 RX ADMIN — MORPHINE SULFATE 0.2 MG: 0.5 INJECTION, SOLUTION EPIDURAL; INTRATHECAL; INTRAVENOUS at 07:43

## 2021-03-16 RX ADMIN — OXYCODONE HYDROCHLORIDE AND ACETAMINOPHEN 1000 MG: 500 TABLET ORAL at 09:07

## 2021-03-16 RX ADMIN — DOCUSATE SODIUM 100 MG: 100 CAPSULE, LIQUID FILLED ORAL at 18:01

## 2021-03-16 NOTE — OP NOTE
OPERATIVE REPORT  PATIENT NAME: Larry Waldron    :  1990  MRN: [id number]  Pt Location: [facility] L&D OR ROOM 02    SURGERY DATE: 3/16/2021    Surgeon(s) and Role:     * Yvonne Elizabeth MD - Primary     * Stephanie Fallon MD - Assisting    Preop Diagnosis:   premature rupture of membranes (PPROM) delivered, current hospitalization [O42 919]  Breech presentation [O32 1XX0]    Post-Op Diagnosis Codes:     *  premature rupture of membranes (PPROM) delivered, current hospitalization [O42 919]     * Breech presentation [O32 1XX0]    Procedure(s) (LRB):   SECTION () (N/A)    Specimen(s):  ID Type Source Tests Collected by Time Destination   1 :  Tissue (Placenta on Hold) OB Only Placenta TISSUE EXAM OB (PLACENTA) ONLY Yvonne Elizabeth MD 3/16/2021 7721    A :  Cord Blood Cord BLOOD GAS, VENOUS, CORD, BLOOD GAS, ARTERIAL, CORD Yvonne Elizabeth MD 3/16/2021 0802      Drains:  Urethral Catheter Non-latex 16 Fr  (Active)   Reasons to continue Urinary Catheter  Post-operative urological requirements 21 0747   Goal for Removal Remove POD#1 21 0747   Site Assessment Clean;Skin intact 21 0747   Collection Container Standard drainage bag 21 0747   Securement Method Securing device (Describe) 21 0747   Number of days: 0       Anesthesia Type: Spinal    Operative Indications:   premature rupture of membranes (PPROM) delivered, current hospitalization [O42 919]  Breech presentation [O32 1XX0]  Labor    Operative Findings:  1  Delivery of viable male on 21 at 0802, weight 3lbs 10 2oz;  Apgar scores of 6 at one minute and 8 at five minutes  Umbilical artery pH 3 153 (base excess -3 8)  2  Normal appearing placenta with centrally-inserted 3 vessel cord  3  Clear amniotic fluid  4  Grossly normal uterus, tubes, and ovaries    Specimens:   1  Arterial and venous cord gases  2  Cord blood  3  Segment of umbilical cord  4   Placenta to pathology     Quantitative Blood Loss:  814 mL    Drains: Chavez catheter           Complications:  None; patient tolerated the procedure well  Disposition: PACU            Condition: stable    Procedure Details     Ashish Miranda is a [id number] who presented to labor and delivery for previable rupture of membranes  She was admitted to the hospital at [facility] and was expectantly managed after getting latency antibiotics and betamethasone  Her pregnancy was complicated by type 1 diabetes mellitus  On 3/16/2021, patient began feeling contractions that were getting progressively worse  She was noted to be 1cm dilated and fetus was noted to be in breech presentation  The decision was made to proceed to the OR for primary  section  Patient was made aware of these findings and the proposed plan  Risks, benefits, possible complications, alternate treatment options, and expected outcomes were discussed with the patient  The patient agreed with the proposed plan and gave informed consent  The patient was taken to the operating room where she was properly identified to the OR staff and attending physician  She received spinal anesthesia preoperatively  Fetal heart tones were appreciated and found to be appropriate  A Chavez catheter was aseptically inserted and SCDs were placed  The vagina was prepped with chlorhexadine and the abdomen was prepped with Chloraprep  Following appropriate drying time, the patient was draped in the usual sterile manner for a Pfannenstiel incision  The patient had received gentamicin, clindamycin and azithromycin IV pre-operatively for prophylaxis  A Time Out was held and the above information confirmed  The patient was identified as Ashish Miranda and the procedure verified as  Delivery  A Pfannenstiel incision was made and carried down through the underlying subcutaneous tissue to the fascia using a scalpel  Rectus fascia was then incised  We then proceeded in [place] fashion    All anatomic layers were well-demarcated  The rectus muscles were  and the peritoneum was identified, entered, and extended longitudinally with blunt dissection  The Andre-O retractor was inserted into the abdomen  A low transverse uterine incision was made with the scalpel and extended laterally with blunt dissection  The amnion was entered sharply  Surgeons hand was inserted through the hysterotomy and the fetal sacrum was palpated, elevated, and delivered through the uterine incision with the assistance of gentle fundal pressure  Fetal body was delivered to the level of the scapula, after which time the legs delivered spontaneously  The right, anterior arm was palpated, elbow flexed, and arm swept down and across the chest for delivery  The  was rotated to the contralateral side using a breech towel  The contralateral arm was palpated and delivered in similar fashion  The fetal zygomatic arches were palpated and fetal head was flexed for delivery  Baby had good color and tone  The umbilical cord was clamped and cut  The infant was handed off to the  providers  Arterial and venous cord gases, cord blood, and a segment of umbilical cord were obtained for evaluation  The placenta was manually extracted  The uterus was exteriorized and curretted with a moist lap sponge  The uterus was returned to the abdomen  The uterine incision was closed with 2-0 stratafix  A second layer of the same suture was used to imbricate the first   Hemostasis was noted to be excellent  The Andre-O retractor was removed from the abdomen  The fascia was closed with a running suture of  1-stratafix  The skin was closed with 3-0 stratafix  Exofin glue was applied to the incision  The patient appeared to tolerate the procedure very well  At the conclusion of the procedure, all needle, sponge, and instrument counts were noted to be correct x2    The patient tolerated the procedure well and was transferred to her the recovery room in stable condition  Dr Maurizio Finn was present and participated in all key portions of the case      Dinesh Gaitan MD  2:08 PM  3/16/2021

## 2021-03-16 NOTE — ANESTHESIA PREPROCEDURE EVALUATION
Procedure:   SECTION () (N/A Uterus)  NPO PAST MN  Relevant Problems   ENDO   (+) Pregnancy complicated by pre-existing type 1 diabetes, second trimester      GYN   (+) 29 weeks gestation of pregnancy      Other   (+)  premature rupture of membranes (PPROM) with unknown onset of labor   (+) Type 1 diabetes mellitus affecting pregnancy in third trimester, antepartum      BREECH PRESENTATION,ACTIVE LABOR, BMI-36, 5'4"  On lovenox 40mg qd  last dose 24405 3/15 21  Physical Exam    Airway    Mallampati score: I  TM Distance: >3 FB  Neck ROM: full     Dental   No notable dental hx     Cardiovascular      Pulmonary      Other Findings    FBS-78  ON INSULIN PUMP  Anesthesia Plan  ASA Score- 2 Emergent    Anesthesia Type- spinal with ASA Monitors  Additional Monitors:   Airway Plan:           Plan Factors-Exercise tolerance (METS): >4 METS  Chart reviewed  Imaging results reviewed  Existing labs reviewed  Patient is not a current smoker  Patient not instructed to abstain from smoking on day of procedure  Patient did not smoke on day of surgery  Induction-     Postoperative Plan-     Informed Consent- Anesthetic plan and risks discussed with patient and spouse  I personally reviewed this patient with the CRNA  Discussed and agreed on the Anesthesia Plan with the CRNA             Lab Results   Component Value Date    GLUC 148 (H) 2021    ALT 15 2021    AST 12 2021    BUN 9 2021    CALCIUM 8 7 2021     2021    CO2 20 (L) 2021    CREATININE 0 62 2021    HCT 38 8 2021    HGB 12 7 2021    HGBA1C 6 1 (H) 2021    MG 3 2 (H) 2021     2021    K 3 8 2021    WBC 19 93 (H) 2021     Spinal technique discussed with Patient   Discussed side effects including post dural puncture headache with patient  All questions answered  Consent given

## 2021-03-16 NOTE — PLAN OF CARE
Problem: PAIN - ADULT  Goal: Verbalizes/displays adequate comfort level or baseline comfort level  Description: Interventions:  - Encourage patient to monitor pain and request assistance  - Assess pain using appropriate pain scale  - Administer analgesics based on type and severity of pain and evaluate response  - Implement non-pharmacological measures as appropriate and evaluate response  - Consider cultural and social influences on pain and pain management  - Notify physician/advanced practitioner if interventions unsuccessful or patient reports new pain  Outcome: Progressing     Problem: INFECTION - ADULT  Goal: Absence or prevention of progression during hospitalization  Description: INTERVENTIONS:  - Assess and monitor for signs and symptoms of infection  - Monitor lab/diagnostic results  - Monitor all insertion sites, i e  indwelling lines, tubes, and drains  - Monitor endotracheal if appropriate and nasal secretions for changes in amount and color  - [place] appropriate cooling/warming therapies per order  - Administer medications as ordered  - Instruct and encourage patient and family to use good hand hygiene technique  - Identify and instruct in appropriate isolation precautions for identified infection/condition  Outcome: Progressing     Problem: SAFETY ADULT  Goal: Patient will remain free of falls  Description: INTERVENTIONS:  - Assess patient frequently for physical needs  -  Identify cognitive and physical deficits and behaviors that affect risk of falls    -  [place] fall precautions as indicated by assessment   - Educate patient/family on patient safety including physical limitations  - Instruct patient to call for assistance with activity based on assessment  - Modify environment to reduce risk of injury  - Consider OT/PT consult to assist with strengthening/mobility  Outcome: Progressing  Goal: Maintain or return to baseline ADL function  Description: INTERVENTIONS:  -  Assess patient's ability to carry out ADLs; assess patient's baseline for ADL function and identify physical deficits which impact ability to perform ADLs (bathing, care of mouth/teeth, toileting, grooming, dressing, etc )  - Assess/evaluate cause of self-care deficits   - Assess range of motion  - Assess patient's mobility; develop plan if impaired  - Assess patient's need for assistive devices and provide as appropriate  - Encourage maximum independence but intervene and supervise when necessary  - Involve family in performance of ADLs  - Assess for home care needs following discharge   - Consider OT consult to assist with ADL evaluation and planning for discharge  - Provide patient education as appropriate  Outcome: Progressing  Goal: Maintain or return mobility status to optimal level  Description: INTERVENTIONS:  - Assess patient's baseline mobility status (ambulation, transfers, stairs, etc )    - Identify cognitive and physical deficits and behaviors that affect mobility  - Identify mobility aids required to assist with transfers and/or ambulation (gait belt, sit-to-stand, lift, walker, cane, etc )  - [place] fall precautions as indicated by assessment  - Record patient progress and toleration of activity level on Mobility SBAR; progress patient to next Phase/Stage  - Instruct patient to call for assistance with activity based on assessment  - Consider rehabilitation consult to assist with strengthening/weightbearing, etc   Outcome: Progressing     Problem: DISCHARGE PLANNING  Goal: Discharge to home or other facility with appropriate resources  Description: INTERVENTIONS:  - Identify barriers to discharge w/patient and caregiver  - Arrange for needed discharge resources and transportation as appropriate  - Identify discharge learning needs (meds, wound care, etc )  - Arrange for interpretive services to assist at discharge as needed  - Refer to Case Management Department for coordinating discharge planning if the patient needs post-hospital services based on physician/advanced practitioner order or complex needs related to functional status, cognitive ability, or social support system  Outcome: Progressing     Problem: DISCHARGE PLANNING - CARE MANAGEMENT  Goal: Discharge to post-acute care or home with appropriate resources  Description: INTERVENTIONS:  - Conduct assessment to determine patient/family and health care team treatment goals, and need for post-acute services based on payer coverage, community resources, and patient preferences, and barriers to discharge  - Address psychosocial, clinical, and financial barriers to discharge as identified in assessment in conjunction with the patient/family and health care team  - Arrange appropriate level of post-acute services according to patients   needs and preference and payer coverage in collaboration with the physician and health care team  - Communicate with and update the patient/family, physician, and health care team regarding progress on the discharge plan  - Arrange appropriate transportation to post-acute venues  Outcome: Progressing     Problem: Potential for Falls  Goal: Patient will remain free of falls  Description: INTERVENTIONS:  - Assess patient frequently for physical needs  -  Identify cognitive and physical deficits and behaviors that affect risk of falls    -  [place] fall precautions as indicated by assessment   - Educate patient/family on patient safety including physical limitations  - Instruct patient to call for assistance with activity based on assessment  - Modify environment to reduce risk of injury  - Consider OT/PT consult to assist with strengthening/mobility  Outcome: Progressing

## 2021-03-16 NOTE — LACTATION NOTE
CONSULT - LACTATION  Keli Horne 27 y o  female MRN: [id number]    [facility] L&D Room / Bed:  301/ 301-01 Encounter: [id number]    Maternal Information     Previouse breast reduction surgery? No     Lactation history:   Has patient previously breast fed: No   How long had patient previously breast fed:     Previous breast feeding complications:       Birth information:  YOB: 2021   Time of birth: 6:65 AM   Sex: male   Delivery type:     Birth Weight: 1650 g (3 lb 10 2 oz)   Percent of Weight Change: 0%     Gestational Age: 34w10d   [unfilled]    Assessment     Breast and nipple assessment: normal assessment      Feeding recommendations:  pump every 2-3 hours     Lupe would like a Spectra S2 breast pump for home use  CM consult/order placed  Hand expression effective for 5 5 ml's colostrum    Information on hand expression given  Discussed benefits of knowing how to manually express breast including stimulating milk supply, softening nipple for latch and evacuating breast in the event of engorgement  Given education on Increasing Breast Milk Supply  Demonstrated how to use the pumping log to accommodate expectations on production of breast milk  Instructions given on pumping  Discussed when to start, frequency, different pumps available versus manual expression  Discussed hygiene of hands and supplies as well as assembly, placement of flanges, size of flanged, preparing the breast and cycles and suction settings on pump  Demonstrated use of hand pump  Discussed labeling of milk, storage, and preparation of stored milk  Encouraged pumping every 2-3 hours while awake  Encouraged setting an alarm to remember when to pump to accomplish 8-10 pumping sessions in a 24 hour time period  Encouraged hand expression  Encouraged parents to call for assistance, questions, and concerns about breastfeeding  Extension provided        Justin Hooper Anais Graff 3/16/2021 3:41 PM

## 2021-03-16 NOTE — DISCHARGE INSTRUCTIONS
Self Care After Delivery   AMBULATORY CARE:   The postpartum period  is the period of time from delivery to about 6 weeks  During this time you may experience many physical and emotional changes  It is important to understand what is normal and when you need to call your healthcare provider  It is also important to know how to care for yourself during this time  Call your local emergency number (911 in the [address] [facility]) for any of the following:   · You see or hear things that are not there, or have thoughts of harming yourself or your baby  · You soak through 1 pad in 15 minutes, have blurry vision, clammy or pale skin, and feel faint  · You faint or lose consciousness  · You have trouble breathing  · You cough up blood  · Your  incision comes apart  Seek care immediately if:   · Your heart is beating faster than usual     · You have a bad headache or changes in your vision  · Your episiotomy or  incision is red, swollen, bleeding, or draining pus  · You have severe abdominal pain  Call your doctor or obstetrician if:   · Your leg is painful, red, and larger than usual     · You soak through 1 or more pads in an hour, or pass blood clots larger than a quarter from your vagina  · You have a fever  · You have new or worsening pain in your abdomen or vagina  · You continue to have depression 1 to 2 weeks after you deliver  · You have trouble sleeping  · You have foul-smelling discharge from your vagina  · You have pain or burning when you urinate  · You do not have a bowel movement for 3 days or more  · You have nausea or are vomiting  · You have hard lumps or red streaks over your breasts  · You have cracked nipples or bleed from your nipples  · You have questions or concerns about your condition or care  Physical changes:   The following are normal changes after you give birth:  · Pain in the area between your anus and vagina    · Breast pain    · Constipation or hemorrhoids    · Hot or cold flashes    · Vaginal bleeding or discharge    · Mild to moderate abdominal cramping    · Difficulty controlling bowel movements or urine    Emotional changes:  A drop in hormone levels after you deliver may cause changes in your emotions  You may feel irritable, sad, or anxious  You may cry easily or for no reason  You may also feel depressed  Depression that continues can be a sign of postpartum depression, a condition that can be treated  Treatment may include talk therapy, medicines, or both  Healthcare providers will ask how you are feeling and if you have any depression  These talks can happen during appointments for your medical care and for your baby's care, such as well child visits  Providers can help you find ways to care for yourself and your baby  Talk to your providers about the following:  · When emotional changes or depression started, and if it is getting worse over time    · Problems you are having with daily activities, sleep, or caring for your baby    · If anything makes you feel worse, or makes you feel better    · Feeling that you are not bonding with your baby the way you want    · Any problems your baby has with sleeping or feeding    · Your baby is fussy or cries a lot    · Support you have from friends, family, or others    Breast care for breastfeeding mothers: You may have sore breasts for 3 to 6 days after you give birth  This happens as your milk begins to fill your breasts  You may also have sore breasts if you do not breastfeed frequently  Do the following to care for your breasts:  · Apply a moist, warm, compress to your breast as directed  This may help soothe your breasts  Make sure the washcloth is not too hot before you apply it to your breast     · Nurse your baby or pump your milk frequently  This may prevent clogged milk ducts  Ask your healthcare provider how often to nurse or pump      · Massage your breasts as directed  This may help increase your milk flow  Gently rub your breasts in a circular motion before you breastfeed  You may need to gently squeeze your breast or nipple to help release milk  You can also use a breast pump to help release milk from your breast     · Wash your breasts with warm water only  Do not put soap on your nipples  Soap may cause your nipples to become dry  · Apply lanolin cream to your nipples as directed  Lanolin cream may add moisture to your skin and prevent nipple dryness  Always  wash off lanolin cream with warm water before you breastfeed  · Place pads in your bra  Your nipples may leak milk when you are not breastfeeding  You can place pads inside of your bra to help prevent leaking onto your clothing  Ask your healthcare provider where to purchase bra pads  · Get breastfeeding support if needed  Healthcare providers can answer questions about breastfeeding and provide you with support  Ask your healthcare provider who you can contact if you need breastfeeding support  Breast care for non-breastfeeding mothers:  Milk will fill your breasts even if you bottle feed your baby  Do the following to help stop your milk from filling your breasts and causing pain:  · Wear a bra with support at all times  A sports bra or a tight-fitting bra will help stop your milk from coming in  · Apply ice on each breast for 15 to 20 minutes every hour or as directed  Use an ice pack, or put crushed ice in a plastic bag  Cover it with a towel before you apply it to your breast  Ice helps your milk ducts shrink  · Keep your breasts away from warm water  Warm water will make it easier for milk to fill your breasts  Stand with your breasts away from warm water in the shower  · Limit how much you touch your breasts  This will prevent them from filling with milk  Perineum care: Your perineum is the area between your rectum and vagina   It is normal to have swelling and pain in this area after you give birth  If you had an episiotomy, your healthcare provider may give you special instructions  · Clean your perineum after you use the bathroom  This may prevent infection and help with healing  Use a spray bottle with warm water to clean your perineum  You may also gently spray warm water against your perineum when you urinate  Always wipe front to back  · Take a sitz bath as directed  A sitz bath may help relieve swelling and pain  Fill your bath tub or bucket with water up to your hips and sit in the water  Use cold water for 2 days after you deliver  Then use warm water  Ask your healthcare provider for more information about a sitz bath  · Apply ice packs for the first 24 hours or as directed  Use a plastic glove filled with ice or buy an ice pack  Wrap the ice pack or plastic glove in a small towel or wash cloth  Place the ice pack on your perineum for 20 minutes at a time  · Sit on a donut-shaped pillow  This may relieve pressure on your perineum when you sit  · Use wipes that contain medicine or take pills as directed  Your healthcare provider may tell you to use witch hazel pads  You can place witch hazel pads in the refrigerator before you apply them to your perineum  Your provider may also tell you to take NSAIDs  Ask him or her how often to take pills or use the wipes  · Do not go swimming or take tub baths for 4 to 6 weeks or as directed  This will help prevent an infection in your vagina or uterus  Bowel and bladder care: It may take 3 to 5 days to have a bowel movement after you deliver your baby  You can do the following to prevent or manage constipation, and get control of your bowel or bladder:  · Take stool softeners as directed  A stool softener is medicine that will make your bowel movements softer  This may prevent or relieve constipation  A stool softener may also make bowel movements less painful  · Drink plenty of liquids    Ask how much liquid to drink each day and which liquids are best for you  Liquids may help prevent constipation  · Eat foods high in fiber  Examples include fruits, vegetables, grains, beans, and lentils  Ask your healthcare provider how much fiber you need each day  Fiber may prevent constipation  · Do Kegel exercises as directed  Kegel exercises will help strengthen the muscles that control bowel movements and urination  Ask your healthcare provider for more information on Kegel exercises  · Apply cold compresses or medicine to hemorrhoids as directed  This may relieve swelling and pain  Your healthcare provider may tell you to apply ice or wipes that contain medicine to your hemorrhoids  He or she may also tell you to use a sitz bath  Ask your provider for more information on how to manage hemorrhoids  Nutrition:  Good nutrition is important in the postpartum period  It will help you return to a healthy weight, increase your energy levels, and prevent constipation  It will also help you get enough nutrients and calories if you are going to breastfeed your baby  · Eat a variety of healthy foods  Healthy foods include fruits, vegetables, whole-grain breads, low-fat dairy products, beans, lean meats, and fish  You may need 500 to 700 extra calories each day if you breastfeed your baby  You may also need extra protein  · Limit foods with added sugar and high amounts of fat  These foods are high in calories and low in healthy nutrients  Read food labels so you know how much sugar and fat is in the food you want to eat  · Drink 8 to 10 glasses of water per day  Water will help you make plenty of milk for your baby  It will also help prevent constipation  Drink a glass of water every time you breastfeed your baby  · Take vitamins as directed  Ask your healthcare provider what vitamins you need  · Limit caffeine and alcohol if you are breastfeeding    Caffeine and alcohol can get into your breast milk  Caffeine and alcohol can make your baby fussy  They can also interfere with your baby's sleep  Ask your healthcare provider if you can drink alcohol or caffeine  Rest and sleep: You may feel very tired in the postpartum period  Enough sleep will help you heal and give you energy to care for your baby  The following may help you get sleep and rest:  · Nap when your baby naps  Your baby may nap several times during the day  Get rest during this time  · Limit visitors  Many people may want to see you and your baby  Ask friends or family to visit on different days  This will give you time to rest     · Do not plan too much for one day  Put off household chores so that you have time to rest  Gradually do more each day  · Ask for help from family, friends, or neighbors  Ask them to help you with laundry, cleaning, or errands  Also ask someone to watch the baby while you take a nap or relax  Ask your partner to help with the care of your baby  Pump some of your breast milk so your partner can feed your baby during the night  Exercise after delivery:  Wait until your healthcare provider says it is okay to exercise  Exercise can help you lose weight, increase your energy levels, and manage your mood  It can also prevent constipation and blood clots  Start with gentle exercises such as walking  Do more as you have more energy  You may need to avoid abdominal exercises for 1 to 2 weeks after you deliver  Talk to your healthcare provider about an exercise plan that is right for you  Sexual activity after delivery:   · Do not have sex until your healthcare provider says it is okay  You may need to wait 4 to 6 weeks before you have sex  This may prevent infection and allow time to heal     · Your menstrual cycle may begin as soon as 3 weeks after you deliver  Your period may be delayed if you breastfeed your baby  You can become pregnant before you get your first postpartum period   Talk to your healthcare provider about birth control that is right for you  Some types of birth control are not safe during breastfeeding  For support and more information:  Join a support group for new mothers  Ask for help from family and friends with chores, errands, and care of your baby  · Office of Women's Health,  Department of Health and Human Services  [address], [address] [facility] [address]  [address], [address] [facility] [address]  Phone: [phone]  Web Address: www womenshealth gov  · [facility] Postpartum [address] , [address] [facility]  [address] [facility] , [address] [facility]  Web Address: [employer] be  [employer]/pregnancy/postpartum-care  aspx  Follow up with your doctor or obstetrician as directed: You will need to follow up within 2 to 6 weeks of delivery  Write down your questions so you remember to ask them at your visits  © Copyright [address] Information is for End User's use only and may not be sold, redistributed or otherwise used for commercial purposes  All illustrations and images included in CareNotes® are the copyrighted property of [employer] A M [employer] Inc  or [employer]  The above information is an  only  It is not intended as medical advice for individual conditions or treatments  Talk to your doctor, nurse or pharmacist before following any medical regimen to see if it is safe and effective for you

## 2021-03-16 NOTE — PLAN OF CARE
Problem: PAIN - ADULT  Goal: Verbalizes/displays adequate comfort level or baseline comfort level  Description: Interventions:  - Encourage patient to monitor pain and request assistance  - Assess pain using appropriate pain scale  - Administer analgesics based on type and severity of pain and evaluate response  - Implement non-pharmacological measures as appropriate and evaluate response  - Consider cultural and social influences on pain and pain management  - Notify physician/advanced practitioner if interventions unsuccessful or patient reports new pain  Outcome: Progressing     Problem: INFECTION - ADULT  Goal: Absence or prevention of progression during hospitalization  Description: INTERVENTIONS:  - Assess and monitor for signs and symptoms of infection  - Monitor lab/diagnostic results  - Monitor all insertion sites, i e  indwelling lines, tubes, and drains  - Monitor endotracheal if appropriate and nasal secretions for changes in amount and color  - [place] appropriate cooling/warming therapies per order  - Administer medications as ordered  - Instruct and encourage patient and family to use good hand hygiene technique  - Identify and instruct in appropriate isolation precautions for identified infection/condition  Outcome: Progressing     Problem: SAFETY ADULT  Goal: Patient will remain free of falls  Description: INTERVENTIONS:  - Assess patient frequently for physical needs  -  Identify cognitive and physical deficits and behaviors that affect risk of falls    -  [place] fall precautions as indicated by assessment   - Educate patient/family on patient safety including physical limitations  - Instruct patient to call for assistance with activity based on assessment  - Modify environment to reduce risk of injury  - Consider OT/PT consult to assist with strengthening/mobility  Outcome: Progressing  Goal: Maintain or return to baseline ADL function  Description: INTERVENTIONS:  -  Assess patient's ability to carry out ADLs; assess patient's baseline for ADL function and identify physical deficits which impact ability to perform ADLs (bathing, care of mouth/teeth, toileting, grooming, dressing, etc )  - Assess/evaluate cause of self-care deficits   - Assess range of motion  - Assess patient's mobility; develop plan if impaired  - Assess patient's need for assistive devices and provide as appropriate  - Encourage maximum independence but intervene and supervise when necessary  - Involve family in performance of ADLs  - Assess for home care needs following discharge   - Consider OT consult to assist with ADL evaluation and planning for discharge  - Provide patient education as appropriate  Outcome: Progressing  Goal: Maintain or return mobility status to optimal level  Description: INTERVENTIONS:  - Assess patient's baseline mobility status (ambulation, transfers, stairs, etc )    - Identify cognitive and physical deficits and behaviors that affect mobility  - Identify mobility aids required to assist with transfers and/or ambulation (gait belt, sit-to-stand, lift, walker, cane, etc )  - [place] fall precautions as indicated by assessment  - Record patient progress and toleration of activity level on Mobility SBAR; progress patient to next Phase/Stage  - Instruct patient to call for assistance with activity based on assessment  - Consider rehabilitation consult to assist with strengthening/weightbearing, etc   Outcome: Progressing     Problem: DISCHARGE PLANNING  Goal: Discharge to home or other facility with appropriate resources  Description: INTERVENTIONS:  - Identify barriers to discharge w/patient and caregiver  - Arrange for needed discharge resources and transportation as appropriate  - Identify discharge learning needs (meds, wound care, etc )  - Arrange for interpretive services to assist at discharge as needed  - Refer to Case Management Department for coordinating discharge planning if the patient needs post-hospital services based on physician/advanced practitioner order or complex needs related to functional status, cognitive ability, or social support system  Outcome: Progressing     Problem: ANTEPARTUM  Goal: Maintain pregnancy as long as maternal and/or fetal condition is stable  Description: INTERVENTIONS:  - Maternal surveillance  - Fetal surveillance  - Monitor uterine activity  - Medications as ordered  - Bedrest  Outcome: Progressing     Problem: DISCHARGE PLANNING - CARE MANAGEMENT  Goal: Discharge to post-acute care or home with appropriate resources  Description: INTERVENTIONS:  - Conduct assessment to determine patient/family and health care team treatment goals, and need for post-acute services based on payer coverage, community resources, and patient preferences, and barriers to discharge  - Address psychosocial, clinical, and financial barriers to discharge as identified in assessment in conjunction with the patient/family and health care team  - Arrange appropriate level of post-acute services according to patients   needs and preference and payer coverage in collaboration with the physician and health care team  - Communicate with and update the patient/family, physician, and health care team regarding progress on the discharge plan  - Arrange appropriate transportation to post-acute venues  Outcome: Progressing     Problem: Potential for Falls  Goal: Patient will remain free of falls  Description: INTERVENTIONS:  - Assess patient frequently for physical needs  -  Identify cognitive and physical deficits and behaviors that affect risk of falls    -  [place] fall precautions as indicated by assessment   - Educate patient/family on patient safety including physical limitations  - Instruct patient to call for assistance with activity based on assessment  - Modify environment to reduce risk of injury  - Consider OT/PT consult to assist with strengthening/mobility  Outcome: Progressing

## 2021-03-16 NOTE — ANESTHESIA POSTPROCEDURE EVALUATION
Post-Op Assessment Note    CV Status:  Stable  Pain Score: 0    Pain management: adequate     Mental Status:  Alert   Hydration Status:  Stable   PONV Controlled:  None      Post Op Vitals Reviewed: Yes      Staff: Anesthesiologist       Moving both lower limbs  Vigorously  No complications documented      BP   [email or web address]   Temp   99 4@0930   Pulse  [id number]   Resp   18@0930   SpO2   [id number]

## 2021-03-16 NOTE — PROGRESS NOTES
Patient reporting contractions this morning since 0430AM  FHT with late and variable decelerations, some improvement with 1L fluid bolus  Speculum exam without purulent discharge, cervix not visualized  Cervical exam: 1/70/-3  Ultrasound confirms breech presentation      Given change in clinical status due to likely abruption, will plan to proceed to OR for primary  section  Risks and benefits again addressed with patient  Will not administer betamethasone at this time given that patient will be delivered by  section and T1DM status  NICU and anesthesia aware    Patient was discussed with Dr Rebecca Flower ([facility]) and Dr Radha Lechuga (primary Ob/GYN)      Julee Garcia MD, PGY-3  3/16/2021  6:56 AM

## 2021-03-16 NOTE — ANESTHESIA PROCEDURE NOTES
Spinal Block    Patient location during procedure: OR  Start time: 3/16/2021 7:40 AM  Reason for block: primary anesthetic  Staffing  Resident/CRNA: Shilpa Teixeira CRNA  Performed: resident/CRNA   Preanesthetic Checklist  Completed: patient identified, site marked, surgical consent, pre-op evaluation, timeout performed, IV checked, risks and benefits discussed and monitors and equipment checked  Spinal Block  Patient position: sitting  Prep: Betadine  Patient monitoring: heart rate, continuous pulse ox and frequent blood pressure checks  Approach: midline  Location: L3-4  Injection technique: single-shot  Needle  Needle type: pencil-tip   Needle gauge: 25 G  Needle length: 10 cm  Assessment  Sensory level: T4  Injection Assessment:  negative aspiration for heme, no paresthesia on injection and positive aspiration for clear CSF    Post-procedure:  site cleaned and sterile dressing applied  Additional Notes  200mcq duramorph  , bupivicaine 1 8ml

## 2021-03-16 NOTE — PLAN OF CARE
Problem: PAIN - ADULT  Goal: Verbalizes/displays adequate comfort level or baseline comfort level  Description: Interventions:  - Encourage patient to monitor pain and request assistance  - Assess pain using appropriate pain scale  - Administer analgesics based on type and severity of pain and evaluate response  - Implement non-pharmacological measures as appropriate and evaluate response  - Consider cultural and social influences on pain and pain management  - Notify physician/advanced practitioner if interventions unsuccessful or patient reports new pain  3/16/2021 0917 by Anshu Strong RN  Outcome: Progressing  3/16/2021 0917 by Anshu Strong RN  Outcome: Progressing     Problem: INFECTION - ADULT  Goal: Absence or prevention of progression during hospitalization  Description: INTERVENTIONS:  - Assess and monitor for signs and symptoms of infection  - Monitor lab/diagnostic results  - Monitor all insertion sites, i e  indwelling lines, tubes, and drains  - Monitor endotracheal if appropriate and nasal secretions for changes in amount and color  - [place] appropriate cooling/warming therapies per order  - Administer medications as ordered  - Instruct and encourage patient and family to use good hand hygiene technique  - Identify and instruct in appropriate isolation precautions for identified infection/condition  3/16/2021 0917 by Anshu Strong RN  Outcome: Progressing  3/16/2021 0917 by Anshu Strong RN  Outcome: Progressing     Problem: SAFETY ADULT  Goal: Patient will remain free of falls  Description: INTERVENTIONS:  - Assess patient frequently for physical needs  -  Identify cognitive and physical deficits and behaviors that affect risk of falls    -  [place] fall precautions as indicated by assessment   - Educate patient/family on patient safety including physical limitations  - Instruct patient to call for assistance with activity based on assessment  - Modify environment to reduce risk of injury  - Consider OT/PT consult to assist with strengthening/mobility  3/16/2021 0917 by Misha Reyes RN  Outcome: Progressing  3/16/2021 0917 by Misha Reyes RN  Outcome: Progressing  Goal: Maintain or return to baseline ADL function  Description: INTERVENTIONS:  -  Assess patient's ability to carry out ADLs; assess patient's baseline for ADL function and identify physical deficits which impact ability to perform ADLs (bathing, care of mouth/teeth, toileting, grooming, dressing, etc )  - Assess/evaluate cause of self-care deficits   - Assess range of motion  - Assess patient's mobility; develop plan if impaired  - Assess patient's need for assistive devices and provide as appropriate  - Encourage maximum independence but intervene and supervise when necessary  - Involve family in performance of ADLs  - Assess for home care needs following discharge   - Consider OT consult to assist with ADL evaluation and planning for discharge  - Provide patient education as appropriate  3/16/2021 0917 by Misha Reyes RN  Outcome: Progressing  3/16/2021 0917 by Misha Reyes RN  Outcome: Progressing  Goal: Maintain or return mobility status to optimal level  Description: INTERVENTIONS:  - Assess patient's baseline mobility status (ambulation, transfers, stairs, etc )    - Identify cognitive and physical deficits and behaviors that affect mobility  - Identify mobility aids required to assist with transfers and/or ambulation (gait belt, sit-to-stand, lift, walker, cane, etc )  - [place] fall precautions as indicated by assessment  - Record patient progress and toleration of activity level on Mobility SBAR; progress patient to next Phase/Stage  - Instruct patient to call for assistance with activity based on assessment  - Consider rehabilitation consult to assist with strengthening/weightbearing, etc   3/16/2021 0917 by Misha Reyes RN  Outcome: Progressing  3/16/2021 0917 by Misha Reyes RN  Outcome: Progressing     Problem: DISCHARGE PLANNING  Goal: Discharge to home or other facility with appropriate resources  Description: INTERVENTIONS:  - Identify barriers to discharge w/patient and caregiver  - Arrange for needed discharge resources and transportation as appropriate  - Identify discharge learning needs (meds, wound care, etc )  - Arrange for interpretive services to assist at discharge as needed  - Refer to Case Management Department for coordinating discharge planning if the patient needs post-hospital services based on physician/advanced practitioner order or complex needs related to functional status, cognitive ability, or social support system  3/16/2021 0917 by Fransico Brar RN  Outcome: Progressing  3/16/2021 0917 by Fransico Brar RN  Outcome: Progressing     Problem: DISCHARGE PLANNING - CARE MANAGEMENT  Goal: Discharge to post-acute care or home with appropriate resources  Description: INTERVENTIONS:  - Conduct assessment to determine patient/family and health care team treatment goals, and need for post-acute services based on payer coverage, community resources, and patient preferences, and barriers to discharge  - Address psychosocial, clinical, and financial barriers to discharge as identified in assessment in conjunction with the patient/family and health care team  - Arrange appropriate level of post-acute services according to patients   needs and preference and payer coverage in collaboration with the physician and health care team  - Communicate with and update the patient/family, physician, and health care team regarding progress on the discharge plan  - Arrange appropriate transportation to post-acute venues  3/16/2021 0917 by Fransico Brar RN  Outcome: Progressing  3/16/2021 0917 by Fransico Brar RN  Outcome: Progressing     Problem: Potential for Falls  Goal: Patient will remain free of falls  Description: INTERVENTIONS:  - Assess patient frequently for physical needs  -  Identify cognitive and physical deficits and behaviors that affect risk of falls    -  [place] fall precautions as indicated by assessment   - Educate patient/family on patient safety including physical limitations  - Instruct patient to call for assistance with activity based on assessment  - Modify environment to reduce risk of injury  - Consider OT/PT consult to assist with strengthening/mobility  3/16/2021 0917 by Angeline Calvert RN  Outcome: Progressing  3/16/2021 0917 by Angeline Calvert RN  Outcome: Progressing     Problem: POSTPARTUM  Goal: Experiences normal postpartum course  Description: INTERVENTIONS:  - Monitor maternal vital signs  - Assess uterine involution and lochia  Outcome: Progressing  Goal: Appropriate maternal -  bonding  Description: INTERVENTIONS:  - Identify family support  - Assess for appropriate maternal/infant bonding   -Encourage maternal/infant bonding opportunities  - Referral to  or  as needed  Outcome: Progressing  Goal: Establishment of infant feeding pattern  Description: INTERVENTIONS:  - Assess breast/bottle feeding  - Refer to lactation as needed  Outcome: Progressing  Goal: Incision(s), wounds(s) or drain site(s) healing without S/S of infection  Description: INTERVENTIONS  - Assess and document risk factors for skin impairment   - Assess and document dressing, incision, wound bed, drain sites and surrounding tissue  - Consider nutrition services referral as needed  - Oral mucous membranes remain intact  - Provide patient/ family education  Outcome: Progressing

## 2021-03-16 NOTE — UTILIZATION REVIEW
Continued Stay Review    Date: 21                         Current Patient Class: inpatient  Current Level of Care: M/S    HPI:30 y o  female initially admitted on  21 with PPROM @ 23 1/7 wks  Ruptured at 17 week gestation  Assessment/Plan: patient with contraction since 0430 AM FHT with late and variable decelerations, some improvement with 1L fluid bolus  Speculum exam without purulent discharge, cervix not visualized Cervical exam: 1/70/-3 Ultrasound confirms breech presentation   @  29 5/7 wks  Male @ 0802  Wt 1650 grams   Apgar 6/8  Infant taken to NICU     Pertinent Labs/Diagnostic Results:       Results from last 7 days   Lab Units 21  0646   WBC Thousand/uL 19 93*   HEMOGLOBIN g/dL 12 7   HEMATOCRIT % 38 8   PLATELETS Thousands/uL 191   NEUTROS ABS Thousands/µL 15 95*     Results from last 7 days   Lab Units 21  0914 21  0657 03/15/21  2157 03/15/21  1915 03/15/21  1706 03/15/21  1438 03/15/21  1003 03/15/21  0608 21  2147 21  1923 21  1701 21  1146   POC GLUCOSE mg/dl 96 78 185* 106 151* 98 171* 117 146* 153* 89 130       Vital Signs:   Date/Time  Temp  Pulse  Resp  BP  SpO2  O2 Device  Cardiac (WDL)  Patient Position - Orthostatic VS   21 1015  99 6 °F (37 6 °C)  105  16  106/52  98 %  None (Room air)  WDL  Lying   21 1000  99 4 °F (37 4 °C)  105  18  98/52  98 %  None (Room air)  --  Lying   21 0945  99 2 °F (37 3 °C)  107Abnormal   16  107/59  99 %  None (Room air)  --  Lying   21 0930  99 4 °F (37 4 °C)  109Abnormal   18  99/60  98 %  None (Room air)  --  Lying   21 0915  99 °F (37 2 °C)   114Abnormal   18  102/72  98 %  None (Room air)  --  Lying   Temp: Simultaneous filing  User may not have seen previous data   at 21 0915   21 0900  98 6 °F (37 °C)  120Abnormal   16  99/51  94 %  None (Room air)  --  Lying   21 0845  99 °F (37 2 °C)  116Abnormal   18  109/52  96 %  None (Room air)  WDL Lying   03/16/21 0700  --  --  --  --  --  --  --  --   Comment rows:         Medications:   Scheduled Medications:  acetaminophen, 650 mg, Oral, Q6H  vitamin C, 1,000 mg, Oral, Daily  docusate sodium, 100 mg, Oral, BID  enoxaparin, 40 mg, Subcutaneous, Q24H IRAJ  ketorolac, 30 mg, Intravenous, Q6H  metFORMIN, 1,500 mg, Oral, Daily  neomycin-bacitracin-polymyxin b, 1 small application, Topical, BID  patient maintained insulin pump, 1 each, Subcutaneous, Q8H      Continuous IV Infusions:  lactated ringers, 125 mL/hr, Intravenous, Continuous  oxytocin, 62 5 pop-units/min, Intravenous, Titrated      PRN Meds:  aluminum-magnesium hydroxide-simethicone, 15 mL, Oral, Q6H PRN  benzocaine-menthol-lanolin-aloe, 1 application, Topical, N4U PRN  bisacodyl, 5 mg, Oral, Daily PRN  calcium carbonate, 1,000 mg, Oral, Daily PRN  dexamethasone, 8 mg, Intravenous, Once PRN  diphenhydrAMINE, 25 mg, Intravenous, Q6H PRN  fentaNYL, 25 mcg, Intravenous, Q3 min PRN  hydrocortisone, 1 application, Topical, 4x Daily PRN  HYDROmorphone, 0 5 mg, Intravenous, Q2H PRN  insulin aspart, 300 Units, Subcutaneous Insulin Pump, Daily PRN  metoclopramide, 5 mg, Intravenous, Q6H PRN  morphine injection, 4 mg, Intravenous, Q5 Min PRN  naloxone, 0 1 mg, Intravenous, Q3 min PRN  ondansetron, 4 mg, Intravenous, Q4H PRN  ondansetron, 4 mg, Intravenous, Once PRN  senna, 1 tablet, Oral, Daily PRN  witch hazel-glycerin, 1 pad, Topical, Q4H PRN        Discharge Plan:  Home when medically cleared     Network Utilization Review Department  ATTENTION: Please call with any questions or concerns to [phone] and carefully listen to the prompts so that you are directed to the right person  All voicemails are confidential   [place] all requests for admission clinical reviews, approved or denied determinations and any other requests to dedicated fax number below belonging to the campus where the patient is receiving treatment   List of dedicated fax numbers for the Facilities:  FACILITY NAME UR FAX NUMBER   ADMISSION DENIALS (Administrative/Medical Necessity) [phone]   [address] (Maternity/NICU/Pediatrics) 261 [facility],7Th Floor [facility] [address] [place] [address]  [phone]   Lexie Rey 6387 (  Alicia Hilario "Jinny" 103) [phone] [address] [place] [address] [phone]   [address]51 [phone]

## 2021-03-17 LAB
BASOPHILS # BLD AUTO: 0.04 THOUSANDS/ΜL (ref 0–0.1)
BASOPHILS NFR BLD AUTO: 0 % (ref 0–1)
EOSINOPHIL # BLD AUTO: 0.11 THOUSAND/ΜL (ref 0–0.61)
EOSINOPHIL NFR BLD AUTO: 1 % (ref 0–6)
ERYTHROCYTE [DISTWIDTH] IN BLOOD BY AUTOMATED COUNT: 13.8 % (ref 11.6–15.1)
GLUCOSE SERPL-MCNC: 125 MG/DL (ref 65–140)
GLUCOSE SERPL-MCNC: 139 MG/DL (ref 65–140)
GLUCOSE SERPL-MCNC: 84 MG/DL (ref 65–140)
GLUCOSE SERPL-MCNC: 85 MG/DL (ref 65–140)
GLUCOSE SERPL-MCNC: 86 MG/DL (ref 65–140)
GLUCOSE SERPL-MCNC: 86 MG/DL (ref 65–140)
HCT VFR BLD AUTO: 32.5 % (ref 34.8–46.1)
HGB BLD-MCNC: 10.6 G/DL (ref 11.5–15.4)
IMM GRANULOCYTES # BLD AUTO: 0.21 THOUSAND/UL (ref 0–0.2)
IMM GRANULOCYTES NFR BLD AUTO: 1 % (ref 0–2)
LYMPHOCYTES # BLD AUTO: 1.46 THOUSANDS/ΜL (ref 0.6–4.47)
LYMPHOCYTES NFR BLD AUTO: 9 % (ref 14–44)
MCH RBC QN AUTO: 30.7 PG (ref 26.8–34.3)
MCHC RBC AUTO-ENTMCNC: 32.6 G/DL (ref 31.4–37.4)
MCV RBC AUTO: 94 FL (ref 82–98)
MONOCYTES # BLD AUTO: 0.85 THOUSAND/ΜL (ref 0.17–1.22)
MONOCYTES NFR BLD AUTO: 5 % (ref 4–12)
NEUTROPHILS # BLD AUTO: 14.37 THOUSANDS/ΜL (ref 1.85–7.62)
NEUTS SEG NFR BLD AUTO: 84 % (ref 43–75)
NRBC BLD AUTO-RTO: 0 /100 WBCS
PLATELET # BLD AUTO: 163 THOUSANDS/UL (ref 149–390)
PMV BLD AUTO: 11.5 FL (ref 8.9–12.7)
RBC # BLD AUTO: 3.45 MILLION/UL (ref 3.81–5.12)
WBC # BLD AUTO: 17.04 THOUSAND/UL (ref 4.31–10.16)

## 2021-03-17 PROCEDURE — 85025 COMPLETE CBC W/AUTO DIFF WBC: CPT | Performed by: OBSTETRICS & GYNECOLOGY

## 2021-03-17 PROCEDURE — 99024 POSTOP FOLLOW-UP VISIT: CPT | Performed by: OBSTETRICS & GYNECOLOGY

## 2021-03-17 PROCEDURE — 82948 REAGENT STRIP/BLOOD GLUCOSE: CPT

## 2021-03-17 RX ORDER — ACETAMINOPHEN 325 MG/1
650 TABLET ORAL EVERY 4 HOURS PRN
Status: DISCONTINUED | OUTPATIENT
Start: 2021-03-17 | End: 2021-03-20 | Stop reason: HOSPADM

## 2021-03-17 RX ORDER — OXYCODONE HYDROCHLORIDE 10 MG/1
10 TABLET ORAL EVERY 4 HOURS PRN
Status: DISCONTINUED | OUTPATIENT
Start: 2021-03-17 | End: 2021-03-20 | Stop reason: HOSPADM

## 2021-03-17 RX ORDER — SIMETHICONE 80 MG
80 TABLET,CHEWABLE ORAL EVERY 6 HOURS PRN
Status: DISCONTINUED | OUTPATIENT
Start: 2021-03-17 | End: 2021-03-18

## 2021-03-17 RX ORDER — IBUPROFEN 600 MG/1
600 TABLET ORAL EVERY 6 HOURS PRN
Status: DISCONTINUED | OUTPATIENT
Start: 2021-03-17 | End: 2021-03-18

## 2021-03-17 RX ORDER — OXYCODONE HYDROCHLORIDE 5 MG/1
5 TABLET ORAL EVERY 4 HOURS PRN
Status: DISCONTINUED | OUTPATIENT
Start: 2021-03-17 | End: 2021-03-20 | Stop reason: HOSPADM

## 2021-03-17 RX ORDER — ONDANSETRON 2 MG/ML
4 INJECTION INTRAMUSCULAR; INTRAVENOUS EVERY 8 HOURS PRN
Status: DISCONTINUED | OUTPATIENT
Start: 2021-03-17 | End: 2021-03-20 | Stop reason: HOSPADM

## 2021-03-17 RX ORDER — HYDROMORPHONE HCL/PF 1 MG/ML
1 SYRINGE (ML) INJECTION EVERY 2 HOUR PRN
Status: DISCONTINUED | OUTPATIENT
Start: 2021-03-17 | End: 2021-03-20 | Stop reason: HOSPADM

## 2021-03-17 RX ADMIN — KETOROLAC TROMETHAMINE 30 MG: 30 INJECTION, SOLUTION INTRAMUSCULAR at 20:57

## 2021-03-17 RX ADMIN — ACETAMINOPHEN 650 MG: 325 TABLET, FILM COATED ORAL at 17:39

## 2021-03-17 RX ADMIN — ACETAMINOPHEN 650 MG: 325 TABLET, FILM COATED ORAL at 12:06

## 2021-03-17 RX ADMIN — METFORMIN ER 500 MG 1500 MG: 500 TABLET ORAL at 21:50

## 2021-03-17 RX ADMIN — KETOROLAC TROMETHAMINE 30 MG: 30 INJECTION, SOLUTION INTRAMUSCULAR at 08:36

## 2021-03-17 RX ADMIN — ACETAMINOPHEN 650 MG: 325 TABLET, FILM COATED ORAL at 05:58

## 2021-03-17 RX ADMIN — KETOROLAC TROMETHAMINE 30 MG: 30 INJECTION, SOLUTION INTRAMUSCULAR at 03:05

## 2021-03-17 RX ADMIN — KETOROLAC TROMETHAMINE 30 MG: 30 INJECTION, SOLUTION INTRAMUSCULAR at 15:56

## 2021-03-17 RX ADMIN — DOCUSATE SODIUM 100 MG: 100 CAPSULE, LIQUID FILLED ORAL at 08:37

## 2021-03-17 RX ADMIN — OXYCODONE HYDROCHLORIDE 5 MG: 5 TABLET ORAL at 21:59

## 2021-03-17 RX ADMIN — DOCUSATE SODIUM 100 MG: 100 CAPSULE, LIQUID FILLED ORAL at 17:40

## 2021-03-17 RX ADMIN — OXYCODONE HYDROCHLORIDE AND ACETAMINOPHEN 1000 MG: 500 TABLET ORAL at 08:37

## 2021-03-17 RX ADMIN — ENOXAPARIN SODIUM 40 MG: 40 INJECTION SUBCUTANEOUS at 21:50

## 2021-03-17 RX ADMIN — OXYCODONE HYDROCHLORIDE 5 MG: 5 TABLET ORAL at 12:07

## 2021-03-17 RX ADMIN — SIMETHICONE 80 MG: 80 TABLET, CHEWABLE ORAL at 16:38

## 2021-03-17 NOTE — PLAN OF CARE
Problem: PAIN - ADULT  Goal: Verbalizes/displays adequate comfort level or baseline comfort level  Description: Interventions:  - Encourage patient to monitor pain and request assistance  - Assess pain using appropriate pain scale  - Administer analgesics based on type and severity of pain and evaluate response  - Implement non-pharmacological measures as appropriate and evaluate response  - Consider cultural and social influences on pain and pain management  - Notify physician/advanced practitioner if interventions unsuccessful or patient reports new pain  Outcome: Progressing     Problem: INFECTION - ADULT  Goal: Absence or prevention of progression during hospitalization  Description: INTERVENTIONS:  - Assess and monitor for signs and symptoms of infection  - Monitor lab/diagnostic results  - Monitor all insertion sites, i e  indwelling lines, tubes, and drains  - Monitor endotracheal if appropriate and nasal secretions for changes in amount and color  - [place] appropriate cooling/warming therapies per order  - Administer medications as ordered  - Instruct and encourage patient and family to use good hand hygiene technique  - Identify and instruct in appropriate isolation precautions for identified infection/condition  Outcome: Progressing     Problem: SAFETY ADULT  Goal: Patient will remain free of falls  Description: INTERVENTIONS:  - Assess patient frequently for physical needs  -  Identify cognitive and physical deficits and behaviors that affect risk of falls    -  [place] fall precautions as indicated by assessment   - Educate patient/family on patient safety including physical limitations  - Instruct patient to call for assistance with activity based on assessment  - Modify environment to reduce risk of injury  - Consider OT/PT consult to assist with strengthening/mobility  Outcome: Progressing  Goal: Maintain or return to baseline ADL function  Description: INTERVENTIONS:  -  Assess patient's ability to carry out ADLs; assess patient's baseline for ADL function and identify physical deficits which impact ability to perform ADLs (bathing, care of mouth/teeth, toileting, grooming, dressing, etc )  - Assess/evaluate cause of self-care deficits   - Assess range of motion  - Assess patient's mobility; develop plan if impaired  - Assess patient's need for assistive devices and provide as appropriate  - Encourage maximum independence but intervene and supervise when necessary  - Involve family in performance of ADLs  - Assess for home care needs following discharge   - Consider OT consult to assist with ADL evaluation and planning for discharge  - Provide patient education as appropriate  Outcome: Progressing  Goal: Maintain or return mobility status to optimal level  Description: INTERVENTIONS:  - Assess patient's baseline mobility status (ambulation, transfers, stairs, etc )    - Identify cognitive and physical deficits and behaviors that affect mobility  - Identify mobility aids required to assist with transfers and/or ambulation (gait belt, sit-to-stand, lift, walker, cane, etc )  - [place] fall precautions as indicated by assessment  - Record patient progress and toleration of activity level on Mobility SBAR; progress patient to next Phase/Stage  - Instruct patient to call for assistance with activity based on assessment  - Consider rehabilitation consult to assist with strengthening/weightbearing, etc   Outcome: Progressing     Problem: DISCHARGE PLANNING  Goal: Discharge to home or other facility with appropriate resources  Description: INTERVENTIONS:  - Identify barriers to discharge w/patient and caregiver  - Arrange for needed discharge resources and transportation as appropriate  - Identify discharge learning needs (meds, wound care, etc )  - Arrange for interpretive services to assist at discharge as needed  - Refer to Case Management Department for coordinating discharge planning if the patient needs post-hospital services based on physician/advanced practitioner order or complex needs related to functional status, cognitive ability, or social support system  Outcome: Progressing     Problem: POSTPARTUM  Goal: Experiences normal postpartum course  Description: INTERVENTIONS:  - Monitor maternal vital signs  - Assess uterine involution and lochia  Outcome: Progressing  Goal: Appropriate maternal -  bonding  Description: INTERVENTIONS:  - Identify family support  - Assess for appropriate maternal/infant bonding   -Encourage maternal/infant bonding opportunities  - Referral to  or  as needed  Outcome: Progressing  Goal: Establishment of infant feeding pattern  Description: INTERVENTIONS:  - Assess breast/bottle feeding  - Refer to lactation as needed  Outcome: Progressing  Goal: Incision(s), wounds(s) or drain site(s) healing without S/S of infection  Description: INTERVENTIONS  - Assess and document risk factors for skin impairment   - Assess and document dressing, incision, wound bed, drain sites and surrounding tissue  - Consider nutrition services referral as needed  - Oral mucous membranes remain intact  - Provide patient/ family education  Outcome: Progressing     Problem: DISCHARGE PLANNING - CARE MANAGEMENT  Goal: Discharge to post-acute care or home with appropriate resources  Description: INTERVENTIONS:  - Conduct assessment to determine patient/family and health care team treatment goals, and need for post-acute services based on payer coverage, community resources, and patient preferences, and barriers to discharge  - Address psychosocial, clinical, and financial barriers to discharge as identified in assessment in conjunction with the patient/family and health care team  - Arrange appropriate level of post-acute services according to patients   needs and preference and payer coverage in collaboration with the physician and health care team  - Communicate with and update the patient/family, physician, and health care team regarding progress on the discharge plan  - Arrange appropriate transportation to post-acute venues  Outcome: Progressing     Problem: Potential for Falls  Goal: Patient will remain free of falls  Description: INTERVENTIONS:  - Assess patient frequently for physical needs  -  Identify cognitive and physical deficits and behaviors that affect risk of falls    -  [place] fall precautions as indicated by assessment   - Educate patient/family on patient safety including physical limitations  - Instruct patient to call for assistance with activity based on assessment  - Modify environment to reduce risk of injury  - Consider OT/PT consult to assist with strengthening/mobility  Outcome: Progressing

## 2021-03-17 NOTE — PLAN OF CARE
Problem: PAIN - ADULT  Goal: Verbalizes/displays adequate comfort level or baseline comfort level  Description: Interventions:  - Encourage patient to monitor pain and request assistance  - Assess pain using appropriate pain scale  - Administer analgesics based on type and severity of pain and evaluate response  - Implement non-pharmacological measures as appropriate and evaluate response  - Consider cultural and social influences on pain and pain management  - Notify physician/advanced practitioner if interventions unsuccessful or patient reports new pain  Outcome: Progressing     Problem: INFECTION - ADULT  Goal: Absence or prevention of progression during hospitalization  Description: INTERVENTIONS:  - Assess and monitor for signs and symptoms of infection  - Monitor lab/diagnostic results  - Monitor all insertion sites, i e  indwelling lines, tubes, and drains  - Monitor endotracheal if appropriate and nasal secretions for changes in amount and color  - [place] appropriate cooling/warming therapies per order  - Administer medications as ordered  - Instruct and encourage patient and family to use good hand hygiene technique  - Identify and instruct in appropriate isolation precautions for identified infection/condition  Outcome: Progressing     Problem: SAFETY ADULT  Goal: Patient will remain free of falls  Description: INTERVENTIONS:  - Assess patient frequently for physical needs  -  Identify cognitive and physical deficits and behaviors that affect risk of falls    -  [place] fall precautions as indicated by assessment   - Educate patient/family on patient safety including physical limitations  - Instruct patient to call for assistance with activity based on assessment  - Modify environment to reduce risk of injury  - Consider OT/PT consult to assist with strengthening/mobility  Outcome: Progressing  Goal: Maintain or return to baseline ADL function  Description: INTERVENTIONS:  -  Assess patient's ability to carry out ADLs; assess patient's baseline for ADL function and identify physical deficits which impact ability to perform ADLs (bathing, care of mouth/teeth, toileting, grooming, dressing, etc )  - Assess/evaluate cause of self-care deficits   - Assess range of motion  - Assess patient's mobility; develop plan if impaired  - Assess patient's need for assistive devices and provide as appropriate  - Encourage maximum independence but intervene and supervise when necessary  - Involve family in performance of ADLs  - Assess for home care needs following discharge   - Consider OT consult to assist with ADL evaluation and planning for discharge  - Provide patient education as appropriate  Outcome: Progressing  Goal: Maintain or return mobility status to optimal level  Description: INTERVENTIONS:  - Assess patient's baseline mobility status (ambulation, transfers, stairs, etc )    - Identify cognitive and physical deficits and behaviors that affect mobility  - Identify mobility aids required to assist with transfers and/or ambulation (gait belt, sit-to-stand, lift, walker, cane, etc )  - [place] fall precautions as indicated by assessment  - Record patient progress and toleration of activity level on Mobility SBAR; progress patient to next Phase/Stage  - Instruct patient to call for assistance with activity based on assessment  - Consider rehabilitation consult to assist with strengthening/weightbearing, etc   Outcome: Progressing     Problem: DISCHARGE PLANNING  Goal: Discharge to home or other facility with appropriate resources  Description: INTERVENTIONS:  - Identify barriers to discharge w/patient and caregiver  - Arrange for needed discharge resources and transportation as appropriate  - Identify discharge learning needs (meds, wound care, etc )  - Arrange for interpretive services to assist at discharge as needed  - Refer to Case Management Department for coordinating discharge planning if the patient needs post-hospital services based on physician/advanced practitioner order or complex needs related to functional status, cognitive ability, or social support system  Outcome: Progressing     Problem: DISCHARGE PLANNING - CARE MANAGEMENT  Goal: Discharge to post-acute care or home with appropriate resources  Description: INTERVENTIONS:  - Conduct assessment to determine patient/family and health care team treatment goals, and need for post-acute services based on payer coverage, community resources, and patient preferences, and barriers to discharge  - Address psychosocial, clinical, and financial barriers to discharge as identified in assessment in conjunction with the patient/family and health care team  - Arrange appropriate level of post-acute services according to patients   needs and preference and payer coverage in collaboration with the physician and health care team  - Communicate with and update the patient/family, physician, and health care team regarding progress on the discharge plan  - Arrange appropriate transportation to post-acute venues  Outcome: Progressing     Problem: Potential for Falls  Goal: Patient will remain free of falls  Description: INTERVENTIONS:  - Assess patient frequently for physical needs  -  Identify cognitive and physical deficits and behaviors that affect risk of falls    -  [place] fall precautions as indicated by assessment   - Educate patient/family on patient safety including physical limitations  - Instruct patient to call for assistance with activity based on assessment  - Modify environment to reduce risk of injury  - Consider OT/PT consult to assist with strengthening/mobility  Outcome: Progressing     Problem: POSTPARTUM  Goal: Experiences normal postpartum course  Description: INTERVENTIONS:  - Monitor maternal vital signs  - Assess uterine involution and lochia  Outcome: Progressing  Goal: Appropriate maternal -  bonding  Description: INTERVENTIONS:  - Identify family support  - Assess for appropriate maternal/infant bonding   -Encourage maternal/infant bonding opportunities  - Referral to  or  as needed  Outcome: Progressing  Goal: Establishment of infant feeding pattern  Description: INTERVENTIONS:  - Assess breast/bottle feeding  - Refer to lactation as needed  Outcome: Progressing  Goal: Incision(s), wounds(s) or drain site(s) healing without S/S of infection  Description: INTERVENTIONS  - Assess and document risk factors for skin impairment   - Assess and document dressing, incision, wound bed, drain sites and surrounding tissue  - Consider nutrition services referral as needed  - Oral mucous membranes remain intact  - Provide patient/ family education  Outcome: Progressing

## 2021-03-17 NOTE — PROGRESS NOTES
Progress Note - OB/GYN   Ashish Miranda 27 y o  female MRN: [id number]  Unit/Bed#: -01 Encounter: [id number]      Assessment:  Post operative day #1 s/p 1LTCS (breech, 29w), stable, and doing well    Plan:  1  Hemodynamically stable   - Pre-op Hb 12 7 --> post-op Hb 10 6   - Vitals WNL, currently asymptomatic  2  Christensen catheter   - Removed this am   - Making adequate urine output   - F/u voiding trial  3  T1DM   - insulin pump resumed pre-pregnancy settings   - blood sugars overnight 96, 111, 81, 85  4  DVT ppx   - Lovenox 40 to start this AM  5  Continue routine post partum care   - Encourage ambulation   - Encourage breastfeeding  6  Continue current meds   - See list below   - Pain adequately controlled with PO analgesics  7  Disposition   - Stable   - Anticipate discharge home POD#3 vs POD#4    Subjective/Objective     Chief Complaint:     Doing well    Subjective:     Pain: controlled  Tolerating Oral Intake: yes  Voiding: via christensen  Flatus: yes  Bowel Movement: no  Ambulating: no  Breastfeeding: Breastfeeding and Using breast pump  Chest Pain: no  Shortness of Breath: no  Leg Pain/Discomfort: no  Lochia: normal    Objective:   Vitals:   /59 (BP Location: Left arm)   Pulse 89   Temp 97 9 °F (36 6 °C) (Oral)   Resp 18   Ht 5' 4" (1 626 m)   Wt 95 3 kg (210 lb)   SpO2 97%   Breastfeeding Yes   BMI 36 05 kg/m²   Body mass index is 36 05 kg/m²  I/O       03/15 0701 - 03/16 0700 03/16 0701 - 03/17 0700    P  O   1160    I V  (mL/kg)  3498 7 (36 7)    Total Intake(mL/kg)  4658 7 (48 9)    Urine (mL/kg/hr)  3725 (1 6)    Blood  814    Total Output  4539    Net  +119 7              Lab Results   Component Value Date    WBC 17 04 (H) 03/17/2021    HGB 10 6 (L) 03/17/2021    HCT 32 5 (L) 03/17/2021    MCV 94 03/17/2021     03/17/2021       Meds/Allergies     Physical Exam:  General: in no apparent distress and well developed and well nourished  Cardiovascular: Cor RRR  Lungs: clear to auscultation bilaterally  Abdomen: abdomen is soft without significant tenderness, masses, organomegaly or guarding; incision c/d/i closed with running absorbable suture  Fundus: Firm and non-tender, 2 cm below the umbilicus  Lower extremeties: nontender, SCDs in place bilaterally    Labs/Tests:   Recent Results (from the past 24 hour(s))   Fingerstick Glucose (POCT)    Collection Time: 03/16/21  6:57 AM   Result Value Ref Range    POC Glucose 78 65 - 140 mg/dl   Blood gas, venous, cord    Collection Time: 03/16/21  8:02 AM   Result Value Ref Range    pH, [place] 7 257 7 190 - 7 490    pCO2, [place] 54 7 (H) 27 0 - 43 0 mm HG    pO2, [place] 14 5 (L) 15 0 - 45 0 mm HG    HCO3, [place] 23 8 12 2 - 28 6 mmol/L    Base Exc, [place] -4 0 (L) 1 0 - 9 0 mmol/L    O2 Cont, [place] 4 6 mL/dL    O2 HGB,VENOUS CORD 22 9 %   Blood gas, arterial, cord    Collection Time: 03/16/21  8:02 AM   Result Value Ref Range    pH, Cord Art 7 242 7 230 - 7 430    pCO2, Cord Art 58 1 30 0 - 60 0    pO2, Cord Art 11 0 5 0 - 25 0 mm HG    HCO3, Cord Art 24 5 17 3 - 27 3 mmol/L    Base Exc, Cord Art -3 8 (L) 3 0 - 11 0 mmol/L    O2 Content, Cord Art 2 9 ml/dl    O2 Hgb, Arterial Cord 14 2 %   Fingerstick Glucose (POCT)    Collection Time: 03/16/21  9:14 AM   Result Value Ref Range    POC Glucose 96 65 - 140 mg/dl   Fingerstick Glucose (POCT)    Collection Time: 03/16/21 11:06 AM   Result Value Ref Range    POC Glucose 111 65 - 140 mg/dl   Fingerstick Glucose (POCT)    Collection Time: 03/16/21  2:36 PM   Result Value Ref Range    POC Glucose 81 65 - 140 mg/dl   Home grade breast pump  Collection Time: 03/16/21  3:52 PM   Result Value Ref Range    Supplier Name [employer] Phone Number [phone]     Order Status Scheduling Delivery     Delivery Note      Delivery Request Date 03/16/2021     Item Description Home grade breast pump      Fingerstick Glucose (POCT)    Collection Time: 03/16/21  6:37 PM Result Value Ref Range    POC Glucose 95 65 - 140 mg/dl   CBC and differential    Collection Time: 03/17/21  6:20 AM   Result Value Ref Range    WBC 17 04 (H) 4 31 - 10 16 Thousand/uL    RBC 3 45 (L) 3 81 - 5 12 Million/uL    Hemoglobin 10 6 (L) 11 5 - 15 4 g/dL    Hematocrit 32 5 (L) 34 8 - 46 1 %    MCV 94 82 - 98 fL    MCH 30 7 26 8 - 34 3 pg    MCHC 32 6 31 4 - 37 4 g/dL    RDW 13 8 11 6 - 15 1 %    MPV 11 5 8 9 - 12 7 fL    Platelets 171 203 - 595 Thousands/uL    nRBC 0 /100 WBCs    Neutrophils Relative 84 (H) 43 - 75 %    Immat GRANS % 1 0 - 2 %    Lymphocytes Relative 9 (L) 14 - 44 %    Monocytes Relative 5 4 - 12 %    Eosinophils Relative 1 0 - 6 %    Basophils Relative 0 0 - 1 %    Neutrophils Absolute 14 37 (H) 1 85 - 7 62 Thousands/µL    Immature Grans Absolute 0 21 (H) 0 00 - 0 20 Thousand/uL    Lymphocytes Absolute 1 46 0 60 - 4 47 Thousands/µL    Monocytes Absolute 0 85 0 17 - 1 22 Thousand/µL    Eosinophils Absolute 0 11 0 00 - 0 61 Thousand/µL    Basophils Absolute 0 04 0 00 - 0 10 Thousands/µL       MEDS:   Current Facility-Administered Medications   Medication Dose Route Frequency    acetaminophen (TYLENOL) tablet 650 mg  650 mg Oral Q6H    aluminum-magnesium hydroxide-simethicone (MYLANTA) oral suspension 15 mL  15 mL Oral Q6H PRN    ascorbic acid (VITAMIN C) tablet 1,000 mg  1,000 mg Oral Daily    benzocaine-menthol-lanolin-aloe (DERMOPLAST) 20-0 5 % topical spray 1 application  1 application Topical S1I PRN    bisacodyl (DULCOLAX) EC tablet 5 mg  5 mg Oral Daily PRN    calcium carbonate (TUMS) chewable tablet 1,000 mg  1,000 mg Oral Daily PRN    dexamethasone (DECADRON) injection 8 mg  8 mg Intravenous Once PRN    diphenhydrAMINE (BENADRYL) injection 25 mg  25 mg Intravenous Q6H PRN    docusate sodium (COLACE) capsule 100 mg  100 mg Oral BID    enoxaparin (LOVENOX) subcutaneous injection 40 mg  40 mg Subcutaneous Q24H [facility]    hydrocortisone 1 % cream 1 application  1 application Topical 4x Daily PRN    HYDROmorphone (DILAUDID) injection 0 5 mg  0 5 mg Intravenous Q2H PRN    insulin aspart (NovoLOG) FOR PUMP REFILLS 300 Units  300 Units Subcutaneous Insulin Pump Daily PRN    ketorolac (TORADOL) injection 30 mg  30 mg Intravenous Q6H    lactated ringers infusion  125 mL/hr Intravenous Continuous    metFORMIN (GLUCOPHAGE-XR) 24 hr tablet 1,500 mg  1,500 mg Oral Daily    metoclopramide (REGLAN) injection 5 mg  5 mg Intravenous Q6H PRN    naloxone (NARCAN) injection 0 1 mg  0 1 mg Intravenous Q3 min PRN    neomycin-bacitracin-polymyxin b (NEOSPORIN) ointment 1 small application  1 small application Topical BID    ondansetron (ZOFRAN) injection 4 mg  4 mg Intravenous Q4H PRN    oxytocin (PITOCIN) 30 Units in lactated ringers 500 mL infusion  62 5 pop-units/min Intravenous Titrated    PATIENT MAINTAINED INSULIN PUMP 1 each  1 each Subcutaneous Q8H    senna (SENOKOT) tablet 8 6 mg  1 tablet Oral Daily PRN    witch hazel-glycerin (TUCKS) topical pad 1 pad  1 pad Topical Q4H PRN     Invasive Devices     Peripherally Inserted Central Catheter Line            PICC Line 68/87/08 Right Basilic 7 days          Peripheral Intravenous Line            Peripheral IV 03/16/21 Right;Ventral (anterior) Hand less than 1 day          Line            Pump Device Insulin pump Anterior;Right Abdomen 7 days              Carlos Cordon MD  OB/GYN PGY-1  6:55 AM  03/17/21

## 2021-03-17 NOTE — PLAN OF CARE
Problem: PAIN - ADULT  Goal: Verbalizes/displays adequate comfort level or baseline comfort level  Description: Interventions:  - Encourage patient to monitor pain and request assistance  - Assess pain using appropriate pain scale  - Administer analgesics based on type and severity of pain and evaluate response  - Implement non-pharmacological measures as appropriate and evaluate response  - Consider cultural and social influences on pain and pain management  - Notify physician/advanced practitioner if interventions unsuccessful or patient reports new pain  Outcome: Progressing     Problem: INFECTION - ADULT  Goal: Absence or prevention of progression during hospitalization  Description: INTERVENTIONS:  - Assess and monitor for signs and symptoms of infection  - Monitor lab/diagnostic results  - Monitor all insertion sites, i e  indwelling lines, tubes, and drains  - Monitor endotracheal if appropriate and nasal secretions for changes in amount and color  - [place] appropriate cooling/warming therapies per order  - Administer medications as ordered  - Instruct and encourage patient and family to use good hand hygiene technique  - Identify and instruct in appropriate isolation precautions for identified infection/condition  Outcome: Progressing     Problem: SAFETY ADULT  Goal: Patient will remain free of falls  Description: INTERVENTIONS:  - Assess patient frequently for physical needs  -  Identify cognitive and physical deficits and behaviors that affect risk of falls    -  [place] fall precautions as indicated by assessment   - Educate patient/family on patient safety including physical limitations  - Instruct patient to call for assistance with activity based on assessment  - Modify environment to reduce risk of injury  - Consider OT/PT consult to assist with strengthening/mobility  Outcome: Progressing  Goal: Maintain or return to baseline ADL function  Description: INTERVENTIONS:  -  Assess patient's ability to carry out ADLs; assess patient's baseline for ADL function and identify physical deficits which impact ability to perform ADLs (bathing, care of mouth/teeth, toileting, grooming, dressing, etc )  - Assess/evaluate cause of self-care deficits   - Assess range of motion  - Assess patient's mobility; develop plan if impaired  - Assess patient's need for assistive devices and provide as appropriate  - Encourage maximum independence but intervene and supervise when necessary  - Involve family in performance of ADLs  - Assess for home care needs following discharge   - Consider OT consult to assist with ADL evaluation and planning for discharge  - Provide patient education as appropriate  Outcome: Progressing  Goal: Maintain or return mobility status to optimal level  Description: INTERVENTIONS:  - Assess patient's baseline mobility status (ambulation, transfers, stairs, etc )    - Identify cognitive and physical deficits and behaviors that affect mobility  - Identify mobility aids required to assist with transfers and/or ambulation (gait belt, sit-to-stand, lift, walker, cane, etc )  - [place] fall precautions as indicated by assessment  - Record patient progress and toleration of activity level on Mobility SBAR; progress patient to next Phase/Stage  - Instruct patient to call for assistance with activity based on assessment  - Consider rehabilitation consult to assist with strengthening/weightbearing, etc   Outcome: Progressing     Problem: DISCHARGE PLANNING  Goal: Discharge to home or other facility with appropriate resources  Description: INTERVENTIONS:  - Identify barriers to discharge w/patient and caregiver  - Arrange for needed discharge resources and transportation as appropriate  - Identify discharge learning needs (meds, wound care, etc )  - Arrange for interpretive services to assist at discharge as needed  - Refer to Case Management Department for coordinating discharge planning if the patient needs post-hospital services based on physician/advanced practitioner order or complex needs related to functional status, cognitive ability, or social support system  Outcome: Progressing     Problem: POSTPARTUM  Goal: Experiences normal postpartum course  Description: INTERVENTIONS:  - Monitor maternal vital signs  - Assess uterine involution and lochia  Outcome: Progressing  Goal: Appropriate maternal -  bonding  Description: INTERVENTIONS:  - Identify family support  - Assess for appropriate maternal/infant bonding   -Encourage maternal/infant bonding opportunities  - Referral to  or  as needed  Outcome: Progressing  Goal: Establishment of infant feeding pattern  Description: INTERVENTIONS:  - Assess breast/bottle feeding  - Refer to lactation as needed  Outcome: Progressing  Goal: Incision(s), wounds(s) or drain site(s) healing without S/S of infection  Description: INTERVENTIONS  - Assess and document risk factors for skin impairment   - Assess and document dressing, incision, wound bed, drain sites and surrounding tissue  - Consider nutrition services referral as needed  - Oral mucous membranes remain intact  - Provide patient/ family education  Outcome: Progressing     Problem: DISCHARGE PLANNING - CARE MANAGEMENT  Goal: Discharge to post-acute care or home with appropriate resources  Description: INTERVENTIONS:  - Conduct assessment to determine patient/family and health care team treatment goals, and need for post-acute services based on payer coverage, community resources, and patient preferences, and barriers to discharge  - Address psychosocial, clinical, and financial barriers to discharge as identified in assessment in conjunction with the patient/family and health care team  - Arrange appropriate level of post-acute services according to patients   needs and preference and payer coverage in collaboration with the physician and health care team  - Communicate with and update the patient/family, physician, and health care team regarding progress on the discharge plan  - Arrange appropriate transportation to post-acute venues  Outcome: Progressing     Problem: Potential for Falls  Goal: Patient will remain free of falls  Description: INTERVENTIONS:  - Assess patient frequently for physical needs  -  Identify cognitive and physical deficits and behaviors that affect risk of falls    -  [place] fall precautions as indicated by assessment   - Educate patient/family on patient safety including physical limitations  - Instruct patient to call for assistance with activity based on assessment  - Modify environment to reduce risk of injury  - Consider OT/PT consult to assist with strengthening/mobility  Outcome: Progressing

## 2021-03-18 LAB
GLUCOSE SERPL-MCNC: 183 MG/DL (ref 65–140)
GLUCOSE SERPL-MCNC: 70 MG/DL (ref 65–140)
GLUCOSE SERPL-MCNC: 94 MG/DL (ref 65–140)
GLUCOSE SERPL-MCNC: 96 MG/DL (ref 65–140)

## 2021-03-18 PROCEDURE — 99024 POSTOP FOLLOW-UP VISIT: CPT | Performed by: OBSTETRICS & GYNECOLOGY

## 2021-03-18 PROCEDURE — 82948 REAGENT STRIP/BLOOD GLUCOSE: CPT

## 2021-03-18 RX ORDER — IBUPROFEN 600 MG/1
600 TABLET ORAL EVERY 6 HOURS SCHEDULED
Status: DISCONTINUED | OUTPATIENT
Start: 2021-03-18 | End: 2021-03-20 | Stop reason: HOSPADM

## 2021-03-18 RX ORDER — SIMETHICONE 80 MG
80 TABLET,CHEWABLE ORAL
Status: DISCONTINUED | OUTPATIENT
Start: 2021-03-18 | End: 2021-03-20 | Stop reason: HOSPADM

## 2021-03-18 RX ADMIN — ENOXAPARIN SODIUM 40 MG: 40 INJECTION SUBCUTANEOUS at 22:29

## 2021-03-18 RX ADMIN — SIMETHICONE 80 MG: 80 TABLET, CHEWABLE ORAL at 18:06

## 2021-03-18 RX ADMIN — SIMETHICONE 80 MG: 80 TABLET, CHEWABLE ORAL at 12:27

## 2021-03-18 RX ADMIN — ACETAMINOPHEN 650 MG: 325 TABLET, FILM COATED ORAL at 00:00

## 2021-03-18 RX ADMIN — BACITRACIN, NEOMYCIN, POLYMYXIN B 1 SMALL APPLICATION: 400; 3.5; 5 OINTMENT TOPICAL at 09:10

## 2021-03-18 RX ADMIN — OXYCODONE HYDROCHLORIDE 5 MG: 5 TABLET ORAL at 18:06

## 2021-03-18 RX ADMIN — IBUPROFEN 600 MG: 600 TABLET, FILM COATED ORAL at 03:16

## 2021-03-18 RX ADMIN — DOCUSATE SODIUM 100 MG: 100 CAPSULE, LIQUID FILLED ORAL at 09:10

## 2021-03-18 RX ADMIN — METFORMIN ER 500 MG 1500 MG: 500 TABLET ORAL at 22:28

## 2021-03-18 RX ADMIN — ACETAMINOPHEN 650 MG: 325 TABLET, FILM COATED ORAL at 12:26

## 2021-03-18 RX ADMIN — OXYCODONE HYDROCHLORIDE 5 MG: 5 TABLET ORAL at 06:17

## 2021-03-18 RX ADMIN — IBUPROFEN 600 MG: 600 TABLET ORAL at 18:05

## 2021-03-18 RX ADMIN — OXYCODONE HYDROCHLORIDE AND ACETAMINOPHEN 1000 MG: 500 TABLET ORAL at 09:10

## 2021-03-18 RX ADMIN — DOCUSATE SODIUM 100 MG: 100 CAPSULE, LIQUID FILLED ORAL at 18:08

## 2021-03-18 RX ADMIN — SENNOSIDES 8.6 MG: 8.6 TABLET, FILM COATED ORAL at 22:28

## 2021-03-18 RX ADMIN — IBUPROFEN 600 MG: 600 TABLET ORAL at 09:10

## 2021-03-18 RX ADMIN — SIMETHICONE 80 MG: 80 TABLET, CHEWABLE ORAL at 09:13

## 2021-03-18 RX ADMIN — ACETAMINOPHEN 650 MG: 325 TABLET, FILM COATED ORAL at 06:10

## 2021-03-18 RX ADMIN — ACETAMINOPHEN 650 MG: 325 TABLET, FILM COATED ORAL at 22:29

## 2021-03-18 RX ADMIN — SIMETHICONE 80 MG: 80 TABLET, CHEWABLE ORAL at 22:29

## 2021-03-18 NOTE — PROGRESS NOTES
Progress Note - OB/GYN   Ang Clay 27 y o  female MRN: [id number]  Unit/Bed#: -01 Encounter: [id number]      Assessment:  Post operative day #2 s/p 1LTCS (breech, 29w), stable, and doing well    Plan:  1  Hemodynamically stable   - Pre-op Hb 12 7 --> post-op Hb 10 6   - Vitals WNL, currently asymptomatic  2  Spontaneously voiding  3  T1DM   - insulin pump resumed pre-pregnancy settings   - blood sugars overnight 86, 139, 125, 86, 85  4  DVT ppx   - Lovenox 40 qd   - SCDs, ambulation  5  Continue routine post partum care   - Encourage ambulation   - Encourage breastfeeding   - PICC and peripheral IV removed  6  Continue current meds   - See list below   - Scheduled tylenol and motrin for better pain control   - Jessica 5/10 q4h prn   - Scheduled simethicone for gas pain  7  Disposition   - Stable   - Anticipate discharge home POD#4    Subjective/Objective     Chief Complaint:     Having increased incisional burning and discomfort  She has passed minimal flatus  Subjective:     Pain: yes  Tolerating Oral Intake: yes  Voiding: yes  Flatus: yes  Bowel Movement: no  Ambulating: no  Breastfeeding: Breastfeeding and Using breast pump  Chest Pain: no  Shortness of Breath: no  Leg Pain/Discomfort: no  Lochia: normal    Objective:   Vitals:   /86 (BP Location: Left arm)   Pulse 85   Temp 98 4 °F (36 9 °C) (Oral)   Resp 17   Ht 5' 4" (1 626 m)   Wt 95 3 kg (210 lb)   SpO2 97%   Breastfeeding Yes   BMI 36 05 kg/m²   Body mass index is 36 05 kg/m²  I/O       03/16 0701 - 03/17 0700 03/17 0701 - 03/18 0700    P  O  1160     I V  (mL/kg) 3498 7 (36 7)     Total Intake(mL/kg) 4658 7 (48 9)     Urine (mL/kg/hr) 3725 (1 6) 1000 (0 4)    Blood 814     Total Output 4539 1000    Net +119 7 -1000                Physical Exam:  General: in no apparent distress and well developed and well nourished  Cardiovascular: Cor RRR  Lungs: nonlabored breathing on room air  Abdomen: abdomen is soft without significant tenderness, masses, organomegaly or guarding; incision c/d/i closed with running absorbable suture  Fundus: Firm and non-tender, 2 cm below the umbilicus  Lower extremeties: nontender, SCDs in place bilaterally    Labs/Tests:   Recent Results (from the past 24 hour(s))   Fingerstick Glucose (POCT)    Collection Time: 03/17/21 11:32 AM   Result Value Ref Range    POC Glucose 86 65 - 140 mg/dl   Fingerstick Glucose (POCT)    Collection Time: 03/17/21  2:04 PM   Result Value Ref Range    POC Glucose 139 65 - 140 mg/dl   Fingerstick Glucose (POCT)    Collection Time: 03/17/21  2:33 PM   Result Value Ref Range    POC Glucose 125 65 - 140 mg/dl   Fingerstick Glucose (POCT)    Collection Time: 03/17/21  7:18 PM   Result Value Ref Range    POC Glucose 86 65 - 140 mg/dl   Fingerstick Glucose (POCT)    Collection Time: 03/17/21  9:55 PM   Result Value Ref Range    POC Glucose 85 65 - 140 mg/dl   Fingerstick Glucose (POCT)    Collection Time: 03/18/21  6:13 AM   Result Value Ref Range    POC Glucose 94 65 - 140 mg/dl       MEDS:   Current Facility-Administered Medications   Medication Dose Route Frequency    acetaminophen (TYLENOL) tablet 650 mg  650 mg Oral Q6H    acetaminophen (TYLENOL) tablet 650 mg  650 mg Oral Q4H PRN    aluminum-magnesium hydroxide-simethicone (MYLANTA) oral suspension 15 mL  15 mL Oral Q6H PRN    ascorbic acid (VITAMIN C) tablet 1,000 mg  1,000 mg Oral Daily    benzocaine-menthol-lanolin-aloe (DERMOPLAST) 20-0 5 % topical spray 1 application  1 application Topical P8G PRN    bisacodyl (DULCOLAX) EC tablet 5 mg  5 mg Oral Daily PRN    calcium carbonate (TUMS) chewable tablet 1,000 mg  1,000 mg Oral Daily PRN    docusate sodium (COLACE) capsule 100 mg  100 mg Oral BID    enoxaparin (LOVENOX) subcutaneous injection 40 mg  40 mg Subcutaneous Q24H [facility]    hydrocortisone 1 % cream 1 application  1 application Topical 4x Daily PRN    HYDROmorphone (DILAUDID) injection 0 5 mg  0 5 mg Intravenous Q2H PRN    HYDROmorphone (DILAUDID) injection 1 mg  1 mg Intravenous Q2H PRN    ibuprofen (MOTRIN) tablet 600 mg  600 mg Oral Q6H [facility]    insulin aspart (NovoLOG) FOR PUMP REFILLS 300 Units  300 Units Subcutaneous Insulin Pump Daily PRN    lactated ringers infusion  125 mL/hr Intravenous Continuous    metFORMIN (GLUCOPHAGE-XR) 24 hr tablet 1,500 mg  1,500 mg Oral Daily    neomycin-bacitracin-polymyxin b (NEOSPORIN) ointment 1 small application  1 small application Topical BID    ondansetron (ZOFRAN) injection 4 mg  4 mg Intravenous Q8H PRN    oxyCODONE (ROXICODONE) immediate release tablet 10 mg  10 mg Oral Q4H PRN    oxyCODONE (ROXICODONE) IR tablet 5 mg  5 mg Oral Q4H PRN    oxytocin (PITOCIN) 30 Units in lactated ringers 500 mL infusion  62 5 pop-units/min Intravenous Titrated    PATIENT MAINTAINED INSULIN PUMP 1 each  1 each Subcutaneous Q8H    senna (SENOKOT) tablet 8 6 mg  1 tablet Oral Daily PRN    simethicone (MYLICON) chewable tablet 80 mg  80 mg Oral 4x Daily (with meals and at bedtime)    witch hazel-glycerin (TUCKS) topical pad 1 pad  1 pad Topical Q4H PRN     Invasive Devices     Line            Pump Device Insulin pump Anterior;Right Abdomen 8 days              Carlos Cordon MD  OB/GYN PGY-1  6:46 AM  03/18/21

## 2021-03-18 NOTE — LACTATION NOTE
Amaury Rubalcava expressed disappointment that there is not as much colostrum/breast milk as there was on day of delivery  Reassured her that what she is experiencing is wnl for breast milk production  She was not convinced that pumping was doing anything and has been relying completely on hand expression  Suggested to her that using the breast pump in stimulative mode for about five minutes prior to beginning hand expression may increase the amount of EBM she is able to harvest  Reassured her that the stimulation is needed to increase production for later though she may not see it yet  Encouraged Amaury Rubalcava and Romina Harris to call as needed for lactation assistance

## 2021-03-19 LAB
GLUCOSE SERPL-MCNC: 67 MG/DL (ref 65–140)
GLUCOSE SERPL-MCNC: 80 MG/DL (ref 65–140)
GLUCOSE SERPL-MCNC: 82 MG/DL (ref 65–140)
GLUCOSE SERPL-MCNC: 83 MG/DL (ref 65–140)
GLUCOSE SERPL-MCNC: 89 MG/DL (ref 65–140)

## 2021-03-19 PROCEDURE — 99024 POSTOP FOLLOW-UP VISIT: CPT | Performed by: OBSTETRICS & GYNECOLOGY

## 2021-03-19 PROCEDURE — 82948 REAGENT STRIP/BLOOD GLUCOSE: CPT

## 2021-03-19 RX ADMIN — IBUPROFEN 600 MG: 600 TABLET ORAL at 12:23

## 2021-03-19 RX ADMIN — METFORMIN ER 500 MG 1500 MG: 500 TABLET ORAL at 22:40

## 2021-03-19 RX ADMIN — DOCUSATE SODIUM 100 MG: 100 CAPSULE, LIQUID FILLED ORAL at 18:25

## 2021-03-19 RX ADMIN — OXYCODONE HYDROCHLORIDE 5 MG: 5 TABLET ORAL at 16:31

## 2021-03-19 RX ADMIN — OXYCODONE HYDROCHLORIDE AND ACETAMINOPHEN 1000 MG: 500 TABLET ORAL at 08:36

## 2021-03-19 RX ADMIN — IBUPROFEN 600 MG: 600 TABLET ORAL at 00:09

## 2021-03-19 RX ADMIN — ACETAMINOPHEN 650 MG: 325 TABLET, FILM COATED ORAL at 16:31

## 2021-03-19 RX ADMIN — BISACODYL 5 MG: 5 TABLET, COATED ORAL at 18:25

## 2021-03-19 RX ADMIN — ACETAMINOPHEN 650 MG: 325 TABLET, FILM COATED ORAL at 10:38

## 2021-03-19 RX ADMIN — DOCUSATE SODIUM 100 MG: 100 CAPSULE, LIQUID FILLED ORAL at 08:36

## 2021-03-19 RX ADMIN — ACETAMINOPHEN 650 MG: 325 TABLET, FILM COATED ORAL at 04:38

## 2021-03-19 RX ADMIN — IBUPROFEN 600 MG: 600 TABLET ORAL at 23:59

## 2021-03-19 RX ADMIN — SIMETHICONE 80 MG: 80 TABLET, CHEWABLE ORAL at 14:59

## 2021-03-19 RX ADMIN — IBUPROFEN 600 MG: 600 TABLET ORAL at 18:25

## 2021-03-19 RX ADMIN — ACETAMINOPHEN 650 MG: 325 TABLET, FILM COATED ORAL at 22:40

## 2021-03-19 RX ADMIN — SIMETHICONE 80 MG: 80 TABLET, CHEWABLE ORAL at 08:36

## 2021-03-19 RX ADMIN — IBUPROFEN 600 MG: 600 TABLET ORAL at 06:16

## 2021-03-19 RX ADMIN — OXYCODONE HYDROCHLORIDE 5 MG: 5 TABLET ORAL at 00:09

## 2021-03-19 RX ADMIN — OXYCODONE HYDROCHLORIDE 5 MG: 5 TABLET ORAL at 06:19

## 2021-03-19 RX ADMIN — ENOXAPARIN SODIUM 40 MG: 40 INJECTION SUBCUTANEOUS at 21:18

## 2021-03-19 RX ADMIN — SENNOSIDES 8.6 MG: 8.6 TABLET, FILM COATED ORAL at 08:39

## 2021-03-19 RX ADMIN — SIMETHICONE 80 MG: 80 TABLET, CHEWABLE ORAL at 22:39

## 2021-03-19 NOTE — PROGRESS NOTES
Progress Note - OB/GYN   La Conner Presto 27 y o  female MRN: [id number]  Unit/Bed#: -01 Encounter: [id number]      Assessment:  Post operative day #3 s/p 1LTCS (breech, 29w), stable, and doing well    Plan:  1  Hemodynamically stable   - Pre-op Hb 12 7 --> post-op Hb 10 6   - Vitals WNL, currently asymptomatic  2  Spontaneously voiding  3  T1DM   - insulin pump resumed pre-pregnancy settings   - blood sugars overnight ranging 70-96, 183    - Follows w/ [facility]   4  DVT ppx   - Lovenox 40 qd   - SCDs, ambulation  5  Continue routine post partum care   - Encourage ambulation   - Encourage breastfeeding   - PICC and peripheral IV removed  6  Continue current meds   - See list below   - Pain better improved with PO analgesics   7  Disposition   - Stable   - Anticipate discharge home POD#4    Subjective/Objective     Chief Complaint:     Pain better controlled this morning    Subjective:     Pain: yes  Tolerating Oral Intake: yes  Voiding: yes  Flatus: yes  Bowel Movement: no  Ambulating: yes  Breastfeeding: Breastfeeding and Using breast pump  Chest Pain: no  Shortness of Breath: no  Leg Pain/Discomfort: no  Lochia: normal    Objective:   Vitals:   /70   Pulse 82   Temp 98 °F (36 7 °C) (Oral)   Resp 16   Ht 5' 4" (1 626 m)   Wt 95 3 kg (210 lb)   SpO2 97%   Breastfeeding Yes   BMI 36 05 kg/m²   Body mass index is 36 05 kg/m²      No intake or output data in the 24 hours ending 03/19/21 3620    Physical Exam:  General: in no apparent distress and well developed and well nourished  Cardiovascular: Cor RRR  Lungs: nonlabored breathing on room air  Abdomen: abdomen is soft without significant tenderness, masses, organomegaly or guarding; incision c/d/i closed with running absorbable suture  Fundus: Firm and non-tender, 2 cm below the umbilicus  Lower extremeties: nontender    Labs/Tests:   Recent Results (from the past 24 hour(s))   Fingerstick Glucose (POCT)    Collection Time: 03/18/21  8:00 AM   Result Value Ref Range    POC Glucose 96 65 - 140 mg/dl   Fingerstick Glucose (POCT)    Collection Time: 03/18/21  2:34 PM   Result Value Ref Range    POC Glucose 70 65 - 140 mg/dl   Fingerstick Glucose (POCT)    Collection Time: 03/18/21  6:32 PM   Result Value Ref Range    POC Glucose 183 (H) 65 - 140 mg/dl       MEDS:   Current Facility-Administered Medications   Medication Dose Route Frequency    acetaminophen (TYLENOL) tablet 650 mg  650 mg Oral Q6H    acetaminophen (TYLENOL) tablet 650 mg  650 mg Oral Q4H PRN    aluminum-magnesium hydroxide-simethicone (MYLANTA) oral suspension 15 mL  15 mL Oral Q6H PRN    ascorbic acid (VITAMIN C) tablet 1,000 mg  1,000 mg Oral Daily    benzocaine-menthol-lanolin-aloe (DERMOPLAST) 20-0 5 % topical spray 1 application  1 application Topical Q9X PRN    bisacodyl (DULCOLAX) EC tablet 5 mg  5 mg Oral Daily PRN    calcium carbonate (TUMS) chewable tablet 1,000 mg  1,000 mg Oral Daily PRN    docusate sodium (COLACE) capsule 100 mg  100 mg Oral BID    enoxaparin (LOVENOX) subcutaneous injection 40 mg  40 mg Subcutaneous Q24H [facility]    hydrocortisone 1 % cream 1 application  1 application Topical 4x Daily PRN    HYDROmorphone (DILAUDID) injection 0 5 mg  0 5 mg Intravenous Q2H PRN    HYDROmorphone (DILAUDID) injection 1 mg  1 mg Intravenous Q2H PRN    ibuprofen (MOTRIN) tablet 600 mg  600 mg Oral Q6H [facility]    insulin aspart (NovoLOG) FOR PUMP REFILLS 300 Units  300 Units Subcutaneous Insulin Pump Daily PRN    lactated ringers infusion  125 mL/hr Intravenous Continuous    metFORMIN (GLUCOPHAGE-XR) 24 hr tablet 1,500 mg  1,500 mg Oral Daily    neomycin-bacitracin-polymyxin b (NEOSPORIN) ointment 1 small application  1 small application Topical BID    ondansetron (ZOFRAN) injection 4 mg  4 mg Intravenous Q8H PRN    oxyCODONE (ROXICODONE) immediate release tablet 10 mg  10 mg Oral Q4H PRN    oxyCODONE (ROXICODONE) IR tablet 5 mg  5 mg Oral Q4H PRN    oxytocin (PITOCIN) 30 Units in lactated ringers 500 mL infusion  62 5 pop-units/min Intravenous Titrated    PATIENT MAINTAINED INSULIN PUMP 1 each  1 each Subcutaneous Q8H    senna (SENOKOT) tablet 8 6 mg  1 tablet Oral Daily PRN    simethicone (MYLICON) chewable tablet 80 mg  80 mg Oral 4x Daily (with meals and at bedtime)    witch hazel-glycerin (TUCKS) topical pad 1 pad  1 pad Topical Q4H PRN     Invasive Devices     Line            Pump Device Insulin pump Anterior;Right Abdomen 9 days              Abiodun Das MD  OB/GYN PGY-1  6:31 AM  03/19/21

## 2021-03-19 NOTE — PLAN OF CARE
Problem: PAIN - ADULT  Goal: Verbalizes/displays adequate comfort level or baseline comfort level  Description: Interventions:  - Encourage patient to monitor pain and request assistance  - Assess pain using appropriate pain scale  - Administer analgesics based on type and severity of pain and evaluate response  - Implement non-pharmacological measures as appropriate and evaluate response  - Consider cultural and social influences on pain and pain management  - Notify physician/advanced practitioner if interventions unsuccessful or patient reports new pain  Outcome: Progressing     Problem: INFECTION - ADULT  Goal: Absence or prevention of progression during hospitalization  Description: INTERVENTIONS:  - Assess and monitor for signs and symptoms of infection  - Monitor lab/diagnostic results  - Monitor all insertion sites, i e  indwelling lines, tubes, and drains  - Monitor endotracheal if appropriate and nasal secretions for changes in amount and color  - [place] appropriate cooling/warming therapies per order  - Administer medications as ordered  - Instruct and encourage patient and family to use good hand hygiene technique  - Identify and instruct in appropriate isolation precautions for identified infection/condition  Outcome: Progressing     Problem: SAFETY ADULT  Goal: Patient will remain free of falls  Description: INTERVENTIONS:  - Assess patient frequently for physical needs  -  Identify cognitive and physical deficits and behaviors that affect risk of falls    -  [place] fall precautions as indicated by assessment   - Educate patient/family on patient safety including physical limitations  - Instruct patient to call for assistance with activity based on assessment  - Modify environment to reduce risk of injury  - Consider OT/PT consult to assist with strengthening/mobility  Outcome: Progressing  Goal: Maintain or return to baseline ADL function  Description: INTERVENTIONS:  -  Assess patient's ability to carry out ADLs; assess patient's baseline for ADL function and identify physical deficits which impact ability to perform ADLs (bathing, care of mouth/teeth, toileting, grooming, dressing, etc )  - Assess/evaluate cause of self-care deficits   - Assess range of motion  - Assess patient's mobility; develop plan if impaired  - Assess patient's need for assistive devices and provide as appropriate  - Encourage maximum independence but intervene and supervise when necessary  - Involve family in performance of ADLs  - Assess for home care needs following discharge   - Consider OT consult to assist with ADL evaluation and planning for discharge  - Provide patient education as appropriate  Outcome: Progressing  Goal: Maintain or return mobility status to optimal level  Description: INTERVENTIONS:  - Assess patient's baseline mobility status (ambulation, transfers, stairs, etc )    - Identify cognitive and physical deficits and behaviors that affect mobility  - Identify mobility aids required to assist with transfers and/or ambulation (gait belt, sit-to-stand, lift, walker, cane, etc )  - [place] fall precautions as indicated by assessment  - Record patient progress and toleration of activity level on Mobility SBAR; progress patient to next Phase/Stage  - Instruct patient to call for assistance with activity based on assessment  - Consider rehabilitation consult to assist with strengthening/weightbearing, etc   Outcome: Progressing     Problem: DISCHARGE PLANNING  Goal: Discharge to home or other facility with appropriate resources  Description: INTERVENTIONS:  - Identify barriers to discharge w/patient and caregiver  - Arrange for needed discharge resources and transportation as appropriate  - Identify discharge learning needs (meds, wound care, etc )  - Arrange for interpretive services to assist at discharge as needed  - Refer to Case Management Department for coordinating discharge planning if the patient needs post-hospital services based on physician/advanced practitioner order or complex needs related to functional status, cognitive ability, or social support system  Outcome: Progressing     Problem: DISCHARGE PLANNING - CARE MANAGEMENT  Goal: Discharge to post-acute care or home with appropriate resources  Description: INTERVENTIONS:  - Conduct assessment to determine patient/family and health care team treatment goals, and need for post-acute services based on payer coverage, community resources, and patient preferences, and barriers to discharge  - Address psychosocial, clinical, and financial barriers to discharge as identified in assessment in conjunction with the patient/family and health care team  - Arrange appropriate level of post-acute services according to patients   needs and preference and payer coverage in collaboration with the physician and health care team  - Communicate with and update the patient/family, physician, and health care team regarding progress on the discharge plan  - Arrange appropriate transportation to post-acute venues  Outcome: Progressing     Problem: Potential for Falls  Goal: Patient will remain free of falls  Description: INTERVENTIONS:  - Assess patient frequently for physical needs  -  Identify cognitive and physical deficits and behaviors that affect risk of falls    -  [place] fall precautions as indicated by assessment   - Educate patient/family on patient safety including physical limitations  - Instruct patient to call for assistance with activity based on assessment  - Modify environment to reduce risk of injury  - Consider OT/PT consult to assist with strengthening/mobility  Outcome: Progressing     Problem: POSTPARTUM  Goal: Experiences normal postpartum course  Description: INTERVENTIONS:  - Monitor maternal vital signs  - Assess uterine involution and lochia  Outcome: Progressing  Goal: Appropriate maternal -  bonding  Description: INTERVENTIONS:  - Identify family support  - Assess for appropriate maternal/infant bonding   -Encourage maternal/infant bonding opportunities  - Referral to  or  as needed  Outcome: Progressing  Goal: Establishment of infant feeding pattern  Description: INTERVENTIONS:  - Assess breast/bottle feeding  - Refer to lactation as needed  Outcome: Progressing  Goal: Incision(s), wounds(s) or drain site(s) healing without S/S of infection  Description: INTERVENTIONS  - Assess and document risk factors for skin impairment   - Assess and document dressing, incision, wound bed, drain sites and surrounding tissue  - Consider nutrition services referral as needed  - Oral mucous membranes remain intact  - Provide patient/ family education  Outcome: Progressing

## 2021-03-19 NOTE — PLAN OF CARE
Problem: PAIN - ADULT  Goal: Verbalizes/displays adequate comfort level or baseline comfort level  Description: Interventions:  - Encourage patient to monitor pain and request assistance  - Assess pain using appropriate pain scale  - Administer analgesics based on type and severity of pain and evaluate response  - Implement non-pharmacological measures as appropriate and evaluate response  - Consider cultural and social influences on pain and pain management  - Notify physician/advanced practitioner if interventions unsuccessful or patient reports new pain  Outcome: Progressing     Problem: INFECTION - ADULT  Goal: Absence or prevention of progression during hospitalization  Description: INTERVENTIONS:  - Assess and monitor for signs and symptoms of infection  - Monitor lab/diagnostic results  - Monitor all insertion sites, i e  indwelling lines, tubes, and drains  - Monitor endotracheal if appropriate and nasal secretions for changes in amount and color  - [place] appropriate cooling/warming therapies per order  - Administer medications as ordered  - Instruct and encourage patient and family to use good hand hygiene technique  - Identify and instruct in appropriate isolation precautions for identified infection/condition  Outcome: Progressing     Problem: SAFETY ADULT  Goal: Patient will remain free of falls  Description: INTERVENTIONS:  - Assess patient frequently for physical needs  -  Identify cognitive and physical deficits and behaviors that affect risk of falls    -  [place] fall precautions as indicated by assessment   - Educate patient/family on patient safety including physical limitations  - Instruct patient to call for assistance with activity based on assessment  - Modify environment to reduce risk of injury  - Consider OT/PT consult to assist with strengthening/mobility  Outcome: Progressing  Goal: Maintain or return to baseline ADL function  Description: INTERVENTIONS:  -  Assess patient's ability to carry out ADLs; assess patient's baseline for ADL function and identify physical deficits which impact ability to perform ADLs (bathing, care of mouth/teeth, toileting, grooming, dressing, etc )  - Assess/evaluate cause of self-care deficits   - Assess range of motion  - Assess patient's mobility; develop plan if impaired  - Assess patient's need for assistive devices and provide as appropriate  - Encourage maximum independence but intervene and supervise when necessary  - Involve family in performance of ADLs  - Assess for home care needs following discharge   - Consider OT consult to assist with ADL evaluation and planning for discharge  - Provide patient education as appropriate  Outcome: Progressing  Goal: Maintain or return mobility status to optimal level  Description: INTERVENTIONS:  - Assess patient's baseline mobility status (ambulation, transfers, stairs, etc )    - Identify cognitive and physical deficits and behaviors that affect mobility  - Identify mobility aids required to assist with transfers and/or ambulation (gait belt, sit-to-stand, lift, walker, cane, etc )  - [place] fall precautions as indicated by assessment  - Record patient progress and toleration of activity level on Mobility SBAR; progress patient to next Phase/Stage  - Instruct patient to call for assistance with activity based on assessment  - Consider rehabilitation consult to assist with strengthening/weightbearing, etc   Outcome: Progressing     Problem: DISCHARGE PLANNING  Goal: Discharge to home or other facility with appropriate resources  Description: INTERVENTIONS:  - Identify barriers to discharge w/patient and caregiver  - Arrange for needed discharge resources and transportation as appropriate  - Identify discharge learning needs (meds, wound care, etc )  - Arrange for interpretive services to assist at discharge as needed  - Refer to Case Management Department for coordinating discharge planning if the patient needs post-hospital services based on physician/advanced practitioner order or complex needs related to functional status, cognitive ability, or social support system  Outcome: Progressing     Problem: DISCHARGE PLANNING - CARE MANAGEMENT  Goal: Discharge to post-acute care or home with appropriate resources  Description: INTERVENTIONS:  - Conduct assessment to determine patient/family and health care team treatment goals, and need for post-acute services based on payer coverage, community resources, and patient preferences, and barriers to discharge  - Address psychosocial, clinical, and financial barriers to discharge as identified in assessment in conjunction with the patient/family and health care team  - Arrange appropriate level of post-acute services according to patients   needs and preference and payer coverage in collaboration with the physician and health care team  - Communicate with and update the patient/family, physician, and health care team regarding progress on the discharge plan  - Arrange appropriate transportation to post-acute venues  Outcome: Progressing     Problem: Potential for Falls  Goal: Patient will remain free of falls  Description: INTERVENTIONS:  - Assess patient frequently for physical needs  -  Identify cognitive and physical deficits and behaviors that affect risk of falls    -  [place] fall precautions as indicated by assessment   - Educate patient/family on patient safety including physical limitations  - Instruct patient to call for assistance with activity based on assessment  - Modify environment to reduce risk of injury  - Consider OT/PT consult to assist with strengthening/mobility  Outcome: Progressing     Problem: POSTPARTUM  Goal: Experiences normal postpartum course  Description: INTERVENTIONS:  - Monitor maternal vital signs  - Assess uterine involution and lochia  Outcome: Progressing  Goal: Appropriate maternal -  bonding  Description: INTERVENTIONS:  - Identify family support  - Assess for appropriate maternal/infant bonding   -Encourage maternal/infant bonding opportunities  - Referral to  or  as needed  Outcome: Progressing  Goal: Establishment of infant feeding pattern  Description: INTERVENTIONS:  - Assess breast/bottle feeding  - Refer to lactation as needed  Outcome: Progressing  Goal: Incision(s), wounds(s) or drain site(s) healing without S/S of infection  Description: INTERVENTIONS  - Assess and document risk factors for skin impairment   - Assess and document dressing, incision, wound bed, drain sites and surrounding tissue  - Consider nutrition services referral as needed  - Oral mucous membranes remain intact  - Provide patient/ family education  Outcome: Progressing

## 2021-03-20 VITALS
SYSTOLIC BLOOD PRESSURE: 116 MMHG | DIASTOLIC BLOOD PRESSURE: 81 MMHG | OXYGEN SATURATION: 97 % | BODY MASS INDEX: 35.85 KG/M2 | TEMPERATURE: 97.8 F | HEIGHT: 64 IN | HEART RATE: 77 BPM | RESPIRATION RATE: 16 BRPM | WEIGHT: 210 LBS

## 2021-03-20 LAB — GLUCOSE SERPL-MCNC: 70 MG/DL (ref 65–140)

## 2021-03-20 PROCEDURE — 99024 POSTOP FOLLOW-UP VISIT: CPT | Performed by: OBSTETRICS & GYNECOLOGY

## 2021-03-20 PROCEDURE — 82948 REAGENT STRIP/BLOOD GLUCOSE: CPT

## 2021-03-20 RX ORDER — ACETAMINOPHEN 325 MG/1
650 TABLET ORAL EVERY 6 HOURS
Refills: 0
Start: 2021-03-20

## 2021-03-20 RX ORDER — OXYCODONE HYDROCHLORIDE AND ACETAMINOPHEN 5; 325 MG/1; MG/1
1 TABLET ORAL EVERY 4 HOURS PRN
Refills: 0
Start: 2021-03-20 | End: 2021-03-30

## 2021-03-20 RX ORDER — DOCUSATE SODIUM 100 MG/1
100 CAPSULE, LIQUID FILLED ORAL 2 TIMES DAILY
Qty: 10 CAPSULE | Refills: 0 | Status: SHIPPED | OUTPATIENT
Start: 2021-03-20

## 2021-03-20 RX ORDER — OXYCODONE HYDROCHLORIDE AND ACETAMINOPHEN 5; 325 MG/1; MG/1
1 TABLET ORAL EVERY 4 HOURS PRN
Qty: 6 TABLET | Refills: 0 | Status: SHIPPED | OUTPATIENT
Start: 2021-03-20 | End: 2021-03-30

## 2021-03-20 RX ORDER — IBUPROFEN 600 MG/1
600 TABLET ORAL EVERY 6 HOURS SCHEDULED
Qty: 30 TABLET | Refills: 0 | Status: SHIPPED | OUTPATIENT
Start: 2021-03-20

## 2021-03-20 RX ADMIN — OXYCODONE HYDROCHLORIDE AND ACETAMINOPHEN 1000 MG: 500 TABLET ORAL at 07:48

## 2021-03-20 RX ADMIN — ACETAMINOPHEN 650 MG: 325 TABLET, FILM COATED ORAL at 10:52

## 2021-03-20 RX ADMIN — IBUPROFEN 600 MG: 600 TABLET ORAL at 06:19

## 2021-03-20 RX ADMIN — ACETAMINOPHEN 650 MG: 325 TABLET, FILM COATED ORAL at 04:31

## 2021-03-20 RX ADMIN — SIMETHICONE 80 MG: 80 TABLET, CHEWABLE ORAL at 07:48

## 2021-03-20 RX ADMIN — OXYCODONE HYDROCHLORIDE 5 MG: 5 TABLET ORAL at 09:47

## 2021-03-20 RX ADMIN — DOCUSATE SODIUM 100 MG: 100 CAPSULE, LIQUID FILLED ORAL at 07:48

## 2021-03-20 NOTE — LACTATION NOTE
Mom feels pumping is going well and she is getting good amounts of milk  Discussed continued frequency of pumping with hand expression  Reviewed triple feeds, continued assistance with breastfeeding questions, and engorgement relief measures  Given discharge breastfeeding pkat and same reviewed  Reviewed when and where to call for additional assistance as needed

## 2021-03-20 NOTE — PLAN OF CARE
Problem: PAIN - ADULT  Goal: Verbalizes/displays adequate comfort level or baseline comfort level  Description: Interventions:  - Encourage patient to monitor pain and request assistance  - Assess pain using appropriate pain scale  - Administer analgesics based on type and severity of pain and evaluate response  - Implement non-pharmacological measures as appropriate and evaluate response  - Consider cultural and social influences on pain and pain management  - Notify physician/advanced practitioner if interventions unsuccessful or patient reports new pain  Outcome: Progressing     Problem: INFECTION - ADULT  Goal: Absence or prevention of progression during hospitalization  Description: INTERVENTIONS:  - Assess and monitor for signs and symptoms of infection  - Monitor lab/diagnostic results  - Monitor all insertion sites, i e  indwelling lines, tubes, and drains  - Monitor endotracheal if appropriate and nasal secretions for changes in amount and color  - [place] appropriate cooling/warming therapies per order  - Administer medications as ordered  - Instruct and encourage patient and family to use good hand hygiene technique  - Identify and instruct in appropriate isolation precautions for identified infection/condition  Outcome: Progressing     Problem: SAFETY ADULT  Goal: Patient will remain free of falls  Description: INTERVENTIONS:  - Assess patient frequently for physical needs  -  Identify cognitive and physical deficits and behaviors that affect risk of falls    -  [place] fall precautions as indicated by assessment   - Educate patient/family on patient safety including physical limitations  - Instruct patient to call for assistance with activity based on assessment  - Modify environment to reduce risk of injury  - Consider OT/PT consult to assist with strengthening/mobility  Outcome: Progressing  Goal: Maintain or return to baseline ADL function  Description: INTERVENTIONS:  -  Assess patient's ability to carry out ADLs; assess patient's baseline for ADL function and identify physical deficits which impact ability to perform ADLs (bathing, care of mouth/teeth, toileting, grooming, dressing, etc )  - Assess/evaluate cause of self-care deficits   - Assess range of motion  - Assess patient's mobility; develop plan if impaired  - Assess patient's need for assistive devices and provide as appropriate  - Encourage maximum independence but intervene and supervise when necessary  - Involve family in performance of ADLs  - Assess for home care needs following discharge   - Consider OT consult to assist with ADL evaluation and planning for discharge  - Provide patient education as appropriate  Outcome: Progressing  Goal: Maintain or return mobility status to optimal level  Description: INTERVENTIONS:  - Assess patient's baseline mobility status (ambulation, transfers, stairs, etc )    - Identify cognitive and physical deficits and behaviors that affect mobility  - Identify mobility aids required to assist with transfers and/or ambulation (gait belt, sit-to-stand, lift, walker, cane, etc )  - [place] fall precautions as indicated by assessment  - Record patient progress and toleration of activity level on Mobility SBAR; progress patient to next Phase/Stage  - Instruct patient to call for assistance with activity based on assessment  - Consider rehabilitation consult to assist with strengthening/weightbearing, etc   Outcome: Progressing     Problem: DISCHARGE PLANNING  Goal: Discharge to home or other facility with appropriate resources  Description: INTERVENTIONS:  - Identify barriers to discharge w/patient and caregiver  - Arrange for needed discharge resources and transportation as appropriate  - Identify discharge learning needs (meds, wound care, etc )  - Arrange for interpretive services to assist at discharge as needed  - Refer to Case Management Department for coordinating discharge planning if the patient needs post-hospital services based on physician/advanced practitioner order or complex needs related to functional status, cognitive ability, or social support system  Outcome: Progressing     Problem: DISCHARGE PLANNING - CARE MANAGEMENT  Goal: Discharge to post-acute care or home with appropriate resources  Description: INTERVENTIONS:  - Conduct assessment to determine patient/family and health care team treatment goals, and need for post-acute services based on payer coverage, community resources, and patient preferences, and barriers to discharge  - Address psychosocial, clinical, and financial barriers to discharge as identified in assessment in conjunction with the patient/family and health care team  - Arrange appropriate level of post-acute services according to patients   needs and preference and payer coverage in collaboration with the physician and health care team  - Communicate with and update the patient/family, physician, and health care team regarding progress on the discharge plan  - Arrange appropriate transportation to post-acute venues  Outcome: Progressing     Problem: Potential for Falls  Goal: Patient will remain free of falls  Description: INTERVENTIONS:  - Assess patient frequently for physical needs  -  Identify cognitive and physical deficits and behaviors that affect risk of falls    -  [place] fall precautions as indicated by assessment   - Educate patient/family on patient safety including physical limitations  - Instruct patient to call for assistance with activity based on assessment  - Modify environment to reduce risk of injury  - Consider OT/PT consult to assist with strengthening/mobility  Outcome: Progressing     Problem: POSTPARTUM  Goal: Experiences normal postpartum course  Description: INTERVENTIONS:  - Monitor maternal vital signs  - Assess uterine involution and lochia  Outcome: Progressing  Goal: Appropriate maternal -  bonding  Description: INTERVENTIONS:  - Identify family support  - Assess for appropriate maternal/infant bonding   -Encourage maternal/infant bonding opportunities  - Referral to  or  as needed  Outcome: Progressing  Goal: Establishment of infant feeding pattern  Description: INTERVENTIONS:  - Assess breast/bottle feeding  - Refer to lactation as needed  Outcome: Progressing  Goal: Incision(s), wounds(s) or drain site(s) healing without S/S of infection  Description: INTERVENTIONS  - Assess and document risk factors for skin impairment   - Assess and document dressing, incision, wound bed, drain sites and surrounding tissue  - Consider nutrition services referral as needed  - Oral mucous membranes remain intact  - Provide patient/ family education  Outcome: Progressing

## 2021-03-20 NOTE — PLAN OF CARE
Problem: PAIN - ADULT  Goal: Verbalizes/displays adequate comfort level or baseline comfort level  Description: Interventions:  - Encourage patient to monitor pain and request assistance  - Assess pain using appropriate pain scale  - Administer analgesics based on type and severity of pain and evaluate response  - Implement non-pharmacological measures as appropriate and evaluate response  - Consider cultural and social influences on pain and pain management  - Notify physician/advanced practitioner if interventions unsuccessful or patient reports new pain  Outcome: Adequate for Discharge     Problem: INFECTION - ADULT  Goal: Absence or prevention of progression during hospitalization  Description: INTERVENTIONS:  - Assess and monitor for signs and symptoms of infection  - Monitor lab/diagnostic results  - Monitor all insertion sites, i e  indwelling lines, tubes, and drains  - Monitor endotracheal if appropriate and nasal secretions for changes in amount and color  - [place] appropriate cooling/warming therapies per order  - Administer medications as ordered  - Instruct and encourage patient and family to use good hand hygiene technique  - Identify and instruct in appropriate isolation precautions for identified infection/condition  Outcome: Adequate for Discharge     Problem: SAFETY ADULT  Goal: Patient will remain free of falls  Description: INTERVENTIONS:  - Assess patient frequently for physical needs  -  Identify cognitive and physical deficits and behaviors that affect risk of falls    -  [place] fall precautions as indicated by assessment   - Educate patient/family on patient safety including physical limitations  - Instruct patient to call for assistance with activity based on assessment  - Modify environment to reduce risk of injury  - Consider OT/PT consult to assist with strengthening/mobility  Outcome: Adequate for Discharge  Goal: Maintain or return to baseline ADL function  Description: INTERVENTIONS:  -  Assess patient's ability to carry out ADLs; assess patient's baseline for ADL function and identify physical deficits which impact ability to perform ADLs (bathing, care of mouth/teeth, toileting, grooming, dressing, etc )  - Assess/evaluate cause of self-care deficits   - Assess range of motion  - Assess patient's mobility; develop plan if impaired  - Assess patient's need for assistive devices and provide as appropriate  - Encourage maximum independence but intervene and supervise when necessary  - Involve family in performance of ADLs  - Assess for home care needs following discharge   - Consider OT consult to assist with ADL evaluation and planning for discharge  - Provide patient education as appropriate  Outcome: Adequate for Discharge  Goal: Maintain or return mobility status to optimal level  Description: INTERVENTIONS:  - Assess patient's baseline mobility status (ambulation, transfers, stairs, etc )    - Identify cognitive and physical deficits and behaviors that affect mobility  - Identify mobility aids required to assist with transfers and/or ambulation (gait belt, sit-to-stand, lift, walker, cane, etc )  - [place] fall precautions as indicated by assessment  - Record patient progress and toleration of activity level on Mobility SBAR; progress patient to next Phase/Stage  - Instruct patient to call for assistance with activity based on assessment  - Consider rehabilitation consult to assist with strengthening/weightbearing, etc   Outcome: Adequate for Discharge     Problem: DISCHARGE PLANNING  Goal: Discharge to home or other facility with appropriate resources  Description: INTERVENTIONS:  - Identify barriers to discharge w/patient and caregiver  - Arrange for needed discharge resources and transportation as appropriate  - Identify discharge learning needs (meds, wound care, etc )  - Arrange for interpretive services to assist at discharge as needed  - Refer to Case Management Department for coordinating discharge planning if the patient needs post-hospital services based on physician/advanced practitioner order or complex needs related to functional status, cognitive ability, or social support system  Outcome: Adequate for Discharge     Problem: POSTPARTUM  Goal: Experiences normal postpartum course  Description: INTERVENTIONS:  - Monitor maternal vital signs  - Assess uterine involution and lochia  Outcome: Adequate for Discharge  Goal: Appropriate maternal -  bonding  Description: INTERVENTIONS:  - Identify family support  - Assess for appropriate maternal/infant bonding   -Encourage maternal/infant bonding opportunities  - Referral to  or  as needed  Outcome: Adequate for Discharge  Goal: Establishment of infant feeding pattern  Description: INTERVENTIONS:  - Assess breast/bottle feeding  - Refer to lactation as needed  Outcome: Adequate for Discharge  Goal: Incision(s), wounds(s) or drain site(s) healing without S/S of infection  Description: INTERVENTIONS  - Assess and document risk factors for skin impairment   - Assess and document dressing, incision, wound bed, drain sites and surrounding tissue  - Consider nutrition services referral as needed  - Oral mucous membranes remain intact  - Provide patient/ family education  Outcome: Adequate for Discharge     Problem: DISCHARGE PLANNING - CARE MANAGEMENT  Goal: Discharge to post-acute care or home with appropriate resources  Description: INTERVENTIONS:  - Conduct assessment to determine patient/family and health care team treatment goals, and need for post-acute services based on payer coverage, community resources, and patient preferences, and barriers to discharge  - Address psychosocial, clinical, and financial barriers to discharge as identified in assessment in conjunction with the patient/family and health care team  - Arrange appropriate level of post-acute services according to patients needs and preference and payer coverage in collaboration with the physician and health care team  - Communicate with and update the patient/family, physician, and health care team regarding progress on the discharge plan  - Arrange appropriate transportation to post-acute venues  Outcome: Adequate for Discharge     Problem: Potential for Falls  Goal: Patient will remain free of falls  Description: INTERVENTIONS:  - Assess patient frequently for physical needs  -  Identify cognitive and physical deficits and behaviors that affect risk of falls    -  [place] fall precautions as indicated by assessment   - Educate patient/family on patient safety including physical limitations  - Instruct patient to call for assistance with activity based on assessment  - Modify environment to reduce risk of injury  - Consider OT/PT consult to assist with strengthening/mobility  Outcome: Adequate for Discharge

## 2021-03-20 NOTE — PROGRESS NOTES
Progress Note - OB/GYN   Ama Recinos 27 y o  female MRN: [id number]  Unit/Bed#: -01 Encounter: [id number]      Assessment:  Post operative day #4 s/p 1LTCS (breech, 29w), stable, and doing well    Plan:  1  Hemodynamically stable   - Pre-op Hb 12 7 --> post-op Hb 10 6   - Vitals WNL, currently asymptomatic  2  Spontaneously voiding  3  T1DM   - insulin pump resumed pre-pregnancy settings   - blood sugars overnight ranging 82-83    - Follows w/ [facility]   4  DVT ppx   - Lovenox 40 qd   - SCDs, ambulation  5  Continue routine post partum care   - Encourage ambulation   - Encourage breastfeeding   - PICC and peripheral IV removed  6  Continue current meds   - See list below   - Pain better improved with PO analgesics   7  Disposition   - Stable   - Anticipate discharge home today    Subjective/Objective   Subjective:     Pain: yes  Tolerating Oral Intake: yes  Voiding: yes  Flatus: yes  Bowel Movement: yes  Ambulating: yes  Breastfeeding: Breastfeeding and Using breast pump  Chest Pain: no  Shortness of Breath: no  Leg Pain/Discomfort: no  Lochia: minimal    Objective:   Vitals:   /75   Pulse 70   Temp 98 °F (36 7 °C) (Oral)   Resp 16   Ht 5' 4" (1 626 m)   Wt 95 3 kg (210 lb)   SpO2 99%   Breastfeeding Yes   BMI 36 05 kg/m²   Body mass index is 36 05 kg/m²      No intake or output data in the 24 hours ending 03/20/21 0647    Physical Exam:  General: in no apparent distress and well developed and well nourished  Cardiovascular: Cor RRR  Lungs: nonlabored breathing on room air  Abdomen: abdomen is soft without significant tenderness, masses, organomegaly or guarding; incision c/d/i closed with running absorbable suture  Fundus: Firm and non-tender, at the level of the umbilicus    Labs/Tests:   Recent Results (from the past 24 hour(s))   Fingerstick Glucose (POCT)    Collection Time: 03/19/21  9:01 AM   Result Value Ref Range    POC Glucose 80 65 - 140 mg/dl   Fingerstick Glucose (POCT) Collection Time: 03/19/21 11:38 AM   Result Value Ref Range    POC Glucose 89 65 - 140 mg/dl   Fingerstick Glucose (POCT)    Collection Time: 03/19/21  2:55 PM   Result Value Ref Range    POC Glucose 67 65 - 140 mg/dl   Fingerstick Glucose (POCT)    Collection Time: 03/19/21  5:29 PM   Result Value Ref Range    POC Glucose 82 65 - 140 mg/dl   Fingerstick Glucose (POCT)    Collection Time: 03/19/21  7:40 PM   Result Value Ref Range    POC Glucose 83 65 - 140 mg/dl       MEDS:   Current Facility-Administered Medications   Medication Dose Route Frequency    acetaminophen (TYLENOL) tablet 650 mg  650 mg Oral Q6H    acetaminophen (TYLENOL) tablet 650 mg  650 mg Oral Q4H PRN    aluminum-magnesium hydroxide-simethicone (MYLANTA) oral suspension 15 mL  15 mL Oral Q6H PRN    ascorbic acid (VITAMIN C) tablet 1,000 mg  1,000 mg Oral Daily    benzocaine-menthol-lanolin-aloe (DERMOPLAST) 20-0 5 % topical spray 1 application  1 application Topical C5B PRN    bisacodyl (DULCOLAX) EC tablet 5 mg  5 mg Oral Daily PRN    calcium carbonate (TUMS) chewable tablet 1,000 mg  1,000 mg Oral Daily PRN    docusate sodium (COLACE) capsule 100 mg  100 mg Oral BID    enoxaparin (LOVENOX) subcutaneous injection 40 mg  40 mg Subcutaneous Q24H IRAJ    hydrocortisone 1 % cream 1 application  1 application Topical 4x Daily PRN    HYDROmorphone (DILAUDID) injection 0 5 mg  0 5 mg Intravenous Q2H PRN    HYDROmorphone (DILAUDID) injection 1 mg  1 mg Intravenous Q2H PRN    ibuprofen (MOTRIN) tablet 600 mg  600 mg Oral Q6H [address]    insulin aspart (NovoLOG) FOR PUMP REFILLS 300 Units  300 Units Subcutaneous Insulin Pump Daily PRN    lactated ringers infusion  125 mL/hr Intravenous Continuous    metFORMIN (GLUCOPHAGE-XR) 24 hr tablet 1,500 mg  1,500 mg Oral Daily    neomycin-bacitracin-polymyxin b (NEOSPORIN) ointment 1 small application  1 small application Topical BID    ondansetron (ZOFRAN) injection 4 mg  4 mg Intravenous Q8H PRN    oxyCODONE (ROXICODONE) immediate release tablet 10 mg  10 mg Oral Q4H PRN    oxyCODONE (ROXICODONE) IR tablet 5 mg  5 mg Oral Q4H PRN    oxytocin (PITOCIN) 30 Units in lactated ringers 500 mL infusion  62 5 pop-units/min Intravenous Titrated    PATIENT MAINTAINED INSULIN PUMP 1 each  1 each Subcutaneous Q8H    senna (SENOKOT) tablet 8 6 mg  1 tablet Oral Daily PRN    simethicone (MYLICON) chewable tablet 80 mg  80 mg Oral 4x Daily (with meals and at bedtime)    witch hazel-glycerin (TUCKS) topical pad 1 pad  1 pad Topical Q4H PRN     Invasive Devices     Line            Pump Device Insulin pump Anterior;Right Abdomen 10 days              Jeramie Guerra MD  OB/GYN PGY-1  6:47 AM  03/20/21

## 2021-03-24 NOTE — UTILIZATION REVIEW
Eulogio Borjas MD   Resident   OB/GYN   Discharge Summary      Attested   Date of Service:  3/20/2021 10:59 AM               Attested        Attestation signed by Jung Huerta MD at 3/23/2021 9:45 AM      Standard Teaching Supervising Statement     I have reviewed the note performed and documented by the Resident  I personally performed the required components/examined the patient  I agree with the Resident's findings and plan of care with the following additions/exceptions:            Jung Huerta MD               Discharge Summary - Gynecology  [facility] 27 y o  female MRN: [id number]  Unit/Bed#: -01 Encounter: [id number]     Admission Date: 2021   Discharge Date: 21     Attending Physician:   Peter Edwards Physician(s):   BRIAN  NICU     Admitting Diagnosis:   PPROM  Type 1 DM  IUP at 23 weeks  Fetal ascites  Oligohydramnios     Discharge Diagnosis:   1LTCS  Type 1 DM     Procedures Performed:   LTCS     HPI:     Hospital Course - Problem Based:  Patient is a 26 yo  at 23w0d who was admitted for known PPROM and anhydraminos at 17 weeks  Patient received latency antibiotics, course of BTM -, and IV magnesium on admission  Patient received intermittent montiroing and weekly ultrasounds to monitor fluid level and fetal ascites  Her glucose was also monitored and required uptitration of insulin with her insulin pump as the pregnancy progressed  She was inpatient for observation for 47 days until she ultimately went into labor at 29w5d  Due to fetal breech presentation, patient had primary  section  She delivered a viable male  on 3/16/2021 @ 65  Apgars were 6 and 8 at 1 min and 5 min respectively       Post-operatively, Lars Menard was meeting her milestones appropriately   She was discharged on POD# 4 in good condition and was ambulating, voiding, and passing flatus       Lab Results:         Lab Results   Component Value Date     WBC 14 14 (H) 2021     HGB 12 2 03/04/2021     HCT 38 2 03/04/2021     MCV 95 03/04/2021      03/04/2021            Lab Results   Component Value Date     CALCIUM 8 7 02/24/2021     K 3 8 02/24/2021     CO2 20 (L) 02/24/2021      02/24/2021     BUN 9 02/24/2021     CREATININE 0 62 02/24/2021            Lab Results   Component Value Date/Time     POCGLU 189 (H) 03/05/2021 08:30 PM     POCGLU 117 03/05/2021 06:14 PM     POCGLU 159 (H) 03/05/2021 03:20 PM     POCGLU 100 03/05/2021 12:45 PM     POCGLU 146 (H) 03/05/2021 10:24 AM      No results found for: PTT  No results found for: INR, PROTIME     Complications:  None  Condition at Discharge:   good      Discharge Medications:   See after visit summary for reconciled discharge medications provided to patient and family        Discharge instructions: See after visit summary for complete information  Do not place anything (no partner, tampons or douche) in your vagina for 6 weeks    You may walk for exercise for the first 6 weeks then gradually return to your usual activities     Please do not drive until tolerating pain and off of narcotics     You may take baths or shower per your preference     Please look at your incision in the mirror daily and call for redness or tenderness or increased warmth   Call us for increasing redness or steady drainage from the incision     Please call us for temperature > 100 4*F or 38* C, worsening pain or a foul discharge      Provisions for Follow-Up Care:   See after visit summary for information related to follow-up care and any pertinent home health orders        Disposition: Home  Planned Readmission: No     Rafael Almanza MD, PGY-3  3/22/2021  5:38 PM                  Cosigned by: Heath Borges MD at 3/23/2021  9:45 AM   Revision History

## 2021-03-25 ENCOUNTER — OFFICE VISIT (OUTPATIENT)
Dept: OBGYN CLINIC | Facility: CLINIC | Age: 31
End: 2021-03-25

## 2021-03-25 VITALS
SYSTOLIC BLOOD PRESSURE: 110 MMHG | BODY MASS INDEX: 35.51 KG/M2 | DIASTOLIC BLOOD PRESSURE: 70 MMHG | WEIGHT: 208 LBS | HEIGHT: 64 IN

## 2021-03-25 PROCEDURE — 99024 POSTOP FOLLOW-UP VISIT: CPT | Performed by: OBSTETRICS & GYNECOLOGY

## 2021-03-25 NOTE — PROGRESS NOTES
Patient presents to the office 1 week following a  at 29 weeks 5 days for prolonged rupture membranes  Patient ruptured membranes at 17 and half weeks  Patient was admitted to the hospital week 23 and went into labor on 2021  Infant is doing extremely well in the NICU  Patient denies any problems except mild incisional pain  Voiding without difficulty  Has mild swelling in the ankles  Physical exam:  Heart regular rate chest clear bilateral breath sounds  Abdomen soft nontender  Incision clean dry intact  Impression:  Stable status post  1 week ago  Plan:  Continue multivitamin extra vitamin-D 3  Increase ambulation  Patient may drive next week    Return to the office in 5 weeks for internal exam

## 2021-03-25 NOTE — PROGRESS NOTES
The patient is here for a follow-up  She had a C/S on 3/16/21  The patient soreness at incision  She has swelling around the incision  No incisional discharge or bleeding  The patient has light vaginal bleeding  No cramping or pelvic pain  The patient is having swelling in her feet

## 2021-03-25 NOTE — PATIENT INSTRUCTIONS
Patient may drive the end of this week  Continue prenatal vitamin vitamin-D 3   Return to the office in 5 weeks for postpartum exam and pelvic exam

## 2021-04-13 ENCOUNTER — POSTPARTUM VISIT (OUTPATIENT)
Dept: OBGYN CLINIC | Facility: CLINIC | Age: 31
End: 2021-04-13

## 2021-04-13 VITALS — DIASTOLIC BLOOD PRESSURE: 88 MMHG | SYSTOLIC BLOOD PRESSURE: 130 MMHG | BODY MASS INDEX: 34.33 KG/M2 | WEIGHT: 200 LBS

## 2021-04-13 PROCEDURE — 99024 POSTOP FOLLOW-UP VISIT: CPT | Performed by: OBSTETRICS & GYNECOLOGY

## 2021-04-13 NOTE — PROGRESS NOTES
The patient is here for a post-partum  She had a C/S on 3/16/2021  The patient is breast-pumping  She has breast tenderness but no other concerns  The patient has minimal vaginal bleeding  No pelvic pain

## 2021-04-13 NOTE — PROGRESS NOTES
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  Joe Martell had surgery  on Saturday and  this past weekend and has 8 cm of intestine left  Patient's recovery has been doing well and will be transferred for to [facility] for TPN therapy  The hope is to have a small bowel transplant at 3-4 years  Diamond seems to be doing extremely well and is hopeful  Her postpartum score was a 10 with no evidence of suicidal ideation  She was offered a mild antidepressant at this time but refused  Physical exam:  Patient does not seem depressed  And is hopeful  Heart regular rate no murmurs  Chest clear bilateral breath sounds  Abdomen soft nontender  Incision healing well  Glue still intact  External genitalia normal   Vagina clear  Cervix no lesions  Uterus normal mobile nontender  No adnexal masses  Impression:  Stable status post  at around 30 weeks  No signs postpartum depression  Plan: May resume intercourse  Continue healthy diet and exercise  Return to office in 1 year for yearly  Patient will keep us updated on the progress of her infant

## 2021-05-13 ENCOUNTER — TELEPHONE (OUTPATIENT)
Dept: OBGYN CLINIC | Facility: CLINIC | Age: 31
End: 2021-05-13

## 2021-05-13 DIAGNOSIS — F41.9 ANXIETY: Primary | ICD-10-CM

## 2021-05-13 RX ORDER — FLUOXETINE 10 MG/1
10 CAPSULE ORAL DAILY
Qty: 90 CAPSULE | Refills: 3 | Status: SHIPPED | OUTPATIENT
Start: 2021-05-13

## 2021-11-09 ENCOUNTER — OFFICE VISIT (OUTPATIENT)
Dept: OBGYN CLINIC | Facility: CLINIC | Age: 31
End: 2021-11-09
Payer: COMMERCIAL

## 2021-11-09 VITALS
HEIGHT: 65 IN | DIASTOLIC BLOOD PRESSURE: 78 MMHG | WEIGHT: 207 LBS | BODY MASS INDEX: 34.49 KG/M2 | SYSTOLIC BLOOD PRESSURE: 124 MMHG

## 2021-11-09 DIAGNOSIS — E28.2 PCOS (POLYCYSTIC OVARIAN SYNDROME): Primary | ICD-10-CM

## 2021-11-09 DIAGNOSIS — E10.65 TYPE 1 DIABETES MELLITUS WITH HYPERGLYCEMIA (HCC): ICD-10-CM

## 2021-11-09 PROCEDURE — 99213 OFFICE O/P EST LOW 20 MIN: CPT | Performed by: OBSTETRICS & GYNECOLOGY

## 2021-11-09 RX ORDER — NORGESTIMATE AND ETHINYL ESTRADIOL 7DAYSX3 28
1 KIT ORAL DAILY
Qty: 90 TABLET | Refills: 3 | Status: SHIPPED | OUTPATIENT
Start: 2021-11-09

## 2021-12-28 ENCOUNTER — NURSE TRIAGE (OUTPATIENT)
Dept: OTHER | Facility: OTHER | Age: 31
End: 2021-12-28

## 2021-12-28 DIAGNOSIS — Z20.828 SARS-ASSOCIATED CORONAVIRUS EXPOSURE: Primary | ICD-10-CM

## 2021-12-30 PROCEDURE — U0003 INFECTIOUS AGENT DETECTION BY NUCLEIC ACID (DNA OR RNA); SEVERE ACUTE RESPIRATORY SYNDROME CORONAVIRUS 2 (SARS-COV-2) (CORONAVIRUS DISEASE [COVID-19]), AMPLIFIED PROBE TECHNIQUE, MAKING USE OF HIGH THROUGHPUT TECHNOLOGIES AS DESCRIBED BY CMS-2020-01-R: HCPCS | Performed by: FAMILY MEDICINE

## 2022-01-26 NOTE — PLAN OF CARE
Problem: PAIN - ADULT  Goal: Verbalizes/displays adequate comfort level or baseline comfort level  Description: Interventions:  - Encourage patient to monitor pain and request assistance  - Assess pain using appropriate pain scale  - Administer analgesics based on type and severity of pain and evaluate response  - Implement non-pharmacological measures as appropriate and evaluate response  - Consider cultural and social influences on pain and pain management  - Notify physician/advanced practitioner if interventions unsuccessful or patient reports new pain  Outcome: Progressing     Problem: INFECTION - ADULT  Goal: Absence or prevention of progression during hospitalization  Description: INTERVENTIONS:  - Assess and monitor for signs and symptoms of infection  - Monitor lab/diagnostic results  - Monitor all insertion sites, i e  indwelling lines, tubes, and drains  - Monitor endotracheal if appropriate and nasal secretions for changes in amount and color  - [place] appropriate cooling/warming therapies per order  - Administer medications as ordered  - Instruct and encourage patient and family to use good hand hygiene technique  - Identify and instruct in appropriate isolation precautions for identified infection/condition  Outcome: Progressing     Problem: SAFETY ADULT  Goal: Patient will remain free of falls  Description: INTERVENTIONS:  - Assess patient frequently for physical needs  -  Identify cognitive and physical deficits and behaviors that affect risk of falls    -  [place] fall precautions as indicated by assessment   - Educate patient/family on patient safety including physical limitations  - Instruct patient to call for assistance with activity based on assessment  - Modify environment to reduce risk of injury  - Consider OT/PT consult to assist with strengthening/mobility  Outcome: Progressing  Goal: Maintain or return to baseline ADL function  Description: INTERVENTIONS:  -  Assess patient's ability to carry out ADLs; assess patient's baseline for ADL function and identify physical deficits which impact ability to perform ADLs (bathing, care of mouth/teeth, toileting, grooming, dressing, etc )  - Assess/evaluate cause of self-care deficits   - Assess range of motion  - Assess patient's mobility; develop plan if impaired  - Assess patient's need for assistive devices and provide as appropriate  - Encourage maximum independence but intervene and supervise when necessary  - Involve family in performance of ADLs  - Assess for home care needs following discharge   - Consider OT consult to assist with ADL evaluation and planning for discharge  - Provide patient education as appropriate  Outcome: Progressing  Goal: Maintain or return mobility status to optimal level  Description: INTERVENTIONS:  - Assess patient's baseline mobility status (ambulation, transfers, stairs, etc )    - Identify cognitive and physical deficits and behaviors that affect mobility  - Identify mobility aids required to assist with transfers and/or ambulation (gait belt, sit-to-stand, lift, walker, cane, etc )  - [place] fall precautions as indicated by assessment  - Record patient progress and toleration of activity level on Mobility SBAR; progress patient to next Phase/Stage  - Instruct patient to call for assistance with activity based on assessment  - Consider rehabilitation consult to assist with strengthening/weightbearing, etc   Outcome: Progressing     Problem: DISCHARGE PLANNING  Goal: Discharge to home or other facility with appropriate resources  Description: INTERVENTIONS:  - Identify barriers to discharge w/patient and caregiver  - Arrange for needed discharge resources and transportation as appropriate  - Identify discharge learning needs (meds, wound care, etc )  - Arrange for interpretive services to assist at discharge as needed  - Refer to Case Management Department for coordinating discharge planning if the patient needs post-hospital services based on physician/advanced practitioner order or complex needs related to functional status, cognitive ability, or social support system  Outcome: Progressing     Problem: Labor & Delivery  Goal: Manages discomfort  Description: Assess and monitor for signs and symptoms of discomfort  Assess patient's pain level regularly and per hospital policy  Administer medications as ordered  Support use of nonpharmacological methods to help control pain such as distraction, imagery, relaxation, and application of heat and cold  Collaborate with interdisciplinary team and patient to determine appropriate pain management plan  1  Include patient in decisions related to comfort  2  Offer non-pharmacological pain management interventions  3  Report ineffective pain management to physician  Outcome: Progressing  Goal: Patient vital signs are stable  Description: 1  Assess vital signs - vaginal delivery    Outcome: Progressing     Problem: ANTEPARTUM  Goal: Maintain pregnancy as long as maternal and/or fetal condition is stable  Description: INTERVENTIONS:  - Maternal surveillance  - Fetal surveillance  - Monitor uterine activity  - Medications as ordered  - Bedrest  Outcome: Progressing     Problem: DISCHARGE PLANNING - CARE MANAGEMENT  Goal: Discharge to post-acute care or home with appropriate resources  Description: INTERVENTIONS:  - Conduct assessment to determine patient/family and health care team treatment goals, and need for post-acute services based on payer coverage, community resources, and patient preferences, and barriers to discharge  - Address psychosocial, clinical, and financial barriers to discharge as identified in assessment in conjunction with the patient/family and health care team  - Arrange appropriate level of post-acute services according to patients   needs and preference and payer coverage in collaboration with the physician and health care team  - Communicate with and update the patient/family, physician, and health care team regarding progress on the discharge plan  - Arrange appropriate transportation to post-acute venues  Outcome: Progressing     Problem: Potential for Falls  Goal: Patient will remain free of falls  Description: INTERVENTIONS:  - Assess patient frequently for physical needs  -  Identify cognitive and physical deficits and behaviors that affect risk of falls    -  [place] fall precautions as indicated by assessment   - Educate patient/family on patient safety including physical limitations  - Instruct patient to call for assistance with activity based on assessment  - Modify environment to reduce risk of injury  - Consider OT/PT consult to assist with strengthening/mobility  Outcome: Progressing Product 6 Application Directions: Apply at night 20-30 min after cleansing.

## 2022-08-08 NOTE — TELEPHONE ENCOUNTER
Spoke with patient and confirmed appointment with [facility]  1 support person ( must be over age of 15) may accompany patient  Will you and your support person be able to wear a mask ,without a valve , during entire appointment? YES   To minimize your exposure in our waiting area,check in and rooming questions will be done via phone  When you arrive in the parking lot please call the following inside line # prior to entering office:    [facility]: [phone]    Have you or your support person traveled outside the state in the last 2 weeks? NO  If yes, what state did you travel to? Do you or your support person have:  Fever or flu- like symptoms? NO  Symptoms of upper respiratory infection like runny nose, sore throat or cough? NO  Do you have new headache that you have not had in the past?NO  Have you experienced any new shortness of breath recently? NO  Do you have any new loss of taste or smell? NO  Do you have any new diarrhea, nausea or vomiting? NO  Have you recently been in contact with anyone who has been sick or diagnosed with COVID-19 infection? NO  Have you been recommended to quarantine because of an exposure to a confirmed positive COVID19 person? NO  Have you recently been tested for COVID19? NO    Patient verbalized understanding of all instructions   ------------------------------------------------------------- Medical Necessity Information: It is in your best interest to select a reason for this procedure from the list below. All of these items fulfill various CMS LCD requirements except the new and changing color options. Medical Necessity Clause: This procedure was medically necessary because the lesion that was treated was: Lab: [id number] Lab Facility: [address] [place] [address] Body Location Override (Optional - Billing Will Still Be Based On Selected Body Map Location If Applicable): mid upper PV Detail Level: Detailed Was A Bandage Applied: Yes Size Of Lesion In Cm (Required): 0.6 X Size Of Lesion In Cm (Optional): 0.5 Biopsy Method: Dermablade Anesthesia Type: 1% lidocaine with epinephrine Hemostasis: Drysol Wound Care: Petrolatum Path Notes (To The Dermatopathologist): 0.6 x 0.5 cm irregular black brown papule r/o DN vs BCC Render Path Notes In Note?: No Consent was obtained from the patient. The risks and benefits to therapy were discussed in detail. Specifically, the risks of infection, scarring, bleeding, prolonged wound healing, incomplete removal, allergy to anesthesia, nerve injury and recurrence were addressed. Prior to the procedure, the treatment site was clearly identified and confirmed by the patient. All components of Universal Protocol/PAUSE Rule completed. Post-Care Instructions: I reviewed with the patient in detail post-care instructions. Patient is to keep the biopsy site dry overnight, and then apply bacitracin twice daily until healed. Patient may apply hydrogen peroxide soaks to remove any crusting. Notification Instructions: Patient will be notified of pathology results. However, patient instructed to call the office if not contacted within 2 weeks. Billing Type: [employer]

## 2022-10-21 ENCOUNTER — TELEPHONE (OUTPATIENT)
Dept: GYNECOLOGY | Facility: CLINIC | Age: 32
End: 2022-10-21

## 2022-10-21 ENCOUNTER — APPOINTMENT (EMERGENCY)
Dept: CT IMAGING | Facility: HOSPITAL | Age: 32
End: 2022-10-21
Payer: COMMERCIAL

## 2022-10-21 ENCOUNTER — HOSPITAL ENCOUNTER (EMERGENCY)
Facility: HOSPITAL | Age: 32
Discharge: HOME | End: 2022-10-21
Attending: EMERGENCY MEDICINE
Payer: COMMERCIAL

## 2022-10-21 VITALS
RESPIRATION RATE: 16 BRPM | SYSTOLIC BLOOD PRESSURE: 109 MMHG | TEMPERATURE: 98.2 F | HEART RATE: 90 BPM | OXYGEN SATURATION: 97 % | DIASTOLIC BLOOD PRESSURE: 65 MMHG

## 2022-10-21 DIAGNOSIS — R91.1 PULMONARY NODULE: ICD-10-CM

## 2022-10-21 DIAGNOSIS — N12 PYELONEPHRITIS: ICD-10-CM

## 2022-10-21 DIAGNOSIS — N39.0 UTI (URINARY TRACT INFECTION): Primary | ICD-10-CM

## 2022-10-21 LAB
ALBUMIN SERPL BCP-MCNC: 4.2 G/DL (ref 3.5–5)
ALP SERPL-CCNC: 58 U/L (ref 34–104)
ALT SERPL W P-5'-P-CCNC: 8 U/L (ref 7–52)
ANION GAP SERPL CALCULATED.3IONS-SCNC: 8 MMOL/L (ref 4–13)
AST SERPL W P-5'-P-CCNC: 12 U/L (ref 13–39)
BACTERIA UR QL AUTO: ABNORMAL /HPF
BASOPHILS # BLD AUTO: 0.05 THOUSANDS/ÂΜL (ref 0–0.1)
BASOPHILS NFR BLD AUTO: 0 % (ref 0–1)
BILIRUB SERPL-MCNC: 0.84 MG/DL (ref 0.2–1)
BILIRUB UR QL STRIP: NEGATIVE
BUDDING YEAST: PRESENT
BUN SERPL-MCNC: 10 MG/DL (ref 5–25)
CALCIUM SERPL-MCNC: 9.1 MG/DL (ref 8.4–10.2)
CHLORIDE SERPL-SCNC: 109 MMOL/L (ref 96–108)
CLARITY UR: ABNORMAL
CO2 SERPL-SCNC: 27 MMOL/L (ref 21–32)
COLOR UR: ABNORMAL
CREAT SERPL-MCNC: 0.78 MG/DL (ref 0.6–1.3)
EOSINOPHIL # BLD AUTO: 0.11 THOUSAND/ÂΜL (ref 0–0.61)
EOSINOPHIL NFR BLD AUTO: 1 % (ref 0–6)
ERYTHROCYTE [DISTWIDTH] IN BLOOD BY AUTOMATED COUNT: 12.5 % (ref 11.6–15.1)
EXT PREG TEST URINE: NEGATIVE
EXT. CONTROL ED NAV: NORMAL
GFR SERPL CREATININE-BSD FRML MDRD: 100 ML/MIN/1.73SQ M
GLUCOSE SERPL-MCNC: 88 MG/DL (ref 65–140)
GLUCOSE UR STRIP-MCNC: NEGATIVE MG/DL
HCT VFR BLD AUTO: 42.8 % (ref 34.8–46.1)
HGB BLD-MCNC: 14.1 G/DL (ref 11.5–15.4)
HGB UR QL STRIP.AUTO: ABNORMAL
IMM GRANULOCYTES # BLD AUTO: 0.07 THOUSAND/UL (ref 0–0.2)
IMM GRANULOCYTES NFR BLD AUTO: 1 % (ref 0–2)
KETONES UR STRIP-MCNC: NEGATIVE MG/DL
LEUKOCYTE ESTERASE UR QL STRIP: ABNORMAL
LIPASE SERPL-CCNC: <6 U/L (ref 11–82)
LYMPHOCYTES # BLD AUTO: 2.97 THOUSANDS/ÂΜL (ref 0.6–4.47)
LYMPHOCYTES NFR BLD AUTO: 20 % (ref 14–44)
MCH RBC QN AUTO: 29.9 PG (ref 26.8–34.3)
MCHC RBC AUTO-ENTMCNC: 32.9 G/DL (ref 31.4–37.4)
MCV RBC AUTO: 91 FL (ref 82–98)
MONOCYTES # BLD AUTO: 0.69 THOUSAND/ÂΜL (ref 0.17–1.22)
MONOCYTES NFR BLD AUTO: 5 % (ref 4–12)
MUCOUS THREADS UR QL AUTO: ABNORMAL
NEUTROPHILS # BLD AUTO: 10.71 THOUSANDS/ÂΜL (ref 1.85–7.62)
NEUTS SEG NFR BLD AUTO: 73 % (ref 43–75)
NITRITE UR QL STRIP: POSITIVE
NON-SQ EPI CELLS URNS QL MICRO: ABNORMAL /HPF
NRBC BLD AUTO-RTO: 0 /100 WBCS
PH UR STRIP.AUTO: 6 [PH]
PLATELET # BLD AUTO: 268 THOUSANDS/UL (ref 149–390)
PMV BLD AUTO: 11.3 FL (ref 8.9–12.7)
POTASSIUM SERPL-SCNC: 3.8 MMOL/L (ref 3.5–5.3)
PROT SERPL-MCNC: 7.3 G/DL (ref 6.4–8.4)
PROT UR STRIP-MCNC: ABNORMAL MG/DL
RBC # BLD AUTO: 4.72 MILLION/UL (ref 3.81–5.12)
RBC #/AREA URNS AUTO: ABNORMAL /HPF
SODIUM SERPL-SCNC: 144 MMOL/L (ref 135–147)
SP GR UR STRIP.AUTO: 1.02 (ref 1–1.03)
TRANS CELLS #/AREA URNS HPF: PRESENT /[HPF]
UROBILINOGEN UR STRIP-ACNC: <2 MG/DL
WBC # BLD AUTO: 14.6 THOUSAND/UL (ref 4.31–10.16)
WBC #/AREA URNS AUTO: ABNORMAL /HPF
WBC CLUMPS # UR AUTO: PRESENT /UL

## 2022-10-21 PROCEDURE — 87077 CULTURE AEROBIC IDENTIFY: CPT

## 2022-10-21 PROCEDURE — 80053 COMPREHEN METABOLIC PANEL: CPT

## 2022-10-21 PROCEDURE — 36415 COLL VENOUS BLD VENIPUNCTURE: CPT

## 2022-10-21 PROCEDURE — 83690 ASSAY OF LIPASE: CPT

## 2022-10-21 PROCEDURE — 85025 COMPLETE CBC W/AUTO DIFF WBC: CPT

## 2022-10-21 PROCEDURE — 81001 URINALYSIS AUTO W/SCOPE: CPT

## 2022-10-21 PROCEDURE — 99284 EMERGENCY DEPT VISIT MOD MDM: CPT

## 2022-10-21 PROCEDURE — 74176 CT ABD & PELVIS W/O CONTRAST: CPT

## 2022-10-21 PROCEDURE — 81025 URINE PREGNANCY TEST: CPT

## 2022-10-21 PROCEDURE — G1004 CDSM NDSC: HCPCS

## 2022-10-21 PROCEDURE — 87086 URINE CULTURE/COLONY COUNT: CPT

## 2022-10-21 PROCEDURE — 87186 SC STD MICRODIL/AGAR DIL: CPT

## 2022-10-21 RX ORDER — SULFAMETHOXAZOLE AND TRIMETHOPRIM 800; 160 MG/1; MG/1
1 TABLET ORAL 2 TIMES DAILY
Qty: 20 TABLET | Refills: 0 | Status: SHIPPED | OUTPATIENT
Start: 2022-10-21 | End: 2022-10-31

## 2022-10-21 RX ORDER — SULFAMETHOXAZOLE AND TRIMETHOPRIM 800; 160 MG/1; MG/1
1 TABLET ORAL ONCE
Status: COMPLETED | OUTPATIENT
Start: 2022-10-21 | End: 2022-10-21

## 2022-10-21 RX ADMIN — SODIUM CHLORIDE 1000 ML: 0.9 INJECTION, SOLUTION INTRAVENOUS at 13:56

## 2022-10-21 RX ADMIN — SULFAMETHOXAZOLE AND TRIMETHOPRIM 1 TABLET: 800; 160 TABLET ORAL at 15:28

## 2022-10-21 NOTE — ED PROVIDER NOTES
History  Chief Complaint   Patient presents with   • Abdominal Cramping     Pt comes to ED c/o "severe" abdominal cramping x3 days  States it was worse than her normal menstral cramps  Got her period yesterday and cramping is still painful  Hx PCOS  Also c/o her urine is dark and she is having difficulty emptying bladder     Patient is a 70-year-old female with past medical history of PCOS arriving for evaluation of lower back pain, and abdominal pain  Patient reports that she started with menstrual cramps she considered to be within normal limits of pain for prior to her period on Wednesday  Patient started that she had a expected menstrual bleeding on Thursday  Patient reports that she also started with having lower back pain, which is unusual for her periods  Patient states she has never had lower back pain  Patient also reports that she had difficulty urinating, feeling like she was not fully emptying on Thursday  Patient denies any burning with urination  Patient reports that her last period was 1 month ago  Patient states that she and her  are not preventing pregnancy at this time  Patient reports that she is a  3 para 1 with 1 chemical pregnancy, 1 miscarriage  Pt denies vaginal discharge prior to period starting  Pt reporting lower back pain but also has CVA tenderness on worse on the left than the right  Pt reports she is also having dark colored urine  Prior to Admission Medications   Prescriptions Last Dose Informant Patient Reported? Taking?    Ascorbic Acid (vitamin C) 1000 MG tablet  Self Yes No   Sig: Take 1,000 mg by mouth daily   Cholecalciferol (Vitamin D3) 50 MCG ( UT) capsule  Self Yes No   Sig: Take 2,000 Units by mouth daily   FLUoxetine (PROzac) 10 mg capsule   No No   Sig: Take 1 capsule (10 mg total) by mouth daily   NovoLOG 100 UNIT/ML injection  Self Yes No   Sig: Inject 100 mL as directed daily   Prenatal MV-Min-Fe Fum-FA-DHA (PRENATAL 1 PO)  Self Yes No   Sig: Take by mouth   acetaminophen (TYLENOL) 325 mg tablet   No No   Sig: Take 2 tablets (650 mg total) by mouth every 6 (six) hours   docusate sodium (COLACE) 100 mg capsule   No No   Sig: Take 1 capsule (100 mg total) by mouth 2 (two) times a day   folic acid (FOLVITE) 1 mg tablet  Self Yes No   Sig: Take 1 mg by mouth daily   glucose blood (Contour Next Test) test strip   No No   Sig: Test up to 8 times per day   ibuprofen (MOTRIN) 600 mg tablet   No No   Sig: Take 1 tablet (600 mg total) by mouth every 6 (six) hours   metFORMIN (GLUCOPHAGE-XR) 500 mg 24 hr tablet  Self Yes No   Sig: Take 1,500 mg by mouth daily   norgestimate-ethinyl estradiol (Ortho Tri-Cyclen, 28,) 0 18/0 215/0 25 MG-35 MCG per tablet   No No   Sig: Take 1 tablet by mouth daily   witch hazel-glycerin (TUCKS) topical pad   No No   Sig: Apply 1 pad topically every 4 (four) hours as needed for irritation      Facility-Administered Medications: None       Past Medical History:   Diagnosis Date   • Diabetes mellitus ([facility] [address] )    • Female infertility    • PCOS (polycystic ovarian syndrome)    • Varicella        Past Surgical History:   Procedure Laterality Date   • DE  DELIVERY ONLY N/A 3/16/2021    Procedure:  SECTION (); Surgeon: Heidy North MD;  Location: [facility];  Service: Obstetrics       Family History   Problem Relation Age of Onset   • Hyperlipidemia Mother    • Diabetes Father    • Heart attack Father    • Diabetes Sister    • Heart attack Paternal Uncle      I have reviewed and agree with the history as documented      E-Cigarette/Vaping   • E-Cigarette Use Never User      E-Cigarette/Vaping Substances   • Nicotine No    • THC No    • CBD No      Social History     Tobacco Use   • Smoking status: Current Every Day Smoker     Packs/day: 0 50     Types: Cigarettes   • Smokeless tobacco: Never Used   Vaping Use   • Vaping Use: Never used   Substance Use Topics   • Alcohol use: Not Currently   • Drug use: Never Review of Systems   Constitutional: Negative  HENT: Negative  Eyes: Negative  Respiratory: Negative  Cardiovascular: Negative  Gastrointestinal: Negative  Genitourinary: Positive for decreased urine volume, flank pain, menstrual problem and vaginal bleeding (currently menstruating)  Negative for dysuria, genital sores, hematuria, pelvic pain, urgency, vaginal discharge and vaginal pain  Musculoskeletal: Positive for back pain  Skin: Negative  Allergic/Immunologic: Negative  Neurological: Negative  Hematological: Negative  Psychiatric/Behavioral: Negative  Physical Exam  Physical Exam  Vitals and nursing note reviewed  Constitutional:       Appearance: Normal appearance  She is normal weight  HENT:      Head: Normocephalic  Right Ear: External ear normal       Left Ear: External ear normal       Nose: Nose normal       Mouth/Throat:      Mouth: Mucous membranes are moist    Eyes:      Extraocular Movements: Extraocular movements intact  Pupils: Pupils are equal, round, and reactive to light  Cardiovascular:      Rate and Rhythm: Normal rate and regular rhythm  Pulses: Normal pulses  Heart sounds: Normal heart sounds  Pulmonary:      Effort: Pulmonary effort is normal    Abdominal:      General: Abdomen is flat  Bowel sounds are normal  There is no distension  Palpations: Abdomen is soft  There is no mass  Tenderness: There is no abdominal tenderness  There is left CVA tenderness  There is no right CVA tenderness, guarding or rebound  Hernia: No hernia is present  Musculoskeletal:         General: Normal range of motion  Cervical back: Normal range of motion  Skin:     General: Skin is warm  Capillary Refill: Capillary refill takes less than 2 seconds  Neurological:      General: No focal deficit present  Mental Status: She is alert and oriented to person, place, and time  Mental status is at baseline  Psychiatric:         Mood and Affect: Mood normal          Vital Signs  ED Triage Vitals [10/21/22 1308]   Temperature Pulse Respirations Blood Pressure SpO2   98 2 °F (36 8 °C) (!) 108 18 147/82 98 %      Temp Source Heart Rate Source Patient Position - Orthostatic VS BP Location FiO2 (%)   Oral Monitor -- -- --      Pain Score       7           Vitals:    10/21/22 1308 10/21/22 1514   BP: 147/82 109/65   Pulse: (!) 108 90         Visual Acuity      ED Medications  Medications   sodium chloride 0 9 % bolus 1,000 mL (0 mL Intravenous Stopped 10/21/22 1529)   sulfamethoxazole-trimethoprim (BACTRIM DS) 800-160 mg per tablet 1 tablet (1 tablet Oral Given 10/21/22 1528)       Diagnostic Studies  Results Reviewed     Procedure Component Value Units Date/Time    Urine culture [[id number]]  (Abnormal) Collected: 10/21/22 1352    Lab Status: Preliminary result Specimen: Urine, Clean Catch Updated: 10/22/22 1904     Urine Culture >100,000 cfu/ml Gram Negative Geoffrey    Urine Microscopic [[id number]]  (Abnormal) Collected: 10/21/22 1352    Lab Status: Final result Specimen: Urine, Clean Catch Updated: 10/21/22 1453     RBC, UA Innumerable /hpf      WBC, UA Innumerable /hpf      Epithelial Cells Occasional /hpf      Bacteria, UA Occasional /hpf      MUCUS THREADS Occasional     WBC Clumps Present     Transitional Epithelial Cells Present     Budding Yeast Present    UA w Reflex to Microscopic w Reflex to Culture [[id number]]  (Abnormal) Collected: 10/21/22 1352    Lab Status: Final result Specimen: Urine, Clean Catch Updated: 10/21/22 1447     Color, UA Light Orange     Clarity, UA Turbid     Specific [place], UA 1 018     pH, UA 6 0     Leukocytes, UA Large     Nitrite, UA Positive     Protein, UA 70 (1+) mg/dl      Glucose, UA Negative mg/dl      Ketones, UA Negative mg/dl      Urobilinogen, UA <2 0 mg/dl      Bilirubin, UA Negative     Occult Blood, UA Large    CMP [[id number]]  (Abnormal) Collected: 10/21/22 1345    Lab Status: Final result Specimen: Blood from Line, Venous Updated: 10/21/22 1426     Sodium 144 mmol/L      Potassium 3 8 mmol/L      Chloride 109 mmol/L      CO2 27 mmol/L      ANION GAP 8 mmol/L      BUN 10 mg/dL      Creatinine 0 78 mg/dL      Glucose 88 mg/dL      Calcium 9 1 mg/dL      AST 12 U/L      ALT 8 U/L      Alkaline Phosphatase 58 U/L      Total Protein 7 3 g/dL      Albumin 4 2 g/dL      Total Bilirubin 0 84 mg/dL      eGFR 100 ml/min/1 73sq m     Narrative:      National Kidney Disease Foundation guidelines for Chronic Kidney Disease (CKD):   •  Stage 1 with normal or high GFR (GFR > 90 mL/min/1 73 square meters)  •  Stage 2 Mild CKD (GFR = 60-89 mL/min/1 73 square meters)  •  Stage 3A Moderate CKD (GFR = 45-59 mL/min/1 73 square meters)  •  Stage 3B Moderate CKD (GFR = 30-44 mL/min/1 73 square meters)  •  Stage 4 Severe CKD (GFR = 15-29 mL/min/1 73 square meters)  •  Stage 5 End Stage CKD (GFR <15 mL/min/1 73 square meters)  Note: GFR calculation is accurate only with a steady state creatinine    Lipase [[id number]]  (Abnormal) Collected: 10/21/22 1345    Lab Status: Final result Specimen: Blood from Line, Venous Updated: 10/21/22 1426     Lipase <6 u/L     CBC and differential [[id number]]  (Abnormal) Collected: 10/21/22 1345    Lab Status: Final result Specimen: Blood from Line, Venous Updated: 10/21/22 1400     WBC 14 60 Thousand/uL      RBC 4 72 Million/uL      Hemoglobin 14 1 g/dL      Hematocrit 42 8 %      MCV 91 fL      MCH 29 9 pg      MCHC 32 9 g/dL      RDW 12 5 %      MPV 11 3 fL      Platelets 095 Thousands/uL      nRBC 0 /100 WBCs      Neutrophils Relative 73 %      Immat GRANS % 1 %      Lymphocytes Relative 20 %      Monocytes Relative 5 %      Eosinophils Relative 1 %      Basophils Relative 0 %      Neutrophils Absolute 10 71 Thousands/µL      Immature Grans Absolute 0 07 Thousand/uL      Lymphocytes Absolute 2 97 Thousands/µL      Monocytes Absolute 0 69 Thousand/µL      Eosinophils Absolute 0 11 Thousand/µL      Basophils Absolute 0 05 Thousands/µL     POCT pregnancy, urine [[id number]]  (Normal) Resulted: 10/21/22 1343    Lab Status: Final result Specimen: Urine Updated: 10/21/22 1344     EXT PREG TEST UR (Ref: Negative) negative     Control valid                 CT renal stone study abdomen pelvis without contrast   Final Result by Flores Elias MD (10/21 1430)      1  No acute findings in the abdomen or pelvis  2   Hepatomegaly  3   Left upper lobe 3 mm pulmonary nodules of doubtful clinical significance  Based on current Fleischner Society 2017 Guidelines on incidental pulmonary nodule, in this patient who is less than 39years of age, management decisions should be made on a    case by case basis, keeping in mind that infectious causes are more likely than cancer and that use of serial CT should be minimized  The study was marked in EPIC for significant notification  Workstation performed: [id number]                    Procedures  Procedures         ED Course                               SBIRT 22yo+    Flowsheet Row Most Recent Value   SBIRT (25 yo +)    In order to provide better care to our patients, we are screening all of our patients for alcohol and drug use  Would it be okay to ask you these screening questions? Unable to answer at this time Filed at: 10/21/2022 1532                    MDM  Number of Diagnoses or Management Options  Pulmonary nodule: new and requires workup  Pyelonephritis: new and requires workup  UTI (urinary tract infection): new and requires workup  Diagnosis management comments: DDx: UTI, kidney stone, pyelonephritis   Will check cbc, cmp, UA, poc pregnancy  Pending POC pregnancy will order CT scan renal study as pt has slight tenderness of left CVA, and reports lower back pain as well, which is different than her regular period pain  Pt has a leukocytosis of 14   Pt also reports is recovering from a URI that was at it's worse last week, for which she was already evaluated for  Pt has no SARAHI, no electrolyte abnormalities, no anemia  Pt is non-toxic appearing, in no acute distress  Pt found to have UA/micro consistent with UTI  As this correlates with pt feeling of incomplete emptying when urinating, but denies dysuria, and new onset back pain, will treat as a UTI/pyelonephritis  Pt's HR improved with fluids  Discussed allergies with pt, and provided Bactrim, first dose in department  Discussed importance of continued follow up  Dicussed strict return precautions  Dicussed urine culture, and if needed, an antibiotic adjustment will be made  No acute findings on CT  Discussed incidental finding of lung nodule  Reviewed with pt she is a smoker, but also had a recent URI that is resolving  Discussed importance of continued following with her PCP for monitoring  Discussed that without continued follow up diagnosis including but not limited to cancer can be missed  Amount and/or Complexity of Data Reviewed  Clinical lab tests: reviewed and ordered  Tests in the radiology section of CPT®: ordered and reviewed  Decide to obtain previous medical records or to obtain history from someone other than the patient: yes    Risk of Complications, Morbidity, and/or Mortality  General comments: Pt arrived with left flank pain, and lower back pain, combined with sensation of not fully empting her bladder  Found to have UA consistent with UTI/pyelonephritis  Abx provided  Pt aware to follow up with PCP  Reviewed reasons to return to ed  Patient verbalized understanding of diagnosis and agreement with discharge plan of care as well as understanding of reasons to return to ed       Patient Progress  Patient progress: improved      Disposition  Final diagnoses:   UTI (urinary tract infection)   Pulmonary nodule   Pyelonephritis     Time reflects when diagnosis was documented in both MDM as applicable and the Disposition within this note     Time User Action Codes Description Comment    10/21/2022  3:18 PM Silverio Sebastian Add [N39 0] UTI (urinary tract infection)     10/21/2022  3:18 PM Silverio Sebastian Add [R91 1] Pulmonary nodule     10/21/2022  3:22 PM [place] Sebastian Add [N12] Pyelonephritis       ED Disposition     ED Disposition   Discharge    Condition   Stable    Date/Time   Fri Oct 21, 2022  3:23 PM    Comment   Karen Lee discharge to home/self care                 Follow-up Information     Follow up With Specialties Details Why Contact Info Additional Information    Tamir Drake DO Family Medicine Schedule an appointment as soon as possible for a visit  abnormal CT findings Chad Iqbal [address] [facility] Emergency Department Emergency Medicine Go to  If symptoms worsen [address] Emergency Department, [address], [place], [place], [address]          Discharge Medication List as of 10/21/2022  3:24 PM      START taking these medications    Details   sulfamethoxazole-trimethoprim (BACTRIM DS) 800-160 mg per tablet Take 1 tablet by mouth 2 (two) times a day for 10 days smx-tmp DS (BACTRIM) 800-160 mg tabs (1tab q12 D10), Starting Fri 10/21/2022, Until Mon 10/31/2022, Normal         CONTINUE these medications which have NOT CHANGED    Details   acetaminophen (TYLENOL) 325 mg tablet Take 2 tablets (650 mg total) by mouth every 6 (six) hours, Starting Sat 3/20/2021, No Print      Ascorbic Acid (vitamin C) 1000 MG tablet Take 1,000 mg by mouth daily, Historical Med      Cholecalciferol (Vitamin D3) 50 MCG (2000 UT) capsule Take 2,000 Units by mouth daily, Historical Med      docusate sodium (COLACE) 100 mg capsule Take 1 capsule (100 mg total) by mouth 2 (two) times a day, Starting Sat 3/20/2021, Normal      FLUoxetine (PROzac) 10 mg capsule Take 1 capsule (10 mg total) by mouth daily, Starting Thu 9/60/8679, Normal      folic acid (FOLVITE) 1 mg tablet Take 1 mg by mouth daily, Starting Thu 9/24/2020, Historical Med      glucose blood (Contour Next Test) test strip Test up to 8 times per day, Normal      ibuprofen (MOTRIN) 600 mg tablet Take 1 tablet (600 mg total) by mouth every 6 (six) hours, Starting Sat 3/20/2021, Normal      metFORMIN (GLUCOPHAGE-XR) 500 mg 24 hr tablet Take 1,500 mg by mouth daily, Starting Mon 10/26/2020, Historical Med      norgestimate-ethinyl estradiol (Ortho Tri-Cyclen, 28,) 0 18/0 215/0 25 MG-35 MCG per tablet Take 1 tablet by mouth daily, Starting Tue 11/9/2021, Normal      NovoLOG 100 UNIT/ML injection Inject 100 mL as directed daily, Starting Thu 7/30/2020, Historical Med      Prenatal MV-Min-Fe Fum-FA-DHA (PRENATAL 1 PO) Take by mouth, Historical Med      witch hazel-glycerin (TUCKS) topical pad Apply 1 pad topically every 4 (four) hours as needed for irritation, Starting Sat 3/20/2021, No Print             No discharge procedures on file      PDMP Review     None          ED Provider  Electronically Signed by           JOAO Duarte  10/23/22 0010

## 2022-10-21 NOTE — ED ATTENDING ATTESTATION
10/21/2022  Alex YANG, DO, saw and evaluated the patient  I have discussed the patient with the resident/non-physician practitioner and agree with the resident's/non-physician practitioner's findings, Plan of Care, and MDM as documented in the resident's/non-physician practitioner's note, except where noted  All available labs and Radiology studies were reviewed  I was present for key portions of any procedure(s) performed by the resident/non-physician practitioner and I was immediately available to provide assistance  At this point I agree with the current assessment done in the Emergency Department  I have conducted an independent evaluation of this patient a history and physical is as follows:        70-year-old female presents with suprapubic abdominal fullness, sensation of incomplete emptying with urination, and bilateral lower back pain, describes pain as dull, achy, nonradiating, currently a 4/10 , no dysuria  , no anterior abdominal pain  Eating well drinking well      Review of Systems   Constitutional: Negative for activity change, chills, diaphoresis and fever  HENT: Negative for congestion, sinus pressure and sore throat  Eyes: Negative for pain and visual disturbance  Respiratory: Negative for cough, chest tightness, shortness of breath, wheezing and stridor  Cardiovascular: Negative for chest pain and palpitations  Gastrointestinal: Negative for abdominal distention, abdominal pain, constipation, diarrhea, nausea and vomiting  Genitourinary: Negative for dysuria and frequency  Musculoskeletal: Negative for neck pain and neck stiffness  Skin: Negative for rash  Neurological: Negative for dizziness, speech difficulty, light-headedness, numbness and headaches  Physical Exam  Vitals reviewed  Constitutional:       General: She is not in acute distress  Appearance: She is well-developed  She is not diaphoretic     HENT:      Head: Normocephalic and atraumatic  Right Ear: External ear normal       Left Ear: External ear normal       Nose: Nose normal    Eyes:      General:         Right eye: No discharge  Left eye: No discharge  Pupils: Pupils are equal, round, and reactive to light  Neck:      Trachea: No tracheal deviation  Cardiovascular:      Rate and Rhythm: Normal rate and regular rhythm  Heart sounds: Normal heart sounds  No murmur heard  Pulmonary:      Effort: Pulmonary effort is normal  No respiratory distress  Breath sounds: Normal breath sounds  No stridor  Abdominal:      General: There is no distension  Palpations: Abdomen is soft  Tenderness: There is no abdominal tenderness  There is right CVA tenderness and left CVA tenderness  There is no guarding or rebound  Musculoskeletal:         General: Normal range of motion  Cervical back: Normal range of motion and neck supple  Skin:     General: Skin is warm and dry  Coloration: Skin is not pale  Findings: No erythema  Neurological:      General: No focal deficit present  Mental Status: She is alert and oriented to person, place, and time                  ED Course         Critical Care Time  Procedures          Labs Reviewed   CBC AND DIFFERENTIAL - Abnormal       Result Value Ref Range Status    WBC 14 60 (*) 4 31 - 10 16 Thousand/uL Final    RBC 4 72  3 81 - 5 12 Million/uL Final    Hemoglobin 14 1  11 5 - 15 4 g/dL Final    Hematocrit 42 8  34 8 - 46 1 % Final    MCV 91  82 - 98 fL Final    MCH 29 9  26 8 - 34 3 pg Final    MCHC 32 9  31 4 - 37 4 g/dL Final    RDW 12 5  11 6 - 15 1 % Final    MPV 11 3  8 9 - 12 7 fL Final    Platelets 031  691 - 390 Thousands/uL Final    nRBC 0  /100 WBCs Final    Neutrophils Relative 73  43 - 75 % Final    Immat GRANS % 1  0 - 2 % Final    Lymphocytes Relative 20  14 - 44 % Final    Monocytes Relative 5  4 - 12 % Final    Eosinophils Relative 1  0 - 6 % Final    Basophils Relative 0  0 - 1 % Final    Neutrophils Absolute 10 71 (*) 1 85 - 7 62 Thousands/µL Final    Immature Grans Absolute 0 07  0 00 - 0 20 Thousand/uL Final    Lymphocytes Absolute 2 97  0 60 - 4 47 Thousands/µL Final    Monocytes Absolute 0 69  0 17 - 1 22 Thousand/µL Final    Eosinophils Absolute 0 11  0 00 - 0 61 Thousand/µL Final    Basophils Absolute 0 05  0 00 - 0 10 Thousands/µL Final   COMPREHENSIVE METABOLIC PANEL - Abnormal    Sodium 144  135 - 147 mmol/L Final    Potassium 3 8  3 5 - 5 3 mmol/L Final    Chloride 109 (*) 96 - 108 mmol/L Final    CO2 27  21 - 32 mmol/L Final    ANION GAP 8  4 - 13 mmol/L Final    BUN 10  5 - 25 mg/dL Final    Creatinine 0 78  0 60 - 1 30 mg/dL Final    Comment: Standardized to IDMS reference method    Glucose 88  65 - 140 mg/dL Final    Comment: If the patient is fasting, the ADA then defines impaired fasting glucose as > 100 mg/dL and diabetes as > or equal to 123 mg/dL  Specimen collection should occur prior to Sulfasalazine administration due to the potential for falsely depressed results  Specimen collection should occur prior to Sulfapyridine administration due to the potential for falsely elevated results  Calcium 9 1  8 4 - 10 2 mg/dL Final    AST 12 (*) 13 - 39 U/L Final    Comment: Specimen collection should occur prior to Sulfasalazine administration due to the potential for falsely depressed results  ALT 8  7 - 52 U/L Final    Comment: Specimen collection should occur prior to Sulfasalazine administration due to the potential for falsely depressed results       Alkaline Phosphatase 58  34 - 104 U/L Final    Total Protein 7 3  6 4 - 8 4 g/dL Final    Albumin 4 2  3 5 - 5 0 g/dL Final    Total Bilirubin 0 84  0 20 - 1 00 mg/dL Final    eGFR 100  ml/min/1 73sq m Final    Narrative:     [place] guidelines for Chronic Kidney Disease (CKD):   •  Stage 1 with normal or high GFR (GFR > 90 mL/min/1 73 square meters)  •  Stage 2 Mild CKD (GFR = 60-89 mL/min/1 73 square meters)  •  Stage 3A Moderate CKD (GFR = 45-59 mL/min/1 73 square meters)  •  Stage 3B Moderate CKD (GFR = 30-44 mL/min/1 73 square meters)  •  Stage 4 Severe CKD (GFR = 15-29 mL/min/1 73 square meters)  •  Stage 5 End Stage CKD (GFR <15 mL/min/1 73 square meters)  Note: GFR calculation is accurate only with a steady state creatinine   LIPASE - Abnormal    Lipase <6 (*) 11 - 82 u/L Final   UA W REFLEX TO MICROSCOPIC WITH REFLEX TO CULTURE - Abnormal    Color, UA Light Orange   Final    Clarity, UA Turbid   Final    Specific [place], UA 1 018  1 003 - 1 030 Final    pH, UA 6 0  4 5, 5 0, 5 5, 6 0, 6 5, 7 0, 7 5, 8 0 Final    Leukocytes, UA Large (*) Negative Final    Nitrite, UA Positive (*) Negative Final    Protein, UA 70 (1+) (*) Negative mg/dl Final    Glucose, UA Negative  Negative mg/dl Final    Ketones, UA Negative  Negative mg/dl Final    Urobilinogen, UA <2 0  <2 0 mg/dl mg/dl Final    Bilirubin, UA Negative  Negative Final    Occult Blood, UA Large (*) Negative Final   URINE MICROSCOPIC - Abnormal    RBC, UA Innumerable (*) None Seen, 1-2 /hpf Final    WBC, UA Innumerable (*) None Seen, 1-2 /hpf Final    Epithelial Cells Occasional  None Seen, Occasional /hpf Final    Bacteria, UA Occasional  None Seen, Occasional /hpf Final    MUCUS THREADS Occasional (*) None Seen Final    WBC Clumps Present   Final    Transitional Epithelial Cells Present   Final    Budding Yeast Present   Final   POCT PREGNANCY, URINE - Normal    EXT PREG TEST UR (Ref: Negative) negative   Final    Control valid   Final   URINE CULTURE           CT renal stone study abdomen pelvis without contrast   Final Result      1  No acute findings in the abdomen or pelvis  2   Hepatomegaly  3   Left upper lobe 3 mm pulmonary nodules of doubtful clinical significance   Based on current Fleischner Society 2017 Guidelines on incidental pulmonary nodule, in this patient who is less than 39years of age, management decisions should be made on a    case by case basis, keeping in mind that infectious causes are more likely than cancer and that use of serial CT should be minimized  The study was marked in EPIC for significant notification  Workstation performed: [id number]             [facility]  Number of Diagnoses or Management Options  Pulmonary nodule: new, needed workup  Pyelonephritis: new, needed workup  UTI (urinary tract infection): new, needed workup     Amount and/or Complexity of Data Reviewed  Clinical lab tests: reviewed and ordered  Tests in the radiology section of CPT®: ordered and reviewed  Review and summarize past medical records: yes  Independent visualization of images, tracings, or specimens: yes            Time reflects when diagnosis was documented in both MDM as applicable and the Disposition within this note     Time User Action Codes Description Comment    10/21/2022  3:18 PM Isabela Mckinneyless Add [N39 0] UTI (urinary tract infection)     10/21/2022  3:18 PM Isabela Ko Add [R91 1] Pulmonary nodule     10/21/2022  3:22 PM Isabela Mckinneyless Add [N12] Pyelonephritis       ED Disposition     ED Disposition   Discharge    Condition   Stable    Date/Time   Fri Oct 21, 2022  3:23 PM    Comment   Paul Henry discharge to home/self care                 Follow-up Information     Follow up With Specialties Details Why Contact Info Additional Information    Maria Del Rosario Porter DO Family Medicine Schedule an appointment as soon as possible for a visit  abnormal CT findings [address] Emergency Department Emergency Medicine Go to  If symptoms worsen [address] Emergency Department,  [address], [place], [place], [address]

## 2022-10-21 NOTE — TELEPHONE ENCOUNTER
Pt called stating she is having a lot of lower back pain and pelvic pain  She states she is very bloated  I offered her an appt today with Dr Andrés Gomez but because of work she couldn't come in to be seen  I advised her that she should go to an urgent care to be evaluated  Pt was agreeable to that

## 2022-10-21 NOTE — DISCHARGE INSTRUCTIONS
Follow up with your PCP for abnormal CT finding: Left upper lobe 3 mm pulmonary nodules of doubtful clinical significance  Based on current Fleischner Society 2017 Guidelines on incidental pulmonary nodule, in this patient who is less than 39years of age, management decisions should be made on a , case by case basis, keeping in mind that infectious causes are more likely than cancer and that use of serial CT should be minimized  ,

## 2022-10-23 LAB — BACTERIA UR CULT: ABNORMAL

## 2022-10-28 ENCOUNTER — APPOINTMENT (EMERGENCY)
Dept: CT IMAGING | Facility: HOSPITAL | Age: 32
End: 2022-10-28
Payer: COMMERCIAL

## 2022-10-28 ENCOUNTER — HOSPITAL ENCOUNTER (EMERGENCY)
Facility: HOSPITAL | Age: 32
Discharge: HOME | End: 2022-10-28
Attending: EMERGENCY MEDICINE
Payer: COMMERCIAL

## 2022-10-28 VITALS
RESPIRATION RATE: 18 BRPM | TEMPERATURE: 98.1 F | DIASTOLIC BLOOD PRESSURE: 79 MMHG | BODY MASS INDEX: 31.62 KG/M2 | OXYGEN SATURATION: 99 % | WEIGHT: 190 LBS | SYSTOLIC BLOOD PRESSURE: 122 MMHG | HEART RATE: 75 BPM

## 2022-10-28 DIAGNOSIS — R10.9 ABDOMINAL PAIN: Primary | ICD-10-CM

## 2022-10-28 LAB
ALBUMIN SERPL BCP-MCNC: 4.3 G/DL (ref 3.5–5)
ALP SERPL-CCNC: 61 U/L (ref 34–104)
ALT SERPL W P-5'-P-CCNC: 9 U/L (ref 7–52)
ANION GAP SERPL CALCULATED.3IONS-SCNC: 8 MMOL/L (ref 4–13)
AST SERPL W P-5'-P-CCNC: 13 U/L (ref 13–39)
BASOPHILS # BLD AUTO: 0.08 THOUSANDS/ÂΜL (ref 0–0.1)
BASOPHILS NFR BLD AUTO: 1 % (ref 0–1)
BILIRUB SERPL-MCNC: 0.28 MG/DL (ref 0.2–1)
BILIRUB UR QL STRIP: NEGATIVE
BUN SERPL-MCNC: 10 MG/DL (ref 5–25)
CALCIUM SERPL-MCNC: 9.5 MG/DL (ref 8.4–10.2)
CHLORIDE SERPL-SCNC: 102 MMOL/L (ref 96–108)
CLARITY UR: CLEAR
CO2 SERPL-SCNC: 25 MMOL/L (ref 21–32)
COLOR UR: COLORLESS
CREAT SERPL-MCNC: 0.88 MG/DL (ref 0.6–1.3)
EOSINOPHIL # BLD AUTO: 0.36 THOUSAND/ÂΜL (ref 0–0.61)
EOSINOPHIL NFR BLD AUTO: 3 % (ref 0–6)
ERYTHROCYTE [DISTWIDTH] IN BLOOD BY AUTOMATED COUNT: 12.7 % (ref 11.6–15.1)
EXT PREG TEST URINE: NEGATIVE
EXT. CONTROL ED NAV: NORMAL
GFR SERPL CREATININE-BSD FRML MDRD: 87 ML/MIN/1.73SQ M
GLUCOSE SERPL-MCNC: 139 MG/DL (ref 65–140)
GLUCOSE UR STRIP-MCNC: NEGATIVE MG/DL
HCT VFR BLD AUTO: 45.4 % (ref 34.8–46.1)
HGB BLD-MCNC: 14.7 G/DL (ref 11.5–15.4)
HGB UR QL STRIP.AUTO: NEGATIVE
IMM GRANULOCYTES # BLD AUTO: 0.04 THOUSAND/UL (ref 0–0.2)
IMM GRANULOCYTES NFR BLD AUTO: 0 % (ref 0–2)
KETONES UR STRIP-MCNC: NEGATIVE MG/DL
LEUKOCYTE ESTERASE UR QL STRIP: NEGATIVE
LYMPHOCYTES # BLD AUTO: 3.51 THOUSANDS/ÂΜL (ref 0.6–4.47)
LYMPHOCYTES NFR BLD AUTO: 29 % (ref 14–44)
MCH RBC QN AUTO: 30.1 PG (ref 26.8–34.3)
MCHC RBC AUTO-ENTMCNC: 32.4 G/DL (ref 31.4–37.4)
MCV RBC AUTO: 93 FL (ref 82–98)
MONOCYTES # BLD AUTO: 0.59 THOUSAND/ÂΜL (ref 0.17–1.22)
MONOCYTES NFR BLD AUTO: 5 % (ref 4–12)
NEUTROPHILS # BLD AUTO: 7.45 THOUSANDS/ÂΜL (ref 1.85–7.62)
NEUTS SEG NFR BLD AUTO: 62 % (ref 43–75)
NITRITE UR QL STRIP: NEGATIVE
NRBC BLD AUTO-RTO: 0 /100 WBCS
PH UR STRIP.AUTO: 6 [PH]
PLATELET # BLD AUTO: 308 THOUSANDS/UL (ref 149–390)
PMV BLD AUTO: 10.8 FL (ref 8.9–12.7)
POTASSIUM SERPL-SCNC: 3.9 MMOL/L (ref 3.5–5.3)
PROT SERPL-MCNC: 7.3 G/DL (ref 6.4–8.4)
PROT UR STRIP-MCNC: NEGATIVE MG/DL
RBC # BLD AUTO: 4.89 MILLION/UL (ref 3.81–5.12)
SODIUM SERPL-SCNC: 135 MMOL/L (ref 135–147)
SP GR UR STRIP.AUTO: 1.01 (ref 1–1.03)
UROBILINOGEN UR STRIP-ACNC: <2 MG/DL
WBC # BLD AUTO: 12.03 THOUSAND/UL (ref 4.31–10.16)

## 2022-10-28 PROCEDURE — 85025 COMPLETE CBC W/AUTO DIFF WBC: CPT | Performed by: EMERGENCY MEDICINE

## 2022-10-28 PROCEDURE — 80053 COMPREHEN METABOLIC PANEL: CPT | Performed by: EMERGENCY MEDICINE

## 2022-10-28 PROCEDURE — G1004 CDSM NDSC: HCPCS

## 2022-10-28 PROCEDURE — 81025 URINE PREGNANCY TEST: CPT | Performed by: EMERGENCY MEDICINE

## 2022-10-28 PROCEDURE — 87086 URINE CULTURE/COLONY COUNT: CPT | Performed by: EMERGENCY MEDICINE

## 2022-10-28 PROCEDURE — 81003 URINALYSIS AUTO W/O SCOPE: CPT | Performed by: EMERGENCY MEDICINE

## 2022-10-28 PROCEDURE — 74177 CT ABD & PELVIS W/CONTRAST: CPT

## 2022-10-28 PROCEDURE — 36415 COLL VENOUS BLD VENIPUNCTURE: CPT | Performed by: EMERGENCY MEDICINE

## 2022-10-28 RX ORDER — KETOROLAC TROMETHAMINE 30 MG/ML
15 INJECTION, SOLUTION INTRAMUSCULAR; INTRAVENOUS ONCE
Status: COMPLETED | OUTPATIENT
Start: 2022-10-28 | End: 2022-10-28

## 2022-10-28 RX ORDER — ONDANSETRON 2 MG/ML
4 INJECTION INTRAMUSCULAR; INTRAVENOUS ONCE
Status: COMPLETED | OUTPATIENT
Start: 2022-10-28 | End: 2022-10-28

## 2022-10-28 RX ADMIN — IOHEXOL 100 ML: 350 INJECTION, SOLUTION INTRAVENOUS at 02:50

## 2022-10-28 RX ADMIN — KETOROLAC TROMETHAMINE 15 MG: 30 INJECTION, SOLUTION INTRAMUSCULAR at 01:48

## 2022-10-28 RX ADMIN — ONDANSETRON 4 MG: 2 INJECTION INTRAMUSCULAR; INTRAVENOUS at 01:49

## 2022-10-28 RX ADMIN — SODIUM CHLORIDE 1000 ML: 0.9 INJECTION, SOLUTION INTRAVENOUS at 01:47

## 2022-10-28 NOTE — ED PROVIDER NOTES
History  Chief Complaint   Patient presents with   • Possible UTI     Pt diagnosed with UTI last Friday, on day 6 of bactrim  Pt reports worsening back pain  Denies urinary pain/frequency  Patient is a 19-year-old female who presents to the emergency department with progressive low back pain and abdominal discomfort  She was seen in the emergency department on October 21st at which time she is experiencing pelvic cramping and low back cramping during her menses  The low back cramping was unusual and she appreciated dark urine at that time  She was diagnosed with the UTI and initiated on sulfamethoxazole trimethoprim  Menses finished, pelvic cramps resolved and low back pain had improved  Her urine color & clarity has greatly improved  Over the last couple of days she describes this ascending to a slightly higher area in the back  She gestures from sacral to low lumbar region and describes intense discomfort in bilateral distribution laterally  She expresses concern that infection could be ascending to her kidneys  Over the last couple of days she has also appreciated increasing discomfort throughout the lower abdomen  She gestures symmetrically over this region and describes a “prickly” pain  Discomfort was worse with movement and on car ride  She remains afebrile  Today she did have nausea and decreased appetite with some increased eructus     She has had some constipation though no difficulty passing flatus  She denies having any abnormal vaginal discharge  No history of similar pain  She has been taking ibuprofen regularly over the last few days with most recent dose having been this morning  Past medical history significant for diabetes and PCOS  Prior to Admission Medications   Prescriptions Last Dose Informant Patient Reported? Taking?    Ascorbic Acid (vitamin C) 1000 MG tablet  Self Yes No   Sig: Take 1,000 mg by mouth daily   Cholecalciferol (Vitamin D3) 50 MCG (2000 UT) capsule Self Yes No   Sig: Take 2,000 Units by mouth daily   FLUoxetine (PROzac) 10 mg capsule   No No   Sig: Take 1 capsule (10 mg total) by mouth daily   NovoLOG 100 UNIT/ML injection  Self Yes No   Sig: Inject 100 mL as directed daily   Prenatal MV-Min-Fe Fum-FA-DHA (PRENATAL 1 PO)  Self Yes No   Sig: Take by mouth   acetaminophen (TYLENOL) 325 mg tablet   No No   Sig: Take 2 tablets (650 mg total) by mouth every 6 (six) hours   docusate sodium (COLACE) 100 mg capsule   No No   Sig: Take 1 capsule (100 mg total) by mouth 2 (two) times a day   folic acid (FOLVITE) 1 mg tablet  Self Yes No   Sig: Take 1 mg by mouth daily   glucose blood (Contour Next Test) test strip   No No   Sig: Test up to 8 times per day   ibuprofen (MOTRIN) 600 mg tablet   No No   Sig: Take 1 tablet (600 mg total) by mouth every 6 (six) hours   metFORMIN (GLUCOPHAGE-XR) 500 mg 24 hr tablet  Self Yes No   Sig: Take 1,500 mg by mouth daily   norgestimate-ethinyl estradiol (Ortho Tri-Cyclen, 28,) 0 18/0 215/0 25 MG-35 MCG per tablet   No No   Sig: Take 1 tablet by mouth daily   sulfamethoxazole-trimethoprim (BACTRIM DS) 800-160 mg per tablet   No No   Sig: Take 1 tablet by mouth 2 (two) times a day for 10 days smx-tmp DS (BACTRIM) 800-160 mg tabs (1tab q12 D10)   witch hazel-glycerin (TUCKS) topical pad   No No   Sig: Apply 1 pad topically every 4 (four) hours as needed for irritation      Facility-Administered Medications: None       Past Medical History:   Diagnosis Date   • Diabetes mellitus ([facility] [address] )    • Female infertility    • PCOS (polycystic ovarian syndrome)    • Varicella        Past Surgical History:   Procedure Laterality Date   • NV  DELIVERY ONLY N/A 3/16/2021    Procedure:  SECTION ();   Surgeon: Gerhard Lan MD;  Location: [facility] ;  Service: Obstetrics       Family History   Problem Relation Age of Onset   • Hyperlipidemia Mother    • Diabetes Father    • Heart attack Father    • Diabetes Sister    • Heart attack Paternal Uncle      I have reviewed and agree with the history as documented  E-Cigarette/Vaping   • E-Cigarette Use Never User      E-Cigarette/Vaping Substances   • Nicotine No    • THC No    • CBD No      Social History     Tobacco Use   • Smoking status: Current Every Day Smoker     Packs/day: 0 50     Types: Cigarettes   • Smokeless tobacco: Never Used   Vaping Use   • Vaping Use: Never used   Substance Use Topics   • Alcohol use: Not Currently   • Drug use: Never       Review of Systems   All other systems reviewed and are negative  Physical Exam  Physical Exam  Vitals and nursing note reviewed  Constitutional:       Appearance: Normal appearance  Comments: Appears uncomfortable   HENT:      Head: Normocephalic  Mouth/Throat:      Mouth: Mucous membranes are moist    Eyes:      Extraocular Movements: Extraocular movements intact  Conjunctiva/sclera: Conjunctivae normal    Cardiovascular:      Rate and Rhythm: Normal rate and regular rhythm  Heart sounds: Normal heart sounds  Pulmonary:      Effort: Pulmonary effort is normal       Breath sounds: Normal breath sounds  Abdominal:      General: Bowel sounds are normal       Palpations: Abdomen is soft  Tenderness: There is abdominal tenderness (B/L lower abdomen  - L greater than R)  There is no right CVA tenderness or left CVA tenderness  Musculoskeletal:         General: Normal range of motion  Comments: Tn midline low lumbar through sacral region & low lumbar paraspinal musculature   Skin:     General: Skin is warm and dry  Neurological:      Mental Status: She is alert and oriented to person, place, and time           Vital Signs  ED Triage Vitals [10/27/22 2109]   Temperature Pulse Respirations Blood Pressure SpO2   98 1 °F (36 7 °C) 90 18 136/78 99 %      Temp Source Heart Rate Source Patient Position - Orthostatic VS BP Location FiO2 (%)   Oral Monitor Sitting Right arm --      Pain Score       9 Vitals:    10/27/22 2109 10/28/22 0109   BP: 136/78 122/79   Pulse: 90 75   Patient Position - Orthostatic VS: Sitting Lying         Visual Acuity      ED Medications  Medications   sodium chloride 0 9 % bolus 1,000 mL (1,000 mL Intravenous New Bag 10/28/22 0147)   ondansetron (ZOFRAN) injection 4 mg (4 mg Intravenous Given 10/28/22 0149)   ketorolac (TORADOL) injection 15 mg (15 mg Intravenous Given 10/28/22 0148)   iohexol (OMNIPAQUE) 350 MG/ML injection (SINGLE-DOSE) 100 mL (100 mL Intravenous Given 10/28/22 0250)       Diagnostic Studies  Results Reviewed     Procedure Component Value Units Date/Time    Comprehensive metabolic panel [[id number]] Collected: 10/28/22 0151    Lab Status: Final result Specimen: Blood from Arm, Left Updated: 10/28/22 0229     Sodium 135 mmol/L      Potassium 3 9 mmol/L      Chloride 102 mmol/L      CO2 25 mmol/L      ANION GAP 8 mmol/L      BUN 10 mg/dL      Creatinine 0 88 mg/dL      Glucose 139 mg/dL      Calcium 9 5 mg/dL      AST 13 U/L      ALT 9 U/L      Alkaline Phosphatase 61 U/L      Total Protein 7 3 g/dL      Albumin 4 3 g/dL      Total Bilirubin 0 28 mg/dL      eGFR 87 ml/min/1 73sq m     Narrative:      [place] guidelines for Chronic Kidney Disease (CKD):   •  Stage 1 with normal or high GFR (GFR > 90 mL/min/1 73 square meters)  •  Stage 2 Mild CKD (GFR = 60-89 mL/min/1 73 square meters)  •  Stage 3A Moderate CKD (GFR = 45-59 mL/min/1 73 square meters)  •  Stage 3B Moderate CKD (GFR = 30-44 mL/min/1 73 square meters)  •  Stage 4 Severe CKD (GFR = 15-29 mL/min/1 73 square meters)  •  Stage 5 End Stage CKD (GFR <15 mL/min/1 73 square meters)  Note: GFR calculation is accurate only with a steady state creatinine    CBC and differential [[id number]]  (Abnormal) Collected: 10/28/22 0151    Lab Status: Final result Specimen: Blood from Arm, Left Updated: 10/28/22 0219     WBC 12 03 Thousand/uL      RBC 4 89 Million/uL      Hemoglobin 14 7 g/dL Hematocrit 45 4 %      MCV 93 fL      MCH 30 1 pg      MCHC 32 4 g/dL      RDW 12 7 %      MPV 10 8 fL      Platelets 956 Thousands/uL      nRBC 0 /100 WBCs      Neutrophils Relative 62 %      Immat GRANS % 0 %      Lymphocytes Relative 29 %      Monocytes Relative 5 %      Eosinophils Relative 3 %      Basophils Relative 1 %      Neutrophils Absolute 7 45 Thousands/µL      Immature Grans Absolute 0 04 Thousand/uL      Lymphocytes Absolute 3 51 Thousands/µL      Monocytes Absolute 0 59 Thousand/µL      Eosinophils Absolute 0 36 Thousand/µL      Basophils Absolute 0 08 Thousands/µL     UA (URINE) with reflex to Scope [[id number]] Collected: 10/28/22 0115    Lab Status: Final result Specimen: Urine, Clean Catch Updated: 10/28/22 0128     Color, UA Colorless     Clarity, UA Clear     Specific Gravity, UA 1 013     pH, UA 6 0     Leukocytes, UA Negative     Nitrite, UA Negative     Protein, UA Negative mg/dl      Glucose, UA Negative mg/dl      Ketones, UA Negative mg/dl      Urobilinogen, UA <2 0 mg/dl      Bilirubin, UA Negative     Occult Blood, UA Negative    Urine culture [[id number]] Collected: 10/28/22 0114    Lab Status: In process Specimen: Urine, Clean Catch Updated: 10/28/22 0122    POCT pregnancy, urine [[id number]]  (Normal) Resulted: 10/28/22 0114    Lab Status: Final result Updated: 10/28/22 0114     EXT PREG TEST UR (Ref: Negative) negative     Control valid                 CT abdomen pelvis with contrast   Final Result by Diaz Richardson MD (10/28 1831)      No acute inflammatory process identified within the abdomen or pelvis  Workstation performed: [id number]                    Procedures  Procedures         ED Course  ED Course as of 10/28/22 0410   Fri Oct 28, 2022   0153 Patient aware that urine testing today is unremarkable  No evidence of UTI  Additional labs and CT are ordered  5433 Patient feeling improved at this time  Awaiting CT report  Care transferred to Dr Rogelio Santos  MDM    Disposition  Final diagnoses:   None     ED Disposition     None      Follow-up Information    None         Patient's Medications   Discharge Prescriptions    No medications on file       No discharge procedures on file      PDMP Review     None          ED Provider  Electronically Signed by           Dex Thomas MD  10/28/22 8605

## 2022-10-28 NOTE — Clinical Note
Kerry Cole was seen and treated in our emergency department on 10/27/2022  Diagnosis:     Wai Rodrigues  may return to work on return date  She may return on this date: 10/29/2022         If you have any questions or concerns, please don't hesitate to call        Marly Najera RN    ______________________________           _______________          _______________  Hospital Representative                              Date                                Time Same Histology In Subsequent Stages Text: The pattern and morphology of the tumor is as described in the first stage.

## 2022-10-30 LAB — BACTERIA UR CULT: NORMAL

## 2023-06-07 PROBLEM — O32.9XX0: Status: RESOLVED | Noted: 2021-02-23 | Resolved: 2023-06-07

## 2023-06-07 PROBLEM — O35.9XX0 SUSPECTED FETAL ANOMALY, ANTEPARTUM: Status: RESOLVED | Noted: 2021-01-23 | Resolved: 2023-06-07

## 2023-06-07 PROBLEM — O41.03X0 OLIGOHYDRAMNIOS IN SINGLETON PREGNANCY IN THIRD TRIMESTER: Status: RESOLVED | Noted: 2021-01-13 | Resolved: 2023-06-07

## 2023-06-07 PROBLEM — O24.013 TYPE 1 DIABETES MELLITUS AFFECTING PREGNANCY IN THIRD TRIMESTER, ANTEPARTUM: Status: RESOLVED | Noted: 2021-01-13 | Resolved: 2023-06-07

## 2023-06-07 PROBLEM — Z31.69 PRE-CONCEPTION COUNSELING: Status: ACTIVE | Noted: 2023-06-07

## 2023-06-08 PROBLEM — Z82.79 FAMILY HISTORY OF CONGENITAL DISEASE: Status: ACTIVE | Noted: 2023-06-08

## 2023-06-29 ENCOUNTER — TELEMEDICINE (OUTPATIENT)
Facility: HOSPITAL | Age: 33
End: 2023-06-29

## 2023-06-29 VITALS — WEIGHT: 195 LBS | HEIGHT: 65 IN | BODY MASS INDEX: 32.49 KG/M2

## 2023-06-29 DIAGNOSIS — E10.65 TYPE 1 DIABETES MELLITUS WITH HYPERGLYCEMIA (HCC): Primary | ICD-10-CM

## 2023-06-29 DIAGNOSIS — Z96.41 INSULIN PUMP IN PLACE: ICD-10-CM

## 2023-06-29 NOTE — ASSESSMENT & PLAN NOTE
-Repeat A1c, goal A1c for pre-conception less than 6% with minimal hypoglycemia   -Continue prenatal and folic acid   -Restart Guardian CGM  -Novolog via [employer] 770/[id number] insulin pump   -Current basal setting:  MN@ 1 50 units/hour  -Please input glucose readings and carbs into insulin pump for more information    -Follow up in 2 weeks    Lab Results   Component Value Date    HGBA1C 6 1 (H) 03/04/2021

## 2023-06-29 NOTE — ASSESSMENT & PLAN NOTE
-Due to lack of glucose readings and download unable to make any insulin pump adjustments at this time

## 2023-06-29 NOTE — PROGRESS NOTES
Virtual Regular Visit    Verification of patient location:[place]    Patient is located at Home in the following state in which I hold an active license [place]      Assessment/Plan:    Problem List Items Addressed This Visit        Endocrine    Type 1 diabetes mellitus with hyperglycemia ([facility] [address] ) - Primary     -Repeat A1c, goal A1c for pre-conception less than 6% with minimal hypoglycemia   -Continue prenatal and folic acid   -Restart Guardian CGM  -Novolog via Medtronic 770/780G insulin pump   -Current basal setting:  MN@ 1 50 units/hour  -Please input glucose readings and carbs into insulin pump for more information    -Follow up in 2 weeks  Lab Results   Component Value Date    HGBA1C 6 1 (H) 2021               Other    BMI 32 0-32 9,adult     -Current weight 195 lbs  Insulin pump in place     -Due to lack of glucose readings and download unable to make any insulin pump adjustments at this time  Trial Dexcom G7 CGM  Reason for visit is   Chief Complaint   Patient presents with   • Virtual Regular Visit   • Diabetes Type 1        Encounter provider Derrick Christianson, [address] [place] [address]    Provider located at [facility] [id number]      Recent Visits  No visits were found meeting these conditions  Showing recent visits within past 7 days and meeting all other requirements  Today's Visits  Date Type Provider Dept   23 [id number], CRNP An    Showing today's visits and meeting all other requirements  Future Appointments  No visits were found meeting these conditions  Showing future appointments within next 150 days and meeting all other requirements       The patient was identified by name and date of birth  Geovany Oliveira was informed that this is a telemedicine visit and that the visit is being conducted through the [address] Now platform  She agrees to proceed     My office door was closed  No one else was in the room  She acknowledged consent and understanding of privacy and security of the video platform  The patient has agreed to participate and understands they can discontinue the visit at any time  Patient is aware this is a billable service  Subjective  Kuldip Grissom is a 28 y o  female  preconception T1DM possibly PAZ on Novolog via Medtronic 770/780G insulin pump with no CGM; unable to make any insulin pump adjustments today due to lack of information  Medtronic account linked today during visit  Courtney Arciniega reported not taking care of herself due to focusing on her son and his care  After delivery, did stop using insulin pump and had an A1c 8 9% in 2022  Endocrinologist from [facility]  Medtronic insulin pump in place without Guardian CGM and has not been self monitoring blood glucose  Denies hypoglycemia  Desires IUI and wants to have diabetes under control before transfer; pending new A1c and insulin pump download in 2 weeks for review  Past Medical History:   Diagnosis Date   • Diabetes mellitus ([facility] [address] )    • Female infertility    • PCOS (polycystic ovarian syndrome)    • Varicella        Past Surgical History:   Procedure Laterality Date   • FL  DELIVERY ONLY N/A 3/16/2021    Procedure:  SECTION ();   Surgeon: Mary Trivedi MD;  Location: [facility] ;  Service: Obstetrics       Current Outpatient Medications   Medication Sig Dispense Refill   • acetaminophen (TYLENOL) 325 mg tablet Take 2 tablets (650 mg total) by mouth every 6 (six) hours (Patient not taking: Reported on 2023)  0   • Ascorbic Acid (vitamin C) 1000 MG tablet Take 1,000 mg by mouth daily (Patient not taking: Reported on 2023)     • Cholecalciferol (Vitamin D3) 50 MCG (2000 UT) capsule Take 2,000 Units by mouth daily (Patient not taking: Reported on 2023)     • docusate sodium (COLACE) 100 mg capsule Take 1 capsule (100 mg total) by mouth 2 (two) times a day (Patient not taking: "Reported on 4/19/2023) 10 capsule 0   • FLUoxetine (PROzac) 10 mg capsule Take 1 capsule (10 mg total) by mouth daily (Patient not taking: Reported on 4/19/2023) 90 capsule 3   • folic acid (FOLVITE) 1 mg tablet Take 1 mg by mouth daily (Patient not taking: Reported on 4/19/2023)     • glucose blood (Contour Next Test) test strip Test up to 8 times per day (Patient not taking: Reported on 4/19/2023) 200 each 0   • metFORMIN (GLUCOPHAGE-XR) 500 mg 24 hr tablet Take 1,500 mg by mouth daily (Patient not taking: Reported on 4/19/2023)     • NovoLOG 100 UNIT/ML injection Inject 100 mL as directed daily     • Prenatal MV-Min-Fe Fum-FA-DHA (PRENATAL 1 PO) Take by mouth (Patient not taking: Reported on 4/19/2023)       No current facility-administered medications for this visit  Allergies   Allergen Reactions   • Amoxicillin Hives   • Cefaclor        Review of Systems    Video Exam    Vitals:    06/29/23 1112   Weight: 88 5 kg (195 lb)   Height: 5' 5\" (1 651 m)       Physical Exam  HENT:      Head: Normocephalic  Nose: Nose normal    Eyes:      Conjunctiva/sclera: Conjunctivae normal    Pulmonary:      Effort: Pulmonary effort is normal    Musculoskeletal:      Cervical back: Normal range of motion  Neurological:      Mental Status: She is alert and oriented to person, place, and time  Psychiatric:         Mood and Affect: Mood normal          Behavior: Behavior normal          Thought Content: Thought content normal          Judgment: Judgment normal           Visit Time  Total Visit Duration: 30 minutes        "

## 2023-06-29 NOTE — PATIENT INSTRUCTIONS
-Repeat A1c, goal A1c for pre-conception less than 6% with minimal hypoglycemia   -Continue prenatal and folic acid   -Restart Guardian CGM  -Novolog via Medtronic 770/780G insulin pump   -Current basal setting:  MN@ 1 50 units/hour  -Please input glucose readings and carbs into insulin pump for more information    -Follow up in 2 weeks

## 2023-07-11 ENCOUNTER — TELEPHONE (OUTPATIENT)
Age: 33
End: 2023-07-11

## 2023-07-11 NOTE — TELEPHONE ENCOUNTER
Left patient a message to reschedule her MFM appointment (60 min with Stacie) that was cancelled via [address] [place] [address]. The new time, date and location were provided. The patient has been instructed to please call us back at [phone] to reschedule.

## 2023-08-01 DIAGNOSIS — B37.9 YEAST INFECTION: Primary | ICD-10-CM

## 2023-08-02 ENCOUNTER — TELEPHONE (OUTPATIENT)
Dept: GYNECOLOGY | Facility: CLINIC | Age: 33
End: 2023-08-02

## 2023-08-03 ENCOUNTER — OFFICE VISIT (OUTPATIENT)
Dept: GYNECOLOGY | Facility: CLINIC | Age: 33
End: 2023-08-03
Payer: COMMERCIAL

## 2023-08-03 VITALS
BODY MASS INDEX: 32.32 KG/M2 | SYSTOLIC BLOOD PRESSURE: 122 MMHG | DIASTOLIC BLOOD PRESSURE: 80 MMHG | WEIGHT: 194 LBS | HEIGHT: 65 IN

## 2023-08-03 DIAGNOSIS — B37.31 YEAST VAGINITIS: ICD-10-CM

## 2023-08-03 DIAGNOSIS — E53.8 FOLIC ACID DEFICIENCY: Primary | ICD-10-CM

## 2023-08-03 DIAGNOSIS — Z32.00 POSSIBLE PREGNANCY, NOT CONFIRMED: ICD-10-CM

## 2023-08-03 DIAGNOSIS — R10.2 PELVIC PRESSURE IN FEMALE: ICD-10-CM

## 2023-08-03 PROBLEM — E11.42 DIABETIC PERIPHERAL NEUROPATHY (HCC): Status: ACTIVE | Noted: 2023-08-03

## 2023-08-03 LAB
SL AMB  POCT GLUCOSE, UA: NEGATIVE
SL AMB LEUKOCYTE ESTERASE,UA: ABNORMAL
SL AMB POCT BILIRUBIN,UA: NEGATIVE
SL AMB POCT BLOOD,UA: NEGATIVE
SL AMB POCT CLARITY,UA: CLEAR
SL AMB POCT COLOR,UA: YELLOW
SL AMB POCT KETONES,UA: NEGATIVE
SL AMB POCT NITRITE,UA: NEGATIVE
SL AMB POCT PH,UA: 5
SL AMB POCT SPECIFIC GRAVITY,UA: 1000
SL AMB POCT URINE HCG: NEGATIVE
SL AMB POCT URINE PROTEIN: NEGATIVE
SL AMB POCT UROBILINOGEN: NORMAL

## 2023-08-03 PROCEDURE — 81002 URINALYSIS NONAUTO W/O SCOPE: CPT | Performed by: OBSTETRICS & GYNECOLOGY

## 2023-08-03 PROCEDURE — 99212 OFFICE O/P EST SF 10 MIN: CPT | Performed by: OBSTETRICS & GYNECOLOGY

## 2023-08-03 PROCEDURE — 81025 URINE PREGNANCY TEST: CPT | Performed by: OBSTETRICS & GYNECOLOGY

## 2023-08-03 RX ORDER — FOLIC ACID 1 MG/1
1 TABLET ORAL DAILY
Qty: 90 TABLET | Refills: 3 | Status: SHIPPED | OUTPATIENT
Start: 2023-08-03

## 2023-08-03 NOTE — PROGRESS NOTES
Patient presents the office complaining of vaginal irritation. On day 6 of Terazol 7. Has improved. Started with vaginal yeast infection and used over-the-counter Monistat. Then was started on Terazol 66 ago. Blood sugars have been in the 200s. On an insulin pump. Denies any urinary problems. UA dip mild leukocytes negative blood. Negative pregnancy test.  Scheduled for this G this week by infertility. Will be getting an embryo for. History of pre-PROM at 17 weeks. Physical exam: Abdomen soft nontender. No groin nodes. External genitalia slightly red. Vagina filled with Terazol 7 cream.  No cervical motion tenderness. Uterus mobile nontender. No adnexal masses. Impression: Resolving yeast infection. Follow-up with infertility. Prescription for 1 mg folic acid placed today. Continuing on her prenatal vitamin.

## 2024-04-22 ENCOUNTER — ANNUAL EXAM (OUTPATIENT)
Dept: GYNECOLOGY | Facility: CLINIC | Age: 34
End: 2024-04-22
Payer: COMMERCIAL

## 2024-04-22 VITALS
DIASTOLIC BLOOD PRESSURE: 70 MMHG | HEIGHT: 65 IN | WEIGHT: 188 LBS | SYSTOLIC BLOOD PRESSURE: 124 MMHG | BODY MASS INDEX: 31.32 KG/M2

## 2024-04-22 DIAGNOSIS — Z01.419 ROUTINE GYNECOLOGICAL EXAMINATION: Primary | ICD-10-CM

## 2024-04-22 DIAGNOSIS — Z11.51 SCREENING FOR HPV (HUMAN PAPILLOMAVIRUS): ICD-10-CM

## 2024-04-22 PROCEDURE — 99395 PREV VISIT EST AGE 18-39: CPT | Performed by: OBSTETRICS & GYNECOLOGY

## 2024-04-22 PROCEDURE — G0476 HPV COMBO ASSAY CA SCREEN: HCPCS | Performed by: OBSTETRICS & GYNECOLOGY

## 2024-04-22 PROCEDURE — G0145 SCR C/V CYTO,THINLAYER,RESCR: HCPCS | Performed by: OBSTETRICS & GYNECOLOGY

## 2024-04-22 NOTE — PROGRESS NOTES
Assessment      Breast and GYN exam  Normal Pap smear .  PCOS  1 diabetes on insulin pump  Positive tobacco abuse 10 cigarettes a day.      Plan: Rx mammogram.  Pap smear.  Encouraged to quit smoking.    Subjective: G3, P1  at 29 weeks rupture membranes at 17 weeks     Patient ID: Lupe Hauser is a 33 y.o. female presents for annual exam no complaints.  History of PCOS and has irregular cycles.  Considering Ozempic.  Will be talking with her endocrinologist.  Denies any abnormal bleeding or dyspareunia.  Denies any pelvic pain breast bowel or bladder issues.  No change in family history.  Medications reviewed.  Working full-time.        Review of Systems   Constitutional: Negative.  Negative for fatigue, fever and unexpected weight change.   HENT: Negative.     Eyes: Negative.    Respiratory: Negative.  Negative for chest tightness, shortness of breath, wheezing and stridor.    Cardiovascular: Negative.  Negative for chest pain, palpitations and leg swelling.   Gastrointestinal: Negative.  Negative for abdominal pain, blood in stool, diarrhea, nausea, rectal pain and vomiting.   Endocrine: Negative.    Genitourinary:  Negative for dysuria, frequency, vaginal bleeding, vaginal discharge and vaginal pain.   Musculoskeletal: Negative.    Skin: Negative.    Allergic/Immunologic: Negative.    Neurological: Negative.    Hematological: Negative.    Psychiatric/Behavioral: Negative.     All other systems reviewed and are negative.        Objective:      There were no vitals taken for this visit.         Physical Exam  Constitutional:       Appearance: She is well-developed.   HENT:      Head: Normocephalic and atraumatic.   Neck:      Thyroid: No thyromegaly.      Trachea: No tracheal deviation.   Cardiovascular:      Rate and Rhythm: Normal rate and regular rhythm.      Heart sounds: Normal heart sounds.   Pulmonary:      Effort: Pulmonary effort is normal. No respiratory distress.      Breath sounds: Normal  breath sounds. No stridor. No wheezing or rales.   Chest:      Chest wall: No tenderness.   Breasts:     Breasts are symmetrical.      Right: No inverted nipple, mass, nipple discharge, skin change or tenderness.      Left: No inverted nipple, mass, nipple discharge, skin change or tenderness.   Abdominal:      General: Bowel sounds are normal. There is no distension.      Palpations: Abdomen is soft. There is no mass.      Tenderness: There is no abdominal tenderness. There is no guarding or rebound.      Hernia: No hernia is present. There is no hernia in the left inguinal area.   Genitourinary:     Labia:         Right: No rash, tenderness, lesion or injury.         Left: No rash, tenderness, lesion or injury.       Vagina: Normal. No signs of injury and foreign body. No vaginal discharge, erythema, tenderness or bleeding.      Cervix: No cervical motion tenderness, discharge or friability.      Uterus: Not deviated, not enlarged, not fixed and not tender.       Adnexa:         Right: No mass, tenderness or fullness.          Left: No mass, tenderness or fullness.        Rectum: No mass, anal fissure, external hemorrhoid or internal hemorrhoid.   Musculoskeletal:      Cervical back: Normal range of motion and neck supple.   Lymphadenopathy:      Lower Body: No right inguinal adenopathy. No left inguinal adenopathy.   Skin:     General: Skin is warm and dry.   Neurological:      Mental Status: She is alert and oriented to person, place, and time.   Psychiatric:         Behavior: Behavior normal.         Thought Content: Thought content normal.         Judgment: Judgment normal.

## 2024-04-24 LAB
HPV HR 12 DNA CVX QL NAA+PROBE: NEGATIVE
HPV16 DNA CVX QL NAA+PROBE: NEGATIVE
HPV18 DNA CVX QL NAA+PROBE: NEGATIVE

## 2024-04-27 LAB
LAB AP GYN PRIMARY INTERPRETATION: NORMAL
Lab: NORMAL
PATH INTERP SPEC-IMP: NORMAL

## 2024-06-13 ENCOUNTER — TELEPHONE (OUTPATIENT)
Age: 34
End: 2024-06-13

## 2024-06-13 DIAGNOSIS — N91.2 AMENORRHEA: Primary | ICD-10-CM

## 2024-06-13 DIAGNOSIS — E10.9 TYPE 1 DIABETES MELLITUS WITHOUT COMPLICATION (HCC): ICD-10-CM

## 2024-06-13 DIAGNOSIS — Z87.59 HISTORY OF PRETERM PREMATURE RUPTURE OF MEMBRANES (PPROM): ICD-10-CM

## 2024-06-13 NOTE — TELEPHONE ENCOUNTER
Placed call to the patient. Reviewed her medical history. Aware that I will consult with Cesilia since she is an existing patient with her and she is new to [facility]. Patient aware of directions for going to the lab 48hr apart.   Mychart sent to the patient after reviewing with Cesilia- sent for Quant x2, Progesterone level, and a referral to [facility].

## 2024-06-13 NOTE — TELEPHONE ENCOUNTER
Patient is 4 wks pregnant and is scheduled for a d and v 7/24. She is a pt of Gyn Fel[facility]. She is concerned about her last pregnancy being type 1 diabetic making her high risk. She is wondering about a MFM referral or if she needs to move appt up. Please advise.

## 2024-06-17 ENCOUNTER — APPOINTMENT (OUTPATIENT)
Dept: LAB | Facility: CLINIC | Age: 34
End: 2024-06-17
Payer: COMMERCIAL

## 2024-06-17 DIAGNOSIS — Z87.59 HISTORY OF PRETERM PREMATURE RUPTURE OF MEMBRANES (PPROM): ICD-10-CM

## 2024-06-17 DIAGNOSIS — N91.2 AMENORRHEA: ICD-10-CM

## 2024-06-17 DIAGNOSIS — N91.2 AMENORRHEA: Primary | ICD-10-CM

## 2024-06-17 DIAGNOSIS — E10.9 TYPE 1 DIABETES MELLITUS WITHOUT COMPLICATION (HCC): ICD-10-CM

## 2024-06-17 LAB
B-HCG SERPL-ACNC: 289.7 MIU/ML (ref 0–5)
PROGEST SERPL-MCNC: 27.61 NG/ML

## 2024-06-17 PROCEDURE — 84702 CHORIONIC GONADOTROPIN TEST: CPT

## 2024-06-17 PROCEDURE — 36415 COLL VENOUS BLD VENIPUNCTURE: CPT

## 2024-06-17 PROCEDURE — 84144 ASSAY OF PROGESTERONE: CPT

## 2024-06-18 ENCOUNTER — TELEPHONE (OUTPATIENT)
Facility: HOSPITAL | Age: 34
End: 2024-06-18

## 2024-06-18 NOTE — TELEPHONE ENCOUNTER
Called pt to reschedule 8/7/24 ultrasound to sooner appt for dating ultrasound and consultation with [facility] provider. Pt confirmed new appt for 7/22/24 at 11:30 AM at [facility] office.

## 2024-06-19 ENCOUNTER — APPOINTMENT (OUTPATIENT)
Dept: LAB | Facility: CLINIC | Age: 34
End: 2024-06-19
Payer: COMMERCIAL

## 2024-06-19 ENCOUNTER — TELEPHONE (OUTPATIENT)
Age: 34
End: 2024-06-19

## 2024-06-19 DIAGNOSIS — N91.2 AMENORRHEA: ICD-10-CM

## 2024-06-19 LAB — B-HCG SERPL-ACNC: 669.4 MIU/ML (ref 0–5)

## 2024-06-19 PROCEDURE — 36415 COLL VENOUS BLD VENIPUNCTURE: CPT

## 2024-06-19 PROCEDURE — 84702 CHORIONIC GONADOTROPIN TEST: CPT

## 2024-07-21 PROBLEM — O09.813 PREGNANCY RESULTING FROM IN VITRO FERTILIZATION IN THIRD TRIMESTER: Status: RESOLVED | Noted: 2020-12-29 | Resolved: 2024-07-21

## 2024-07-21 PROBLEM — O34.219 PREGNANCY WITH HISTORY OF CESAREAN SECTION, ANTEPARTUM: Status: ACTIVE | Noted: 2024-07-21

## 2024-07-21 PROBLEM — O09.899 HISTORY OF PRETERM DELIVERY, CURRENTLY PREGNANT: Status: ACTIVE | Noted: 2024-07-21

## 2024-07-22 ENCOUNTER — TELEPHONE (OUTPATIENT)
Facility: HOSPITAL | Age: 34
End: 2024-07-22

## 2024-07-22 ENCOUNTER — ULTRASOUND (OUTPATIENT)
Facility: HOSPITAL | Age: 34
End: 2024-07-22
Payer: COMMERCIAL

## 2024-07-22 VITALS
WEIGHT: 189.6 LBS | DIASTOLIC BLOOD PRESSURE: 70 MMHG | HEIGHT: 65 IN | BODY MASS INDEX: 31.59 KG/M2 | HEART RATE: 88 BPM | SYSTOLIC BLOOD PRESSURE: 108 MMHG

## 2024-07-22 DIAGNOSIS — N91.2 AMENORRHEA: ICD-10-CM

## 2024-07-22 DIAGNOSIS — O36.80X0 ENCOUNTER TO DETERMINE FETAL VIABILITY OF PREGNANCY, SINGLE OR UNSPECIFIED FETUS: ICD-10-CM

## 2024-07-22 DIAGNOSIS — O34.219 PREGNANCY WITH HISTORY OF CESAREAN SECTION, ANTEPARTUM: ICD-10-CM

## 2024-07-22 DIAGNOSIS — E10.65 TYPE 1 DIABETES MELLITUS WITH HYPERGLYCEMIA (HCC): ICD-10-CM

## 2024-07-22 DIAGNOSIS — O09.899 HISTORY OF PRETERM DELIVERY, CURRENTLY PREGNANT: ICD-10-CM

## 2024-07-22 DIAGNOSIS — Z3A.10 10 WEEKS GESTATION OF PREGNANCY: Primary | ICD-10-CM

## 2024-07-22 DIAGNOSIS — E10.9 TYPE 1 DIABETES MELLITUS WITHOUT COMPLICATION (HCC): ICD-10-CM

## 2024-07-22 DIAGNOSIS — Z87.59 HISTORY OF PRETERM PREMATURE RUPTURE OF MEMBRANES (PPROM): ICD-10-CM

## 2024-07-22 PROBLEM — Z98.891 S/P PRIMARY LOW TRANSVERSE C-SECTION: Status: RESOLVED | Noted: 2021-03-16 | Resolved: 2024-07-22

## 2024-07-22 PROCEDURE — 76801 OB US < 14 WKS SINGLE FETUS: CPT | Performed by: OBSTETRICS & GYNECOLOGY

## 2024-07-22 PROCEDURE — 99204 OFFICE O/P NEW MOD 45 MIN: CPT | Performed by: OBSTETRICS & GYNECOLOGY

## 2024-07-22 NOTE — TELEPHONE ENCOUNTER
Called Mark CHAPMAN at [phone] and spoke with Griselle B.  Patient is scheduled for Monday, August 26, 2024 8 am.    Procedure is scheduled with Dr. Paiz.     [employer] message sent with instructions.

## 2024-07-22 NOTE — PROGRESS NOTES
Ultrasound Probe Disinfection    A transvaginal ultrasound was performed.   Prior to use, disinfection was performed with High Level Disinfection Process ([employer]).  [id number] [id number] was used.      Anika Sanderson  07/22/24  11:36 AM

## 2024-07-22 NOTE — LETTER
"   Date: 2024    Izabela Cosby PA-C  [address] [facility]  [place] [address]    Patient: Lupe Hauser   YOB: 1990   Date of Visit: 2024   Gestational age 9w6d   Nature of this communication: Routine though please note planning NIPS in 2 days, NT at 12 weeks, cerclage at 14 weeks, and diabetes team ASAP.       This patient was seen recently in our  office.  Please see ultrasound report under \"OB Procedures\" tab.  Please don't hesitate to contact our office with any concerns or questions.      Sincerely,      Toyin Washington MD  Attending Physician, Maternal-Fetal Medicine  [facility]      "

## 2024-07-23 NOTE — PROGRESS NOTES
"[facility]: Ms. Hauser was seen today for dating ultrasound.  See ultrasound report under \"OB Procedures\" tab.      MDM:   I. Diagnoses/Problems addressed:  Chronic illness with exacerbation, progression, or side effects of treatment: type I DM  II.  Data: I reviewed notes from operative note and discharge summary and prior OB ultrasounds; care everywhere A1C.  III.  Risk of morbidity: High    Please don't hesitate to contact our office with any concerns or questions.  -Toyin Washington MD      "

## 2024-07-24 ENCOUNTER — CLINICAL SUPPORT (OUTPATIENT)
Facility: HOSPITAL | Age: 34
End: 2024-07-24
Payer: COMMERCIAL

## 2024-07-24 ENCOUNTER — ULTRASOUND (OUTPATIENT)
Dept: OBGYN CLINIC | Facility: CLINIC | Age: 34
End: 2024-07-24

## 2024-07-24 VITALS — SYSTOLIC BLOOD PRESSURE: 120 MMHG | BODY MASS INDEX: 31.55 KG/M2 | WEIGHT: 189.6 LBS | DIASTOLIC BLOOD PRESSURE: 72 MMHG

## 2024-07-24 DIAGNOSIS — Z36.0 ENCOUNTER FOR ANTENATAL SCREENING FOR CHROMOSOMAL ANOMALIES: Primary | ICD-10-CM

## 2024-07-24 DIAGNOSIS — N91.2 AMENORRHEA: Primary | ICD-10-CM

## 2024-07-24 PROCEDURE — 36415 COLL VENOUS BLD VENIPUNCTURE: CPT | Performed by: OBSTETRICS & GYNECOLOGY

## 2024-07-24 NOTE — PROGRESS NOTES
Assessment/Plan:  - Viable IUP @ 10w 0d EGA  - ESTHER 2025  - Continue PNV  - Patient to call for concerns  - RTO 1 weeks for OB intake    Encounter Diagnosis     ICD-10-CM    1. Amenorrhea  N91.2               Subjective:       Patient ID: Lupe Hauser 1990        Lupe Hauser is a 33 y.o.  presenting to the office for pregnancy confirmation. Patient's last menstrual period was 05/15/2024. , placing her at 10w0d today with ESTHER of 25. She is feeling ok today. Patient had a viability scan and consult with MFM on . She has an OB hx significant for PPROM @ 17wk GA with delivery via LTCS @ 29.5 weeks. Her son is followed by physicians at [facility] for short bowel syndrome, as he experienced a bowel perforation in utero. That pregnancy was conceived via IVF.     This is a spontaneous and unplanned conception. Patient and  were considering surrogacy for future pregnancy when they spontaneously conceived.  Patient is a Type1 Diabetic.        OB History    Para Term  AB Living   4 1 0 1 2 1   SAB IAB Ectopic Multiple Live Births   2 0 0 0 1      # Outcome Date GA Lbr Galileo/2nd Weight Sex Type Anes PTL Lv   4 Current            3  21 29w5d  1650 g (3 lb 10.2 oz) M CS-LTranv Spinal  JAYDA   2 SAB            1 SAB                  The following portions of the patient's history were reviewed and updated as appropriate: She  has a past medical history of Diabetes mellitus (HCC), Female infertility, PCOS (polycystic ovarian syndrome), and Varicella.  She   Patient Active Problem List    Diagnosis Date Noted    History of  premature rupture of membranes (PPROM) 2024    History of  delivery, currently pregnant 2024    Pregnancy with history of  section, antepartum 2024    Diabetic peripheral neuropathy (HCC) 2023    BMI 32.0-32.9,adult 2023    Insulin pump in place 2023    Family history of congenital  disease 2023    Pre-conception counseling 2023    Type 1 diabetes mellitus with hyperglycemia (HCC) 2021    History of  delivery      She  has a past surgical history that includes pr  delivery only (N/A, 3/16/2021).  Her family history includes Diabetes in her father and sister; Heart attack in her father and paternal uncle; Hyperlipidemia in her mother.  She  reports that she has been smoking cigarettes. She has never used smokeless tobacco. She reports that she does not currently use alcohol. She reports that she does not use drugs.  Current Outpatient Medications   Medication Sig Dispense Refill    Cholecalciferol (Vitamin D3) 50 MCG (2000 UT) capsule Take 2,000 Units by mouth daily      folic acid (FOLVITE) 1 mg tablet Take 1 tablet (1 mg total) by mouth daily 90 tablet 3    glucose blood (Contour Next Test) test strip Test up to 8 times per day 200 each 0    NovoLOG 100 UNIT/ML injection Inject 100 mL as directed daily      Prenatal MV-Min-Fe Fum-FA-DHA (PRENATAL 1 PO) Take by mouth       No current facility-administered medications for this visit.     Current Outpatient Medications on File Prior to Visit   Medication Sig    Cholecalciferol (Vitamin D3) 50 MCG (2000 UT) capsule Take 2,000 Units by mouth daily    folic acid (FOLVITE) 1 mg tablet Take 1 tablet (1 mg total) by mouth daily    glucose blood (Contour Next Test) test strip Test up to 8 times per day    NovoLOG 100 UNIT/ML injection Inject 100 mL as directed daily    Prenatal MV-Min-Fe Fum-FA-DHA (PRENATAL 1 PO) Take by mouth     No current facility-administered medications on file prior to visit.     She is allergic to amoxicillin and cefaclor..    Allergies:  Amoxicillin and Cefaclor    Medications:    Current Outpatient Medications:     Cholecalciferol (Vitamin D3) 50 MCG (2000 UT) capsule, Take 2,000 Units by mouth daily, Disp: , Rfl:     folic acid (FOLVITE) 1 mg tablet, Take 1 tablet (1 mg total) by mouth daily,  Disp: 90 tablet, Rfl: 3    glucose blood (Contour Next Test) test strip, Test up to 8 times per day, Disp: 200 each, Rfl: 0    NovoLOG 100 UNIT/ML injection, Inject 100 mL as directed daily, Disp: , Rfl:     Prenatal MV-Min-Fe Fum-FA-DHA (PRENATAL 1 PO), Take by mouth, Disp: , Rfl:       Review of Systems:   Review of Systems   Genitourinary:  Negative for vaginal bleeding, vaginal discharge and vaginal pain.          Objective:       Visit Vitals  LMP 05/15/2024   OB Status Pregnant   Smoking Status Every Day        GEN: The patient was alert and oriented x3, pleasant well-appearing female in no acute distress.   PULM: nonlabored respirations  MSK: Normal gait  ABD: NTTP  Skin: warm, dry  Neuro: no focal deficits  Psych: normal affect and judgement, cooperative    TAUS:      Gestational sac: present               Location: intrauterine  Yolk sac: present  Fetal pole: present               CRL:  9w5d  Cardiac activity: present               Rate: 179 bpm             I have spent a total time of 30 minutes in caring for this patient on the day of the visit/encounter including Patient and family education, Documenting in the medical record, Reviewing / ordering tests, medicine, procedures  , and Obtaining or reviewing history  .

## 2024-07-24 NOTE — PROGRESS NOTES
Patient chose to have [facility] [id number] Non-Invasive Prenatal Screen [id number] PLUS w/ SCA, WITH fetal sex.  Patient choose to be billed through insurance.     Patient given brochure and is aware [facility] will contact patient's insurance and coordinate coverage.  Provided [facility] contact information. General inquiries [phone], Cost estimates [phone] and [facility] Billing [phone]. Website [employer]     Blood collection tubes labeled with patient identifiers (name, medical record number, and date of birth).     Filled out [facility] order form. Patient chose to have blood drawn in our office at time of visit. NIPS was drawn from left arm with a butterfly needle by Fang SHAH RN.       If patient chose to have blood work drawn at a [facility] lab we requested patient notify MFM (via phone call or "[employer] message) when blood collected so office can follow up on results.       Maternal Fetal Medicine will have results in approximately 5-7 business days and will call patient or notify via "[employer]  Patient aware viewing lab result online will reveal fetal sex if ordered.    Patient verbalized understanding of all instructions and no questions at this time.

## 2024-07-25 ENCOUNTER — OFFICE VISIT (OUTPATIENT)
Facility: HOSPITAL | Age: 34
End: 2024-07-25
Payer: COMMERCIAL

## 2024-07-25 VITALS — WEIGHT: 189 LBS | BODY MASS INDEX: 31.49 KG/M2 | HEIGHT: 65 IN

## 2024-07-25 DIAGNOSIS — O24.011 PRE-EXISTING TYPE 1 DIABETES MELLITUS WITH HYPERGLYCEMIA DURING PREGNANCY IN FIRST TRIMESTER (HCC): Primary | ICD-10-CM

## 2024-07-25 DIAGNOSIS — Z96.41 INSULIN PUMP IN PLACE: ICD-10-CM

## 2024-07-25 DIAGNOSIS — Z3A.10 10 WEEKS GESTATION OF PREGNANCY: ICD-10-CM

## 2024-07-25 DIAGNOSIS — E10.65 PRE-EXISTING TYPE 1 DIABETES MELLITUS WITH HYPERGLYCEMIA DURING PREGNANCY IN FIRST TRIMESTER (HCC): Primary | ICD-10-CM

## 2024-07-25 PROCEDURE — 99215 OFFICE O/P EST HI 40 MIN: CPT | Performed by: NURSE PRACTITIONER

## 2024-07-25 PROCEDURE — 99417 PROLNG OP E/M EACH 15 MIN: CPT | Performed by: NURSE PRACTITIONER

## 2024-07-25 NOTE — ASSESSMENT & PLAN NOTE
-Check A1c, CMP, TSH with reflex free T4 and baseline UPCR.  -A1c goal less than 6% with minimal hypoglycemia.   -Schedule follow up with dietitian.  -Keep 3 day food log to review with dietitian.  -Start self monitoring blood glucose (SMBG) fasting; 2 hours after start of each meal and with hypoglycemia.   -Glucose goals: fasting 60-90 mg/dL, 140 mg/dL or less 1 hour post meals, and 120 mg/dL or less 2 hours post meal.   -Novolog via Amplifinity 770 and Guardian CSM.   -Currently not enough data to make adjustments.   -Weekly insulin pump download.   -Start GDM meal plan with 3 meals and 3 snacks including recommended combination of carb, protein and fat per meal/snack.  -Please eat meal or snack every 2-3.5 hours while awake.  -No more than 8 to 10 hours of fasting overnight.  -Refer to [employer] MyPlate online as a reference.  -2nd/3rd trimester minimum total daily carbohydrates 175 grams paired with half grams in protein.   -Stay active if no restriction from your OB, walk up to 30 minutes a day.  -Always have glucose available to treat hypoglycemia. Use 15:15 rule.   -Refer to hypoglycemia patient education sheet. SMBG when experiencing signs and symptoms of hypoglycemia and prior to driving.   -Serial fetal growth ultrasounds.  -20 weeks detailed fetal growth ultrasound.  -22-24 weeks fetal echo.  -If diabetes related medications are started, at 32 weeks gestation; NST twice a week and GORDON weekly.   -Continue prenatal vitamin as recommended.  -At 36 weeks gestation, stop baby aspirin.   -Continue follow-up with your OB and MFM as recommended.  -Stay in close contact with diabetes education team.  -Insulin requirements during pregnancy; basal/bolus concept and Metformin discussed.  -Very important to maintain tight glucose control during pregnancy to decrease risk factors including fetal macrosomia; birth injury; risk of ; polyhydramnios; pre-term labor; pre-eclampsia;  hypoglycemia; jaundice  and stillbirth.   -Diabetes and pregnancy booklet; meal plan and hypoglycemia patient education.       Lab Results   Component Value Date    HGBA1C 6.1 (H) 03/04/2021

## 2024-07-25 NOTE — PROGRESS NOTES
Ambulatory Visit  Name: Lupe Hauser      : 1990      MRN: [id number]  Encounter Provider: JOAO Bloom  Encounter Date: 2024   Encounter department: [facility]    Assessment & Plan   1. Pre-existing type 1 diabetes mellitus with hyperglycemia during pregnancy in first trimester (HCC)  Assessment & Plan:  -Check A1c, CMP, TSH with reflex free T4 and baseline UPCR.  -A1c goal less than 6% with minimal hypoglycemia.   -Schedule follow up with dietitian.  -Keep 3 day food log to review with dietitian.  -Start self monitoring blood glucose (SMBG) fasting; 2 hours after start of each meal and with hypoglycemia.   -Glucose goals: fasting 60-90 mg/dL, 140 mg/dL or less 1 hour post meals, and 120 mg/dL or less 2 hours post meal.   -Novolog via [place] [employer] 770 and Guardian CSM.   -Currently not enough data to make adjustments.   -Weekly insulin pump download.   -Start GDM meal plan with 3 meals and 3 snacks including recommended combination of carb, protein and fat per meal/snack.  -Please eat meal or snack every 2-3.5 hours while awake.  -No more than 8 to 10 hours of fasting overnight.  -Refer to [employer] MyPlate online as a reference.  -2nd/3rd trimester minimum total daily carbohydrates 175 grams paired with half grams in protein.   -Stay active if no restriction from your OB, walk up to 30 minutes a day.  -Always have glucose available to treat hypoglycemia. Use 15:15 rule.   -Refer to hypoglycemia patient education sheet. SMBG when experiencing signs and symptoms of hypoglycemia and prior to driving.   -Serial fetal growth ultrasounds.  -20 weeks detailed fetal growth ultrasound.  -22-24 weeks fetal echo.  -If diabetes related medications are started, at 32 weeks gestation; NST twice a week and GORDON weekly.   -Continue prenatal vitamin as recommended.  -At 36 weeks gestation, stop baby aspirin.   -Continue follow-up with your OB and MFM as recommended.  -Stay in close  contact with diabetes education team.  -Insulin requirements during pregnancy; basal/bolus concept and Metformin discussed.  -Very important to maintain tight glucose control during pregnancy to decrease risk factors including fetal macrosomia; birth injury; risk of ; polyhydramnios; pre-term labor; pre-eclampsia;  hypoglycemia; jaundice and stillbirth.   -Diabetes and pregnancy booklet; meal plan and hypoglycemia patient education.       Lab Results   Component Value Date    HGBA1C 6.1 (H) 2021     Orders:  -     TSH, 3rd generation with Free T4 reflex  -     Comprehensive metabolic panel  -     Hemoglobin A1C  -     Protein / creatinine ratio, urine; Future  2. 10 weeks gestation of pregnancy  -     TSH, 3rd generation with Free T4 reflex  -     Comprehensive metabolic panel  -     Hemoglobin A1C  -     Protein / creatinine ratio, urine; Future  3. BMI 31.0-31.9,adult  Assessment & Plan:  -First visit weight 189 lbs.  -Recommended weight gain 11 to 20 lbs.  -Start GDM meal plan and follow up with dietitian.   Orders:  -     TSH, 3rd generation with Free T4 reflex  -     Comprehensive metabolic panel  -     Hemoglobin A1C  -     Protein / creatinine ratio, urine; Future  4. Insulin pump in place  -     TSH, 3rd generation with Free T4 reflex  -     Comprehensive metabolic panel  -     Hemoglobin A1C  -     Protein / creatinine ratio, urine; Future  Wt. 86 kg using 0.40-0.50 units/kg equals 34-43 units TDD.  34 units main basal rate 0.70 units/hour; carb ratio using 400 vs 450 would be 12; ISF 50  43 units main basal rate 0.90 units/hour; carb ratio 9; ISF 40.   Pending insulin pump download for recommendations.     History of Present Illness     Lupe Hauser is a 33 y.o. female  10 1/7 weeks gestation T1DM on Novolog via [employer] 770G and Guardian CGM, restarted insulin pump in April, last A1c 10%. Reported not enough glucose data currently. Re-diagnosed as PAZ-mutation 3.  Was on  Ozempic and Metformin per endocrinologist, lost a couple of pounds, medications stopped due to pregnancy. Pending transition from 770G to 780G insulin pump auto-mode. Needs to set up [employer] zay and follow up with [employer] diabetes educator.   Wakes up at 9 AM, eats breakfast bar around 10:30 AM. Trying to eat less carbs, more protein, fruits and veggies. Early pregnancy minimum daily carbohydrates 135 grams paired with half the grams in protein. 2nd/3rd trimester 175 grams paired with half the grams in protein.   Eats 2-3 meals a day and snacks throughout the day, times vary.   Bedtime MN-1 AM.    back to work, will wake up at 6 AM and bedtime 11 PM.   Very busy with son. History of PPROM and delivered via .   Review of Systems   Constitutional:  Positive for fatigue. Negative for fever.   HENT:  Positive for congestion. Negative for trouble swallowing.    Eyes:  Negative for visual disturbance.        Needs eye exam.    Respiratory:  Negative for cough and shortness of breath.    Cardiovascular:  Negative for chest pain, palpitations and leg swelling.   Gastrointestinal:  Positive for nausea. Negative for constipation and vomiting.   Endocrine: Positive for polyphagia and polyuria. Negative for polydipsia.   Genitourinary:  Negative for difficulty urinating and vaginal bleeding.   Musculoskeletal:  Negative for back pain.   Skin:  Negative for rash.   Neurological:  Positive for numbness (right hand numbness). Negative for headaches.   Psychiatric/Behavioral:  Negative for sleep disturbance.      Medical History Reviewed by provider this encounter:  Tobacco  Allergies  Meds  Problems  Med Hx  Surg Hx  Fam Hx  Soc   Hx      Current Outpatient Medications on File Prior to Visit   Medication Sig Dispense Refill    acetone, urine, test strip Use 1 strip if needed for high blood sugar      Cholecalciferol (Vitamin D3) 50 MCG ( UT) capsule Take 2,000 Units by mouth daily      folic acid  "(FOLVITE) 1 mg tablet Take 1 tablet (1 mg total) by mouth daily 90 tablet 3    glucose blood (Contour Next Test) test strip Test up to 8 times per day 200 each 0    NovoLOG 100 UNIT/ML injection Inject 100 mL as directed daily      Prenatal MV-Min-Fe Fum-FA-DHA (PRENATAL 1 PO) Take by mouth       No current facility-administered medications on file prior to visit.      Social History     Tobacco Use    Smoking status: Former     Current packs/day: 0.00     Types: Cigarettes     Quit date: 2024     Years since quittin.1    Smokeless tobacco: Never   Vaping Use    Vaping status: Never Used   Substance and Sexual Activity    Alcohol use: Not Currently    Drug use: Never    Sexual activity: Yes     Partners: Male     Objective     Ht 5' 5\" (1.651 m)   Wt 85.7 kg (189 lb)   LMP 05/15/2024   BMI 31.45 kg/m²     Physical Exam  HENT:      Head: Normocephalic.      Nose: Nose normal.   Eyes:      Conjunctiva/sclera: Conjunctivae normal.   Cardiovascular:      Rate and Rhythm: Normal rate and regular rhythm.      Heart sounds: Normal heart sounds.   Pulmonary:      Effort: Pulmonary effort is normal.      Breath sounds: Normal breath sounds.   Neurological:      Mental Status: She is alert and oriented to person, place, and time.   Psychiatric:         Mood and Affect: Mood normal.         Behavior: Behavior normal.         Thought Content: Thought content normal.         Judgment: Judgment normal.       Administrative Statements   I have spent a total time of 60 minutes in caring for this patient on the day of the visit/encounter including Risks and benefits of tx options, Instructions for management, Patient and family education, Importance of tx compliance, Risk factor reductions, Counseling / Coordination of care, Documenting in the medical record, Reviewing / ordering tests, medicine, procedures  , and Obtaining or reviewing history  .        "

## 2024-07-29 LAB
CFDNA.FET/CFDNA.TOTAL SFR FETUS: NORMAL %
CITATION REF LAB TEST: NORMAL
FET 13+18+21+X+Y ANEUP PLAS.CFDNA: NEGATIVE
FET CHR 21 TS PLAS.CFDNA QL: NEGATIVE
FET CHR 21 TS PLAS.CFDNA QL: NEGATIVE
FET MS X RISK WBC.DNA+CFDNA QL: NOT DETECTED
FET SEX PLAS.CFDNA DOSAGE CFDNA: NORMAL
FET TS 13 RISK PLAS.CFDNA QL: NEGATIVE
FET X + Y ANEUP RISK PLAS.CFDNA SEQ-IMP: NOT DETECTED
GA EST FROM CONCEPTION DATE: NORMAL D
GESTATIONAL AGE > 9:: YES
LAB DIRECTOR NAME PROVIDER: NORMAL
LAB DIRECTOR NAME PROVIDER: NORMAL
LABORATORY COMMENT REPORT: NORMAL
LIMITATIONS OF THE TEST: NORMAL
NEGATIVE PREDICTIVE VALUE: NORMAL
PERFORMANCE CHARACTERISTICS: NORMAL
POSITIVE PREDICTIVE VALUE: NORMAL
REF LAB TEST METHOD: NORMAL
SL AMB NOTE:: NORMAL
TEST PERFORMANCE INFO SPEC: NORMAL

## 2024-07-30 NOTE — ASSESSMENT & PLAN NOTE
-First visit weight 189 lbs.  -Recommended weight gain 11 to 20 lbs.  -Start GDM meal plan and follow up with dietitian.

## 2024-08-05 ENCOUNTER — OB ABSTRACT (OUTPATIENT)
Dept: OBGYN CLINIC | Facility: CLINIC | Age: 34
End: 2024-08-05

## 2024-08-05 NOTE — PATIENT INSTRUCTIONS
.Congratulations!! Please review our Pregnancy Essential Guide and [facility] L&D Virtual tour from our networks website.     [facility] Pregnancy Essentials Guide  [facility] ([email or web address])     [facility] - Virtual Tour ([employer])        .Alcides Hauser,     It was so nice getting to know you today. Remember if you have an urgent or time sensitive concern, please call the practice phone number so a clinical triage team member can review your symptoms and get in touch with our on call provider if necessary. If you have general questions or need help navigating our services please REPLY to this message so it comes directly to me and I will respond between other patients. If I am out of the office for any reason, another nurse navigator may reach out to help you. Our Pregnancy Essential Guide is a great online resource--please use the link below.     [facility] Pregnancy Essentials Guide  [facility] ([email or web address])     Again, Congratulations and Thank You for choosing [facility]. I look forward to helping you through this journey. Reach out- [phone]

## 2024-08-07 ENCOUNTER — INITIAL PRENATAL (OUTPATIENT)
Dept: OBGYN CLINIC | Facility: CLINIC | Age: 34
End: 2024-08-07

## 2024-08-07 VITALS — BODY MASS INDEX: 31.49 KG/M2 | HEIGHT: 65 IN | WEIGHT: 189 LBS

## 2024-08-07 DIAGNOSIS — O24.410 DIET CONTROLLED GESTATIONAL DIABETES MELLITUS (GDM) IN FIRST TRIMESTER: ICD-10-CM

## 2024-08-07 DIAGNOSIS — Z31.430 ENCOUNTER OF FEMALE FOR TESTING FOR GENETIC DISEASE CARRIER STATUS FOR PROCREATIVE MANAGEMENT: ICD-10-CM

## 2024-08-07 DIAGNOSIS — Z34.81 PRENATAL CARE, SUBSEQUENT PREGNANCY, FIRST TRIMESTER: ICD-10-CM

## 2024-08-07 DIAGNOSIS — Z36.9 UNSPECIFIED ANTENATAL SCREENING: Primary | ICD-10-CM

## 2024-08-07 PROCEDURE — OBC

## 2024-08-07 RX ORDER — INSULIN DEGLUDEC 100 U/ML
20 INJECTION, SOLUTION SUBCUTANEOUS
COMMUNITY
Start: 2024-04-25

## 2024-08-07 NOTE — PROGRESS NOTES
.  OB INTAKE INTERVIEW  Patient is 34 y.o. who presents for OB intake at 12wks  She is accompanied by herself during this encounter  The father of her baby (Lambert) is involved in the pregnancy and is 36 years old.      Last Menstrual Period: 05/15/2024  Ultrasound: Measured 10 weeks 0 days on 2024  Estimated Date of Delivery: 2025  confirmed by 10 week US    Signs/Symptoms of Pregnancy  Current pregnancy symptoms: Breast tenderness, hungry  Constipation no  Headaches no  Cramping/spotting no  PICA cravings no    Diabetes-  Body mass index is 31.45 kg/m².  If patient has 1 or more, please order early 1 hour GTT  History of GDM no  BMI >35 no  History of PCOS or current metformin use YES  History of LGA/macrosomic infant (4000g/9lbs) no    If patient has 2 or more, please order early 1 hour GTT  BMI>30 YES  AMA no  First degree relative with type 2 diabetes no  History of chronic HTN, hyperlipidemia, elevated A1C YES, Patient Elevated A 1 C  High risk race (, , ,  or ) no    Hypertension- if you answer yes to any of the following, please order baseline preeclampsia labs (cbc, comprehensive metabolic panel, urine protein creatinine ratio, uric acid)  History of of chronic HTN no  History of gestational HTN no  History of preeclampsia, eclampsia, or HELLP syndrome no  History of diabetes YES  History of lupus, autoimmune disease, kidney disease no    Thyroid- if yes order TSH with reflex T4  History of thyroid disease no    Bleeding Disorder or Hx of DVT-patient or first degree relative with history of. Order the following if not done previously.   (Factor V, antithrombin III, prothrombin gene mutation, protein C and S Ag, lupus anticoagulant, anticardiolipin, beta-2 glycoprotein)   no    OB/GYN-  History of abnormal pap smear YES       Date of last pap smear 2024  History of HPV YES  History of Herpes/HSV no  History of other STI  (gonorrhea, chlamydia, trich) YES, HPV, genital warts  History of prior  no  History of prior  YES  History of  delivery prior to 36 weeks 6 days  yes 29w 5d   History of blood transfusion no  Ok for blood transfusion yes    Substance screening-   History of tobacco use YES  Currently using tobacco no  Substance Use Screen Level  Low    MRSA Screening-   Does the pt have a hx of MRSA? no    Immunizations:  Influenza vaccine given this season yes  Discussed Tdap vaccine yes  Discussed COVID Vaccine yes    Genetic/[facility]-  Do you or your partner have a history of any of the following in yourselves or first degree relatives?  Cystic fibrosis no  Spinal muscular atrophy no  Hemoglobinopathy/Sickle Cell/Thalassemia no  Fragile X Intellectual Disability no         discussed Carrier Screening being completed once in a lifetime as a standard of care lab test. Was tested with IVF , [facility]     Appointment for Nuchal Translucency Ultrasound at [facility] scheduled for 2024      Interview education  [facility] Pregnancy Essentials Book reviewed, discussed and attached to their AVS yes    Nurse/Family Partnership- patient may qualify no; referral placed no    Prenatal lab work scripts yes  Extra labs ordered:  A 1 C    Aspirin/Preeclampsia Screen    Risk Level Risk Factor Recommendation   LOW Prior Uncomplicated full-term delivery no,  delivery , PPROm No Aspirin recommendation        MODERATE Nulliparity no Recommend low-dose aspirin if     BMI>30 YES 2 or more moderate risk factors    Family History Preeclampsia (mother/sister) no     35yr old or greater no     Black Race, Concern for SDOH/Low Socioeconomic no     IVF Pregnancy  no     Personal History Risks (low birth weight, prior adverse preg outcome, >10yr preg interval) YES  delivery          HIGH History of Preeclampsia no Recommend low-dose aspirin if     Multifetal gestation no 1 or more high risk factors    Chronic HTN no      Type 1 or 2 Diabetes YES     Renal Disease no     Autoimmune Disease  no      Contraindications to ASA therapy:  NSAID/ ASA allergy: no  Nasal polyps: no  Asthma with history of ASA induced bronchospasm: no  Relative contraindications:  History of GI bleed: no  Active peptic ulcer disease: no  Severe hepatic dysfunction: no    Patient should be recommended to take ASA 162mg during this pregnancy from 12-36wks to lower her risk of preeclampsia: already taking baby aspirin       The patient has a history now or in prior pregnancy notable for:   labor      Details that I feel the provider should be aware of:  Patient is a  with history of  labor 29w  prior     PN1 visit scheduled. The patient was oriented to our practice, the navigator role, reviewed delivering physicians and [facility] for Delivery. All questions were answered.    Interviewed by: Keiry Rankin LPN, Ob nurse Navigator

## 2024-08-11 PROBLEM — Z3A.12 12 WEEKS GESTATION OF PREGNANCY: Status: ACTIVE | Noted: 2024-07-25

## 2024-08-12 ENCOUNTER — ROUTINE PRENATAL (OUTPATIENT)
Facility: HOSPITAL | Age: 34
End: 2024-08-12
Payer: COMMERCIAL

## 2024-08-12 ENCOUNTER — APPOINTMENT (OUTPATIENT)
Dept: LAB | Facility: CLINIC | Age: 34
End: 2024-08-12
Payer: COMMERCIAL

## 2024-08-12 VITALS
DIASTOLIC BLOOD PRESSURE: 70 MMHG | BODY MASS INDEX: 32.36 KG/M2 | HEART RATE: 85 BPM | SYSTOLIC BLOOD PRESSURE: 114 MMHG | HEIGHT: 65 IN | WEIGHT: 194.2 LBS

## 2024-08-12 DIAGNOSIS — E10.65 PRE-EXISTING TYPE 1 DIABETES MELLITUS WITH HYPERGLYCEMIA DURING PREGNANCY IN FIRST TRIMESTER (HCC): ICD-10-CM

## 2024-08-12 DIAGNOSIS — O09.899 HISTORY OF PRETERM DELIVERY, CURRENTLY PREGNANT: ICD-10-CM

## 2024-08-12 DIAGNOSIS — O34.219 PREGNANCY WITH HISTORY OF CESAREAN SECTION, ANTEPARTUM: ICD-10-CM

## 2024-08-12 DIAGNOSIS — Z3A.12 12 WEEKS GESTATION OF PREGNANCY: ICD-10-CM

## 2024-08-12 DIAGNOSIS — Z36.82 ENCOUNTER FOR ANTENATAL SCREENING FOR NUCHAL TRANSLUCENCY: ICD-10-CM

## 2024-08-12 DIAGNOSIS — Z82.79 FAMILY HISTORY OF CONGENITAL DISEASE: ICD-10-CM

## 2024-08-12 DIAGNOSIS — Z87.59 HISTORY OF PRETERM PREMATURE RUPTURE OF MEMBRANES (PPROM): ICD-10-CM

## 2024-08-12 DIAGNOSIS — Z3A.10 10 WEEKS GESTATION OF PREGNANCY: ICD-10-CM

## 2024-08-12 DIAGNOSIS — O24.011 PRE-EXISTING TYPE 1 DIABETES MELLITUS WITH HYPERGLYCEMIA DURING PREGNANCY IN FIRST TRIMESTER (HCC): Primary | ICD-10-CM

## 2024-08-12 DIAGNOSIS — Z96.41 INSULIN PUMP IN PLACE: ICD-10-CM

## 2024-08-12 DIAGNOSIS — O24.011 PRE-EXISTING TYPE 1 DIABETES MELLITUS WITH HYPERGLYCEMIA DURING PREGNANCY IN FIRST TRIMESTER (HCC): ICD-10-CM

## 2024-08-12 DIAGNOSIS — Z34.81 PRENATAL CARE, SUBSEQUENT PREGNANCY, FIRST TRIMESTER: ICD-10-CM

## 2024-08-12 DIAGNOSIS — E10.65 PRE-EXISTING TYPE 1 DIABETES MELLITUS WITH HYPERGLYCEMIA DURING PREGNANCY IN FIRST TRIMESTER (HCC): Primary | ICD-10-CM

## 2024-08-12 LAB
ABO GROUP BLD: NORMAL
ALBUMIN SERPL BCG-MCNC: 3.9 G/DL (ref 3.5–5)
ALP SERPL-CCNC: 41 U/L (ref 34–104)
ALT SERPL W P-5'-P-CCNC: 10 U/L (ref 7–52)
ANION GAP SERPL CALCULATED.3IONS-SCNC: 6 MMOL/L (ref 4–13)
AST SERPL W P-5'-P-CCNC: 11 U/L (ref 13–39)
BASOPHILS # BLD AUTO: 0.04 THOUSANDS/ÂΜL (ref 0–0.1)
BASOPHILS NFR BLD AUTO: 0 % (ref 0–1)
BILIRUB SERPL-MCNC: 0.54 MG/DL (ref 0.2–1)
BILIRUB UR QL STRIP: NEGATIVE
BLD GP AB SCN SERPL QL: NEGATIVE
BUN SERPL-MCNC: 10 MG/DL (ref 5–25)
CALCIUM SERPL-MCNC: 9.3 MG/DL (ref 8.4–10.2)
CHLORIDE SERPL-SCNC: 102 MMOL/L (ref 96–108)
CLARITY UR: CLEAR
CO2 SERPL-SCNC: 26 MMOL/L (ref 21–32)
COLOR UR: ABNORMAL
CREAT SERPL-MCNC: 0.64 MG/DL (ref 0.6–1.3)
CREAT UR-MCNC: 90.8 MG/DL
EOSINOPHIL # BLD AUTO: 0.08 THOUSAND/ÂΜL (ref 0–0.61)
EOSINOPHIL NFR BLD AUTO: 1 % (ref 0–6)
ERYTHROCYTE [DISTWIDTH] IN BLOOD BY AUTOMATED COUNT: 13.2 % (ref 11.6–15.1)
EST. AVERAGE GLUCOSE BLD GHB EST-MCNC: 140 MG/DL
GFR SERPL CREATININE-BSD FRML MDRD: 116 ML/MIN/1.73SQ M
GLUCOSE SERPL-MCNC: 129 MG/DL (ref 65–140)
GLUCOSE UR STRIP-MCNC: ABNORMAL MG/DL
HBA1C MFR BLD: 6.5 %
HBV SURFACE AB SER-ACNC: 32.2 MIU/ML
HBV SURFACE AG SER QL: NORMAL
HCT VFR BLD AUTO: 40 % (ref 34.8–46.1)
HCV AB SER QL: NORMAL
HGB BLD-MCNC: 13.4 G/DL (ref 11.5–15.4)
HGB UR QL STRIP.AUTO: NEGATIVE
HIV 1+2 AB+HIV1 P24 AG SERPL QL IA: NORMAL
HIV 2 AB SERPL QL IA: NORMAL
HIV1 AB SERPL QL IA: NORMAL
HIV1 P24 AG SERPL QL IA: NORMAL
IMM GRANULOCYTES # BLD AUTO: 0.04 THOUSAND/UL (ref 0–0.2)
IMM GRANULOCYTES NFR BLD AUTO: 0 % (ref 0–2)
KETONES UR STRIP-MCNC: NEGATIVE MG/DL
LEUKOCYTE ESTERASE UR QL STRIP: NEGATIVE
LYMPHOCYTES # BLD AUTO: 2.18 THOUSANDS/ÂΜL (ref 0.6–4.47)
LYMPHOCYTES NFR BLD AUTO: 19 % (ref 14–44)
MCH RBC QN AUTO: 31.5 PG (ref 26.8–34.3)
MCHC RBC AUTO-ENTMCNC: 33.5 G/DL (ref 31.4–37.4)
MCV RBC AUTO: 94 FL (ref 82–98)
MONOCYTES # BLD AUTO: 0.41 THOUSAND/ÂΜL (ref 0.17–1.22)
MONOCYTES NFR BLD AUTO: 4 % (ref 4–12)
NEUTROPHILS # BLD AUTO: 8.52 THOUSANDS/ÂΜL (ref 1.85–7.62)
NEUTS SEG NFR BLD AUTO: 76 % (ref 43–75)
NITRITE UR QL STRIP: NEGATIVE
NRBC BLD AUTO-RTO: 0 /100 WBCS
PH UR STRIP.AUTO: 6.5 [PH]
PLATELET # BLD AUTO: 212 THOUSANDS/UL (ref 149–390)
PMV BLD AUTO: 11.6 FL (ref 8.9–12.7)
POTASSIUM SERPL-SCNC: 4 MMOL/L (ref 3.5–5.3)
PROT SERPL-MCNC: 7.1 G/DL (ref 6.4–8.4)
PROT UR STRIP-MCNC: NEGATIVE MG/DL
PROT UR-MCNC: 6 MG/DL
PROT/CREAT UR: 0.07 MG/G{CREAT} (ref 0–0.1)
RBC # BLD AUTO: 4.25 MILLION/UL (ref 3.81–5.12)
RH BLD: POSITIVE
RUBV IGG SERPL IA-ACNC: 12.8 IU/ML
SODIUM SERPL-SCNC: 134 MMOL/L (ref 135–147)
SP GR UR STRIP.AUTO: 1.02 (ref 1–1.03)
SPECIMEN EXPIRATION DATE: NORMAL
TREPONEMA PALLIDUM IGG+IGM AB [PRESENCE] IN SERUM OR PLASMA BY IMMUNOASSAY: NORMAL
TSH SERPL DL<=0.05 MIU/L-ACNC: 1.19 UIU/ML (ref 0.45–4.5)
UROBILINOGEN UR STRIP-ACNC: <2 MG/DL
VZV IGG SER QL IA: NORMAL
WBC # BLD AUTO: 11.27 THOUSAND/UL (ref 4.31–10.16)

## 2024-08-12 PROCEDURE — 99213 OFFICE O/P EST LOW 20 MIN: CPT | Performed by: OBSTETRICS & GYNECOLOGY

## 2024-08-12 PROCEDURE — 86787 VARICELLA-ZOSTER ANTIBODY: CPT

## 2024-08-12 PROCEDURE — 86780 TREPONEMA PALLIDUM: CPT

## 2024-08-12 PROCEDURE — 86850 RBC ANTIBODY SCREEN: CPT

## 2024-08-12 PROCEDURE — 76801 OB US < 14 WKS SINGLE FETUS: CPT | Performed by: OBSTETRICS & GYNECOLOGY

## 2024-08-12 PROCEDURE — 86706 HEP B SURFACE ANTIBODY: CPT

## 2024-08-12 PROCEDURE — 36415 COLL VENOUS BLD VENIPUNCTURE: CPT

## 2024-08-12 PROCEDURE — 83036 HEMOGLOBIN GLYCOSYLATED A1C: CPT | Performed by: NURSE PRACTITIONER

## 2024-08-12 PROCEDURE — 76813 OB US NUCHAL MEAS 1 GEST: CPT | Performed by: OBSTETRICS & GYNECOLOGY

## 2024-08-12 PROCEDURE — 84443 ASSAY THYROID STIM HORMONE: CPT | Performed by: NURSE PRACTITIONER

## 2024-08-12 PROCEDURE — 87340 HEPATITIS B SURFACE AG IA: CPT

## 2024-08-12 PROCEDURE — 87086 URINE CULTURE/COLONY COUNT: CPT

## 2024-08-12 PROCEDURE — 86901 BLOOD TYPING SEROLOGIC RH(D): CPT

## 2024-08-12 PROCEDURE — 87181 SC STD AGAR DILUTION PER AGT: CPT

## 2024-08-12 PROCEDURE — 84156 ASSAY OF PROTEIN URINE: CPT

## 2024-08-12 PROCEDURE — 86900 BLOOD TYPING SEROLOGIC ABO: CPT

## 2024-08-12 PROCEDURE — 86762 RUBELLA ANTIBODY: CPT

## 2024-08-12 PROCEDURE — 82570 ASSAY OF URINE CREATININE: CPT

## 2024-08-12 PROCEDURE — 86803 HEPATITIS C AB TEST: CPT

## 2024-08-12 PROCEDURE — 85025 COMPLETE CBC W/AUTO DIFF WBC: CPT

## 2024-08-12 PROCEDURE — 87389 HIV-1 AG W/HIV-1&-2 AB AG IA: CPT

## 2024-08-12 PROCEDURE — 80053 COMPREHEN METABOLIC PANEL: CPT | Performed by: NURSE PRACTITIONER

## 2024-08-12 PROCEDURE — 87147 CULTURE TYPE IMMUNOLOGIC: CPT

## 2024-08-12 NOTE — PROGRESS NOTES
"[facility]: Ms. Hauser was seen today for nuchal translucency ultrasound.  See ultrasound report under \"OB Procedures\" tab.      MDM:   I. Diagnoses/Problems addressed:  Stable chronic illness: diabetes  II.  Data:   III.  Risk of morbidity: High    Please don't hesitate to contact our office with any concerns or questions.  -Toyin Washington MD      "

## 2024-08-12 NOTE — PROGRESS NOTES
Materniti 21 wnl. PNL and baseline preeclamptic labs need completed. Plan cerclage placement prophylactically and 20 week anatomy scan. ASA but hold prior to surgery. Recommend a msafp between 16-18 weeks.

## 2024-08-12 NOTE — LETTER
"   Date: 2024    Izabela Cosby PA-C  [address] [address]    Patient: Lupe Hauser   YOB: 1990   Date of Visit: 2024   Gestational age 12w5d   Nature of this communication: Routine though please note she changed her mind about cerclage and is now opting for serial cervical surveillance.       This patient was seen recently in our  office.  Please see ultrasound report under \"OB Procedures\" tab.  Please don't hesitate to contact our office with any concerns or questions.      Sincerely,      Toyin Washington MD  Attending Physician, Maternal-Fetal Medicine  [facility]      "

## 2024-08-12 NOTE — PROGRESS NOTES
Padmini Zhang stated patient declined cerclage procedure scheduled for 8/26/2024. Called [facility] and spoke with Rayo. Cerclage procedure for 8/26 has been cancelled.

## 2024-08-16 LAB
BACTERIA UR CULT: ABNORMAL
BACTERIA UR CULT: ABNORMAL

## 2024-08-21 ENCOUNTER — INITIAL PRENATAL (OUTPATIENT)
Dept: OBGYN CLINIC | Facility: CLINIC | Age: 34
End: 2024-08-21

## 2024-08-21 VITALS — WEIGHT: 195.4 LBS | BODY MASS INDEX: 32.52 KG/M2 | DIASTOLIC BLOOD PRESSURE: 70 MMHG | SYSTOLIC BLOOD PRESSURE: 110 MMHG

## 2024-08-21 DIAGNOSIS — Z3A.14 14 WEEKS GESTATION OF PREGNANCY: ICD-10-CM

## 2024-08-21 DIAGNOSIS — O24.012 TYPE 1 DIABETES MELLITUS AFFECTING PREGNANCY IN SECOND TRIMESTER, ANTEPARTUM: Primary | ICD-10-CM

## 2024-08-21 DIAGNOSIS — O09.892 HISTORY OF PRETERM DELIVERY, CURRENTLY PREGNANT, SECOND TRIMESTER: ICD-10-CM

## 2024-08-21 DIAGNOSIS — O34.219 PREGNANCY WITH HISTORY OF CESAREAN SECTION, ANTEPARTUM: ICD-10-CM

## 2024-08-21 PROCEDURE — PNV: Performed by: PHYSICIAN ASSISTANT

## 2024-08-21 PROCEDURE — 87591 N.GONORRHOEAE DNA AMP PROB: CPT | Performed by: PHYSICIAN ASSISTANT

## 2024-08-21 PROCEDURE — 87491 CHLMYD TRACH DNA AMP PROBE: CPT | Performed by: PHYSICIAN ASSISTANT

## 2024-08-21 NOTE — PROGRESS NOTES
Patient is a 35 YO  female presenting to the office at 14.0 weeks for routine OB care.   BP: 110/70  TWlb  Fetal Movement: none    34 y.o.  female at 14w0d (Estimated Date of Delivery: 25) for PNV.    Pre-Javier Vitals      Flowsheet Row Most Recent Value   Prenatal Assessment    Fetal Heart Rate 160   Fundal Height (cm) 14 cm   Movement Absent   Prenatal Vitals    Blood Pressure 110/70   Weight - Scale 88.6 kg (195 lb 6.4 oz)   Urine Albumin/Glucose    Dilation/Effacement/Station    Vaginal Drainage    Edema           TW.893 kg (17 lb 6.4 oz)    Cramping: no  Bleeding: no  LOF: no  NT/13 week scan scheduled: yes  Anatomy scan scheduled   NIPT low risk  AFP ordered if indicated: yes  Prenatal labs complete (including Heb B, HIV): yes; date completed   Pap collected: UTD  GC collected:yes    Type 1 DM - managed by endo and MFM  HX LTCS @ 29.5 wk, PPROM at 17 weeks  Planning RLTCS - would like 2-5-25 if possible   GBS in urine    OK to transfuse and code  Oriented to practice/delivery location.   RTO 4 weeks

## 2024-08-22 LAB
C TRACH DNA SPEC QL NAA+PROBE: NEGATIVE
N GONORRHOEA DNA SPEC QL NAA+PROBE: NEGATIVE

## 2024-08-26 DIAGNOSIS — Z3A.28 28 WEEKS GESTATION OF PREGNANCY: ICD-10-CM

## 2024-08-26 DIAGNOSIS — O24.012 PREGNANCY COMPLICATED BY PRE-EXISTING TYPE 1 DIABETES, SECOND TRIMESTER: Primary | ICD-10-CM

## 2024-09-04 ENCOUNTER — TELEPHONE (OUTPATIENT)
Dept: OBGYN CLINIC | Facility: CLINIC | Age: 34
End: 2024-09-04

## 2024-09-05 ENCOUNTER — ROUTINE PRENATAL (OUTPATIENT)
Facility: HOSPITAL | Age: 34
End: 2024-09-05
Payer: COMMERCIAL

## 2024-09-05 VITALS
HEART RATE: 93 BPM | BODY MASS INDEX: 33.09 KG/M2 | HEIGHT: 65 IN | SYSTOLIC BLOOD PRESSURE: 104 MMHG | DIASTOLIC BLOOD PRESSURE: 62 MMHG | WEIGHT: 198.63 LBS

## 2024-09-05 DIAGNOSIS — Z3A.16 16 WEEKS GESTATION OF PREGNANCY: Primary | ICD-10-CM

## 2024-09-05 DIAGNOSIS — O09.899 HISTORY OF PRETERM DELIVERY, CURRENTLY PREGNANT: ICD-10-CM

## 2024-09-05 DIAGNOSIS — O09.892 HISTORY OF PRETERM DELIVERY, CURRENTLY PREGNANT, SECOND TRIMESTER: ICD-10-CM

## 2024-09-05 DIAGNOSIS — Z03.75 ENCOUNTER FOR SUSPECTED CERVICAL SHORTENING RULED OUT: ICD-10-CM

## 2024-09-05 DIAGNOSIS — O34.219 PREGNANCY WITH HISTORY OF CESAREAN SECTION, ANTEPARTUM: ICD-10-CM

## 2024-09-05 PROCEDURE — 76817 TRANSVAGINAL US OBSTETRIC: CPT | Performed by: OBSTETRICS & GYNECOLOGY

## 2024-09-05 PROCEDURE — 99213 OFFICE O/P EST LOW 20 MIN: CPT | Performed by: OBSTETRICS & GYNECOLOGY

## 2024-09-05 PROCEDURE — 76815 OB US LIMITED FETUS(S): CPT | Performed by: OBSTETRICS & GYNECOLOGY

## 2024-09-05 NOTE — PROGRESS NOTES
"[facility]: Ms. Hauser was seen today for serial cervical length screening ultrasound.  See ultrasound report under \"OB Procedures\" tab.   The time spent on this established patient on the encounter date included 5 minutes previsit service time reviewing records and precharting, 7 minutes face-to-face service time counseling regarding results and coordinating care, and  5 minutes charting, totalling 17 minutes.  Please don't hesitate to contact our office with any concerns or questions.  -Toyin Washington MD    "

## 2024-09-08 ENCOUNTER — HOSPITAL ENCOUNTER (EMERGENCY)
Facility: HOSPITAL | Age: 34
Discharge: HOME | End: 2024-09-08
Payer: COMMERCIAL

## 2024-09-08 ENCOUNTER — APPOINTMENT (EMERGENCY)
Dept: CT IMAGING | Facility: HOSPITAL | Age: 34
End: 2024-09-08
Payer: COMMERCIAL

## 2024-09-08 VITALS
RESPIRATION RATE: 17 BRPM | SYSTOLIC BLOOD PRESSURE: 110 MMHG | DIASTOLIC BLOOD PRESSURE: 69 MMHG | TEMPERATURE: 97.9 F | OXYGEN SATURATION: 97 % | HEART RATE: 102 BPM

## 2024-09-08 DIAGNOSIS — M54.9 BACK PAIN: ICD-10-CM

## 2024-09-08 DIAGNOSIS — R00.0 TACHYCARDIA: ICD-10-CM

## 2024-09-08 DIAGNOSIS — R07.9 CHEST PAIN: Primary | ICD-10-CM

## 2024-09-08 DIAGNOSIS — Z34.90 PREGNANT: ICD-10-CM

## 2024-09-08 LAB
ALBUMIN SERPL BCG-MCNC: 3.5 G/DL (ref 3.5–5)
ALP SERPL-CCNC: 43 U/L (ref 34–104)
ALT SERPL W P-5'-P-CCNC: 26 U/L (ref 7–52)
ANION GAP SERPL CALCULATED.3IONS-SCNC: 9 MMOL/L (ref 4–13)
AST SERPL W P-5'-P-CCNC: 30 U/L (ref 13–39)
BASOPHILS # BLD AUTO: 0.03 THOUSANDS/ÂΜL (ref 0–0.1)
BASOPHILS NFR BLD AUTO: 0 % (ref 0–1)
BILIRUB SERPL-MCNC: 0.45 MG/DL (ref 0.2–1)
BUN SERPL-MCNC: 10 MG/DL (ref 5–25)
CALCIUM SERPL-MCNC: 8.8 MG/DL (ref 8.4–10.2)
CARDIAC TROPONIN I PNL SERPL HS: <2 NG/L
CARDIAC TROPONIN I PNL SERPL HS: <2 NG/L
CHLORIDE SERPL-SCNC: 103 MMOL/L (ref 96–108)
CO2 SERPL-SCNC: 20 MMOL/L (ref 21–32)
CREAT SERPL-MCNC: 0.85 MG/DL (ref 0.6–1.3)
D DIMER PPP FEU-MCNC: 0.63 UG/ML FEU
EOSINOPHIL # BLD AUTO: 0.04 THOUSAND/ÂΜL (ref 0–0.61)
EOSINOPHIL NFR BLD AUTO: 0 % (ref 0–6)
ERYTHROCYTE [DISTWIDTH] IN BLOOD BY AUTOMATED COUNT: 13.1 % (ref 11.6–15.1)
GFR SERPL CREATININE-BSD FRML MDRD: 89 ML/MIN/1.73SQ M
GLUCOSE SERPL-MCNC: 203 MG/DL (ref 65–140)
HCT VFR BLD AUTO: 36.6 % (ref 34.8–46.1)
HGB BLD-MCNC: 12.1 G/DL (ref 11.5–15.4)
IMM GRANULOCYTES # BLD AUTO: 0.07 THOUSAND/UL (ref 0–0.2)
IMM GRANULOCYTES NFR BLD AUTO: 1 % (ref 0–2)
LYMPHOCYTES # BLD AUTO: 0.6 THOUSANDS/ÂΜL (ref 0.6–4.47)
LYMPHOCYTES NFR BLD AUTO: 6 % (ref 14–44)
MCH RBC QN AUTO: 31 PG (ref 26.8–34.3)
MCHC RBC AUTO-ENTMCNC: 33.1 G/DL (ref 31.4–37.4)
MCV RBC AUTO: 94 FL (ref 82–98)
MONOCYTES # BLD AUTO: 0.52 THOUSAND/ÂΜL (ref 0.17–1.22)
MONOCYTES NFR BLD AUTO: 6 % (ref 4–12)
NEUTROPHILS # BLD AUTO: 8.07 THOUSANDS/ÂΜL (ref 1.85–7.62)
NEUTS SEG NFR BLD AUTO: 87 % (ref 43–75)
NRBC BLD AUTO-RTO: 0 /100 WBCS
PLATELET # BLD AUTO: 165 THOUSANDS/UL (ref 149–390)
PMV BLD AUTO: 11.7 FL (ref 8.9–12.7)
POTASSIUM SERPL-SCNC: 3.9 MMOL/L (ref 3.5–5.3)
PROT SERPL-MCNC: 6.5 G/DL (ref 6.4–8.4)
RBC # BLD AUTO: 3.9 MILLION/UL (ref 3.81–5.12)
SODIUM SERPL-SCNC: 132 MMOL/L (ref 135–147)
WBC # BLD AUTO: 9.33 THOUSAND/UL (ref 4.31–10.16)

## 2024-09-08 PROCEDURE — 76815 OB US LIMITED FETUS(S): CPT

## 2024-09-08 PROCEDURE — 96360 HYDRATION IV INFUSION INIT: CPT

## 2024-09-08 PROCEDURE — 99285 EMERGENCY DEPT VISIT HI MDM: CPT

## 2024-09-08 PROCEDURE — 93005 ELECTROCARDIOGRAM TRACING: CPT

## 2024-09-08 PROCEDURE — 36415 COLL VENOUS BLD VENIPUNCTURE: CPT

## 2024-09-08 PROCEDURE — 85379 FIBRIN DEGRADATION QUANT: CPT

## 2024-09-08 PROCEDURE — 80053 COMPREHEN METABOLIC PANEL: CPT

## 2024-09-08 PROCEDURE — 71275 CT ANGIOGRAPHY CHEST: CPT

## 2024-09-08 PROCEDURE — 85025 COMPLETE CBC W/AUTO DIFF WBC: CPT

## 2024-09-08 PROCEDURE — 84484 ASSAY OF TROPONIN QUANT: CPT

## 2024-09-08 RX ADMIN — SODIUM CHLORIDE 1000 ML: 0.9 INJECTION, SOLUTION INTRAVENOUS at 18:32

## 2024-09-08 RX ADMIN — IOHEXOL 50 ML: 350 INJECTION, SOLUTION INTRAVENOUS at 19:29

## 2024-09-08 NOTE — Clinical Note
Lupe Hauser was seen and treated in our emergency department on 9/8/2024.                Diagnosis:     Lupe  .    She may return on this date: 09/10/2024         If you have any questions or concerns, please don't hesitate to call.      Jeremi Bosch MD    ______________________________           _______________          _______________  Hospital Representative                              Date                                Time

## 2024-09-08 NOTE — ED PROVIDER NOTES
History  Chief Complaint   Patient presents with    Chest Pain     Pt 16w pregnant c/o chest tightness that started today. Denies Cardiac Hx. Pt also states she feels like her heart is racing     Abdominal Pain     Pt c/o sharp abd pain across entire abd that started today. Denies Nausea/Vomiting      34F @ 16w pregnancy, h/o questionable antiphospholipid syndrome, presents to the ED due to onset chest tightness, palpitations and abdominal pain today.  Notes that she has had some upper respiratory symptoms such as congestion.  Today she woke up with chest tightness, shortness of breath, and palpitations.  She also has been having intermittent pains across her entire abdomen.  Denies history of similar.  Notes both legs have been swollen since past few weeks of pregnancy but has not noticed any changes recently.  Denies any calf pain or unilateral leg redness/swelling compared to the other.  Denies fevers, chills, nausea, vomiting, or other complaints.    Prior to Admission Medications   Prescriptions Last Dose Informant Patient Reported? Taking?   Cholecalciferol (Vitamin D3) 50 MCG (2000 UT) capsule  Self Yes No   Sig: Take 2,000 Units by mouth daily   NovoLOG 100 UNIT/ML injection  Self Yes No   Sig: Inject 100 mL as directed daily   Prenatal MV-Min-Fe Fum-FA-DHA (PRENATAL 1 PO)  Self Yes No   Sig: Take by mouth   acetone, urine, test strip  Self Yes No   Sig: Use 1 strip if needed for high blood sugar   aspirin (ECOTRIN LOW STRENGTH) 81 mg EC tablet  Self No No   Sig: Take 2 tablets (162 mg total) by mouth daily Stop at 36 weeks.  Contact your OB or MFM with any side effects.  See https://www.preeclampsia.org/[email or web address]   folic acid (FOLVITE) 1 mg tablet  Self No No   Sig: Take 1 tablet (1 mg total) by mouth daily   glucose blood (Contour Next Test) test strip   No No   Sig: Test up to 6 times per day. T1DM and pregnancy.   insulin degludec (Tresiba FlexTouch) 100 units/mL injection pen  Self Yes No   Sig: Inject 20  Units under the skin   Patient not taking: Reported on 2024      Facility-Administered Medications: None       Past Medical History:   Diagnosis Date    Abnormal Pap smear of cervix     hpv,    Antiphospholipid syndrome (HCC)     Diabetes mellitus (HCC)     Female infertility     past IVF    Genital warts     Miscarriage     X2    PCOS (polycystic ovarian syndrome)     Varicella     as a child       Past Surgical History:   Procedure Laterality Date     SECTION      CONDYLOMA EXCISION/FULGURATION      [place]  DELIVERY ONLY N/A 2021    Procedure:  SECTION ();  Surgeon: Jonnathan Verdugo MD;  Location: [facility] ;  Service: Obstetrics       Family History   Problem Relation Age of Onset    Hyperlipidemia Mother     Diabetes Father     Heart attack Father     Diabetes Sister     Diabetes Sister     Premature birth Son         29 weeks    Other Son         short bowel syndrome    Rheum arthritis Maternal Grandmother     Arthritis Maternal Grandmother     Cancer Maternal Grandfather         throat,mouth    Diabetes Maternal Grandfather     Alcohol abuse Maternal Grandfather     No Known Problems Paternal Grandmother     Heart attack Paternal Grandfather     Diabetes Paternal Grandfather     Heart attack Paternal Uncle      I have reviewed and agree with the history as documented.    E-Cigarette/Vaping    E-Cigarette Use Never User      E-Cigarette/Vaping Substances    Nicotine No     THC No     CBD No      Social History     Tobacco Use    Smoking status: Former     Current packs/day: 0.00     Types: Cigarettes     Quit date: 2024     Years since quittin.2    Smokeless tobacco: Never   Vaping Use    Vaping status: Never Used   Substance Use Topics    Alcohol use: Not Currently    Drug use: Never       Review of Systems   All other systems reviewed and are negative.      Physical Exam  Physical Exam  Vitals and nursing note reviewed.   Constitutional:       General: She is not in  acute distress.     Appearance: She is well-developed.   HENT:      Head: Normocephalic and atraumatic.   Eyes:      Conjunctiva/sclera: Conjunctivae normal.   Cardiovascular:      Rate and Rhythm: Regular rhythm. Tachycardia present.      Heart sounds: No murmur heard.  Pulmonary:      Effort: Pulmonary effort is normal. No respiratory distress.      Breath sounds: Normal breath sounds.   Abdominal:      Palpations: Abdomen is soft.      Tenderness: There is abdominal tenderness (mild, generalized.).   Musculoskeletal:         General: No swelling.      Cervical back: Neck supple.      Right lower leg: Edema present.      Left lower leg: Edema present.   Skin:     General: Skin is warm and dry.      Capillary Refill: Capillary refill takes less than 2 seconds.   Neurological:      Mental Status: She is alert.   Psychiatric:         Mood and Affect: Mood normal.         Vital Signs  ED Triage Vitals [09/08/24 1525]   Temperature Pulse Respirations Blood Pressure SpO2   97.9 °F (36.6 °C) (!) 112 18 123/67 97 %      Temp Source Heart Rate Source Patient Position - Orthostatic VS BP Location FiO2 (%)   Oral Monitor Sitting Right arm --      Pain Score       4           Vitals:    09/08/24 1525 09/08/24 1615 09/08/24 1654 09/08/24 1815   BP: 123/67 118/69  110/69   Pulse: (!) 112 (!) 112 102 102   Patient Position - Orthostatic VS: Sitting Sitting  Sitting         Visual Acuity      ED Medications  Medications   sodium chloride 0.9 % bolus 1,000 mL (0 mL Intravenous Stopped 9/8/24 1932)   iohexol (OMNIPAQUE) 350 MG/ML injection (MULTI-DOSE) 50 mL (50 mL Intravenous Given 9/8/24 1929)       Diagnostic Studies  Results Reviewed       Procedure Component Value Units Date/Time    HS Troponin I 2hr [[id number]] Collected: 09/08/24 1801    Lab Status: Final result Specimen: Blood from Arm, Left Updated: 09/08/24 1839     hs TnI 2hr <2 ng/L      Delta 2hr hsTnI --    D-Dimer [[id number]]  (Abnormal) Collected: 09/08/24 1536     Lab Status: Final result Specimen: Blood from Arm, Left Updated: 09/08/24 1638     D-Dimer, Quant 0.63 ug/ml FEU     HS Troponin 0hr (reflex protocol) [[id number]]  (Normal) Collected: 09/08/24 1536    Lab Status: Final result Specimen: Blood from Arm, Left Updated: 09/08/24 1604     hs TnI 0hr <2 ng/L     Comprehensive metabolic panel [[id number]]  (Abnormal) Collected: 09/08/24 1536    Lab Status: Final result Specimen: Blood from Arm, Left Updated: 09/08/24 1558     Sodium 132 mmol/L      Potassium 3.9 mmol/L      Chloride 103 mmol/L      CO2 20 mmol/L      ANION GAP 9 mmol/L      BUN 10 mg/dL      Creatinine 0.85 mg/dL      Glucose 203 mg/dL      Calcium 8.8 mg/dL      AST 30 U/L      ALT 26 U/L      Alkaline Phosphatase 43 U/L      Total Protein 6.5 g/dL      Albumin 3.5 g/dL      Total Bilirubin 0.45 mg/dL      eGFR 89 ml/min/1.73sq m     Narrative:      National Kidney Disease Foundation guidelines for Chronic Kidney Disease (CKD):     Stage 1 with normal or high GFR (GFR > 90 mL/min/1.73 square meters)    Stage 2 Mild CKD (GFR = 60-89 mL/min/1.73 square meters)    Stage 3A Moderate CKD (GFR = 45-59 mL/min/1.73 square meters)    Stage 3B Moderate CKD (GFR = 30-44 mL/min/1.73 square meters)    Stage 4 Severe CKD (GFR = 15-29 mL/min/1.73 square meters)    Stage 5 End Stage CKD (GFR <15 mL/min/1.73 square meters)  Note: GFR calculation is accurate only with a steady state creatinine    CBC and differential [[id number]]  (Abnormal) Collected: 09/08/24 1536    Lab Status: Final result Specimen: Blood from Arm, Left Updated: 09/08/24 1549     WBC 9.33 Thousand/uL      RBC 3.90 Million/uL      Hemoglobin 12.1 g/dL      Hematocrit 36.6 %      MCV 94 fL      MCH 31.0 pg      MCHC 33.1 g/dL      RDW 13.1 %      MPV 11.7 fL      Platelets 165 Thousands/uL      nRBC 0 /100 WBCs      Segmented % 87 %      Immature Grans % 1 %      Lymphocytes % 6 %      Monocytes % 6 %      Eosinophils Relative 0 %      Basophils Relative 0  %      Absolute Neutrophils 8.07 Thousands/µL      Absolute Immature Grans 0.07 Thousand/uL      Absolute Lymphocytes 0.60 Thousands/µL      Absolute Monocytes 0.52 Thousand/µL      Eosinophils Absolute 0.04 Thousand/µL      Basophils Absolute 0.03 Thousands/µL                    CTA ED chest PE study   Final Result by Dashawn Browne MD (09/08 1942)      No acute thoracic pathology. No CT evidence of pulmonary embolism.      4 mm pulmonary nodule right minor fissure likely an intrapulmonary lymph node.                  Workstation performed: [id number]                    Procedures  ECG 12 Lead Documentation Only    Date/Time: 9/8/2024 4:28 PM    Performed by: Jeremi Bosch MD  Authorized by: Jeremi Bosch MD    ECG reviewed by me, the ED Provider: yes    Patient location:  ED  Previous ECG:     Previous ECG:  Unavailable  Interpretation:     Interpretation: abnormal    Rate:     ECG rate:  108    ECG rate assessment: tachycardic    Rhythm:     Rhythm: sinus rhythm    Ectopy:     Ectopy: none    QRS:     QRS axis:  Normal    QRS intervals:  Normal  Conduction:     Conduction: normal    ST segments:     ST segments:  Normal  T waves:     T waves: normal    POC Pelvic US    Date/Time: 9/8/2024 5:00 PM    Performed by: Jeremi Bosch MD  Authorized by: Jeremi Bosch MD    Patient location:  ED  Procedure details:     Exam Type:  Diagnostic    Indications: evaluate for IUP and pregnant with abdominal pain      Assessment for: evaluate fetal viability      Technique:  Transabdominal obstetric (HCG+) exam    Views obtained: uterus (transverse and sagittal)      Image quality: limited diagnostic      Image availability:  Images available in PACS  Uterine findings:     Endometrial stripe: identified      Intrauterine pregnancy: identified      Single gestation: identified      Multiple gestation: not identified      Fetal heart rate: identified      Fetal heart rate (bpm):  160  Right adnexa findings:     Right  ovary:  Not visualized  Left adnexa findings:     Left ovary:  Not visualized  Other findings:     Free pelvic fluid: not identified      Free peritoneal fluid: not identified    Interpretation:     Ultrasound impressions: normal      Pregnancy findings: intrauterine pregnancy (IUP)             ED Course  ED Course as of 09/13/24 1410   Sun Sep 08, 2024   1614 Carbon Dioxide(!): 20                         PERC Rule for PE      Flowsheet Row Most Recent Value   PERC Rule for PE    Age >=50 0 Filed at: 09/08/2024 1825   HR >=100 1 Filed at: 09/08/2024 1825   O2 Sat on room air < 95% 1 Filed at: 09/08/2024 1825   History of PE or DVT 0 Filed at: 09/08/2024 1825   Recent trauma or surgery 0 Filed at: 09/08/2024 1825   Hemoptysis 0 Filed at: 09/08/2024 1825   Exogenous estrogen 0 Filed at: 09/08/2024 1825   Unilateral leg swelling 0 Filed at: 09/08/2024 1825   PERC Rule for PE Results 2 Filed at: 09/08/2024 1825                SBIRT 20yo+      Flowsheet Row Most Recent Value   Initial Alcohol Screen: US AUDIT-C     1. How often do you have a drink containing alcohol? 0 Filed at: 09/08/2024 1649   2. How many drinks containing alcohol do you have on a typical day you are drinking?  0 Filed at: 09/08/2024 1649   3a. Male UNDER 65: How often do you have five or more drinks on one occasion? 0 Filed at: 09/08/2024 1649   3b. FEMALE Any Age, or MALE 65+: How often do you have 4 or more drinks on one occassion? 0 Filed at: 09/08/2024 1649   Audit-C Score 0 Filed at: 09/08/2024 1649   NATHAN: How many times in the past year have you...    Used an illegal drug or used a prescription medication for non-medical reasons? Never Filed at: 09/08/2024 1649            Wells' Criteria for PE      Flowsheet Row Most Recent Value   Wells' Criteria for PE    Clinical signs and symptoms of DVT 0 Filed at: 09/08/2024 1824   PE is primary diagnosis or equally likely 3 Filed at: 09/08/2024 1824   HR >100 1.5 Filed at: 09/08/2024 1824    Immobilization at least 3 days or Surgery in the previous 4 weeks 0 Filed at: 09/08/2024 1824   Previous, objectively diagnosed PE or DVT 0 Filed at: 09/08/2024 1824   Hemoptysis 0 Filed at: 09/08/2024 1824   Malignancy with treatment within 6 months or palliative 0 Filed at: 09/08/2024 1824   Wells' Criteria Total 4.5 Filed at: 09/08/2024 1824                  Medical Decision Making  34-year-old female present emergency department due to sudden onset chest discomfort, shortness of breath, tachycardia.   Bedside ultrasound performed and shows good fetal viability. Patient is pregnant, expect that D-dimer may be elevated but to attempt to try to avoid PE study obtained this lab test.  As expected with slightly elevated.  Given symptomology as well as intermittent desaturations into the low 90% with sustained tachycardia, will obtain PE study.  Per patient request, discussed with her OB/GYN first who agreed.  PE study negative.  Labs and EKG otherwise reassuring without findings to suggest ACS, infection, anemia, or other etiologies.  Symptoms may be related to viral syndrome given patient noting that she has been feeling similar associated symptoms such as a congestion recently.  With workup as it is, discussed further monitoring versus discharge home with close follow-up.  Patient opting for home management with return precautions for lightheadedness, fevers, chest pain that worsens, vaginal bleeding, or other concerns.  Bedside ultrasound performed and shows good fetal viability.    Amount and/or Complexity of Data Reviewed  Labs: ordered. Decision-making details documented in ED Course.  Radiology: ordered.    Risk  Prescription drug management.                 Disposition  Final diagnoses:   Chest pain   Back pain   Tachycardia   Pregnant     Time reflects when diagnosis was documented in both MDM as applicable and the Disposition within this note       Time User Action Codes Description Comment    9/8/2024   8:06 PM JianitisGleny Add [R07.9] Chest pain     9/8/2024  8:06 PM Yokubaitis, Jeremi Add [M54.9] Back pain     9/8/2024  8:06 PM Yokubaitis, Jeremi Add [R00.0] Tachycardia     9/8/2024  8:06 PM YokGlen giordanoy Add [Z34.90] Pregnant           ED Disposition       ED Disposition   Discharge    Condition   Stable    Date/Time   Sun Sep 8, 2024 2006    Comment   Lupe Hauser discharge to home/self care.                   Follow-up Information    None         Discharge Medication List as of 9/8/2024  8:07 PM        CONTINUE these medications which have NOT CHANGED    Details   acetone, urine, test strip Use 1 strip if needed for high blood sugar, Historical Med      aspirin (ECOTRIN LOW STRENGTH) 81 mg EC tablet Take 2 tablets (162 mg total) by mouth daily Stop at 36 weeks.  Contact your OB or MFM with any side effects.  See https://www.preeclampsia.org/[email or web address], Starting Mon 8/12/2024, Normal      Cholecalciferol (Vitamin D3) 50 MCG (2000 UT) capsule Take 2,000 Units by mouth daily, Historical Med      folic acid (FOLVITE) 1 mg tablet Take 1 tablet (1 mg total) by mouth daily, Starting Thu 8/3/2023, Normal      glucose blood (Contour Next Test) test strip Test up to 6 times per day. T1DM and pregnancy., Normal      insulin degludec (Tresiba FlexTouch) 100 units/mL injection pen Inject 20 Units under the skin, Starting Thu 4/25/2024, Historical Med      NovoLOG 100 UNIT/ML injection Inject 100 mL as directed daily, Starting Thu 7/30/2020, Historical Med      Prenatal MV-Min-Fe Fum-FA-DHA (PRENATAL 1 PO) Take by mouth, Historical Med             No discharge procedures on file.    PDMP Review       None            ED Provider  Electronically Signed by             Jeremi Bosch MD  09/13/24 [id number]

## 2024-09-09 LAB
ATRIAL RATE: 108 BPM
P AXIS: 60 DEGREES
PR INTERVAL: 152 MS
QRS AXIS: 21 DEGREES
QRSD INTERVAL: 68 MS
QT INTERVAL: 332 MS
QTC INTERVAL: 444 MS
T WAVE AXIS: 22 DEGREES
VENTRICULAR RATE: 108 BPM

## 2024-09-09 PROCEDURE — 93010 ELECTROCARDIOGRAM REPORT: CPT | Performed by: INTERNAL MEDICINE

## 2024-09-09 NOTE — PATIENT INSTRUCTIONS
-A1c 6.5% improved from 11.2% and closer to goal.   -CMP within normal. UPCR 0.07. TSH normal.   -A1c goal less than 6% with minimal hypoglycemia.   -Novolog via Medtronic 780G insulin pump and Guardian CGM.   -Due to hyperglycemia noted; insulin pump adjustments made as follows:  MN-3 AM@ 0.85 units/hour;  3 AM-6 AM from 1.05 to 1.20 units/hour;  6 AM-10 PM from 1.50 to 1.65 units/hour;  10 PM-MN from 0.95 to 1.05 units/hour.  Decrease BG target to 90 mg/dL.  Continue ICR 5 and ISF 24.   -Self monitoring blood glucose (SMBG) fasting; 2 hours after start of each meal and with hypoglycemia.   -Glucose goals: fasting 60-90 mg/dL, 140 mg/dL or less 1 hour post meals, and 120 mg/dL or less 2 hours post meal.   -Weekly insulin pump download.   -GDM meal plan with 3 meals and 3 snacks including recommended combination of carb, protein and fat per meal/snack.  -Please eat meal or snack every 2-3.5 hours while awake.  -No more than 8 to 10 hours of fasting overnight.  -Refer to [employer] MyPlate online as a reference.  -2nd/3rd trimester minimum total daily carbohydrates 175 grams paired with half grams in protein.   -Stay active if no restriction from your OB, walk up to 30 minutes a day.  -Always have glucose available to treat hypoglycemia. Use 15:15 rule.   -Refer to hypoglycemia patient education sheet. SMBG when experiencing signs and symptoms of hypoglycemia and prior to driving.   -Serial fetal growth ultrasounds.  -20 weeks detailed fetal growth ultrasound.  -22-24 weeks fetal echo.  -At 32 weeks gestation; NST twice a week and GORDON weekly.   -Continue prenatal vitamin and baby aspirin as recommended.  -At 36 weeks gestation, stop baby aspirin.   -Continue follow-up with your OB and MFM as recommended.  -Stay in close contact with diabetes education team.  Thank you for choosing us for your  care today.  If you have any questions about your ultrasound or care, please do not hesitate to contact us or  your primary obstetrician.        Some general instructions for your pregnancy are:    Exercise: Aim for 150 minutes per week of regular exercise.  Walking is great!  Nutrition: Choose healthy sources of calcium, iron, and protein.  Avoid ultraprocessed foods and added sugar.  Learn about Preeclampsia: preeclampsia is a common, potentially serious high blood pressure complication in pregnancy.  A blood pressure of 140mmHg (systolic or top number) or 90mmHg (diastolic or bottom number) should be evaluated by your doctor.  Aspirin is sometimes prescribed in early pregnancy to prevent preeclampsia in women with risk factors - ask your obstetrician if you should be on this medication.  For more resources, visit:  https://www.highriskpregnancyinfo.org/preeclampsia  If you smoke, please try to quit completely but also try to reduce your smoking by as much as possible (as soon as possible).  Do not vape.  Please also avoid cannabis products.  Other warning signs to watch out for in pregnancy or postpartum: chest pain, obstructed breathing or shortness of breath, seizures, thoughts of hurting yourself or your baby, bleeding, a painful or swollen leg, fever, or headache (see [facility] POST-BIRTH Warning Signs campaign).  If these happen call 911.  Itching is also not normal in pregnancy and if you experience this, especially over your hands and feet, potentially worse at night, notify your doctors.

## 2024-09-09 NOTE — DISCHARGE INSTRUCTIONS
Your workup was reassuring, please monitor for development of further symptoms to help guide evaluation of the cause of symptoms. If you develop fevers, productive cough, blood in your cough, or other concerning symptoms then please return to the ED for evaluation.     Please follow up with your OBGYN as planned in this coming week.

## 2024-09-11 ENCOUNTER — TELEMEDICINE (OUTPATIENT)
Facility: HOSPITAL | Age: 34
End: 2024-09-11
Payer: COMMERCIAL

## 2024-09-11 VITALS — BODY MASS INDEX: 32.99 KG/M2 | WEIGHT: 198 LBS | HEIGHT: 65 IN

## 2024-09-11 DIAGNOSIS — Z3A.17 17 WEEKS GESTATION OF PREGNANCY: ICD-10-CM

## 2024-09-11 DIAGNOSIS — Z46.81 INSULIN PUMP TITRATION: ICD-10-CM

## 2024-09-11 DIAGNOSIS — E10.65 PRE-EXISTING TYPE 1 DIABETES MELLITUS WITH HYPERGLYCEMIA DURING PREGNANCY IN SECOND TRIMESTER (HCC): Primary | ICD-10-CM

## 2024-09-11 DIAGNOSIS — O24.012 PRE-EXISTING TYPE 1 DIABETES MELLITUS WITH HYPERGLYCEMIA DURING PREGNANCY IN SECOND TRIMESTER (HCC): Primary | ICD-10-CM

## 2024-09-11 PROCEDURE — 99215 OFFICE O/P EST HI 40 MIN: CPT | Performed by: NURSE PRACTITIONER

## 2024-09-11 NOTE — PROGRESS NOTES
Virtual Regular Visit  Name: Lupe Hauser      : 1990      MRN: [id number]  Encounter Provider: JOAO Bloom  Encounter Date: 2024   Encounter department: [facility]    Verification of patient location:    Patient is located at Home in the following state in which I hold an active license [place]    Assessment & Plan  17 weeks gestation of pregnancy         Pre-existing type 1 diabetes mellitus with hyperglycemia during pregnancy in second trimester (HCC)  -A1c 6.5% improved from 11.2% and closer to goal.   -CMP within normal. UPCR 0.07. TSH normal.   -A1c goal less than 6% with minimal hypoglycemia.   -Novolog via Medtronic 780G insulin pump and Guardian CGM.   -Due to hyperglycemia noted; insulin pump adjustments made as follows:  MN-3 AM@ 0.85 units/hour;  3 AM-6 AM from 1.05 to 1.20 units/hour;  6 AM-10 PM from 1.50 to 1.65 units/hour;  10 PM-MN from 0.95 to 1.05 units/hour.  Decrease BG target to 90 mg/dL.  Continue ICR 5 and ISF 24.   -Self monitoring blood glucose (SMBG) fasting; 2 hours after start of each meal and with hypoglycemia.   -Glucose goals: fasting 60-90 mg/dL, 140 mg/dL or less 1 hour post meals, and 120 mg/dL or less 2 hours post meal.   -Weekly insulin pump download.   -GDM meal plan with 3 meals and 3 snacks including recommended combination of carb, protein and fat per meal/snack.  -Please eat meal or snack every 2-3.5 hours while awake.  -No more than 8 to 10 hours of fasting overnight.  -Refer to Sweet Success MyPlate online as a reference.  -2nd/3rd trimester minimum total daily carbohydrates 175 grams paired with half grams in protein.   -Stay active if no restriction from your OB, walk up to 30 minutes a day.  -Always have glucose available to treat hypoglycemia. Use 15:15 rule.   -Refer to hypoglycemia patient education sheet. SMBG when experiencing signs and symptoms of hypoglycemia and prior to driving.   -Serial fetal growth ultrasounds.  -20  weeks detailed fetal growth ultrasound.  -22-24 weeks fetal echo.  -At 32 weeks gestation; NST twice a week and GORDON weekly.   -Continue prenatal vitamin and baby aspirin as recommended.  -At 36 weeks gestation, stop baby aspirin.   -Continue follow-up with your OB and MFM as recommended.  -Stay in close contact with diabetes education team.  Lab Results   Component Value Date    HGBA1C 6.5 (H) 2024            BMI 33.0-33.9,adult  -First visit weight 189 lbs.  -Current weight 198 lbs.   -TWG 9 lbs.   -Recommended weight gain 11 to 20 lbs.  -Continue GDM meal plan and follow up with dietitian as needed.          Insulin pump titration             Encounter provider JOAO Bloom    The patient was identified by name and date of birth. Lupe Hauser was informed that this is a telemedicine visit and that the visit is being conducted through the Epic Embedded platform. She agrees to proceed..  My office door was closed. No one else was in the room.  She acknowledged consent and understanding of privacy and security of the video platform. The patient has agreed to participate and understands they can discontinue the visit at any time.    Patient is aware this is a billable service.     History of Present Illness     Lupe Hauser is a 34 y.o. female  17 0/7 weeks gestation who presents for follow up T1DM (re-diagnosed with PAZ-mutation 3). On Novolog via Medtronic 780G insulin pump now and Guardian CGM. Due to higher glucose readings noted during the day, did self adjust 6 AM basal rate back to original 1.50 units/hour and since unable to bolus insulin 15 minutes before start of meals; will report higher carbs for more bolus units. Decrease in basal suspensions noted. A1c improved to 6.5% from 11.2% and is very proud. Has an upcoming endocrinology appointment. Following GDM meal plan. Insulin pump does auto-upload. Back to work schedule. Recent trip to ER due to chest pain and evaluation within normal. Currently  dealing with an URI. Weight stable.       Review of Systems   Constitutional:  Negative for fatigue and fever.   HENT:  Positive for congestion.    Eyes:  Negative for visual disturbance.   Respiratory:  Negative for cough and shortness of breath.    Cardiovascular:  Positive for chest pain (one episode and evaluated by ER). Negative for palpitations and leg swelling.   Gastrointestinal:  Negative for constipation, nausea and vomiting.   Endocrine: Negative for polydipsia, polyphagia and polyuria.   Genitourinary:  Negative for difficulty urinating and vaginal bleeding.   Musculoskeletal:  Negative for back pain.   Neurological:  Negative for headaches.   Psychiatric/Behavioral:  Negative for sleep disturbance.      Medical History Reviewed by provider this encounter:  Tobacco  Allergies  Meds  Problems  Med Hx  Surg Hx  Fam Hx  Soc   Hx      Current Outpatient Medications on File Prior to Visit   Medication Sig Dispense Refill    acetone, urine, test strip Use 1 strip if needed for high blood sugar      aspirin (ECOTRIN LOW STRENGTH) 81 mg EC tablet Take 2 tablets (162 mg total) by mouth daily Stop at 36 weeks.  Contact your OB or MFM with any side effects.  See [email or web address].[email or web address] 60 tablet 3    Cholecalciferol (Vitamin D3) 50 MCG (2000 UT) capsule Take 2,000 Units by mouth daily      folic acid (FOLVITE) 1 mg tablet Take 1 tablet (1 mg total) by mouth daily 90 tablet 3    glucose blood (Contour Next Test) test strip Test up to 6 times per day. T1DM and pregnancy. 150 each 6    insulin degludec (Tresiba FlexTouch) 100 units/mL injection pen Inject 20 Units under the skin (Patient not taking: Reported on 8/7/2024)      NovoLOG 100 UNIT/ML injection Inject 100 mL as directed daily      Prenatal MV-Min-Fe Fum-FA-DHA (PRENATAL 1 PO) Take by mouth       No current facility-administered medications on file prior to visit.      Social History     Tobacco Use    Smoking status: Former     Current  "packs/day: 0.00     Types: Cigarettes     Quit date: 2024     Years since quittin.2    Smokeless tobacco: Never   Vaping Use    Vaping status: Never Used   Substance and Sexual Activity    Alcohol use: Not Currently    Drug use: Never    Sexual activity: Yes     Partners: Male     Birth control/protection: None         Objective     Ht 5' 5\" (1.651 m)   Wt 89.8 kg (198 lb)   LMP 05/15/2024   BMI 32.95 kg/m²   Refer to attached file for insulin pump download.   Physical Exam  HENT:      Head: Normocephalic.      Nose: Nose normal.   Eyes:      Conjunctiva/sclera: Conjunctivae normal.   Pulmonary:      Effort: Pulmonary effort is normal.   Neurological:      Mental Status: She is alert and oriented to person, place, and time.   Psychiatric:         Mood and Affect: Mood normal.         Behavior: Behavior normal.         Thought Content: Thought content normal.         Judgment: Judgment normal.         Visit Time  Total Visit Duration: 22 minutes with patient and 20 minutes reviewing insulin pump/charting.         "

## 2024-09-11 NOTE — ASSESSMENT & PLAN NOTE
-A1c 6.5% improved from 11.2% and closer to goal.   -CMP within normal. UPCR 0.07. TSH normal.   -A1c goal less than 6% with minimal hypoglycemia.   -Novolog via Medtronic 780G insulin pump and Guardian CGM.   -Due to hyperglycemia noted; insulin pump adjustments made as follows:  MN-3 AM@ 0.85 units/hour;  3 AM-6 AM from 1.05 to 1.20 units/hour;  6 AM-10 PM from 1.50 to 1.65 units/hour;  10 PM-MN from 0.95 to 1.05 units/hour.  Decrease BG target to 90 mg/dL.  Continue ICR 5 and ISF 24.   -Self monitoring blood glucose (SMBG) fasting; 2 hours after start of each meal and with hypoglycemia.   -Glucose goals: fasting 60-90 mg/dL, 140 mg/dL or less 1 hour post meals, and 120 mg/dL or less 2 hours post meal.   -Weekly insulin pump download.   -GDM meal plan with 3 meals and 3 snacks including recommended combination of carb, protein and fat per meal/snack.  -Please eat meal or snack every 2-3.5 hours while awake.  -No more than 8 to 10 hours of fasting overnight.  -Refer to Sweet Success MyPlate online as a reference.  -2nd/3rd trimester minimum total daily carbohydrates 175 grams paired with half grams in protein.   -Stay active if no restriction from your OB, walk up to 30 minutes a day.  -Always have glucose available to treat hypoglycemia. Use 15:15 rule.   -Refer to hypoglycemia patient education sheet. SMBG when experiencing signs and symptoms of hypoglycemia and prior to driving.   -Serial fetal growth ultrasounds.  -20 weeks detailed fetal growth ultrasound.  -22-24 weeks fetal echo.  -At 32 weeks gestation; NST twice a week and GORDON weekly.   -Continue prenatal vitamin and baby aspirin as recommended.  -At 36 weeks gestation, stop baby aspirin.   -Continue follow-up with your OB and MFM as recommended.  -Stay in close contact with diabetes education team.  Lab Results   Component Value Date    HGBA1C 6.5 (H) 08/12/2024             Communicate Risk of Fall with Harm to all staff/Reinforce activity limits and safety measures with patient and family/Tailored Fall Risk Interventions/Visual Cue: Yellow wristband and red socks/Bed in lowest position, wheels locked, appropriate side rails in place/Call bell, personal items and telephone in reach/Instruct patient to call for assistance before getting out of bed or chair/Non-slip footwear when patient is out of bed/[place] to call system/Physically safe environment - no spills, clutter or unnecessary equipment/Purposeful Proactive Rounding/Room/bathroom lighting operational, light cord in reach

## 2024-09-11 NOTE — ASSESSMENT & PLAN NOTE
-First visit weight 189 lbs.  -Current weight 198 lbs.   -TWG 9 lbs.   -Recommended weight gain 11 to 20 lbs.  -Continue GDM meal plan and follow up with dietitian as needed.

## 2024-09-12 ENCOUNTER — ROUTINE PRENATAL (OUTPATIENT)
Dept: PERINATAL CARE | Facility: CLINIC | Age: 34
End: 2024-09-12
Payer: COMMERCIAL

## 2024-09-12 VITALS
WEIGHT: 202.4 LBS | SYSTOLIC BLOOD PRESSURE: 112 MMHG | BODY MASS INDEX: 33.72 KG/M2 | HEIGHT: 65 IN | HEART RATE: 88 BPM | DIASTOLIC BLOOD PRESSURE: 70 MMHG

## 2024-09-12 DIAGNOSIS — E10.65 PRE-EXISTING TYPE 1 DIABETES MELLITUS WITH HYPERGLYCEMIA DURING PREGNANCY IN SECOND TRIMESTER (HCC): ICD-10-CM

## 2024-09-12 DIAGNOSIS — O24.012 PRE-EXISTING TYPE 1 DIABETES MELLITUS WITH HYPERGLYCEMIA DURING PREGNANCY IN SECOND TRIMESTER (HCC): ICD-10-CM

## 2024-09-12 DIAGNOSIS — Z3A.17 17 WEEKS GESTATION OF PREGNANCY: Primary | ICD-10-CM

## 2024-09-12 DIAGNOSIS — Z36.3 ENCOUNTER FOR ANTENATAL SCREENING FOR MALFORMATION: ICD-10-CM

## 2024-09-12 DIAGNOSIS — O09.899 HISTORY OF PRETERM DELIVERY, CURRENTLY PREGNANT: ICD-10-CM

## 2024-09-12 PROCEDURE — 76811 OB US DETAILED SNGL FETUS: CPT | Performed by: STUDENT IN AN ORGANIZED HEALTH CARE EDUCATION/TRAINING PROGRAM

## 2024-09-12 PROCEDURE — 99214 OFFICE O/P EST MOD 30 MIN: CPT | Performed by: STUDENT IN AN ORGANIZED HEALTH CARE EDUCATION/TRAINING PROGRAM

## 2024-09-12 NOTE — PROGRESS NOTES
"[facility]: Ms. Hauser was seen today for anatomic survey ultrasound.  See ultrasound report under \"OB Procedures\" tab.      MDM:   I. Diagnoses/Problems addressed:  Stable chronic illness: type 1 diabetes  II.  Data: I reviewed 2 lab tests ordered by another provider.  III.  Risk of morbidity: Low    Please don't hesitate to contact our office with any concerns or questions.  -Lupe Diehl MD    "

## 2024-09-18 ENCOUNTER — ROUTINE PRENATAL (OUTPATIENT)
Dept: OBGYN CLINIC | Facility: CLINIC | Age: 34
End: 2024-09-18

## 2024-09-18 ENCOUNTER — ROUTINE PRENATAL (OUTPATIENT)
Facility: HOSPITAL | Age: 34
End: 2024-09-18
Payer: COMMERCIAL

## 2024-09-18 VITALS
WEIGHT: 203.26 LBS | HEIGHT: 65 IN | SYSTOLIC BLOOD PRESSURE: 124 MMHG | BODY MASS INDEX: 33.87 KG/M2 | DIASTOLIC BLOOD PRESSURE: 60 MMHG | HEART RATE: 85 BPM

## 2024-09-18 VITALS — SYSTOLIC BLOOD PRESSURE: 104 MMHG | WEIGHT: 201.6 LBS | DIASTOLIC BLOOD PRESSURE: 66 MMHG | BODY MASS INDEX: 33.55 KG/M2

## 2024-09-18 DIAGNOSIS — Z87.59 HISTORY OF PRETERM PREMATURE RUPTURE OF MEMBRANES (PPROM): ICD-10-CM

## 2024-09-18 DIAGNOSIS — Z3A.18 18 WEEKS GESTATION OF PREGNANCY: ICD-10-CM

## 2024-09-18 DIAGNOSIS — O34.219 PREGNANCY WITH HISTORY OF CESAREAN SECTION, ANTEPARTUM: ICD-10-CM

## 2024-09-18 DIAGNOSIS — Z87.51 HISTORY OF PRETERM DELIVERY: ICD-10-CM

## 2024-09-18 DIAGNOSIS — O24.012 TYPE 1 DIABETES MELLITUS AFFECTING PREGNANCY IN SECOND TRIMESTER, ANTEPARTUM: Primary | ICD-10-CM

## 2024-09-18 DIAGNOSIS — E10.65 PRE-EXISTING TYPE 1 DIABETES MELLITUS WITH HYPERGLYCEMIA DURING PREGNANCY IN SECOND TRIMESTER (HCC): ICD-10-CM

## 2024-09-18 DIAGNOSIS — O24.012 PRE-EXISTING TYPE 1 DIABETES MELLITUS WITH HYPERGLYCEMIA DURING PREGNANCY IN SECOND TRIMESTER (HCC): ICD-10-CM

## 2024-09-18 DIAGNOSIS — O09.899 HISTORY OF PRETERM DELIVERY, CURRENTLY PREGNANT: ICD-10-CM

## 2024-09-18 DIAGNOSIS — Z3A.18 18 WEEKS GESTATION OF PREGNANCY: Primary | ICD-10-CM

## 2024-09-18 PROCEDURE — PNV: Performed by: OBSTETRICS & GYNECOLOGY

## 2024-09-18 PROCEDURE — 76815 OB US LIMITED FETUS(S): CPT | Performed by: OBSTETRICS & GYNECOLOGY

## 2024-09-18 PROCEDURE — 99213 OFFICE O/P EST LOW 20 MIN: CPT | Performed by: OBSTETRICS & GYNECOLOGY

## 2024-09-18 PROCEDURE — 76817 TRANSVAGINAL US OBSTETRIC: CPT | Performed by: OBSTETRICS & GYNECOLOGY

## 2024-09-18 NOTE — PROGRESS NOTES
Ultrasound Probe Disinfection    A transvaginal ultrasound was performed.   Prior to use, disinfection was performed with High Level Disinfection Process (AMECon).  Probe serial number [id number]: [id number] was used.    Kitty Bernal  09/18/24  1:56 PM

## 2024-09-18 NOTE — PROGRESS NOTES
Lupe Hauser     34 y.o.  female at 18w0d (Estimated Date of Delivery: 25) for PNV.    Pre- Vitals      Flowsheet Row Most Recent Value   Prenatal Assessment    Fetal Heart Rate 153   Movement Present   Prenatal Vitals    Blood Pressure 104/66   Weight - Scale 91.4 kg (201 lb 9.6 oz)   Urine Albumin/Glucose    Dilation/Effacement/Station    Vaginal Drainage    Edema           TWG: 10.7 kg (23 lb 9.6 oz)    Leakage of fluid: no  Vaginal bleeding: no  Contractions/Cramping: no  Fetal movement: yes    Has carpal tunnel. Does not have time to do PT.   Wearing braces at night.   Following with [facility] and endo for T1DM.    - A1c 11.2 --> 6.5   - uses pump, has episodes of hyperglycemia and recent alterations in her pump via [facility].   Going for cervical surveillance.   Would like to try and schedule  as soon as she can.   Unless she has uncontrolled DM, rec would be at 39w.     RTO in 4 weeks.

## 2024-09-18 NOTE — PROGRESS NOTES
"[facility]: Lupe Hauser was seen today for serial cervical length screening ultrasound.  See ultrasound report under \"OB Procedures\" tab.   Please don't hesitate to contact our office with any concerns or questions.  -Lali Paiz MD      "

## 2024-10-02 ENCOUNTER — ROUTINE PRENATAL (OUTPATIENT)
Facility: HOSPITAL | Age: 34
End: 2024-10-02
Payer: COMMERCIAL

## 2024-10-02 VITALS
SYSTOLIC BLOOD PRESSURE: 106 MMHG | WEIGHT: 206.35 LBS | HEIGHT: 65 IN | DIASTOLIC BLOOD PRESSURE: 72 MMHG | BODY MASS INDEX: 34.38 KG/M2 | HEART RATE: 78 BPM

## 2024-10-02 DIAGNOSIS — Z3A.20 20 WEEKS GESTATION OF PREGNANCY: ICD-10-CM

## 2024-10-02 DIAGNOSIS — O34.219 PREGNANCY WITH HISTORY OF CESAREAN SECTION, ANTEPARTUM: ICD-10-CM

## 2024-10-02 DIAGNOSIS — E10.65 PRE-EXISTING TYPE 1 DIABETES MELLITUS WITH HYPERGLYCEMIA DURING PREGNANCY IN SECOND TRIMESTER (HCC): Primary | ICD-10-CM

## 2024-10-02 DIAGNOSIS — Z82.79 FAMILY HISTORY OF CONGENITAL DISEASE: ICD-10-CM

## 2024-10-02 DIAGNOSIS — O09.899 HISTORY OF PRETERM DELIVERY, CURRENTLY PREGNANT: ICD-10-CM

## 2024-10-02 DIAGNOSIS — O24.012 PRE-EXISTING TYPE 1 DIABETES MELLITUS WITH HYPERGLYCEMIA DURING PREGNANCY IN SECOND TRIMESTER (HCC): Primary | ICD-10-CM

## 2024-10-02 PROBLEM — G56.03 CARPAL TUNNEL SYNDROME ON BOTH SIDES: Status: ACTIVE | Noted: 2023-08-03

## 2024-10-02 PROCEDURE — 76817 TRANSVAGINAL US OBSTETRIC: CPT | Performed by: OBSTETRICS & GYNECOLOGY

## 2024-10-02 PROCEDURE — 76811 OB US DETAILED SNGL FETUS: CPT | Performed by: OBSTETRICS & GYNECOLOGY

## 2024-10-02 PROCEDURE — 99214 OFFICE O/P EST MOD 30 MIN: CPT | Performed by: OBSTETRICS & GYNECOLOGY

## 2024-10-02 NOTE — LETTER
2024     Izabela Cosby PA-C  [address] [facility] [id number]    Patient: Luep Hauser   YOB: 1990   Date of Visit: 10/2/2024       Dear Dr. Cosby:    Thank you for referring Lupe Hauser to me for evaluation. Below are my notes for this consultation.    If you have questions, please do not hesitate to call me. I look forward to following your patient along with you.         Sincerely,        Shawna Su MD        CC: No Recipients    Shawna Su MD  10/2/2024  4:49 PM  Sign when Signing Visit  Lupe Hauser  has no complaints today at 20w0d. She reports fetal movements and does not report any vaginal bleeding or signs of labor.  Her recently completed fetal testing revealed a normal NIPT.  MSAFP was not completed by today's visit she is here today for an ultrasound for fetal anatomy.    Problem list:  Type 1 diabetes on a Medtronic insulin pump and Guardian CGM.  Most recent hemoglobin A1c 6.5%.  Normal baseline EKG, normal TSH and baseline preeclamptic labs.  She is on aspirin daily.   History of prior pregnancy with midtrimester PPROM at 17 weeks who delivered at 29 weeks and 5 days.  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    Ultrasound findings:  The ultrasound today shows normal interval fetal growth and fluid, normal cervical length, and no malformations were detected.  The anatomical survey was limited due to fetal position.  The placenta was not near her prior  scar.    Pregnancy ultrasound has limitations and is unable to detect all forms of fetal congenital abnormalities.      Follow up recommended:   Encouraged her to schedule her yearly eye exam.  She has a fetal echo scheduled in 2 weeks along with her last transvaginal scan and review of the missed anatomy that we  were unable to see today. She was reassured today though that the missed anatomy not seen today was seen on her 16-week ultrasound and recorded as normal.   Recommend a growth scan in 4 weeks    Pre visit time reviewing her records   10 minutes  Face to face time 10 minutes  Post visit time on documentation of note, updating her problem list, adding orders and prescriptions 10 minutes.  Procedures that were completed today were charged separately.   The level of decision making was moderate complexity.    Shawna Su MD

## 2024-10-02 NOTE — PROGRESS NOTES
Lupe Hauser  has no complaints today at 20w0d. She reports fetal movements and does not report any vaginal bleeding or signs of labor.  Her recently completed fetal testing revealed a normal NIPT.  MSAFP was not completed by today's visit she is here today for an ultrasound for fetal anatomy.    Problem list:  Type 1 diabetes on a Medtronic insulin pump and Guardian CGM.  Most recent hemoglobin A1c 6.5%.  Normal baseline EKG, normal TSH and baseline preeclamptic labs.  She is on aspirin daily.   History of prior pregnancy with midtrimester PPROM at 17 weeks who delivered at 29 weeks and 5 days.  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    Ultrasound findings:  The ultrasound today shows normal interval fetal growth and fluid, normal cervical length, and no malformations were detected.  The anatomical survey was limited due to fetal position.  The placenta was not near her prior  scar.    Pregnancy ultrasound has limitations and is unable to detect all forms of fetal congenital abnormalities.      Follow up recommended:   Encouraged her to schedule her yearly eye exam.  She has a fetal echo scheduled in 2 weeks along with her last transvaginal scan and review of the missed anatomy that we were unable to see today. She was reassured today though that the missed anatomy not seen today was seen on her 16-week ultrasound and recorded as normal.   Recommend a growth scan in 4 weeks    Pre visit time reviewing her records   10 minutes  Face to face time 10 minutes  Post visit time on documentation of note, updating her problem list, adding orders and prescriptions 10 minutes.  Procedures that were completed today were charged separately.   The level of decision making was moderate complexity.    Shawna Su MD

## 2024-10-02 NOTE — Clinical Note
Please call patient tomorrow to schedule a follow-up ultrasound for fetal growth in 4 weeks. Thanks Shawna

## 2024-10-02 NOTE — PROGRESS NOTES
Ultrasound Probe Disinfection    A transvaginal ultrasound was performed.   Prior to use, disinfection was performed with High Level Disinfection Process (Nexamp).  Probe serial number A2: [id number] was used.    Pinky La  10/02/24  3:04 PM

## 2024-10-03 ENCOUNTER — TELEPHONE (OUTPATIENT)
Facility: HOSPITAL | Age: 34
End: 2024-10-03

## 2024-10-03 NOTE — TELEPHONE ENCOUNTER
Patient was seen on 10/2/24 in the [facility] office for Detailed US. Per Dr. Su, patient should return in 4 weeks (approx 10/30) for a growth US. Called patient to schedule. ANGELA informing her of the recommended follow up and requested that she call the office back at [phone].

## 2024-10-08 ENCOUNTER — TELEPHONE (OUTPATIENT)
Dept: OBGYN CLINIC | Facility: CLINIC | Age: 34
End: 2024-10-08

## 2024-10-08 NOTE — TELEPHONE ENCOUNTER
.Overall how are you doing? good    Compliant with routine OB care appointments? yes    Have you completed your 1st trimester labs? yes    If you had NIPS with MFM, do you have a order for MSAFP? yes   Can be completed 15w-22w9d, ideally 16w-18w    Have you seen MFM and do you have your detailed US scheduled? yes    Pregnancy Education-have you had a chance to review the classes offered and registered?  No not yet

## 2024-10-17 ENCOUNTER — ROUTINE PRENATAL (OUTPATIENT)
Dept: PERINATAL CARE | Facility: OTHER | Age: 34
End: 2024-10-17
Payer: COMMERCIAL

## 2024-10-17 ENCOUNTER — ROUTINE PRENATAL (OUTPATIENT)
Dept: OBGYN CLINIC | Facility: CLINIC | Age: 34
End: 2024-10-17

## 2024-10-17 VITALS
DIASTOLIC BLOOD PRESSURE: 64 MMHG | SYSTOLIC BLOOD PRESSURE: 110 MMHG | WEIGHT: 206.6 LBS | BODY MASS INDEX: 36.61 KG/M2 | HEART RATE: 91 BPM | HEIGHT: 63 IN

## 2024-10-17 VITALS — BODY MASS INDEX: 34.28 KG/M2 | DIASTOLIC BLOOD PRESSURE: 78 MMHG | SYSTOLIC BLOOD PRESSURE: 120 MMHG | WEIGHT: 206 LBS

## 2024-10-17 DIAGNOSIS — Z36.9 ANTENATAL SCREENING ENCOUNTER: ICD-10-CM

## 2024-10-17 DIAGNOSIS — Z87.59 HISTORY OF PRETERM PREMATURE RUPTURE OF MEMBRANES (PPROM): ICD-10-CM

## 2024-10-17 DIAGNOSIS — Z3A.22 22 WEEKS GESTATION OF PREGNANCY: Primary | ICD-10-CM

## 2024-10-17 DIAGNOSIS — Z3A.22 22 WEEKS GESTATION OF PREGNANCY: ICD-10-CM

## 2024-10-17 DIAGNOSIS — O24.012 TYPE 1 DIABETES MELLITUS AFFECTING PREGNANCY IN SECOND TRIMESTER, ANTEPARTUM: ICD-10-CM

## 2024-10-17 DIAGNOSIS — O24.012 TYPE 1 DIABETES MELLITUS AFFECTING PREGNANCY IN SECOND TRIMESTER, ANTEPARTUM: Primary | ICD-10-CM

## 2024-10-17 DIAGNOSIS — O09.899 HISTORY OF PRETERM DELIVERY, CURRENTLY PREGNANT: ICD-10-CM

## 2024-10-17 PROCEDURE — 99213 OFFICE O/P EST LOW 20 MIN: CPT | Performed by: OBSTETRICS & GYNECOLOGY

## 2024-10-17 PROCEDURE — 76827 ECHO EXAM OF FETAL HEART: CPT | Performed by: OBSTETRICS & GYNECOLOGY

## 2024-10-17 PROCEDURE — 76825 ECHO EXAM OF FETAL HEART: CPT | Performed by: OBSTETRICS & GYNECOLOGY

## 2024-10-17 PROCEDURE — 76817 TRANSVAGINAL US OBSTETRIC: CPT | Performed by: OBSTETRICS & GYNECOLOGY

## 2024-10-17 PROCEDURE — 93325 DOPPLER ECHO COLOR FLOW MAPG: CPT | Performed by: OBSTETRICS & GYNECOLOGY

## 2024-10-17 PROCEDURE — PNV: Performed by: PHYSICIAN ASSISTANT

## 2024-10-17 NOTE — PROGRESS NOTES
34 y.o.  female at 22w1d (Estimated Date of Delivery: 25) for PNV.    Pre-Javier Vitals      Flowsheet Row Most Recent Value   Prenatal Assessment    Movement Present   Prenatal Vitals    Blood Pressure 120/78   Weight - Scale 93.4 kg (206 lb)   Urine Albumin/Glucose    Dilation/Effacement/Station    Vaginal Drainage    Edema           TW.7 kg (28 lb)    Leakage of fluid: no  Vaginal bleeding: no  Contractions/Cramping: no  Fetal movement: yes  Pt is feeling well  Seeing MFM for fetal echo later today--  Rx for 28 week labs  O pos  Type 1 diabetic    RTO in 4 weeks.

## 2024-10-17 NOTE — PROGRESS NOTES
The patient was seen today for an ultrasound.  Please see ultrasound report (located under Ob Procedures) for additional details.   Thank you very much for allowing us to participate in the care of this very nice patient.  Should you have any questions, please do not hesitate to contact me.     Bertram Smith MD FACOG  Attending Physician, Maternal-Fetal Medicine  [facility]

## 2024-10-17 NOTE — PROGRESS NOTES
Ultrasound Probe Disinfection    A transvaginal ultrasound was performed.   Prior to use, disinfection was performed with High Level Disinfection Process ([place]).  Probe serial number F2: [id number] was used.    Nusrat Bernal  10/17/24  2:17 PM

## 2024-10-30 ENCOUNTER — ULTRASOUND (OUTPATIENT)
Facility: HOSPITAL | Age: 34
End: 2024-10-30
Payer: COMMERCIAL

## 2024-10-30 VITALS
HEART RATE: 94 BPM | DIASTOLIC BLOOD PRESSURE: 74 MMHG | BODY MASS INDEX: 35.26 KG/M2 | HEIGHT: 65 IN | SYSTOLIC BLOOD PRESSURE: 124 MMHG | OXYGEN SATURATION: 98 % | WEIGHT: 211.64 LBS

## 2024-10-30 DIAGNOSIS — O24.012 TYPE 1 DIABETES MELLITUS AFFECTING PREGNANCY IN SECOND TRIMESTER, ANTEPARTUM: ICD-10-CM

## 2024-10-30 DIAGNOSIS — Z3A.24 24 WEEKS GESTATION OF PREGNANCY: Primary | ICD-10-CM

## 2024-10-30 DIAGNOSIS — O09.892 HISTORY OF PRETERM DELIVERY, CURRENTLY PREGNANT, SECOND TRIMESTER: ICD-10-CM

## 2024-10-30 PROCEDURE — 76817 TRANSVAGINAL US OBSTETRIC: CPT | Performed by: OBSTETRICS & GYNECOLOGY

## 2024-10-30 PROCEDURE — 76816 OB US FOLLOW-UP PER FETUS: CPT | Performed by: OBSTETRICS & GYNECOLOGY

## 2024-10-30 PROCEDURE — 99212 OFFICE O/P EST SF 10 MIN: CPT | Performed by: OBSTETRICS & GYNECOLOGY

## 2024-10-30 NOTE — PROGRESS NOTES
The patient was seen today for an ultrasound.  Please see ultrasound report (located under Ob Procedures) for additional details.   Thank you very much for allowing us to participate in the care of this very nice patient.  Should you have any questions, please do not hesitate to contact me.     Bertram Smith MD FACOG  Attending Physician, Maternal-Fetal Medicine  [facility]

## 2024-10-30 NOTE — PROGRESS NOTES
Ultrasound Probe Disinfection    A transvaginal ultrasound was performed.   Prior to use, disinfection was performed with High Level Disinfection Process ([place]).  Probe serial number [id number]: [id number] was used.    Pinky La  10/30/24  1:44 PM

## 2024-11-01 DIAGNOSIS — O22.42 HEMORRHOIDS DURING PREGNANCY IN SECOND TRIMESTER: Primary | ICD-10-CM

## 2024-11-01 RX ORDER — HYDROCORTISONE ACETATE 25 MG/1
25 SUPPOSITORY RECTAL 2 TIMES DAILY
Qty: 12 SUPPOSITORY | Refills: 0 | Status: SHIPPED | OUTPATIENT
Start: 2024-11-01

## 2024-11-11 ENCOUNTER — ROUTINE PRENATAL (OUTPATIENT)
Dept: OBGYN CLINIC | Facility: CLINIC | Age: 34
End: 2024-11-11

## 2024-11-11 VITALS — DIASTOLIC BLOOD PRESSURE: 68 MMHG | SYSTOLIC BLOOD PRESSURE: 98 MMHG | BODY MASS INDEX: 35.21 KG/M2 | WEIGHT: 211.6 LBS

## 2024-11-11 DIAGNOSIS — Z87.59 HISTORY OF PRETERM PREMATURE RUPTURE OF MEMBRANES (PPROM): ICD-10-CM

## 2024-11-11 DIAGNOSIS — Z3A.25 25 WEEKS GESTATION OF PREGNANCY: ICD-10-CM

## 2024-11-11 DIAGNOSIS — O34.219 PREGNANCY WITH HISTORY OF CESAREAN SECTION, ANTEPARTUM: ICD-10-CM

## 2024-11-11 DIAGNOSIS — O24.012 TYPE 1 DIABETES MELLITUS AFFECTING PREGNANCY IN SECOND TRIMESTER, ANTEPARTUM: Primary | ICD-10-CM

## 2024-11-11 PROCEDURE — PNV

## 2024-11-11 RX ORDER — INSULIN ASPART 100 [IU]/ML
INJECTION, SOLUTION INTRAVENOUS; SUBCUTANEOUS
COMMUNITY
Start: 2024-10-30

## 2024-11-11 NOTE — PROGRESS NOTES
Patient is a 35 YO  female presenting to the office at 25w5d for routine OB care.   Patient is feeling well today.   Would like RLTCS, she would not like to schedule until after next US to see if she will be able to deliver at 38-weeks due to baby's size.   DM1 following with [facility] diabetic education.   US with [facility] 24  Fetal heart rate: 150  BP: 98/68  TWlb 9.6oz  Fetal Movement: yes, good movement   LOF: no  VB: no  CTX: no  Reviewed precautions  Call for concerns  RTO 3 weeks

## 2024-11-11 NOTE — PROGRESS NOTES
34 y.o.  female at 25w5d (Estimated Date of Delivery: 25) for PNV.      TWG: 15.3 kg (33 lb 10.3 oz)    Leakage of fluid: no  Vaginal bleeding: no  Contractions/Cramping: no  Fetal movement: yes

## 2024-11-13 ENCOUNTER — TELEMEDICINE (OUTPATIENT)
Facility: HOSPITAL | Age: 34
End: 2024-11-13
Payer: COMMERCIAL

## 2024-11-13 VITALS — BODY MASS INDEX: 35.11 KG/M2 | WEIGHT: 211 LBS

## 2024-11-13 DIAGNOSIS — Z46.81 INSULIN PUMP TITRATION: ICD-10-CM

## 2024-11-13 DIAGNOSIS — E10.65 PRE-EXISTING TYPE 1 DIABETES MELLITUS WITH HYPERGLYCEMIA DURING PREGNANCY IN SECOND TRIMESTER (HCC): Primary | ICD-10-CM

## 2024-11-13 DIAGNOSIS — O24.012 PRE-EXISTING TYPE 1 DIABETES MELLITUS WITH HYPERGLYCEMIA DURING PREGNANCY IN SECOND TRIMESTER (HCC): Primary | ICD-10-CM

## 2024-11-13 DIAGNOSIS — Z3A.26 26 WEEKS GESTATION OF PREGNANCY: ICD-10-CM

## 2024-11-13 PROCEDURE — 99215 OFFICE O/P EST HI 40 MIN: CPT | Performed by: NURSE PRACTITIONER

## 2024-11-13 NOTE — PROGRESS NOTES
Virtual Regular Visit  Name: Lupe Hauser      : 1990      MRN: [id number]  Encounter Provider: JOAO Bloom  Encounter Date: 2024   Encounter department: [facility]    Verification of patient location:    Patient is located at Other in the following state in which I hold an active license [place]    :  Assessment & Plan  26 weeks gestation of pregnancy         Pre-existing type 1 diabetes mellitus with hyperglycemia during pregnancy in second trimester (HCC)  -Novolog via Medtronic 780G insulin pump and Guardian CGM.  -Due to hyperglycemia, please adjust insulin pump as follows:  MN-3 AM from 1.10 to 1.30 units/hour;  3 AM-6 AM from 1.45 to 1.70 units/hour;  6 AM-10 PM from 2.05 to 2.25 units/hour;  10 PM-MN@ 1.40 units/hour.  Let me know new basal total.   Under bolus settings:  Decrease BG target from 100 to 90 mg/dL.  Decrease insulin sensitivity from 20 to 15.   Under device settings decrease low alerts from 70 to 55.   Aim to check messages every Monday.  Try to download insulin pump every Monday.  Continue GDM meal plan.  SMBG fasting; 2 hours post start of each meal and with hypoglycemia.  -Glucose goals fasting 60-90 mg/dL; 2 hours post meal readings 120 or less.   -Always have glucose available for hypoglycemia; use 15 by 15 rule.  Lab Results   Component Value Date    HGBA1C 6.5 (H) 2024            Insulin pump titration         BMI 35.0-35.9,adult  -First visit weight 189 lbs.  -Current weight 211 lbs.   -TWG 22 lbs.   -Recommended weight gain 11 to 20 lbs.  -Continue GDM meal plan and follow up with dietitian as needed.          26 weeks gestation of pregnancy         Pre-existing type 1 diabetes mellitus with hyperglycemia during pregnancy in second trimester (HCC)    Lab Results   Component Value Date    HGBA1C 6.5 (H) 2024          Insulin pump titration         Encounter provider JOAO Bloom    The patient was identified by name and date of  birth. Lupe Hauser was informed that this is a telemedicine visit and that the visit is being conducted through the Epic Embedded platform. She agrees to proceed..  My office door was closed. No one else was in the room.  She acknowledged consent and understanding of privacy and security of the video platform. The patient has agreed to participate and understands they can discontinue the visit at any time.    Patient is aware this is a billable service.     History of Present Illness  Lupe is a 35 yo  female 26 0/7 weeks gestation for follow up PAZ Mutation 3, managed as a T1DM on Novolog via Medtronic 780G insulin pump and Guardian CGM. Having issues with insulin pump auto-uploading and last pump download noted was on 2024 and during visit requested an upload via zay. Finally insulin pump noted up to date. Will aim to look at [employer] messages every Monday to check for insulin pump adjustments or if download is requested. Reported issues with glucose readings more during the day and associated with meals. Due to hyperglycemia noted, all basal settings adjusted, BG target and insulin sensitivity decreased.   History of Present Illness   Objective     Wt 95.7 kg (211 lb)   LMP 05/15/2024   BMI 35.11 kg/m²   Refer to attached file for insulin pump download.   Review of Systems - today without complaints.    Physical Exam  HENT:      Head: Normocephalic.      Nose: Nose normal.   Eyes:      Conjunctiva/sclera: Conjunctivae normal.   Pulmonary:      Effort: Pulmonary effort is normal.   Neurological:      Mental Status: She is alert and oriented to person, place, and time.   Psychiatric:         Mood and Affect: Mood normal.         Behavior: Behavior normal.         Thought Content: Thought content normal.         Judgment: Judgment normal.         Visit Time  Total Visit Duration: 30 minutes with patient and 15 minutes charting and reviewing insulin pump download.

## 2024-11-13 NOTE — ASSESSMENT & PLAN NOTE
-First visit weight 189 lbs.  -Current weight 211 lbs.   -TWG 22 lbs.   -Recommended weight gain 11 to 20 lbs.  -Continue GDM meal plan and follow up with dietitian as needed.

## 2024-11-13 NOTE — ASSESSMENT & PLAN NOTE
-Novolog via Medtronic 780G insulin pump and Guardian CGM.  -Due to hyperglycemia, please adjust insulin pump as follows:  MN-3 AM from 1.10 to 1.30 units/hour;  3 AM-6 AM from 1.45 to 1.70 units/hour;  6 AM-10 PM from 2.05 to 2.25 units/hour;  10 PM-MN@ 1.40 units/hour.  Let me know new basal total.   Under bolus settings:  Decrease BG target from 100 to 90 mg/dL.  Decrease insulin sensitivity from 20 to 15.   Under device settings decrease low alerts from 70 to 55.   Aim to check messages every Monday.  Try to download insulin pump every Monday.  Continue GDM meal plan.  SMBG fasting; 2 hours post start of each meal and with hypoglycemia.  -Glucose goals fasting 60-90 mg/dL; 2 hours post meal readings 120 or less.   -Always have glucose available for hypoglycemia; use 15 by 15 rule.  Lab Results   Component Value Date    HGBA1C 6.5 (H) 08/12/2024

## 2024-11-18 ENCOUNTER — TELEPHONE (OUTPATIENT)
Facility: HOSPITAL | Age: 34
End: 2024-11-18

## 2024-11-18 NOTE — TELEPHONE ENCOUNTER
Phone call to Lupe regarding insulin pump adjustments sent on 11/13/2024 and changes not noted on insulin pump download. Message unread and adjustments completed to today. Encouraged to download every Monday and communicate at least every Monday.

## 2024-11-26 ENCOUNTER — APPOINTMENT (OUTPATIENT)
Dept: LAB | Facility: AMBULARY SURGERY CENTER | Age: 34
End: 2024-11-26
Payer: COMMERCIAL

## 2024-11-26 DIAGNOSIS — O24.012 TYPE 1 DIABETES MELLITUS AFFECTING PREGNANCY IN SECOND TRIMESTER, ANTEPARTUM: ICD-10-CM

## 2024-11-26 DIAGNOSIS — N91.2 AMENORRHEA: ICD-10-CM

## 2024-11-26 DIAGNOSIS — O34.219 PREGNANCY WITH HISTORY OF CESAREAN SECTION, ANTEPARTUM: ICD-10-CM

## 2024-11-26 DIAGNOSIS — Z87.59 HISTORY OF PRETERM PREMATURE RUPTURE OF MEMBRANES (PPROM): ICD-10-CM

## 2024-11-26 DIAGNOSIS — Z3A.14 14 WEEKS GESTATION OF PREGNANCY: ICD-10-CM

## 2024-11-26 DIAGNOSIS — Z36.9 ANTENATAL SCREENING ENCOUNTER: ICD-10-CM

## 2024-11-26 DIAGNOSIS — Z3A.22 22 WEEKS GESTATION OF PREGNANCY: ICD-10-CM

## 2024-11-26 DIAGNOSIS — E10.9 TYPE 1 DIABETES MELLITUS WITHOUT COMPLICATION (HCC): ICD-10-CM

## 2024-11-26 DIAGNOSIS — O09.892 HISTORY OF PRETERM DELIVERY, CURRENTLY PREGNANT, SECOND TRIMESTER: ICD-10-CM

## 2024-11-26 LAB
B-HCG SERPL-ACNC: ABNORMAL MIU/ML (ref 0–5)
ERYTHROCYTE [DISTWIDTH] IN BLOOD BY AUTOMATED COUNT: 12.6 % (ref 11.6–15.1)
HCT VFR BLD AUTO: 35.3 % (ref 34.8–46.1)
HGB BLD-MCNC: 11.6 G/DL (ref 11.5–15.4)
MCH RBC QN AUTO: 30.4 PG (ref 26.8–34.3)
MCHC RBC AUTO-ENTMCNC: 32.9 G/DL (ref 31.4–37.4)
MCV RBC AUTO: 92 FL (ref 82–98)
PLATELET # BLD AUTO: 206 THOUSANDS/UL (ref 149–390)
PMV BLD AUTO: 12.2 FL (ref 8.9–12.7)
RBC # BLD AUTO: 3.82 MILLION/UL (ref 3.81–5.12)
TREPONEMA PALLIDUM IGG+IGM AB [PRESENCE] IN SERUM OR PLASMA BY IMMUNOASSAY: NORMAL
WBC # BLD AUTO: 10.55 THOUSAND/UL (ref 4.31–10.16)

## 2024-11-26 PROCEDURE — 36415 COLL VENOUS BLD VENIPUNCTURE: CPT

## 2024-11-26 PROCEDURE — 84702 CHORIONIC GONADOTROPIN TEST: CPT

## 2024-11-26 PROCEDURE — 86780 TREPONEMA PALLIDUM: CPT

## 2024-11-26 PROCEDURE — 85027 COMPLETE CBC AUTOMATED: CPT

## 2024-11-26 PROCEDURE — 82105 ALPHA-FETOPROTEIN SERUM: CPT

## 2024-11-27 ENCOUNTER — TELEPHONE (OUTPATIENT)
Dept: OBGYN CLINIC | Facility: CLINIC | Age: 34
End: 2024-11-27

## 2024-11-27 ENCOUNTER — ROUTINE PRENATAL (OUTPATIENT)
Dept: OBGYN CLINIC | Facility: CLINIC | Age: 34
End: 2024-11-27
Payer: COMMERCIAL

## 2024-11-27 VITALS — SYSTOLIC BLOOD PRESSURE: 120 MMHG | DIASTOLIC BLOOD PRESSURE: 80 MMHG | BODY MASS INDEX: 35.94 KG/M2 | WEIGHT: 216 LBS

## 2024-11-27 DIAGNOSIS — Z23 NEED FOR INFLUENZA VACCINATION: ICD-10-CM

## 2024-11-27 DIAGNOSIS — O24.012 PRE-EXISTING TYPE 1 DIABETES MELLITUS WITH HYPERGLYCEMIA DURING PREGNANCY IN SECOND TRIMESTER (HCC): Primary | ICD-10-CM

## 2024-11-27 DIAGNOSIS — Z3A.28 28 WEEKS GESTATION OF PREGNANCY: ICD-10-CM

## 2024-11-27 DIAGNOSIS — E10.65 PRE-EXISTING TYPE 1 DIABETES MELLITUS WITH HYPERGLYCEMIA DURING PREGNANCY IN SECOND TRIMESTER (HCC): Primary | ICD-10-CM

## 2024-11-27 DIAGNOSIS — Z46.81 INSULIN PUMP TITRATION: ICD-10-CM

## 2024-11-27 DIAGNOSIS — Z23 NEED FOR TDAP VACCINATION: ICD-10-CM

## 2024-11-27 PROCEDURE — 90471 IMMUNIZATION ADMIN: CPT | Performed by: OBSTETRICS & GYNECOLOGY

## 2024-11-27 PROCEDURE — 90656 IIV3 VACC NO PRSV 0.5 ML IM: CPT | Performed by: OBSTETRICS & GYNECOLOGY

## 2024-11-27 PROCEDURE — PNV: Performed by: OBSTETRICS & GYNECOLOGY

## 2024-11-27 PROCEDURE — 90472 IMMUNIZATION ADMIN EACH ADD: CPT | Performed by: OBSTETRICS & GYNECOLOGY

## 2024-11-27 PROCEDURE — 90715 TDAP VACCINE 7 YRS/> IM: CPT | Performed by: OBSTETRICS & GYNECOLOGY

## 2024-11-27 NOTE — TELEPHONE ENCOUNTER
Left message for patient to call back about breast pump. The company that she wants it ordered from is not the usual company we use. She will need to contact the company to send us their information so we can fill out the request and send it back to them.

## 2024-11-27 NOTE — PROGRESS NOTES
The patient was given a tdap and flue vaccine today in the LEFT DELT. The patient tolerated the injections well.     Tdap:   LOT: [id number]  EXP: 8/2026  NDC: 05163-703-23    Flu vaccine:   LOT: [id number]  EXP: 6/2025  NDC: 17587-795-46    Urine dip- protein trace, glucose 500

## 2024-11-27 NOTE — PROGRESS NOTES
34 y.o.  female at 28w0d (Estimated Date of Delivery: 25) for PNV.    Pre-Javier Vitals      Flowsheet Row Most Recent Value   Prenatal Assessment    Fetal Heart Rate 155   Movement Present   Prenatal Vitals    Blood Pressure 120/80   Weight - Scale 98 kg (216 lb)   Urine Albumin/Glucose    Dilation/Effacement/Station    Vaginal Drainage    Draining Fluid No   Edema           TW.2 kg (38 lb)    28 wk labs reviewed.   Red folder given and reviewed. Discussed Fetal kick counts, PTL/Labor information, Baby & Me Classes.     Consent signed - ok with transfusion, full code.   Ok with hysterectomy in life-saving situation.   Current visitor policy reviewed.   Patient plans to breastfeed.   Skin to skin, rooming in, delayed cord clamp, pain management in labor discussed.    TDAP was given.  Rhogam was not indicated.    Leakage of fluid: no  Vaginal bleeding: no  Contractions/Cramping: no  Fetal movement: yes    Patient has T1DM- uses insulin pump   She reports her requirements are going up.   FBG have been in the 120-130 range.   She is concerned about her sugars.   Has never gone into DKA before.   We discussed sxs of DKA and when to come to the hospital.   She has a repeat ultrasound on Friday.   Will discuss with MFM delivery timing in the setting of currently not well controlled DM. Will set up CS date from there.     RTO in 2 weeks.

## 2024-11-29 ENCOUNTER — ULTRASOUND (OUTPATIENT)
Facility: HOSPITAL | Age: 34
End: 2024-11-29
Payer: COMMERCIAL

## 2024-11-29 VITALS
DIASTOLIC BLOOD PRESSURE: 70 MMHG | HEIGHT: 65 IN | WEIGHT: 216.27 LBS | BODY MASS INDEX: 36.03 KG/M2 | SYSTOLIC BLOOD PRESSURE: 126 MMHG | HEART RATE: 96 BPM

## 2024-11-29 DIAGNOSIS — O24.013 PRE-EXISTING TYPE 1 DIABETES MELLITUS WITH HYPERGLYCEMIA DURING PREGNANCY IN THIRD TRIMESTER (HCC): Primary | ICD-10-CM

## 2024-11-29 DIAGNOSIS — Z3A.28 28 WEEKS GESTATION OF PREGNANCY: ICD-10-CM

## 2024-11-29 DIAGNOSIS — O24.012 TYPE 1 DIABETES MELLITUS AFFECTING PREGNANCY IN SECOND TRIMESTER, ANTEPARTUM: ICD-10-CM

## 2024-11-29 DIAGNOSIS — E10.65 PRE-EXISTING TYPE 1 DIABETES MELLITUS WITH HYPERGLYCEMIA DURING PREGNANCY IN THIRD TRIMESTER (HCC): Primary | ICD-10-CM

## 2024-11-29 PROCEDURE — 99213 OFFICE O/P EST LOW 20 MIN: CPT | Performed by: OBSTETRICS & GYNECOLOGY

## 2024-11-29 PROCEDURE — 76816 OB US FOLLOW-UP PER FETUS: CPT | Performed by: OBSTETRICS & GYNECOLOGY

## 2024-11-29 NOTE — PROGRESS NOTES
The patient was seen today for an ultrasound.  Please see ultrasound report (located under Ob Procedures) for additional details.   Thank you very much for allowing us to participate in the care of this very nice patient.  Should you have any questions, please do not hesitate to contact me.     Bertram Smith MD FACOG  Attending Physician, Maternal-Fetal Medicine  [facility]

## 2024-11-30 LAB
2ND TRIMESTER 4 SCREEN SERPL-IMP: NORMAL
AFP ADJ MOM SERPL: NORMAL
AFP INTERP AMN-IMP: NORMAL
AFP INTERP SERPL-IMP: NORMAL
AFP INTERP SERPL-IMP: NORMAL
AFP SERPL-MCNC: 84 NG/ML
AGE AT DELIVERY: 34.5 YR
GA METHOD: NORMAL
GA: 27.9 WEEKS
IDDM PATIENT QL: YES
MULTIPLE PREGNANCY: NO
NEURAL TUBE DEFECT RISK FETUS: NORMAL %

## 2024-12-02 ENCOUNTER — RESULTS FOLLOW-UP (OUTPATIENT)
Dept: OBGYN CLINIC | Facility: CLINIC | Age: 34
End: 2024-12-02

## 2024-12-04 ENCOUNTER — TELEPHONE (OUTPATIENT)
Facility: HOSPITAL | Age: 34
End: 2024-12-04

## 2024-12-04 NOTE — TELEPHONE ENCOUNTER
Message left regarding glucose readings and insulin pump bolus doses since none noted since Monday. Encouraged to read [employer] messages, sending insulin pump adjustments due to hyperglycemia noted on insulin pump report.

## 2024-12-09 ENCOUNTER — ROUTINE PRENATAL (OUTPATIENT)
Dept: OBGYN CLINIC | Facility: CLINIC | Age: 34
End: 2024-12-09

## 2024-12-09 VITALS — WEIGHT: 218 LBS | DIASTOLIC BLOOD PRESSURE: 72 MMHG | BODY MASS INDEX: 36.28 KG/M2 | SYSTOLIC BLOOD PRESSURE: 116 MMHG

## 2024-12-09 DIAGNOSIS — O34.219 PREGNANCY WITH HISTORY OF CESAREAN SECTION, ANTEPARTUM: ICD-10-CM

## 2024-12-09 DIAGNOSIS — Z87.59 HISTORY OF PRETERM PREMATURE RUPTURE OF MEMBRANES (PPROM): ICD-10-CM

## 2024-12-09 DIAGNOSIS — E53.8 FOLIC ACID DEFICIENCY: ICD-10-CM

## 2024-12-09 DIAGNOSIS — O09.893 HISTORY OF PRETERM DELIVERY, CURRENTLY PREGNANT IN THIRD TRIMESTER: ICD-10-CM

## 2024-12-09 DIAGNOSIS — E10.65 PRE-EXISTING TYPE 1 DIABETES MELLITUS WITH HYPERGLYCEMIA DURING PREGNANCY IN THIRD TRIMESTER (HCC): ICD-10-CM

## 2024-12-09 DIAGNOSIS — Z3A.29 29 WEEKS GESTATION OF PREGNANCY: Primary | ICD-10-CM

## 2024-12-09 DIAGNOSIS — O24.013 PRE-EXISTING TYPE 1 DIABETES MELLITUS WITH HYPERGLYCEMIA DURING PREGNANCY IN THIRD TRIMESTER (HCC): ICD-10-CM

## 2024-12-09 PROCEDURE — PNV: Performed by: OBSTETRICS & GYNECOLOGY

## 2024-12-09 RX ORDER — FOLIC ACID 1 MG/1
1 TABLET ORAL DAILY
Qty: 90 TABLET | Refills: 3 | Status: SHIPPED | OUTPATIENT
Start: 2024-12-09

## 2024-12-09 NOTE — PROGRESS NOTES
34 y.o.  female at 29w5d (Estimated Date of Delivery: 25) for PNV.    Pre-Javier Vitals      Flowsheet Row Most Recent Value   Prenatal Assessment    Fetal Heart Rate 160   Movement Present   Prenatal Vitals    Blood Pressure 116/72   Weight - Scale 98.9 kg (218 lb)   Urine Albumin/Glucose    Dilation/Effacement/Station    Vaginal Drainage    Edema           TW.1 kg (40 lb)    Leakage of fluid: no  Vaginal bleeding: no  Contractions/Cramping: no  Fetal movement: yes    RTO in 4 weeks.      Requested refill of folic acid -sent.   Wants to schedule repeat CS. Discussed 38wks due to hard to control type 1 DM. Encouraged continue communication with Diabetes in Pregnancy team (Flor). Per note  from flor, needs to adjust pump settings to increase insulin.   28w growth 84% 3lbs 2oz, repeat growth at 32 wks.   RLTCS schedule 2/5 at 8am with Dr Umana/Kelle Flores

## 2024-12-09 NOTE — PATIENT INSTRUCTIONS
Joss Andrade,   I scheduled your delivery for 12/5 at 8am with Dr Umana and our PA Kelle Flores will be assisting. Please contact the office through [employer] or by telephone at [phone] with any questions.

## 2024-12-17 DIAGNOSIS — O24.013 PRE-EXISTING TYPE 1 DIABETES MELLITUS WITH HYPERGLYCEMIA DURING PREGNANCY IN THIRD TRIMESTER (HCC): Primary | ICD-10-CM

## 2024-12-17 DIAGNOSIS — E10.65 PRE-EXISTING TYPE 1 DIABETES MELLITUS WITH HYPERGLYCEMIA DURING PREGNANCY IN THIRD TRIMESTER (HCC): Primary | ICD-10-CM

## 2024-12-17 RX ORDER — INSULIN ASPART 100 [IU]/ML
INJECTION, SOLUTION INTRAVENOUS; SUBCUTANEOUS
Qty: 10 ML | Refills: 0 | Status: CANCELLED | OUTPATIENT
Start: 2024-12-17

## 2024-12-17 RX ORDER — INSULIN ASPART 100 [IU]/ML
INJECTION, SOLUTION INTRAVENOUS; SUBCUTANEOUS
Qty: 60 ML | Refills: 0 | Status: SHIPPED | OUTPATIENT
Start: 2024-12-17

## 2024-12-17 NOTE — TELEPHONE ENCOUNTER
Pt requesting Stacie take over refills. States she needs Stacie to fill script due to insurance only allowing her to see 1 doctor at a time. Any issues refilling it, please contact pt at [phone]     Reason for call:   [x] Refill   [] Prior Auth  [] Other:     Office:   [] PCP/Provider -   [x] Specialty/Provider - [facility] / JOAO Bloom     Medication:   NovoLOG 100 UNIT/ML injection     Pharmacy:   [facility] [id number] [facility] [address]     Does the patient have enough for 3 days?   [] Yes   [x] No - Send as HP to POD

## 2024-12-19 ENCOUNTER — TELEPHONE (OUTPATIENT)
Dept: OBGYN CLINIC | Facility: CLINIC | Age: 34
End: 2024-12-19

## 2024-12-23 ENCOUNTER — ULTRASOUND (OUTPATIENT)
Dept: PERINATAL CARE | Facility: OTHER | Age: 34
End: 2024-12-23
Payer: COMMERCIAL

## 2024-12-23 ENCOUNTER — DOCUMENTATION (OUTPATIENT)
Facility: HOSPITAL | Age: 34
End: 2024-12-23

## 2024-12-23 VITALS
WEIGHT: 223.2 LBS | HEIGHT: 65 IN | BODY MASS INDEX: 37.19 KG/M2 | HEART RATE: 87 BPM | DIASTOLIC BLOOD PRESSURE: 82 MMHG | SYSTOLIC BLOOD PRESSURE: 126 MMHG

## 2024-12-23 DIAGNOSIS — E10.65 PRE-EXISTING TYPE 1 DIABETES MELLITUS WITH HYPERGLYCEMIA DURING PREGNANCY IN THIRD TRIMESTER (HCC): Primary | ICD-10-CM

## 2024-12-23 DIAGNOSIS — O09.893 HISTORY OF PRETERM DELIVERY, CURRENTLY PREGNANT IN THIRD TRIMESTER: Primary | ICD-10-CM

## 2024-12-23 DIAGNOSIS — O34.219 PREGNANCY WITH HISTORY OF CESAREAN SECTION, ANTEPARTUM: ICD-10-CM

## 2024-12-23 DIAGNOSIS — O24.013 PRE-EXISTING TYPE 1 DIABETES MELLITUS WITH HYPERGLYCEMIA DURING PREGNANCY IN THIRD TRIMESTER (HCC): Primary | ICD-10-CM

## 2024-12-23 DIAGNOSIS — Z46.81 INSULIN PUMP TITRATION: ICD-10-CM

## 2024-12-23 DIAGNOSIS — Z3A.31 31 WEEKS GESTATION OF PREGNANCY: ICD-10-CM

## 2024-12-23 DIAGNOSIS — E10.65 PRE-EXISTING TYPE 1 DIABETES MELLITUS WITH HYPERGLYCEMIA DURING PREGNANCY IN THIRD TRIMESTER (HCC): ICD-10-CM

## 2024-12-23 DIAGNOSIS — O24.013 PRE-EXISTING TYPE 1 DIABETES MELLITUS WITH HYPERGLYCEMIA DURING PREGNANCY IN THIRD TRIMESTER (HCC): ICD-10-CM

## 2024-12-23 DIAGNOSIS — O36.63X0 FETAL MACROSOMIA DURING PREGNANCY IN THIRD TRIMESTER, SINGLE OR UNSPECIFIED FETUS: ICD-10-CM

## 2024-12-23 PROCEDURE — 99213 OFFICE O/P EST LOW 20 MIN: CPT | Performed by: OBSTETRICS & GYNECOLOGY

## 2024-12-23 PROCEDURE — 76816 OB US FOLLOW-UP PER FETUS: CPT | Performed by: OBSTETRICS & GYNECOLOGY

## 2024-12-23 PROCEDURE — 59025 FETAL NON-STRESS TEST: CPT | Performed by: OBSTETRICS & GYNECOLOGY

## 2024-12-23 NOTE — ASSESSMENT & PLAN NOTE
Novolog via Medtronic 780G insulin pump and Guardian CGM.  Due to hyperglycemia, please adjust insulin pump as follows:  MN-3 AM from 2.10 to 3.20 units/hour;  3 AM-6 AM from 3.20 to 4.00 units/hour;  6 AM-10 AM from 3.20 to 4.75 units/hour;  10 AM-3 PM from 3.50 to 3.95 units/hour;  3 PM-10 PM from 3.75 to 4.05 units/hour;  10 PM-MN from 2.40 to 3.20 units/hour.  Refer to attached file for insulin pump.    Lab Results   Component Value Date    HGBA1C 6.5 (H) 08/12/2024

## 2024-12-23 NOTE — PROGRESS NOTES
[facility]: Ms. Hauser was seen today for NST (found under the pregnancy episode) which I reviewed the RN assessment and agree, and fetal growth ultrasound (see ultrasound report under OB procedures tab).   The time spent on this established patient on the encounter date included 7 minutes previsit service time reviewing records and precharting, 10 minutes face-to-face service time counseling regarding results and coordinating care, and  5 minutes charting, totalling 22 minutes.  Please don't hesitate to contact our office with any concerns or questions.  -Toyin Washington MD

## 2024-12-23 NOTE — LETTER
"Date: 2024    Francesca Deng, DO  [address]  [address]  [place] [address]    Patient: Lupe Hauser   YOB: 1990   Date of Visit: 2024   Gestational age 31w5d   Nature of this communication: Routine though please note big (macrosomic) baby       This patient was seen recently in our  office.  Please see ultrasound report under \"OB Procedures\" tab.  Please don't hesitate to contact our office with any concerns or questions.      Sincerely,      Toyin Washington MD  Attending Physician, Maternal-Fetal Medicine  [facility]       "

## 2024-12-23 NOTE — PROGRESS NOTES
Repeat Non-Stress Testing:    Patient verbalizes +FM. Pt denies ALL:               Leaking of fluid   Contractions   Vaginal bleeding   Decreased fetal movement    Patient is performing daily kick counts. Patient has no questions or concerns.   NST strip reviewed by Dr. Washington.

## 2024-12-24 ENCOUNTER — ROUTINE PRENATAL (OUTPATIENT)
Dept: OBGYN CLINIC | Facility: CLINIC | Age: 34
End: 2024-12-24

## 2024-12-24 ENCOUNTER — TELEPHONE (OUTPATIENT)
Age: 34
End: 2024-12-24

## 2024-12-24 VITALS — WEIGHT: 221 LBS | SYSTOLIC BLOOD PRESSURE: 120 MMHG | DIASTOLIC BLOOD PRESSURE: 78 MMHG | BODY MASS INDEX: 36.78 KG/M2

## 2024-12-24 DIAGNOSIS — Z3A.31 31 WEEKS GESTATION OF PREGNANCY: ICD-10-CM

## 2024-12-24 DIAGNOSIS — O24.013 PRE-EXISTING TYPE 1 DIABETES MELLITUS WITH HYPERGLYCEMIA DURING PREGNANCY IN THIRD TRIMESTER (HCC): Primary | ICD-10-CM

## 2024-12-24 DIAGNOSIS — O09.893 HISTORY OF PRETERM DELIVERY, CURRENTLY PREGNANT IN THIRD TRIMESTER: ICD-10-CM

## 2024-12-24 DIAGNOSIS — E10.65 PRE-EXISTING TYPE 1 DIABETES MELLITUS WITH HYPERGLYCEMIA DURING PREGNANCY IN THIRD TRIMESTER (HCC): Primary | ICD-10-CM

## 2024-12-24 DIAGNOSIS — Z87.59 HISTORY OF PRETERM PREMATURE RUPTURE OF MEMBRANES (PPROM): ICD-10-CM

## 2024-12-24 DIAGNOSIS — O34.219 PREGNANCY WITH HISTORY OF CESAREAN SECTION, ANTEPARTUM: ICD-10-CM

## 2024-12-24 DIAGNOSIS — O36.63X0 FETAL MACROSOMIA DURING PREGNANCY IN THIRD TRIMESTER, SINGLE OR UNSPECIFIED FETUS: ICD-10-CM

## 2024-12-24 PROCEDURE — PNV: Performed by: OBSTETRICS & GYNECOLOGY

## 2024-12-24 NOTE — PROGRESS NOTES
34 y.o.  female at 31w6d (Estimated Date of Delivery: 25) for PNV.    Pre-Javier Vitals      Flowsheet Row Most Recent Value   Prenatal Assessment    Fetal Heart Rate 130   Movement Present   Prenatal Vitals    Blood Pressure 120/78   Weight - Scale 100 kg (221 lb)   Urine Albumin/Glucose    Dilation/Effacement/Station    Vaginal Drainage    Edema           TW.5 kg (43 lb)    Leakage of fluid: no  Vaginal bleeding: no  Contractions/Cramping: no  Fetal movement: yes  Will need NSTs twice weekly at 32 wks.   Still struggling to control blood sugars  Latest growth US 31w 97% 5#3    RTO in 2 weeks.

## 2024-12-24 NOTE — TELEPHONE ENCOUNTER
Patient called, saw Dr Washington yesterday & needs to schedule twice weekly testing (2x/wk NST + 1x/wk GORDON). Attempted to contact office, AC unable to initially schedule these appt types.     Pt requesting appts 3:00 or later if possible d/t no work PTO remaining. Please call pt to schedule.

## 2024-12-27 ENCOUNTER — TELEPHONE (OUTPATIENT)
Dept: PERINATAL CARE | Facility: CLINIC | Age: 34
End: 2024-12-27

## 2024-12-27 NOTE — TELEPHONE ENCOUNTER
Left a message for the patient to schedule twice weekly NST/GORDON appointments. Left a call back number.

## 2024-12-27 NOTE — TELEPHONE ENCOUNTER
patient needed to scheduled 2x/weekly nst 1x weekly adonis. 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 was able to accomodate.

## 2024-12-30 NOTE — PATIENT INSTRUCTIONS
Thank you for choosing us for your  care today.  If you have any questions about your ultrasound or care, please do not hesitate to contact us or your primary obstetrician.        Some general instructions for your pregnancy are:    Exercise: Aim for 150 minutes per week of regular exercise.  Walking is great!  Nutrition: Choose healthy sources of calcium, iron, and protein.  Avoid ultraprocessed foods and added sugar.  Learn about Preeclampsia: preeclampsia is a common, potentially serious high blood pressure complication in pregnancy.  A blood pressure of 140mmHg (systolic or top number) or 90mmHg (diastolic or bottom number) should be evaluated by your doctor.  Aspirin is sometimes prescribed in early pregnancy to prevent preeclampsia in women with risk factors - ask your obstetrician if you should be on this medication.  For more resources, visit:  https://www.highriskpregnancyinfo.org/preeclampsia  If you smoke, please try to quit completely but also try to reduce your smoking by as much as possible (as soon as possible).  Do not vape.  Please also avoid cannabis products.  Other warning signs to watch out for in pregnancy or postpartum: chest pain, obstructed breathing or shortness of breath, seizures, thoughts of hurting yourself or your baby, bleeding, a painful or swollen leg, fever, or headache (see [facility] Sign[facility]).  If these happen call 911.  Itching is also not normal in pregnancy and if you experience this, especially over your hands and feet, potentially worse at night, notify your doctors.     Kick Counts in Pregnancy   AMBULATORY CARE:   Kick counts  measure how much your baby is moving in your womb. A kick from your baby can be felt as a twist, turn, swish, roll, or jab. It is common to feel your baby kicking at 26 to 28 weeks of pregnancy. You may feel your baby kick as early as 20 weeks of pregnancy. You may want to start counting at 28 weeks.   Contact your  doctor immediately if:   You feel a change in the number of kicks or movements of your baby.      You feel fewer than 10 kicks within 2 hours.      You have questions or concerns about your baby's movements.     Why measure kick counts:  Your baby's movement may provide information about your baby's health. He or she may move less, or not at all, if there are problems. Your baby may move less if he or she is not getting enough oxygen or nutrition from the placenta. Do not smoke while you are pregnant. Smoking decreases the amount of oxygen that gets to your baby. Talk to your healthcare provider if you need help to quit smoking. Tell your healthcare provider as soon as you feel a change in your baby's movements.  When to measure kick counts:   Measure kick counts at the same time every day.       Measure kick counts when your baby is awake and most active. Your baby may be most active in the evening.     How to measure kick counts:  Check that your baby is awake before you measure kick counts. You can wake up your baby by lightly pushing on your belly, walking, or drinking something cold. Your healthcare provider may tell you different ways to measure kick counts. You may be told to do the following:  Use a chart or clock to keep track of the time you start and finish counting.      Sit in a chair or lie on your left side.      Place your hands on the largest part of your belly.      Count until you reach 10 kicks. Write down how much time it takes to count 10 kicks.      It may take 30 minutes to 2 hours to count 10 kicks. It should not take more than 2 hours to count 10 kicks.     Follow up with your doctor as directed:  Write down your questions so you remember to ask them during your visits.   © Copyright [employer] 2023 Information is for End User's use only and may not be sold, redistributed or otherwise used for commercial purposes.  The above information is an  only. It is not intended as  medical advice for individual conditions or treatments. Talk to your doctor, nurse or pharmacist before following any medical regimen to see if it is safe and effective for you. Nonstress Test for Pregnancy   WHAT YOU NEED TO KNOW:   What do I need to know about a nonstress test?  A nonstress test measures your baby's heart rate and movements. Nonstress means that no stress will be placed on your baby during the test.  How do I prepare for a nonstress test?  Your healthcare provider will talk to you about how to prepare for this test. Your provider may tell you to eat and drink plenty of liquids before your test. If you smoke, you may be asked not to smoke within 2 hours before the test. Your provider will also tell you which medicines to take or not take on the day of your test.  What will happen during a nonstress test?  You may be asked to lie down or recline back for the test on a bed. One or 2 belts with sensors will be placed around your abdomen. Your baby's heart rate will be recorded with a machine. If your baby does not move, your baby may be asleep. Your healthcare provider may make a noise near your abdomen to try to wake your baby. The test usually takes about 20 minutes, but can take longer if your baby needs to be awakened.        What do I need to know about the test results?  Your baby will be expected to move at least 2 times for a certain amount of time. Your baby's heart rate will be expected to go up by a certain number of beats per minute during movement. If your baby does not move as expected, the test may need to be repeated or you may need other tests.  CARE AGREEMENT:   You have the right to help plan your care. Learn about your health condition and how it may be treated. Discuss treatment options with your healthcare providers to decide what care you want to receive. You always have the right to refuse treatment. The above information is an  only. It is not intended as  medical advice for individual conditions or treatments. Talk to your doctor, nurse or pharmacist before following any medical regimen to see if it is safe and effective for you.  © 2023 Information is for End User's use only and may not be sold, redistributed or otherwise used for commercial purposes. Thank you for choosing us for your  care today.  If you have any questions about your ultrasound or care, please do not hesitate to contact us or your primary obstetrician.        Some general instructions for your pregnancy are:    Exercise: Aim for 150 minutes per week of regular exercise.  Walking is great!  Nutrition: Choose healthy sources of calcium, iron, and protein.  Avoid ultraprocessed foods and added sugar.  Learn about Preeclampsia: preeclampsia is a common, potentially serious high blood pressure complication in pregnancy.  A blood pressure of 140mmHg (systolic or top number) or 90mmHg (diastolic or bottom number) should be evaluated by your doctor.  Aspirin is sometimes prescribed in early pregnancy to prevent preeclampsia in women with risk factors - ask your obstetrician if you should be on this medication.  For more resources, visit:  https://www.highriskpregnancyinfo.org/preeclampsia  If you smoke, please try to quit completely but also try to reduce your smoking by as much as possible (as soon as possible).  Do not vape.  Please also avoid cannabis products.  Other warning signs to watch out for in pregnancy or postpartum: chest pain, obstructed breathing or shortness of breath, seizures, thoughts of hurting yourself or your baby, bleeding, a painful or swollen leg, fever, or headache (see [facility] POST-BIRTH Warning Signs campaign).  If these happen call 911.  Itching is also not normal in pregnancy and if you experience this, especially over your hands and feet, potentially worse at night, notify your doctors.     Kick Counts in Pregnancy   AMBULATORY CARE:   Kick counts   measure how much your baby is moving in your womb. A kick from your baby can be felt as a twist, turn, swish, roll, or jab. It is common to feel your baby kicking at 26 to 28 weeks of pregnancy. You may feel your baby kick as early as 20 weeks of pregnancy. You may want to start counting at 28 weeks.   Contact your doctor immediately if:   You feel a change in the number of kicks or movements of your baby.      You feel fewer than 10 kicks within 2 hours.      You have questions or concerns about your baby's movements.     Why measure kick counts:  Your baby's movement may provide information about your baby's health. He or she may move less, or not at all, if there are problems. Your baby may move less if he or she is not getting enough oxygen or nutrition from the placenta. Do not smoke while you are pregnant. Smoking decreases the amount of oxygen that gets to your baby. Talk to your healthcare provider if you need help to quit smoking. Tell your healthcare provider as soon as you feel a change in your baby's movements.  When to measure kick counts:   Measure kick counts at the same time every day.       Measure kick counts when your baby is awake and most active. Your baby may be most active in the evening.     How to measure kick counts:  Check that your baby is awake before you measure kick counts. You can wake up your baby by lightly pushing on your belly, walking, or drinking something cold. Your healthcare provider may tell you different ways to measure kick counts. You may be told to do the following:  Use a chart or clock to keep track of the time you start and finish counting.      Sit in a chair or lie on your left side.      Place your hands on the largest part of your belly.      Count until you reach 10 kicks. Write down how much time it takes to count 10 kicks.      It may take 30 minutes to 2 hours to count 10 kicks. It should not take more than 2 hours to count 10 kicks.     Follow up with your  doctor as directed:  Write down your questions so you remember to ask them during your visits.   © Copyright [employer] 2023 Information is for End User's use only and may not be sold, redistributed or otherwise used for commercial purposes.  The above information is an  only. It is not intended as medical advice for individual conditions or treatments. Talk to your doctor, nurse or pharmacist before following any medical regimen to see if it is safe and effective for you. Nonstress Test for Pregnancy   WHAT YOU NEED TO KNOW:   What do I need to know about a nonstress test?  A nonstress test measures your baby's heart rate and movements. Nonstress means that no stress will be placed on your baby during the test.  How do I prepare for a nonstress test?  Your healthcare provider will talk to you about how to prepare for this test. Your provider may tell you to eat and drink plenty of liquids before your test. If you smoke, you may be asked not to smoke within 2 hours before the test. Your provider will also tell you which medicines to take or not take on the day of your test.  What will happen during a nonstress test?  You may be asked to lie down or recline back for the test on a bed. One or 2 belts with sensors will be placed around your abdomen. Your baby's heart rate will be recorded with a machine. If your baby does not move, your baby may be asleep. Your healthcare provider may make a noise near your abdomen to try to wake your baby. The test usually takes about 20 minutes, but can take longer if your baby needs to be awakened.        What do I need to know about the test results?  Your baby will be expected to move at least 2 times for a certain amount of time. Your baby's heart rate will be expected to go up by a certain number of beats per minute during movement. If your baby does not move as expected, the test may need to be repeated or you may need other tests.  CARE AGREEMENT:   You have the  right to help plan your care. Learn about your health condition and how it may be treated. Discuss treatment options with your healthcare providers to decide what care you want to receive. You always have the right to refuse treatment. The above information is an  only. It is not intended as medical advice for individual conditions or treatments. Talk to your doctor, nurse or pharmacist before following any medical regimen to see if it is safe and effective for you.  © 2023 Information is for End User's use only and may not be sold, redistributed or otherwise used for commercial purposes. Thank you for choosing us for your  care today.  If you have any questions about your ultrasound or care, please do not hesitate to contact us or your primary obstetrician.        Some general instructions for your pregnancy are:    Exercise: Aim for 150 minutes per week of regular exercise.  Walking is great!  Nutrition: Choose healthy sources of calcium, iron, and protein.  Avoid ultraprocessed foods and added sugar.  Learn about Preeclampsia: preeclampsia is a common, potentially serious high blood pressure complication in pregnancy.  A blood pressure of 140mmHg (systolic or top number) or 90mmHg (diastolic or bottom number) should be evaluated by your doctor.  Aspirin is sometimes prescribed in early pregnancy to prevent preeclampsia in women with risk factors - ask your obstetrician if you should be on this medication.  For more resources, visit:  https://www.highriskpregnancyinfo.org/preeclampsia  If you smoke, please try to quit completely but also try to reduce your smoking by as much as possible (as soon as possible).  Do not vape.  Please also avoid cannabis products.  Other warning signs to watch out for in pregnancy or postpartum: chest pain, obstructed breathing or shortness of breath, seizures, thoughts of hurting yourself or your baby, bleeding, a painful or swollen leg,  fever, or headache (see [facility] POST-BIRTH Warning Signs campaign).  If these happen call 911.  Itching is also not normal in pregnancy and if you experience this, especially over your hands and feet, potentially worse at night, notify your doctors.

## 2024-12-31 ENCOUNTER — ULTRASOUND (OUTPATIENT)
Dept: PERINATAL CARE | Facility: CLINIC | Age: 34
End: 2024-12-31
Payer: COMMERCIAL

## 2024-12-31 VITALS
HEIGHT: 65 IN | DIASTOLIC BLOOD PRESSURE: 82 MMHG | WEIGHT: 226.6 LBS | HEART RATE: 78 BPM | SYSTOLIC BLOOD PRESSURE: 118 MMHG | BODY MASS INDEX: 37.75 KG/M2

## 2024-12-31 DIAGNOSIS — Z3A.32 32 WEEKS GESTATION OF PREGNANCY: Primary | ICD-10-CM

## 2024-12-31 DIAGNOSIS — O24.013 PRE-EXISTING TYPE 1 DIABETES MELLITUS WITH HYPERGLYCEMIA DURING PREGNANCY IN THIRD TRIMESTER (HCC): ICD-10-CM

## 2024-12-31 DIAGNOSIS — E10.65 PRE-EXISTING TYPE 1 DIABETES MELLITUS WITH HYPERGLYCEMIA DURING PREGNANCY IN THIRD TRIMESTER (HCC): ICD-10-CM

## 2024-12-31 PROCEDURE — 59025 FETAL NON-STRESS TEST: CPT

## 2024-12-31 PROCEDURE — 99212 OFFICE O/P EST SF 10 MIN: CPT

## 2024-12-31 PROCEDURE — 76815 OB US LIMITED FETUS(S): CPT

## 2024-12-31 NOTE — PROGRESS NOTES
"[facility]: Ms. Hauser was seen today at 32w6d gestational age for NST (found under the pregnancy episode) which I reviewed the RN assessment and agree, and GORDON (see ultrasound report under OB procedures tab).  See ultrasound report under \"OB Procedures\" tab.    Nancy SHEPHERD    I spent 10 minutes devoted to patient care (3 min chart preparation, 4 minutes face to face and 3 minutes documenting).    "

## 2024-12-31 NOTE — PROGRESS NOTES
Repeat Non-Stress Testing:    Patient verbalizes +FM. Pt denies ALL:               Leaking of fluid   Contractions   Vaginal bleeding   Decreased fetal movement    Patient is performing daily kick counts. Patient has no questions or concerns.   NST strip reviewed by JOAO Grijalva.

## 2025-01-02 ENCOUNTER — ROUTINE PRENATAL (OUTPATIENT)
Dept: OBGYN CLINIC | Facility: CLINIC | Age: 35
End: 2025-01-02
Payer: COMMERCIAL

## 2025-01-02 VITALS — BODY MASS INDEX: 37.72 KG/M2 | SYSTOLIC BLOOD PRESSURE: 128 MMHG | WEIGHT: 226.7 LBS | DIASTOLIC BLOOD PRESSURE: 84 MMHG

## 2025-01-02 DIAGNOSIS — Z3A.33 33 WEEKS GESTATION OF PREGNANCY: Primary | ICD-10-CM

## 2025-01-02 DIAGNOSIS — E10.65 PRE-EXISTING TYPE 1 DIABETES MELLITUS WITH HYPERGLYCEMIA DURING PREGNANCY IN THIRD TRIMESTER (HCC): ICD-10-CM

## 2025-01-02 DIAGNOSIS — O36.63X0 FETAL MACROSOMIA DURING PREGNANCY IN THIRD TRIMESTER, SINGLE OR UNSPECIFIED FETUS: ICD-10-CM

## 2025-01-02 DIAGNOSIS — O24.013 PRE-EXISTING TYPE 1 DIABETES MELLITUS WITH HYPERGLYCEMIA DURING PREGNANCY IN THIRD TRIMESTER (HCC): ICD-10-CM

## 2025-01-02 DIAGNOSIS — Z46.81 INSULIN PUMP TITRATION: ICD-10-CM

## 2025-01-02 DIAGNOSIS — O09.893 HISTORY OF PRETERM DELIVERY, CURRENTLY PREGNANT IN THIRD TRIMESTER: ICD-10-CM

## 2025-01-02 DIAGNOSIS — O34.219 PREGNANCY WITH HISTORY OF CESAREAN SECTION, ANTEPARTUM: ICD-10-CM

## 2025-01-02 PROCEDURE — 59025 FETAL NON-STRESS TEST: CPT | Performed by: OBSTETRICS & GYNECOLOGY

## 2025-01-02 PROCEDURE — PNV: Performed by: OBSTETRICS & GYNECOLOGY

## 2025-01-02 NOTE — PROGRESS NOTES
34 y.o.  female at 33w1d (Estimated Date of Delivery: 25) for PNV.    Pre-Javier Vitals      Flowsheet Row Most Recent Value   Prenatal Assessment    Fetal Heart Rate 130   Movement Present   Prenatal Vitals    Blood Pressure 128/84   Weight - Scale 103 kg (226 lb 11.2 oz)   Urine Albumin/Glucose    Dilation/Effacement/Station    Vaginal Drainage    Edema           TW.1 kg (48 lb 11.2 oz)    Leakage of fluid: no  Vaginal bleeding: no  Contractions/Cramping: no  Fetal movement: yes  Having pain in R wrist from carpel tunnel.   RLTCS scheduled for 24 with Dr. Umana  GDM - insulin controlled.    - continuing to require increases in insulin basal doses   - NST twice weekly and GORDON weekly  RTO in 2 weeks.      NST today:    Nonstress Test  Reason for NST: GDMA2  Variability: Moderate  Maternal Pulse: 98  Decelerations: None  Accelerations: Yes  Acoustic Stimulator: No  Baseline: 130 BPM  Contractions: Not present

## 2025-01-03 ENCOUNTER — PATIENT MESSAGE (OUTPATIENT)
Dept: OBGYN CLINIC | Facility: CLINIC | Age: 35
End: 2025-01-03

## 2025-01-06 ENCOUNTER — ULTRASOUND (OUTPATIENT)
Dept: PERINATAL CARE | Facility: CLINIC | Age: 35
End: 2025-01-06
Payer: COMMERCIAL

## 2025-01-06 VITALS
HEIGHT: 65 IN | DIASTOLIC BLOOD PRESSURE: 84 MMHG | HEART RATE: 93 BPM | SYSTOLIC BLOOD PRESSURE: 134 MMHG | WEIGHT: 233.8 LBS | BODY MASS INDEX: 38.95 KG/M2

## 2025-01-06 DIAGNOSIS — E10.65 PRE-EXISTING TYPE 1 DIABETES MELLITUS WITH HYPERGLYCEMIA DURING PREGNANCY IN THIRD TRIMESTER (HCC): ICD-10-CM

## 2025-01-06 DIAGNOSIS — O24.013 PRE-EXISTING TYPE 1 DIABETES MELLITUS WITH HYPERGLYCEMIA DURING PREGNANCY IN THIRD TRIMESTER (HCC): ICD-10-CM

## 2025-01-06 DIAGNOSIS — Z3A.33 33 WEEKS GESTATION OF PREGNANCY: Primary | ICD-10-CM

## 2025-01-06 PROCEDURE — 59025 FETAL NON-STRESS TEST: CPT | Performed by: STUDENT IN AN ORGANIZED HEALTH CARE EDUCATION/TRAINING PROGRAM

## 2025-01-06 PROCEDURE — 76815 OB US LIMITED FETUS(S): CPT | Performed by: STUDENT IN AN ORGANIZED HEALTH CARE EDUCATION/TRAINING PROGRAM

## 2025-01-06 NOTE — PROGRESS NOTES
This patient received  care under my supervision on 25 at 33w5d gestational age at [facility].  NST is reactive.  -Lupe Diehl MD

## 2025-01-06 NOTE — PROGRESS NOTES
Repeat Non-Stress Testing:    Patient verbalizes +FM. Pt denies ALL:               Leaking of fluid   Contractions   Vaginal bleeding   Decreased fetal movement    /84. Reviewed s/s pre-e and when to call OB. Denies HA, visual changes, RUQ pain. Patient reporting BS's to diabetes ed weekly. Patient is performing daily kick counts. Patient has no questions or concerns.   NST strip reviewed by Dr. Diehl.

## 2025-01-07 NOTE — PATIENT INSTRUCTIONS
-Check A1c, CMP and UPCR.  -Last A1c 6.5% not at goal.  -Novolog via Medtronic 780G insulin pump with Guardian CGM.  Due to hyperglycemia, please adjust insulin pump as follows:  MN-3 AM from 3.20 to 3.80 units/hour;  3 AM-6 AM from 4.00 to 4.80 units/hour;  6 AM-10 AM@ 4.75 units/hour;  10 AM-3 PM from 3.95 to 4.75 units/hour;  3 PM-10 PM from 4.05 to 4.85 units/hour;  10 PM-MN from 3.20 to 4.00 units/hour.  Decrease insulin sensitivity from 10 to 6.   -Exercise caution when using extra bolus insulin close to bedtime.   -If not eating, please use correction bolus vs inputting fake carbs.   -Insulin pump review every Monday and Thursday.     Thank you for choosing us for your  care today.  If you have any questions about your ultrasound or care, please do not hesitate to contact us or your primary obstetrician.        Some general instructions for your pregnancy are:    Exercise: Aim for 150 minutes per week of regular exercise.  Walking is great!  Nutrition: Choose healthy sources of calcium, iron, and protein.  Avoid ultraprocessed foods and added sugar.  Learn about Preeclampsia: preeclampsia is a common, potentially serious high blood pressure complication in pregnancy.  A blood pressure of 140mmHg (systolic or top number) or 90mmHg (diastolic or bottom number) should be evaluated by your doctor.  Aspirin is sometimes prescribed in early pregnancy to prevent preeclampsia in women with risk factors - ask your obstetrician if you should be on this medication.  For more resources, visit:  https://www.highriskpregnancyinfo.org/preeclampsia  If you smoke, please try to quit completely but also try to reduce your smoking by as much as possible (as soon as possible).  Do not vape.  Please also avoid cannabis products.  Other warning signs to watch out for in pregnancy or postpartum: chest pain, obstructed breathing or shortness of breath, seizures, thoughts of hurting yourself or your baby, bleeding, a painful  or swollen leg, fever, or headache (see [facility] Sign[facility]).  If these happen call 911.  Itching is also not normal in pregnancy and if you experience this, especially over your hands and feet, potentially worse at night, notify your doctors.

## 2025-01-08 ENCOUNTER — APPOINTMENT (OUTPATIENT)
Dept: LAB | Facility: AMBULARY SURGERY CENTER | Age: 35
End: 2025-01-08
Payer: COMMERCIAL

## 2025-01-08 ENCOUNTER — ROUTINE PRENATAL (OUTPATIENT)
Dept: OBGYN CLINIC | Facility: CLINIC | Age: 35
End: 2025-01-08

## 2025-01-08 ENCOUNTER — TELEMEDICINE (OUTPATIENT)
Facility: HOSPITAL | Age: 35
End: 2025-01-08
Payer: COMMERCIAL

## 2025-01-08 VITALS — SYSTOLIC BLOOD PRESSURE: 126 MMHG | DIASTOLIC BLOOD PRESSURE: 82 MMHG | BODY MASS INDEX: 37.94 KG/M2 | WEIGHT: 228 LBS

## 2025-01-08 DIAGNOSIS — Z46.81 INSULIN PUMP TITRATION: ICD-10-CM

## 2025-01-08 DIAGNOSIS — E10.65 PRE-EXISTING TYPE 1 DIABETES MELLITUS WITH HYPERGLYCEMIA DURING PREGNANCY IN THIRD TRIMESTER (HCC): ICD-10-CM

## 2025-01-08 DIAGNOSIS — Z3A.34 34 WEEKS GESTATION OF PREGNANCY: ICD-10-CM

## 2025-01-08 DIAGNOSIS — E10.65 PRE-EXISTING TYPE 1 DIABETES MELLITUS WITH HYPERGLYCEMIA DURING PREGNANCY IN THIRD TRIMESTER (HCC): Primary | ICD-10-CM

## 2025-01-08 DIAGNOSIS — O24.013 PRE-EXISTING TYPE 1 DIABETES MELLITUS WITH HYPERGLYCEMIA DURING PREGNANCY IN THIRD TRIMESTER (HCC): Primary | ICD-10-CM

## 2025-01-08 DIAGNOSIS — O24.013 PRE-EXISTING TYPE 1 DIABETES MELLITUS WITH HYPERGLYCEMIA DURING PREGNANCY IN THIRD TRIMESTER (HCC): ICD-10-CM

## 2025-01-08 LAB
ALBUMIN SERPL BCG-MCNC: 3.3 G/DL (ref 3.5–5)
ALP SERPL-CCNC: 134 U/L (ref 34–104)
ALT SERPL W P-5'-P-CCNC: 11 U/L (ref 7–52)
ANION GAP SERPL CALCULATED.3IONS-SCNC: 6 MMOL/L (ref 4–13)
AST SERPL W P-5'-P-CCNC: 14 U/L (ref 13–39)
BILIRUB SERPL-MCNC: 0.32 MG/DL (ref 0.2–1)
BUN SERPL-MCNC: 16 MG/DL (ref 5–25)
CALCIUM ALBUM COR SERPL-MCNC: 9.6 MG/DL (ref 8.3–10.1)
CALCIUM SERPL-MCNC: 9 MG/DL (ref 8.4–10.2)
CHLORIDE SERPL-SCNC: 105 MMOL/L (ref 96–108)
CO2 SERPL-SCNC: 22 MMOL/L (ref 21–32)
CREAT SERPL-MCNC: 0.65 MG/DL (ref 0.6–1.3)
CREAT UR-MCNC: 125 MG/DL
GFR SERPL CREATININE-BSD FRML MDRD: 116 ML/MIN/1.73SQ M
GLUCOSE SERPL-MCNC: 152 MG/DL (ref 65–140)
POTASSIUM SERPL-SCNC: 4 MMOL/L (ref 3.5–5.3)
PROT SERPL-MCNC: 6.9 G/DL (ref 6.4–8.4)
PROT UR-MCNC: 24.3 MG/DL
PROT/CREAT UR: 0.2 MG/G{CREAT} (ref 0–0.1)
SODIUM SERPL-SCNC: 133 MMOL/L (ref 135–147)

## 2025-01-08 PROCEDURE — 80053 COMPREHEN METABOLIC PANEL: CPT

## 2025-01-08 PROCEDURE — 84156 ASSAY OF PROTEIN URINE: CPT

## 2025-01-08 PROCEDURE — 36415 COLL VENOUS BLD VENIPUNCTURE: CPT

## 2025-01-08 PROCEDURE — 83036 HEMOGLOBIN GLYCOSYLATED A1C: CPT

## 2025-01-08 PROCEDURE — 99214 OFFICE O/P EST MOD 30 MIN: CPT | Performed by: NURSE PRACTITIONER

## 2025-01-08 PROCEDURE — 82570 ASSAY OF URINE CREATININE: CPT

## 2025-01-08 PROCEDURE — PNV: Performed by: PHYSICIAN ASSISTANT

## 2025-01-08 RX ORDER — INSULIN ASPART 100 [IU]/ML
INJECTION, SOLUTION INTRAVENOUS; SUBCUTANEOUS
Qty: 90 ML | Refills: 0 | Status: SHIPPED | OUTPATIENT
Start: 2025-01-08

## 2025-01-08 NOTE — ASSESSMENT & PLAN NOTE
Orders:    Protein / creatinine ratio, urine; Future    Comprehensive metabolic panel; Future    Hemoglobin A1C; Future    Insulin Aspart (NovoLOG) 100 units/mL injection; Use up to 300 units a day via insulin pump. T1DM and pregnancy.    
  Orders:    Protein / creatinine ratio, urine; Future    Comprehensive metabolic panel; Future    Hemoglobin A1C; Future    Insulin Aspart (NovoLOG) 100 units/mL injection; Use up to 300 units a day via insulin pump. T1DM and pregnancy.    
-Check A1c, CMP and UPCR.  -Last A1c 6.5% not at goal.  -Novolog via Medtronic 780G insulin pump with Guardian CGM.  Due to hyperglycemia, please adjust insulin pump as follows:  MN-3 AM from 3.20 to 3.80 units/hour;  3 AM-6 AM from 4.00 to 4.80 units/hour;  6 AM-10 AM@ 4.75 units/hour;  10 AM-3 PM from 3.95 to 4.75 units/hour;  3 PM-10 PM from 4.05 to 4.85 units/hour;  10 PM-MN from 3.20 to 4.00 units/hour.  Decrease insulin sensitivity from 10 to 6.   -Exercise caution when using extra bolus insulin close to bedtime.   -If not eating, please use correction bolus vs inputting fake carbs.   -Insulin pump review every Monday and Thursday.   Lab Results   Component Value Date    HGBA1C 6.5 (H) 08/12/2024     
-Check A1c, CMP and UPCR.  -Last A1c 6.5% not at goal.  -Novolog via Medtronic 780G insulin pump with Guardian CGM.  Due to hyperglycemia, please adjust insulin pump as follows:  MN-3 AM from 3.20 to 3.80 units/hour;  3 AM-6 AM from 4.00 to 4.80 units/hour;  6 AM-10 AM@ 4.75 units/hour;  10 AM-3 PM from 3.95 to 4.75 units/hour;  3 PM-10 PM from 4.05 to 4.85 units/hour;  10 PM-MN from 3.20 to 4.00 units/hour.  Decrease insulin sensitivity from 10 to 6.   -Exercise caution when using extra bolus insulin close to bedtime.   -If not eating, please use correction bolus vs inputting fake carbs.   -Insulin pump review every Monday and Thursday.   Lab Results   Component Value Date    HGBA1C 6.5 (H) 08/12/2024       Orders:    Protein / creatinine ratio, urine; Future    Comprehensive metabolic panel; Future    Hemoglobin A1C; Future    Insulin Aspart (NovoLOG) 100 units/mL injection; Use up to 300 units a day via insulin pump. T1DM and pregnancy.    
-First visit weight 189 lbs.  -Current weight 228 lbs.   -TWG 39 lbs.   -Recommended weight gain 11 to 20 lbs.  -Continue GDM meal plan and follow up with dietitian as needed.     Orders:    Protein / creatinine ratio, urine; Future    Comprehensive metabolic panel; Future    Hemoglobin A1C; Future    Insulin Aspart (NovoLOG) 100 units/mL injection; Use up to 300 units a day via insulin pump. T1DM and pregnancy.    
Bed/Stretcher in lowest position, wheels locked, appropriate side rails in place/Call bell, personal items and telephone in reach/Instruct patient to call for assistance before getting out of bed/chair/stretcher/Non-slip footwear applied when patient is off stretcher/[place] to call system/Physically safe environment - no spills, clutter or unnecessary equipment/Purposeful proactive rounding/Room/bathroom lighting operational, light cord in reach

## 2025-01-08 NOTE — PROGRESS NOTES
Patient is a 35 YO  female presenting to the office at 34.0 weeks for routine OB care.   BP: 126/82  TWlb  Fetal Movement: yes good movement   LOF: no  VB: no  CTX: no  Reviewed precautions  Call for concerns  RTO 1 weeks    NST today:    Nonstress Test  Reason for NST: Pregestational DM  Variability: Moderate  Decelerations: None  Acoustic Stimulator: No  Baseline: 135 BPM  Uterine Irritability: No  Contractions: Not present

## 2025-01-08 NOTE — PROGRESS NOTES
Virtual Brief Visit  Name: Lupe Hauser      : 1990      MRN: [id number]  Encounter Provider: JOAO Bloom  Encounter Date: 2025   Encounter department: [facility]    This Visit is being completed by telephone. The Patient is located at Other and in the following state in which I hold an active license [place]    The patient was identified by name and date of birth. Lupe Hauser was informed that this is a telemedicine visit and that the visit is being conducted through the Epic Embedded platform. She agrees to proceed..  My office door was closed. No one else was in the room.  She acknowledged consent and understanding of privacy and security of the video platform. The patient has agreed to participate and understands they can discontinue the visit at any time.    Patient is aware this is a billable service.     :  Assessment & Plan  Pre-existing type 1 diabetes mellitus with hyperglycemia during pregnancy in third trimester (HCC)  -Check A1c, CMP and UPCR.  -Last A1c 6.5% not at goal.  -Novolog via Medtronic 780G insulin pump with Guardian CGM.  Due to hyperglycemia, please adjust insulin pump as follows:  MN-3 AM from 3.20 to 3.80 units/hour;  3 AM-6 AM from 4.00 to 4.80 units/hour;  6 AM-10 AM@ 4.75 units/hour;  10 AM-3 PM from 3.95 to 4.75 units/hour;  3 PM-10 PM from 4.05 to 4.85 units/hour;  10 PM-MN from 3.20 to 4.00 units/hour.  Decrease insulin sensitivity from 10 to 6.   -Exercise caution when using extra bolus insulin close to bedtime.   -If not eating, please use correction bolus vs inputting fake carbs.   -Insulin pump review every Monday and Thursday.   Lab Results   Component Value Date    HGBA1C 6.5 (H) 2024       Orders:    Protein / creatinine ratio, urine; Future    Comprehensive metabolic panel; Future    Hemoglobin A1C; Future    Insulin Aspart (NovoLOG) 100 units/mL injection; Use up to 300 units a day via insulin pump. T1DM and pregnancy.    34 weeks  gestation of pregnancy    Orders:    Protein / creatinine ratio, urine; Future    Comprehensive metabolic panel; Future    Hemoglobin A1C; Future    Insulin Aspart (NovoLOG) 100 units/mL injection; Use up to 300 units a day via insulin pump. T1DM and pregnancy.    BMI 37.0-37.9, adult  -First visit weight 189 lbs.  -Current weight 228 lbs.   -TWG 39 lbs.   -Recommended weight gain 11 to 20 lbs.  -Continue GDM meal plan and follow up with dietitian as needed.     Orders:    Protein / creatinine ratio, urine; Future    Comprehensive metabolic panel; Future    Hemoglobin A1C; Future    Insulin Aspart (NovoLOG) 100 units/mL injection; Use up to 300 units a day via insulin pump. T1DM and pregnancy.    Insulin pump titration    Orders:    Protein / creatinine ratio, urine; Future    Comprehensive metabolic panel; Future    Hemoglobin A1C; Future    Insulin Aspart (NovoLOG) 100 units/mL injection; Use up to 300 units a day via insulin pump. T1DM and pregnancy.           History of Present Illness   Lupe is a 33 yo  female, 34 0/7 weeks gestation for follow up PAZ, managed as a T1DM on Novolog via Medtronic 780G insulin pump and Guardian CGM with hyperglycemia. Has been inputting fake carbs to increase units of insulin. Encouraged to try to use correction factor vs using fake carbs. Average TDD varies but highest noted on 2025 about 279 units a day. Current TIR about 43% and above 140 about 56%. Will aim to make insulin pump adjustments every Monday and Thursday. Started NSTs twice a week and GORDON weekly. Positive fetal movement. Needs pre-pregnancy insulin pump settings for postpartum.     Visit Time  Total Visit Duration: 20 minutes with patient and 15 minutes charting/reviewing insulin pump with re-calculations.

## 2025-01-09 ENCOUNTER — RESULTS FOLLOW-UP (OUTPATIENT)
Facility: HOSPITAL | Age: 35
End: 2025-01-09

## 2025-01-09 LAB
EST. AVERAGE GLUCOSE BLD GHB EST-MCNC: 169 MG/DL
HBA1C MFR BLD: 7.5 %

## 2025-01-11 ENCOUNTER — NURSE TRIAGE (OUTPATIENT)
Dept: OTHER | Facility: OTHER | Age: 35
End: 2025-01-11

## 2025-01-11 ENCOUNTER — HOSPITAL ENCOUNTER (OUTPATIENT)
Facility: HOSPITAL | Age: 35
Discharge: HOME | End: 2025-01-12
Attending: STUDENT IN AN ORGANIZED HEALTH CARE EDUCATION/TRAINING PROGRAM | Admitting: OBSTETRICS & GYNECOLOGY
Payer: COMMERCIAL

## 2025-01-11 PROCEDURE — NC001 PR NO CHARGE: Performed by: OBSTETRICS & GYNECOLOGY

## 2025-01-12 VITALS
HEIGHT: 65 IN | HEART RATE: 98 BPM | SYSTOLIC BLOOD PRESSURE: 128 MMHG | DIASTOLIC BLOOD PRESSURE: 78 MMHG | TEMPERATURE: 98.4 F | OXYGEN SATURATION: 98 % | RESPIRATION RATE: 16 BRPM | BODY MASS INDEX: 37.99 KG/M2 | WEIGHT: 228 LBS

## 2025-01-12 PROBLEM — R03.0 ELEVATED BP WITHOUT DIAGNOSIS OF HYPERTENSION: Status: ACTIVE | Noted: 2025-01-12

## 2025-01-12 LAB
ALBUMIN SERPL BCG-MCNC: 3.1 G/DL (ref 3.5–5)
ALP SERPL-CCNC: 149 U/L (ref 34–104)
ALT SERPL W P-5'-P-CCNC: 11 U/L (ref 7–52)
ANION GAP SERPL CALCULATED.3IONS-SCNC: 7 MMOL/L (ref 4–13)
AST SERPL W P-5'-P-CCNC: 15 U/L (ref 13–39)
BASOPHILS # BLD AUTO: 0.02 THOUSANDS/ΜL (ref 0–0.1)
BASOPHILS NFR BLD AUTO: 0 % (ref 0–1)
BILIRUB SERPL-MCNC: 0.33 MG/DL (ref 0.2–1)
BUN SERPL-MCNC: 13 MG/DL (ref 5–25)
CALCIUM ALBUM COR SERPL-MCNC: 9.7 MG/DL (ref 8.3–10.1)
CALCIUM SERPL-MCNC: 9 MG/DL (ref 8.4–10.2)
CHLORIDE SERPL-SCNC: 103 MMOL/L (ref 96–108)
CO2 SERPL-SCNC: 25 MMOL/L (ref 21–32)
CREAT SERPL-MCNC: 0.73 MG/DL (ref 0.6–1.3)
CREAT UR-MCNC: 142.5 MG/DL
EOSINOPHIL # BLD AUTO: 0.05 THOUSAND/ΜL (ref 0–0.61)
EOSINOPHIL NFR BLD AUTO: 0 % (ref 0–6)
ERYTHROCYTE [DISTWIDTH] IN BLOOD BY AUTOMATED COUNT: 13.8 % (ref 11.6–15.1)
GFR SERPL CREATININE-BSD FRML MDRD: 107 ML/MIN/1.73SQ M
GLUCOSE SERPL-MCNC: 136 MG/DL (ref 65–140)
HCT VFR BLD AUTO: 33.7 % (ref 34.8–46.1)
HGB BLD-MCNC: 10.7 G/DL (ref 11.5–15.4)
IMM GRANULOCYTES # BLD AUTO: 0.13 THOUSAND/UL (ref 0–0.2)
IMM GRANULOCYTES NFR BLD AUTO: 1 % (ref 0–2)
LYMPHOCYTES # BLD AUTO: 2.76 THOUSANDS/ΜL (ref 0.6–4.47)
LYMPHOCYTES NFR BLD AUTO: 21 % (ref 14–44)
MCH RBC QN AUTO: 27 PG (ref 26.8–34.3)
MCHC RBC AUTO-ENTMCNC: 31.8 G/DL (ref 31.4–37.4)
MCV RBC AUTO: 85 FL (ref 82–98)
MONOCYTES # BLD AUTO: 0.85 THOUSAND/ΜL (ref 0.17–1.22)
MONOCYTES NFR BLD AUTO: 6 % (ref 4–12)
NEUTROPHILS # BLD AUTO: 9.42 THOUSANDS/ΜL (ref 1.85–7.62)
NEUTS SEG NFR BLD AUTO: 72 % (ref 43–75)
NRBC BLD AUTO-RTO: 0 /100 WBCS
PLATELET # BLD AUTO: 239 THOUSANDS/UL (ref 149–390)
PMV BLD AUTO: 11.9 FL (ref 8.9–12.7)
POTASSIUM SERPL-SCNC: 4.2 MMOL/L (ref 3.5–5.3)
PROT SERPL-MCNC: 6.4 G/DL (ref 6.4–8.4)
PROT UR-MCNC: 29 MG/DL
PROT/CREAT UR: 0.2 MG/G{CREAT} (ref 0–0.1)
RBC # BLD AUTO: 3.96 MILLION/UL (ref 3.81–5.12)
SODIUM SERPL-SCNC: 135 MMOL/L (ref 135–147)
WBC # BLD AUTO: 13.23 THOUSAND/UL (ref 4.31–10.16)

## 2025-01-12 PROCEDURE — 80053 COMPREHEN METABOLIC PANEL: CPT

## 2025-01-12 PROCEDURE — 84156 ASSAY OF PROTEIN URINE: CPT

## 2025-01-12 PROCEDURE — G0463 HOSPITAL OUTPT CLINIC VISIT: HCPCS

## 2025-01-12 PROCEDURE — 85025 COMPLETE CBC W/AUTO DIFF WBC: CPT

## 2025-01-12 PROCEDURE — 99214 OFFICE O/P EST MOD 30 MIN: CPT

## 2025-01-12 PROCEDURE — 82570 ASSAY OF URINE CREATININE: CPT

## 2025-01-12 NOTE — TELEPHONE ENCOUNTER
"Has noticed some increase in blood pressure this week. This evening blood pressures are 150/92 and 151/102. She denies any CP, SOB, headache or blurry vision. Does however report increase in b/l LE swelling as well as facial and hand swelling. Normal fetal movement. Mild abdominal pain also reported right over her belly button.     On call provider notified   Reason for Disposition   [1] Pregnant 20 or more weeks AND [2] Systolic BP >= 140 OR Diastolic >= 90    Answer Assessment - Initial Assessment Questions  1. BLOOD PRESSURE: \"What is your blood pressure?\" \"Did you take at least two measurements 5 minutes apart?\"      150/92, 151/102    2. ONSET: \"When did you take your blood pressure?\"      Has been noticing a steady increase all week    3. HOW: \"How did you take your blood pressure?\" (e.g., automatic home BP monitor, visiting nurse)      Home BP monitor    6. PREGNANCY: \"How many weeks pregnant are you?\" \"How many babies are you carrying?\" (e.g., single baby, twins)      34w3d    7. ESTHER: \"What date are you expecting to deliver?\"      2/19    8. OTHER SYMPTOMS: \"Do you have any other symptoms?\" (e.g., abdomen pain, chest pain, difficulty breathing, hand or face swelling, headache, vision changes)      B/l LE swelling as well as facial and hand swelling    Protocols used: Pregnancy - High Blood Pressure-Adult-AH    "

## 2025-01-12 NOTE — TELEPHONE ENCOUNTER
"Results for orders placed or performed in visit on 05/24/24   Cardiac EP device report    Narrative    BIO DC/PM  BIOTRONIK TRANSMISSION: BATTERY STATUS \"OK\" (80% REMAINING BATTERY LIFE). AP-1%, -77% (>40% DDD-CLS@60PPM). ALL AVAILABLE LEAD PARAMETERS WITHIN NORMAL LIMITS. NO NEW SIGNIFICANT HIGH RATE EPISODES. PT IN % OF TIME & ON XARELTO. NORMAL DEVICE FUNCTION. GV        " Per on call-  Patient should be evaluated in L&D    Patient was advised and verbalized understanding.

## 2025-01-12 NOTE — TELEPHONE ENCOUNTER
"Regardinw pregnant / high BP  ----- Message from Chloe RODRIGUEZ sent at 2025  9:24 PM EST -----  \" I'm 34 weeks pregnant and I'm having high blood pressure 150/90\"    "

## 2025-01-12 NOTE — PROGRESS NOTES
Progress Note - OB/GYN   Name: Lupe Hauser 34 y.o. female I MRN: [id number]  Unit/Bed#: LD TRIAGE 2 I Date of Admission: 2025   Date of Service: 2025 I Hospital Day: 0     Assessment & Plan      L&D Triage Note - OB/GYN  Lupe Hauser 34 y.o. female MRN: [id number]  Unit/Bed#: LD TRIAGE 2 Encounter: [id number]      ASSESSMENT:    Lupe Hauser is a 34 y.o.  at 34w3d presenting for elevated BP at home, denies HA, abdominal pain, visual disturbances or ob symptoms, here she had one elevated in the ER (156/91) While here in triage had one 140/83 and three normal ranges after that. preE labs CBC, CMP wnl UPC 0.2. NST reactive 145 bpm . Patient was d/c with preE instructions.          PLAN:    1) Preeclampsia work up  - CBC, CMP wnl, UPC 0.2  - BP cycling  - Elevated BP without dx of HTN    2) Fetal monitoring  - NST reactive 145 bpm    3) 34 weeks gestation of pregnancy  - Continue routine prenatal care  - Discharge from OB triage with  labor precautions    - Reviewed rupture of membranes, false vs true labor, decreased fetal movement, and vaginal bleeding   -Reviewed preeclampsia symptoms and encouraged to keep monitoring BP at home.    - Pt to call provider with any concerns and follow up at her next scheduled prenatal appointment    - Case discussed with Dr. Guerrero    SUBJECTIVE:    Lupe Hauser 34 y.o.  at 34w3d with an Estimated Date of Delivery: 25 who presents to triage for elevated BP at home.  She otherwise denies contractions, leakage of fluid, vaginal bleeding, and decreased fetal movement.    Her current obstetrical history is significant for Type 1 DM, Fetal macrosomia.    Her past obstetrical history is significant for  labor, two SAB.    OBJECTIVE:    Vitals:    25 2338   BP: 135/82   Pulse: 94   Resp: 16   Temp: 98.4 °F (36.9 °C)   SpO2:        ROS:  Constitutional: Negative  Respiratory: Negative  Cardiovascular: Negative    Gastrointestinal:  Negative    General Physical Exam:  General: Well appearing, no distress  Respiratory: Unlabored breathing  Cardiovascular: Regular rate.  Abdomen: Soft, gravid, nontender  Fundal Height: Appropriate for gestational age.  Extremities: Warm and well perfused.  Non tender.      Darnell Delgado MD  OBGYN, PGY-1  01/11/25  11:59 PM

## 2025-01-13 NOTE — PATIENT INSTRUCTIONS
Thank you for choosing us for your  care today.  If you have any questions about your ultrasound or care, please do not hesitate to contact us or your primary obstetrician.        Some general instructions for your pregnancy are:    Exercise: Aim for 150 minutes per week of regular exercise.  Walking is great!  Nutrition: Choose healthy sources of calcium, iron, and protein.  Avoid ultraprocessed foods and added sugar.  Learn about Preeclampsia: preeclampsia is a common, potentially serious high blood pressure complication in pregnancy.  A blood pressure of 140mmHg (systolic or top number) or 90mmHg (diastolic or bottom number) should be evaluated by your doctor.  Aspirin is sometimes prescribed in early pregnancy to prevent preeclampsia in women with risk factors - ask your obstetrician if you should be on this medication.  For more resources, visit:  https://www.highriskpregnancyinfo.org/preeclampsia  If you smoke, please try to quit completely but also try to reduce your smoking by as much as possible (as soon as possible).  Do not vape.  Please also avoid cannabis products.  Other warning signs to watch out for in pregnancy or postpartum: chest pain, obstructed breathing or shortness of breath, seizures, thoughts of hurting yourself or your baby, bleeding, a painful or swollen leg, fever, or headache (see [facility] Sign[facility]).  If these happen call 911.  Itching is also not normal in pregnancy and if you experience this, especially over your hands and feet, potentially worse at night, notify your doctors.     Kick Counts in Pregnancy   AMBULATORY CARE:   Kick counts  measure how much your baby is moving in your womb. A kick from your baby can be felt as a twist, turn, swish, roll, or jab. It is common to feel your baby kicking at 26 to 28 weeks of pregnancy. You may feel your baby kick as early as 20 weeks of pregnancy. You may want to start counting at 28 weeks.   Contact your  doctor immediately if:   You feel a change in the number of kicks or movements of your baby.      You feel fewer than 10 kicks within 2 hours.      You have questions or concerns about your baby's movements.     Why measure kick counts:  Your baby's movement may provide information about your baby's health. He or she may move less, or not at all, if there are problems. Your baby may move less if he or she is not getting enough oxygen or nutrition from the placenta. Do not smoke while you are pregnant. Smoking decreases the amount of oxygen that gets to your baby. Talk to your healthcare provider if you need help to quit smoking. Tell your healthcare provider as soon as you feel a change in your baby's movements.  When to measure kick counts:   Measure kick counts at the same time every day.       Measure kick counts when your baby is awake and most active. Your baby may be most active in the evening.     How to measure kick counts:  Check that your baby is awake before you measure kick counts. You can wake up your baby by lightly pushing on your belly, walking, or drinking something cold. Your healthcare provider may tell you different ways to measure kick counts. You may be told to do the following:  Use a chart or clock to keep track of the time you start and finish counting.      Sit in a chair or lie on your left side.      Place your hands on the largest part of your belly.      Count until you reach 10 kicks. Write down how much time it takes to count 10 kicks.      It may take 30 minutes to 2 hours to count 10 kicks. It should not take more than 2 hours to count 10 kicks.     Follow up with your doctor as directed:  Write down your questions so you remember to ask them during your visits.   © Copyright [employer] 2023 Information is for End User's use only and may not be sold, redistributed or otherwise used for commercial purposes.  The above information is an  only. It is not intended as  medical advice for individual conditions or treatments. Talk to your doctor, nurse or pharmacist before following any medical regimen to see if it is safe and effective for you. Thank you for choosing us for your  care today.  If you have any questions about your ultrasound or care, please do not hesitate to contact us or your primary obstetrician.        Some general instructions for your pregnancy are:    Exercise: Aim for 150 minutes per week of regular exercise.  Walking is great!  Nutrition: Choose healthy sources of calcium, iron, and protein.  Avoid ultraprocessed foods and added sugar.  Learn about Preeclampsia: preeclampsia is a common, potentially serious high blood pressure complication in pregnancy.  A blood pressure of 140mmHg (systolic or top number) or 90mmHg (diastolic or bottom number) should be evaluated by your doctor.  Aspirin is sometimes prescribed in early pregnancy to prevent preeclampsia in women with risk factors - ask your obstetrician if you should be on this medication.  For more resources, visit:  https://www.highriskpregnancyinfo.org/preeclampsia  If you smoke, please try to quit completely but also try to reduce your smoking by as much as possible (as soon as possible).  Do not vape.  Please also avoid cannabis products.  Other warning signs to watch out for in pregnancy or postpartum: chest pain, obstructed breathing or shortness of breath, seizures, thoughts of hurting yourself or your baby, bleeding, a painful or swollen leg, fever, or headache (see [facility] POST-BIRTH Warning Signs campaign).  If these happen call 911.  Itching is also not normal in pregnancy and if you experience this, especially over your hands and feet, potentially worse at night, notify your doctors.

## 2025-01-15 ENCOUNTER — ULTRASOUND (OUTPATIENT)
Dept: PERINATAL CARE | Facility: CLINIC | Age: 35
End: 2025-01-15
Payer: COMMERCIAL

## 2025-01-15 VITALS
WEIGHT: 237.6 LBS | DIASTOLIC BLOOD PRESSURE: 86 MMHG | HEART RATE: 97 BPM | SYSTOLIC BLOOD PRESSURE: 130 MMHG | BODY MASS INDEX: 39.58 KG/M2 | HEIGHT: 65 IN

## 2025-01-15 DIAGNOSIS — Z3A.35 35 WEEKS GESTATION OF PREGNANCY: ICD-10-CM

## 2025-01-15 DIAGNOSIS — O35.EXX0 PYELECTASIS OF FETUS ON PRENATAL ULTRASOUND: ICD-10-CM

## 2025-01-15 DIAGNOSIS — O24.012 TYPE 1 DIABETES MELLITUS AFFECTING PREGNANCY IN SECOND TRIMESTER, ANTEPARTUM: Primary | ICD-10-CM

## 2025-01-15 PROCEDURE — 76816 OB US FOLLOW-UP PER FETUS: CPT | Performed by: OBSTETRICS & GYNECOLOGY

## 2025-01-15 PROCEDURE — 99214 OFFICE O/P EST MOD 30 MIN: CPT | Performed by: OBSTETRICS & GYNECOLOGY

## 2025-01-15 PROCEDURE — 59025 FETAL NON-STRESS TEST: CPT | Performed by: OBSTETRICS & GYNECOLOGY

## 2025-01-15 NOTE — PATIENT INSTRUCTIONS
Thank you for choosing us for your  care today.  If you have any questions about your ultrasound or care, please do not hesitate to contact us or your primary obstetrician.        Some general instructions for your pregnancy are:    Exercise: Aim for 150 minutes per week of regular exercise.  Walking is great!  Nutrition: Choose healthy sources of calcium, iron, and protein.  Avoid ultraprocessed foods and added sugar.  Learn about Preeclampsia: preeclampsia is a common, potentially serious high blood pressure complication in pregnancy.  A blood pressure of 140mmHg (systolic or top number) or 90mmHg (diastolic or bottom number) should be evaluated by your doctor.  Aspirin is sometimes prescribed in early pregnancy to prevent preeclampsia in women with risk factors - ask your obstetrician if you should be on this medication.  For more resources, visit:  https://www.highriskpregnancyinfo.org/preeclampsia  If you smoke, please try to quit completely but also try to reduce your smoking by as much as possible (as soon as possible).  Do not vape.  Please also avoid cannabis products.  Other warning signs to watch out for in pregnancy or postpartum: chest pain, obstructed breathing or shortness of breath, seizures, thoughts of hurting yourself or your baby, bleeding, a painful or swollen leg, fever, or headache (see [facility] Warning Sign[facility]).  If these happen call 911.  Itching is also not normal in pregnancy and if you experience this, especially over your hands and feet, potentially worse at night, notify your doctors.     Kick Counts in Pregnancy   AMBULATORY CARE:   Kick counts  measure how much your baby is moving in your womb. A kick from your baby can be felt as a twist, turn, swish, roll, or jab. It is common to feel your baby kicking at 26 to 28 weeks of pregnancy. You may feel your baby kick as early as 20 weeks of pregnancy. You may want to start counting at 28 weeks.   Contact your  doctor immediately if:   You feel a change in the number of kicks or movements of your baby.      You feel fewer than 10 kicks within 2 hours.      You have questions or concerns about your baby's movements.     Why measure kick counts:  Your baby's movement may provide information about your baby's health. He or she may move less, or not at all, if there are problems. Your baby may move less if he or she is not getting enough oxygen or nutrition from the placenta. Do not smoke while you are pregnant. Smoking decreases the amount of oxygen that gets to your baby. Talk to your healthcare provider if you need help to quit smoking. Tell your healthcare provider as soon as you feel a change in your baby's movements.  When to measure kick counts:   Measure kick counts at the same time every day.       Measure kick counts when your baby is awake and most active. Your baby may be most active in the evening.     How to measure kick counts:  Check that your baby is awake before you measure kick counts. You can wake up your baby by lightly pushing on your belly, walking, or drinking something cold. Your healthcare provider may tell you different ways to measure kick counts. You may be told to do the following:  Use a chart or clock to keep track of the time you start and finish counting.      Sit in a chair or lie on your left side.      Place your hands on the largest part of your belly.      Count until you reach 10 kicks. Write down how much time it takes to count 10 kicks.      It may take 30 minutes to 2 hours to count 10 kicks. It should not take more than 2 hours to count 10 kicks.     Follow up with your doctor as directed:  Write down your questions so you remember to ask them during your visits.   © Copyright [employer] 2023 Information is for End User's use only and may not be sold, redistributed or otherwise used for commercial purposes.  The above information is an  only. It is not intended as  medical advice for individual conditions or treatments. Talk to your doctor, nurse or pharmacist before following any medical regimen to see if it is safe and effective for you.

## 2025-01-15 NOTE — PROGRESS NOTES
[facility]: Lupe Hauser was seen today for NST (found under the pregnancy episode) which I reviewed the RN assessment and agree, and fetal growth ultrasound (see ultrasound report under OB procedures tab).   The time spent on this established patient on the encounter date included 6 minutes previsit service time reviewing records and precharting, 10 minutes face-to-face service time counseling regarding results and coordinating care, and  10 minutes charting, totalling 26 minutes.  Please don't hesitate to contact our office with any concerns or questions.  -Lali Paiz MD

## 2025-01-17 ENCOUNTER — ROUTINE PRENATAL (OUTPATIENT)
Facility: HOSPITAL | Age: 35
End: 2025-01-17
Payer: COMMERCIAL

## 2025-01-17 VITALS
WEIGHT: 241.8 LBS | HEIGHT: 65 IN | HEART RATE: 87 BPM | DIASTOLIC BLOOD PRESSURE: 88 MMHG | BODY MASS INDEX: 40.29 KG/M2 | SYSTOLIC BLOOD PRESSURE: 116 MMHG

## 2025-01-17 DIAGNOSIS — E10.65 PRE-EXISTING TYPE 1 DIABETES MELLITUS WITH HYPERGLYCEMIA DURING PREGNANCY IN THIRD TRIMESTER (HCC): Primary | ICD-10-CM

## 2025-01-17 DIAGNOSIS — O24.013 PRE-EXISTING TYPE 1 DIABETES MELLITUS WITH HYPERGLYCEMIA DURING PREGNANCY IN THIRD TRIMESTER (HCC): Primary | ICD-10-CM

## 2025-01-17 DIAGNOSIS — Z3A.35 35 WEEKS GESTATION OF PREGNANCY: ICD-10-CM

## 2025-01-17 PROBLEM — O09.899 HISTORY OF PRETERM DELIVERY, CURRENTLY PREGNANT: Status: RESOLVED | Noted: 2024-07-21 | Resolved: 2025-01-17

## 2025-01-17 PROCEDURE — 59025 FETAL NON-STRESS TEST: CPT | Performed by: NURSE PRACTITIONER

## 2025-01-17 NOTE — PROGRESS NOTES
[facility]: Ms. Hauser was seen today at 35w2d gestational age for NST (found under the pregnancy episode) which I reviewed the RN assessment and agree.       Ann SHEPHERD

## 2025-01-17 NOTE — PROGRESS NOTES
Repeat Non-Stress Testing:    Patient verbalizes +FM. Pt denies ALL:               Leaking of fluid   Contractions   Vaginal bleeding   Decreased fetal movement    Patient is performing daily kick counts. Patient has no questions or concerns.   NST strip reviewed by JOAO Weller.

## 2025-01-17 NOTE — LETTER
NST sleeve cover sheet    Patient name: Lupe Hauser  : 1990  MRN: [id number]    ESTHER: Estimated Date of Delivery: 25    Obstetrician: Ian    Reason(s) for testing: IDDM / IVF    Testing frequency:    ___  2x/wk  ___  1x/wk  ___  Dopplers  ___  BPP?      Last growth scan: __________, _________, _________    Baby:      Male   /   Female   /   [place]             Baby Name: ___________________            IOL or  C/S: _____________________

## 2025-01-20 PROBLEM — Z31.69 PRE-CONCEPTION COUNSELING: Status: RESOLVED | Noted: 2023-06-07 | Resolved: 2025-01-20

## 2025-01-20 PROBLEM — R82.71 GBS BACTERIURIA: Status: ACTIVE | Noted: 2025-01-20

## 2025-01-21 ENCOUNTER — HOSPITAL ENCOUNTER (INPATIENT)
Facility: HOSPITAL | Age: 35
LOS: 3 days | Discharge: HOME | End: 2025-01-25
Attending: OBSTETRICS & GYNECOLOGY | Admitting: OBSTETRICS & GYNECOLOGY
Payer: COMMERCIAL

## 2025-01-21 ENCOUNTER — ANESTHESIA EVENT (OUTPATIENT)
Dept: LABOR AND DELIVERY | Facility: HOSPITAL | Age: 35
End: 2025-01-21
Payer: COMMERCIAL

## 2025-01-21 ENCOUNTER — ANESTHESIA (OUTPATIENT)
Dept: LABOR AND DELIVERY | Facility: HOSPITAL | Age: 35
End: 2025-01-21
Payer: COMMERCIAL

## 2025-01-21 DIAGNOSIS — O36.63X0 FETAL MACROSOMIA DURING PREGNANCY IN THIRD TRIMESTER, SINGLE OR UNSPECIFIED FETUS: ICD-10-CM

## 2025-01-21 DIAGNOSIS — O14.93 PRE-ECLAMPSIA IN THIRD TRIMESTER: ICD-10-CM

## 2025-01-21 DIAGNOSIS — Z3A.35 35 WEEKS GESTATION OF PREGNANCY: ICD-10-CM

## 2025-01-21 DIAGNOSIS — Z98.891 S/P CESAREAN SECTION: ICD-10-CM

## 2025-01-21 DIAGNOSIS — O35.EXX0 PYELECTASIS OF FETUS ON PRENATAL ULTRASOUND: ICD-10-CM

## 2025-01-21 DIAGNOSIS — O13.3 GESTATIONAL HYPERTENSION, THIRD TRIMESTER: ICD-10-CM

## 2025-01-21 DIAGNOSIS — O24.419 GESTATIONAL DIABETES MELLITUS (GDM) AFFECTING PREGNANCY, ANTEPARTUM: ICD-10-CM

## 2025-01-21 DIAGNOSIS — O24.012 TYPE 1 DIABETES MELLITUS AFFECTING PREGNANCY IN SECOND TRIMESTER, ANTEPARTUM: ICD-10-CM

## 2025-01-21 PROBLEM — O14.00 MILD PREECLAMPSIA: Status: ACTIVE | Noted: 2025-01-21

## 2025-01-21 PROBLEM — O14.90 PREECLAMPSIA: Status: ACTIVE | Noted: 2025-01-21

## 2025-01-21 PROBLEM — O13.9 GESTATIONAL HYPERTENSION: Status: ACTIVE | Noted: 2025-01-21

## 2025-01-21 LAB
ABO GROUP BLD: NORMAL
ALBUMIN SERPL BCG-MCNC: 3.2 G/DL (ref 3.5–5)
ALP SERPL-CCNC: 158 U/L (ref 34–104)
ALT SERPL W P-5'-P-CCNC: 13 U/L (ref 7–52)
ANION GAP SERPL CALCULATED.3IONS-SCNC: 8 MMOL/L (ref 4–13)
AST SERPL W P-5'-P-CCNC: 16 U/L (ref 13–39)
BILIRUB SERPL-MCNC: 0.35 MG/DL (ref 0.2–1)
BLD GP AB SCN SERPL QL: NEGATIVE
BUN SERPL-MCNC: 12 MG/DL (ref 5–25)
CALCIUM ALBUM COR SERPL-MCNC: 9.9 MG/DL (ref 8.3–10.1)
CALCIUM SERPL-MCNC: 9.3 MG/DL (ref 8.4–10.2)
CHLORIDE SERPL-SCNC: 103 MMOL/L (ref 96–108)
CO2 SERPL-SCNC: 22 MMOL/L (ref 21–32)
CREAT SERPL-MCNC: 0.58 MG/DL (ref 0.6–1.3)
CREAT UR-MCNC: 123.2 MG/DL
ERYTHROCYTE [DISTWIDTH] IN BLOOD BY AUTOMATED COUNT: 14.4 % (ref 11.6–15.1)
EXTERNAL GROUP B STREP ANTIGEN: POSITIVE
GFR SERPL CREATININE-BSD FRML MDRD: 120 ML/MIN/1.73SQ M
GLUCOSE SERPL-MCNC: 129 MG/DL (ref 65–140)
GLUCOSE SERPL-MCNC: 151 MG/DL (ref 65–140)
GLUCOSE SERPL-MCNC: 65 MG/DL (ref 65–140)
GLUCOSE SERPL-MCNC: 66 MG/DL (ref 65–140)
GLUCOSE SERPL-MCNC: 72 MG/DL (ref 65–140)
GLUCOSE SERPL-MCNC: 79 MG/DL (ref 65–140)
HCT VFR BLD AUTO: 36 % (ref 34.8–46.1)
HGB BLD-MCNC: 11.6 G/DL (ref 11.5–15.4)
MCH RBC QN AUTO: 26.9 PG (ref 26.8–34.3)
MCHC RBC AUTO-ENTMCNC: 32.2 G/DL (ref 31.4–37.4)
MCV RBC AUTO: 84 FL (ref 82–98)
PLATELET # BLD AUTO: 234 THOUSANDS/UL (ref 149–390)
PMV BLD AUTO: 11.9 FL (ref 8.9–12.7)
POTASSIUM SERPL-SCNC: 3.9 MMOL/L (ref 3.5–5.3)
PROT SERPL-MCNC: 6.6 G/DL (ref 6.4–8.4)
PROT UR-MCNC: 160 MG/DL
PROT/CREAT UR: 1.3 MG/G{CREAT} (ref 0–0.1)
RBC # BLD AUTO: 4.31 MILLION/UL (ref 3.81–5.12)
RH BLD: POSITIVE
SODIUM SERPL-SCNC: 133 MMOL/L (ref 135–147)
SPECIMEN EXPIRATION DATE: NORMAL
TREPONEMA PALLIDUM IGG+IGM AB [PRESENCE] IN SERUM OR PLASMA BY IMMUNOASSAY: NORMAL
WBC # BLD AUTO: 12.52 THOUSAND/UL (ref 4.31–10.16)

## 2025-01-21 PROCEDURE — 4A1HXCZ MONITORING OF PRODUCTS OF CONCEPTION, CARDIAC RATE, EXTERNAL APPROACH: ICD-10-PCS | Performed by: OBSTETRICS & GYNECOLOGY

## 2025-01-21 PROCEDURE — 86850 RBC ANTIBODY SCREEN: CPT

## 2025-01-21 PROCEDURE — 85027 COMPLETE CBC AUTOMATED: CPT

## 2025-01-21 PROCEDURE — NC001 PR NO CHARGE: Performed by: OBSTETRICS & GYNECOLOGY

## 2025-01-21 PROCEDURE — 84156 ASSAY OF PROTEIN URINE: CPT

## 2025-01-21 PROCEDURE — 82948 REAGENT STRIP/BLOOD GLUCOSE: CPT

## 2025-01-21 PROCEDURE — 80053 COMPREHEN METABOLIC PANEL: CPT

## 2025-01-21 PROCEDURE — 86901 BLOOD TYPING SEROLOGIC RH(D): CPT

## 2025-01-21 PROCEDURE — 86900 BLOOD TYPING SEROLOGIC ABO: CPT

## 2025-01-21 PROCEDURE — 86780 TREPONEMA PALLIDUM: CPT

## 2025-01-21 PROCEDURE — 99213 OFFICE O/P EST LOW 20 MIN: CPT

## 2025-01-21 PROCEDURE — G0463 HOSPITAL OUTPT CLINIC VISIT: HCPCS

## 2025-01-21 PROCEDURE — 82570 ASSAY OF URINE CREATININE: CPT

## 2025-01-21 PROCEDURE — 99222 1ST HOSP IP/OBS MODERATE 55: CPT | Performed by: OBSTETRICS & GYNECOLOGY

## 2025-01-21 PROCEDURE — 59025 FETAL NON-STRESS TEST: CPT | Performed by: OBSTETRICS & GYNECOLOGY

## 2025-01-21 RX ORDER — CALCIUM CARBONATE 500 MG/1
1000 TABLET, CHEWABLE ORAL 3 TIMES DAILY PRN
Status: DISCONTINUED | OUTPATIENT
Start: 2025-01-21 | End: 2025-01-22

## 2025-01-21 RX ORDER — SODIUM CHLORIDE 9 MG/ML
50 INJECTION, SOLUTION INTRAVENOUS CONTINUOUS
Status: DISCONTINUED | OUTPATIENT
Start: 2025-01-21 | End: 2025-01-21

## 2025-01-21 RX ORDER — DEXTROSE MONOHYDRATE AND SODIUM CHLORIDE 5; .9 G/100ML; G/100ML
50 INJECTION, SOLUTION INTRAVENOUS CONTINUOUS
Status: DISCONTINUED | OUTPATIENT
Start: 2025-01-21 | End: 2025-01-21

## 2025-01-21 RX ORDER — ONDANSETRON 2 MG/ML
4 INJECTION INTRAMUSCULAR; INTRAVENOUS EVERY 8 HOURS PRN
Status: DISCONTINUED | OUTPATIENT
Start: 2025-01-21 | End: 2025-01-22 | Stop reason: SDUPTHER

## 2025-01-21 RX ORDER — ACETAMINOPHEN 325 MG/1
650 TABLET ORAL EVERY 4 HOURS PRN
Status: DISCONTINUED | OUTPATIENT
Start: 2025-01-21 | End: 2025-01-22

## 2025-01-21 RX ADMIN — SODIUM CHLORIDE 50 ML/HR: 0.9 INJECTION, SOLUTION INTRAVENOUS at 09:29

## 2025-01-21 RX ADMIN — DEXTROSE AND SODIUM CHLORIDE 50 ML/HR: 5; .9 INJECTION, SOLUTION INTRAVENOUS at 09:33

## 2025-01-21 NOTE — ASSESSMENT & PLAN NOTE
Growth scan 1/15:     AC             34.59 cm        38 weeks 4 days* (>99%)  BPD             8.91 cm        36 weeks 1 day * (80%)  HC             31.87 cm        35 weeks 6 days* (38%)  Femur           6.53 cm        33 weeks 5 days* (13%)     EFW Hadlock 4   3038 grams - 6 lbs 11 oz                 (91%)     THE AVERAGE GESTATIONAL AGE is 36 weeks 1 day +/- 21 days.     AMNIOTIC FLUID     Q1: 5.8      Q2: 5.9      Q3: 4.4      Q4: 5.4  GORDON Total = 21.5 cm  Amniotic Fluid: Normal

## 2025-01-21 NOTE — CONSULTS
Consultation - Maternal Fetal Medicine   Lupe Hauser 34 y.o. female MRN: [id number]  Unit/Bed#: -01 Encounter: [id number]  Admit date: 2025.  Today's date: 25    Assessment & Plan   Ms. Hauser is a 34 y.o. year-old  at 35w6d, Hospital Day: 1, admitted for observation in the setting poorly controlled type 1 diabetes and newly diagnosed preeclampsia with plan for delivery  via repeat low-transverse  section at 36 weeks gestation.  By issue:    Preeclampsia  Assessment & Plan  Newly diagnosed following elevated pressures in triage on 25   Patient with previosuly elevated BP on 25    Systolic (24hrs), Av , Min:135 , Max:150      Diastolic (24hrs), Av, Min:91, Max:103  CMP/CBC wnl  P:C ratio: 1.3 (increased from 0.2)    Denies any s/s of PreE at this time    Admit for observation for continued monitoring of BP      GBS bacteriuria  Assessment & Plan  Noted on urine culture  Patient allergic to PCN and Cephalosporins     Pyelectasis of fetus on prenatal ultrasound  Assessment & Plan  Plan for  US    Fetal macrosomia during pregnancy in third trimester  Assessment & Plan  Growth scan 1/15:     AC             34.59 cm        38 weeks 4 days* (>99%)  BPD             8.91 cm        36 weeks 1 day * (80%)  HC             31.87 cm        35 weeks 6 days* (38%)  Femur           6.53 cm        33 weeks 5 days* (13%)     EFW Hadlock 4   3038 grams - 6 lbs 11 oz                 (91%)     THE AVERAGE GESTATIONAL AGE is 36 weeks 1 day +/- 21 days.     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  Patient currently on insulin pump requiring regular up-titration by JOAO Bloom who has been following this patient closely   Patient reports diabetes has become increasingly difficult to control throughout the course of her pregnancy    Home pump setting orders   POC glucose before meals and at bedtime daily        Pregnancy with history of  section, antepartum  Assessment & Plan  Patient desires delivery via repeat      35 weeks gestation of pregnancy  Assessment & Plan  Up to date on prenatal care  O pos, GBS pos    Admit to L&D antepartum service for observation in the setting of concern for poorly controlled diabetes and newly diagnosed preeclampsia with plan for delviery via RLS 25.  NICU consult placed           Chief Complaint   Patient presents with    Decreased Fetal Movement       Physician Requesting Consult: Yoko Buenrostro, *  Reason for Consult / Principal Problem:  newly diagnosed preeclampsia and poorly controlled type 1 diabetes   Subspeciality: Perinatology    Dear Dr. Buenrostro,    Thank you for referring patient Lupe Hauser for Maternal-Fetal Medicine consultation regarding her .  HPI: As you know, Ms. Hauser is a 34 y.o. year-old  with an ESTHER of Estimated Date of Delivery: 25 at 35w6d, Hospital Day: 1, admitted for patient in the setting of poorly controlled type 1 diabetes and newly diagnosed preeclampsia.  Patient initially presented to triage due to concern for decreased fetal movement.  While fetal heart rate tracing was noted to be reactive, patient did meet criteria for preeclampsia with an elevated blood pressure (her second of this pregnancy) as well as a urine protein to creatinine ratio of 1.3.  Patient's history is further complicated by a diagnosis of type 1 diabetes for which she has been managed with an insulin pump in this pregnancy requiring up titration of insulin.    Following review of patient's chart, the decision was made to recommend delivery at 36 weeks due to concern for uncontrolled diabetes.   This recommendation was discussed with patient and her  and they are amenable to this plan.    Other obstetric review of symptoms:  Contractions: None.    Leakage of fluid: None.    Bleeding: None.    Fetal movement: present.    Pertinent items are noted in HPI.  Review of Systems   Constitutional:  Negative for chills and fever.   Respiratory:  Negative for cough, shortness of breath and wheezing.    Cardiovascular:  Negative for chest pain and leg swelling.   Gastrointestinal:  Negative for abdominal pain, diarrhea, nausea and vomiting.   Genitourinary:  Negative for pelvic pain, vaginal bleeding and vaginal discharge.   Musculoskeletal:  Negative for back pain.   Neurological:  Negative for weakness, light-headedness and headaches.       Other history is as follows:    Historical Information   OB History    Para Term  AB Living   4 1 0 1 2 1   SAB IAB Ectopic Multiple Live Births   2 0 0 0 1      # Outcome Date GA Lbr Galileo/2nd Weight Sex Type Anes PTL Lv   4 Current            3  21 29w5d  1650 g (3 lb 10.2 oz) M CS-LTranv Spinal Y JAYDA   2 2020 4w0d             Birth Comments: chemical   1 2018 6w0d            Gynecologic history: Patient's last menstrual period was 05/15/2024.     Past Medical History:   Diagnosis Date    Abnormal Pap smear of cervix     hpv,    Antiphospholipid syndrome (HCC)     Diabetes mellitus (HCC)     Female infertility     past IVF    Genital warts     Miscarriage     X2    PCOS (polycystic ovarian syndrome)     Varicella     as a child     Past Surgical History:   Procedure Laterality Date     SECTION      CONDYLOMA EXCISION/FULGURATION      NM  DELIVERY ONLY N/A 2021    Procedure:  SECTION ();  Surgeon: Jonnathan Verdugo MD;  Location: [facility] ;  Service: Obstetrics     Social  History   Social History     Substance and Sexual Activity   Alcohol Use Not Currently     Social History     Substance and Sexual Activity   Drug Use Never     Social History     Tobacco Use   Smoking Status Former    Current packs/day: 0.00    Types: Cigarettes    Quit date: 2024    Years since quittin.5   Smokeless Tobacco Never     Family History: Family history non-contributory    Meds/Allergies   Prior to Admission Medications   Prescriptions Last Dose Informant Patient Reported? Taking?   Cholecalciferol (Vitamin D3) 50 MCG (2000 UT) capsule Past Week Self Yes Yes   Sig: Take 2,000 Units by mouth daily   Insulin Aspart (NovoLOG) 100 units/mL injection 2025 Morning  No Yes   Sig: Use up to 300 units a day via insulin pump. T1DM and pregnancy.   Prenatal MV-Min-Fe Fum-FA-DHA (PRENATAL 1 PO) 2025 Self Yes Yes   Sig: Take by mouth   acetone, urine, test strip Unknown Self Yes No   Sig: Use 1 strip if needed for high blood sugar   folic acid (FOLVITE) 1 mg tablet Past Week Self No Yes   Sig: Take 1 tablet (1 mg total) by mouth daily   glucose blood (Contour Next Test) test strip 2025 Morning Self No Yes   Sig: Test up to 6 times per day. T1DM and pregnancy.      Facility-Administered Medications: None     Medication Administration - last 24 hours from 2025 1455 to 2025 1455         Date/Time Order Dose Route Action Action by     2025 1145 EST sodium chloride 0.9 % infusion 0 mL/hr Intravenous Stopped Leatha Brown RN     2025 0929 EST sodium chloride 0.9 % infusion 50 mL/hr Intravenous New Cleve Lewis RN     2025 1145 EST dextrose 5 % and sodium chloride 0.9 % infusion 0 mL/hr Intravenous Stopped Leatha Brown RN     2025 0933 EST dextrose 5 % and sodium chloride 0.9 % infusion 50 mL/hr Intravenous New Cleve Lewis RN     2025 1257 EST PATIENT MAINTAINED INSULIN PUMP 1 each 1 each Subcutaneous Given Estelle Leopold, RN       "      Current Facility-Administered Medications:     acetaminophen (TYLENOL) tablet 650 mg, Q4H PRN    calcium carbonate (TUMS) chewable tablet 1,000 mg, TID PRN    insulin aspart (NovoLOG) FOR PUMP REFILLS 100 Units, Daily PRN    ondansetron (ZOFRAN) injection 4 mg, Q8H PRN    PATIENT MAINTAINED INSULIN PUMP 1 each, Q8H  Allergies   Allergen Reactions    Amoxicillin Hives    Cefaclor Hives    Cephalosporins Hives       Objective    Patient Vitals for the past 24 hrs:   BP Temp Temp src Pulse Resp SpO2 Height Weight   01/21/25 1415 147/96 97.7 °F (36.5 °C) Oral 89 18 97 % -- --   01/21/25 1050 150/91 98.4 °F (36.9 °C) Axillary 86 20 -- -- --   01/21/25 1019 135/94 -- -- 100 -- -- -- --   01/21/25 1004 140/99 -- -- 93 -- -- -- --   01/21/25 0939 -- -- -- -- -- -- 5' 5\" (1.651 m) 109 kg (240 lb)   01/21/25 0857 143/97 -- -- 90 -- -- -- --   01/21/25 0842 (!) 142/102 -- -- 100 -- -- -- --   01/21/25 0827 (!) 144/103 -- -- 99 -- -- -- --   01/21/25 0812 142/97 -- -- 87 -- -- -- --     Vitals: Blood pressure 147/96, pulse 89, temperature 97.7 °F (36.5 °C), temperature source Oral, resp. rate 18, height 5' 5\" (1.651 m), weight 109 kg (240 lb), last menstrual period 05/15/2024, SpO2 97%, not currently breastfeeding.Body mass index is 39.94 kg/m².    Physical Exam  Constitutional:       General: She is not in acute distress.     Appearance: Normal appearance.   HENT:      Head: Normocephalic and atraumatic.   Cardiovascular:      Rate and Rhythm: Normal rate.      Pulses: Normal pulses.   Pulmonary:      Effort: Pulmonary effort is normal. No respiratory distress.      Breath sounds: Normal breath sounds.   Abdominal:      General: Abdomen is flat.      Palpations: Abdomen is soft.   Skin:     General: Skin is warm and dry.   Neurological:      General: No focal deficit present.      Mental Status: She is alert.   Psychiatric:         Mood and Affect: Mood normal.         Behavior: Behavior normal.       Not " evaluated.    Prenatal Labs: I have personally reviewed pertinent reports.      Results from last 7 days   Lab Units 01/21/25  0834   WBC Thousand/uL 12.52*   HEMOGLOBIN g/dL 11.6   MCV fL 84   PLATELETS Thousands/uL 234         Results from last 7 days   Lab Units 01/21/25  0834   POTASSIUM mmol/L 3.9   CHLORIDE mmol/L 103   CO2 mmol/L 22   BUN mg/dL 12   CREATININE mg/dL 0.58*   EGFR ml/min/1.73sq m 120     Results from last 7 days   Lab Units 01/21/25  0834   AST U/L 16   ALT U/L 13   ALK PHOS U/L 158*     Results from last 7 days   Lab Units 01/21/25  0834   PLATELETS Thousands/uL 234     Results from last 7 days   Lab Units 01/21/25  1142 01/21/25  1023 01/21/25  0922   POC GLUCOSE mg/dl 66 65 79     Results from last 7 days   Lab Units 01/21/25  0834   PROT/CREAT RATIO UR  1.3*                   Fetal data:  Nonstress test: date 01/21/25 Time: 0752 - 0828  Baseline:  140  Variability: moderate  Accelerations: present, 15x15  Decelerations: absent  Contractions: absent  Assessment: reactive  Plan: continue TID and PRN    MFM ultrasound report key findings:  from 1/15/25 date at 35w0d     Fetus # 1 of 1  Vertex presentation  Fetal growth appeared normal  Placenta Location = Anterior     MEASUREMENTS (* Included In Average GA)     AC             34.59 cm        38 weeks 4 days* (>99%)  BPD             8.91 cm        36 weeks 1 day * (80%)  HC             31.87 cm        35 weeks 6 days* (38%)  Femur           6.53 cm        33 weeks 5 days* (13%)     EFW Hadlock 4   3038 grams - 6 lbs 11 oz                 (91%)     THE AVERAGE GESTATIONAL AGE is 36 weeks 1 day +/- 21 days.     AMNIOTIC FLUID     Q1: 5.8      Q2: 5.9      Q3: 4.4      Q4: 5.4  GORDON Total = 21.5 cm  Amniotic Fluid: Normal     ANATOMY     Right Kidney                            Abnormal  Left Kidney                             Normal       Imaging, EKG, Pathology, and Other Studies: Results Review Statement: No pertinent imaging studies  reviewed.          Celi Roblero MD  1/21/2025  2:55 PM

## 2025-01-21 NOTE — CONSULTS
NICU Prenatal Consult   Lupe Hauser 34 y.o. female MRN: [id number]  Unit/Bed#: -01 Encounter: [id number]    Consulting Physician: Yoko Buenrostro, *      A NICU Prenatal Consult has been requested by Yoko Buenrostro MD due to prematurity.     Lupe Hauser is a 34 y.o. , with an ESTHER of 2025  at 35w6d GA.    Lupe is expecting a girl, name is not decided yet. Estimated fetal weight is 3038 gms (as of 01/15/2025). She intends to breastfeed and formula feed.     Along with Mitesh Andrade, her spouse was present for the consultation.     Prenatal Care:Yes.     Prenatal Labs:  Lab Results   Component Value Date/Time    ABO Grouping O 2025 08:34 AM    Rh Factor Positive 2025 08:34 AM    Hepatitis B Surface Ag Non-reactive 2024 10:12 AM    Hepatitis C Ab Non-reactive 2024 10:12 AM    Rubella IgG Quant 12.8 (L) 2024 10:12 AM       Unknown labs at this time: GBS    Pregnancy complications:   Patient Active Problem List   Diagnosis    35 weeks gestation of pregnancy    Pre-existing type 1 diabetes mellitus with hyperglycemia during pregnancy in third trimester (HCC)    Family history of congenital disease    BMI 37.0-37.9, adult    Insulin pump in place    Carpal tunnel syndrome on both sides    Pregnancy with history of  section, antepartum    History of  premature rupture of membranes (PPROM)    History of  delivery, currently pregnant in third trimester    Type 1 diabetes mellitus affecting pregnancy in second trimester, antepartum    Insulin pump titration    Encounter for care and examination of lactating mother    Breech presentation    Fetal macrosomia during pregnancy in third trimester    Elevated BP without diagnosis of hypertension    Pyelectasis of fetus on prenatal ultrasound    GBS bacteriuria    Preeclampsia    Mild preeclampsia     Maternal medical history:   Past Medical History:   Diagnosis Date    Abnormal Pap smear of  cervix     hpv,    Antiphospholipid syndrome (HCC)     Diabetes mellitus (HCC)     Female infertility     past IVF    Genital warts     Miscarriage     X2    PCOS (polycystic ovarian syndrome)     Varicella     as a child     Medications at home:   Medications Prior to Admission:     Cholecalciferol (Vitamin D3) 50 MCG (2000) capsule    folic acid (FOLVITE) 1 mg tablet    glucose blood (Contour Next Test) test strip    Insulin Aspart (NovoLOG) 100 units/mL injection    Prenatal MV-Min-Fe Fum-FA-DHA (PRENATAL 1 PO)    acetone, urine, test strip  Maternal social history:  Social History     Tobacco Use    Smoking status: Former     Current packs/day: 0.00     Types: Cigarettes     Quit date: 2024     Years since quittin.5    Smokeless tobacco: Never   Substance Use Topics    Alcohol use: Not Currently     Maternal  medications: Other medications: Insulin      I spoke with Lupe Hauser today regarding the upcoming birth of her daughter.  We discussed the baby's possible medical problems and the specialized care needed to address these problems.  This discussion included, but was not limited to:     Attendance of physician/NATO, nursing, and/or respiratory therapist in the delivery and delivery room management , Risk of feedings problems and potential need for IV fluids or gavage tube feeds, Risk of hypoglycemia requiring formula feeding if breastmilk is unavailable or IV dextrose infusion, Risk of hypothermia and need for radiant warmer and/or isolette, Risk of immature cardiorespiratory control and need for monitoring and/or caffeine , Risk of jaundice requiring phototherapy, Risk of NEC and advantages of breast milk , Risk of respiratory distress and possible need for support (oxygen, CPAP, intubation, and/or surfactant), and Risk of sepsis and possible need for lab tests and IV antibiotics    All of Lupe's questions were answered to the best of my ability, and she was encouraged to contact us  with any further questions.      Thank you for including us in the care of Lupe Hauser. Please reach out to the on-call Neonatologist via [employer] for any further questions that may arise.    I spent 40 minutes in consultation with Lupegeoff Hauser, of which 75% was in direct communication.    JOAO Bearden  - Medicine

## 2025-01-21 NOTE — ASSESSMENT & PLAN NOTE
Newly diagnosed following elevated pressures in triage on 25   Patient with previosuly elevated BP on 25    Systolic (24hrs), Av , Min:119 , Max:162      Diastolic (24hrs), Av, Min:75, Max:103  CMP/CBC wnl  P:C ratio: 1.3 (increased from 0.2)    Denies any s/s of PreE at this time    Admit for observation for continued monitoring of BP

## 2025-01-21 NOTE — ASSESSMENT & PLAN NOTE
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

## 2025-01-21 NOTE — H&P
"History & Physical - OB/GYN   Lupe Hauser 34 y.o. female MRN: [id number]  Unit/Bed#:  TRIAGE  Encounter: [id number]    Lupe Hauser is a patient of [facility] and she is being admitted for observation.    Chief complaint:  \"Poorly controlled blood sugars and newly diagnosed preeclampsia without severe features\"    HPI:  Lupe Hauser is a 34 y.o.  female with an ESTHER of 2025, by Last Menstrual Period at 35w6d weeks gestation who is being admitted for Observation due to poorly controlled Type 1 Diabetes and newly diagnosed Preeclampsia without severe features    Contractions:  None  Fetal movement:  Good  Vaginal bleeding:   None  Leaking of fluid:  None    Pregnancy Complications:  Type 1 diabetes, PAZ genetic mutation  Preeclampsia without severe features    PMH:  Past Medical History:   Diagnosis Date    Abnormal Pap smear of cervix     hpv,    Antiphospholipid syndrome (HCC)     Diabetes mellitus (HCC)     Female infertility     past IVF    Genital warts     Miscarriage     X2    PCOS (polycystic ovarian syndrome)     Varicella     as a child       PSH:  Past Surgical History:   Procedure Laterality Date     SECTION      CONDYLOMA EXCISION/FULGURATION      HI  DELIVERY ONLY N/A 2021    Procedure:  SECTION ();  Surgeon: Jonnathan Verdugo MD;  Location: [facility] ;  Service: Obstetrics       Social Hx:  Denies EtOH, cigarette, recreational drug, use in pregnancy    OB Hx:  OB History    Para Term  AB Living   4 1 0 1 2 1   SAB IAB Ectopic Multiple Live Births   2 0 0 0 1      # Outcome Date GA Lbr Galileo/2nd Weight Sex Type Anes PTL Lv   4 Current            3  21 29w5d  1650 g (3 lb 10.2 oz) M CS-LTranv Spinal Y JAYDA      Name: STANISLAV,BABY BOY (LUPE)      Apgar1: 6  Apgar5: 8   2 2020 4w0d             Birth Comments: chemical   1 2018 6w0d              Meds:  No current facility-administered medications on file prior to " encounter.     Current Outpatient Medications on File Prior to Encounter   Medication Sig Dispense Refill    Cholecalciferol (Vitamin D3) 50 MCG (2000 UT) capsule Take 2,000 Units by mouth daily      folic acid (FOLVITE) 1 mg tablet Take 1 tablet (1 mg total) by mouth daily 90 tablet 3    glucose blood (Contour Next Test) test strip Test up to 6 times per day. T1DM and pregnancy. 150 each 6    Prenatal MV-Min-Fe Fum-FA-DHA (PRENATAL 1 PO) Take by mouth      acetone, urine, test strip Use 1 strip if needed for high blood sugar      [DISCONTINUED] aspirin (ECOTRIN LOW STRENGTH) 81 mg EC tablet Take 2 tablets (162 mg total) by mouth daily Stop at 36 weeks.  Contact your OB or MFM with any side effects.  See [email or web address]://[email or web address].[email or web address] 60 tablet 3       Allergies:  Allergies   Allergen Reactions    Amoxicillin Hives    Cefaclor Hives    Cephalosporins Hives       Review of Systems   Constitutional:  Negative for chills and fever.   Eyes:  Negative for photophobia and visual disturbance.   Respiratory:  Negative for cough, chest tightness and shortness of breath.    Cardiovascular:  Negative for chest pain and palpitations.   Gastrointestinal:  Negative for abdominal pain, constipation, diarrhea and nausea.   Genitourinary:  Negative for dysuria, hematuria and vaginal discharge.   Neurological:  Negative for headaches.       Physical Exam  Vitals reviewed.   Constitutional:       Appearance: Normal appearance.   Eyes:      Extraocular Movements: Extraocular movements intact.      Pupils: Pupils are equal, round, and reactive to light.   Cardiovascular:      Rate and Rhythm: Normal rate and regular rhythm.      Pulses: Normal pulses.   Pulmonary:      Effort: Pulmonary effort is normal.   Abdominal:      General: Bowel sounds are normal.      Palpations: Abdomen is soft.      Comments: Gravid   Musculoskeletal:      Cervical back: Normal range of motion.   Skin:     General: Skin is warm and dry.   Neurological:       Mental Status: She is alert.   Psychiatric:         Behavior: Behavior is cooperative.       Fetal heart rate:   Baseline Rate (FHR): 130 bpm  Variability: Moderate  Accelerations: 15 x 15 or greater, With fetal movement, Present  Decelerations: None  FHR Category: Category I    [place]:   Contraction Frequency (minutes): 0    EFW: 6 lb 11 ounces, 91st percentile at 35 weeks    GBS: Positive, GBS bacteriuria    Membranes: intact    Labs:  Recent Results (from the past 24 hours)   Comprehensive metabolic panel    Collection Time: 01/21/25  8:34 AM   Result Value Ref Range    Sodium 133 (L) 135 - 147 mmol/L    Potassium 3.9 3.5 - 5.3 mmol/L    Chloride 103 96 - 108 mmol/L    CO2 22 21 - 32 mmol/L    ANION GAP 8 4 - 13 mmol/L    BUN 12 5 - 25 mg/dL    Creatinine 0.58 (L) 0.60 - 1.30 mg/dL    Glucose 129 65 - 140 mg/dL    Calcium 9.3 8.4 - 10.2 mg/dL    Corrected Calcium 9.9 8.3 - 10.1 mg/dL    AST 16 13 - 39 U/L    ALT 13 7 - 52 U/L    Alkaline Phosphatase 158 (H) 34 - 104 U/L    Total Protein 6.6 6.4 - 8.4 g/dL    Albumin 3.2 (L) 3.5 - 5.0 g/dL    Total Bilirubin 0.35 0.20 - 1.00 mg/dL    eGFR 120 ml/min/1.73sq m   CBC and Platelet    Collection Time: 01/21/25  8:34 AM   Result Value Ref Range    WBC 12.52 (H) 4.31 - 10.16 Thousand/uL    RBC 4.31 3.81 - 5.12 Million/uL    Hemoglobin 11.6 11.5 - 15.4 g/dL    Hematocrit 36.0 34.8 - 46.1 %    MCV 84 82 - 98 fL    MCH 26.9 26.8 - 34.3 pg    MCHC 32.2 31.4 - 37.4 g/dL    RDW 14.4 11.6 - 15.1 %    Platelets 234 149 - 390 Thousands/uL    MPV 11.9 8.9 - 12.7 fL   Protein / creatinine ratio, urine    Collection Time: 01/21/25  8:34 AM   Result Value Ref Range    Creatinine, Ur 123.2 Reference range not established. mg/dL    Protein Urine Random 160.0 Reference range not established. mg/dL    Prot/Creat Ratio, Ur 1.3 (H) 0.0 - 0.1   Type and screen    Collection Time: 01/21/25  8:34 AM   Result Value Ref Range    ABO Grouping O     Rh Factor Positive     Antibody Screen  Negative     Specimen Expiration Date 2025    Fingerstick Glucose (POCT)    Collection Time: 25  9:22 AM   Result Value Ref Range    POC Glucose 79 65 - 140 mg/dl   Fingerstick Glucose (POCT)    Collection Time: 25 10:23 AM   Result Value Ref Range    POC Glucose 65 65 - 140 mg/dl         Assessment:   34 y.o.  at 35w6d with poorly controlled type 1 diabetes secondary to PAZ mutation, newly diagnosed preeclampsia without severe features.  Given her poorly managed sugars and likely requirement for the baby to be monitored by the NICU and new onset hypertensive pressures the decision was made to keep her for observation per MFM recommendations.    Plan:   1. IUP at 35 w 6 d   -3 times daily FHR and tocometry monitoring  2) Membrane status: Intact    3) Routine antepartum labs   - CBC, RPR, T&S     4) MFM consult   -Recommendation to keep her for observation and place her n.p.o. at midnight just in case she needs to have her repeat section urgently    5) NICU consult   - Prematurity, infant of diabetic mother    6) FEN   -Diabetic diet until midnight where she will be placed n.p.o.  - D5 half-normal saline ordered     D/w Yoko Lowe MD  OBGYN PGY-1  2025 11:32 AM

## 2025-01-21 NOTE — PLAN OF CARE
Problem: ANTEPARTUM  Goal: Maintain pregnancy as long as maternal and/or fetal condition is stable  Description: INTERVENTIONS:  - Maternal surveillance  - Fetal surveillance  - Monitor uterine activity  - Medications as ordered  - Bedrest  Outcome: Progressing     Problem: BIRTH - VAGINAL/ SECTION  Goal: Fetal and maternal status remain reassuring during the birth process  Description: INTERVENTIONS:  - Monitor vital signs  - Monitor fetal heart rate  - Monitor uterine activity  - Monitor labor progression (vaginal delivery)  - DVT prophylaxis  - Antibiotic prophylaxis  Outcome: Progressing  Goal: Emotionally satisfying birthing experience for mother/fetus  Description: Interventions:  - Assess, plan, implement and evaluate the nursing care given to the patient in labor  - Advocate the philosophy that each childbirth experience is a unique experience and support the family's chosen level of involvement and control during the labor process   - Actively participate in both the patient's and family's teaching of the birth process  - Consider cultural, Spiritism and age-specific factors and plan care for the patient in labor  Outcome: Progressing

## 2025-01-21 NOTE — ASSESSMENT & PLAN NOTE
Prot/Creat Ratio, Ur   Date Value Ref Range Status   2025 1.3 (H) 0.0 - 0.1 Final   2024 0.07 0.00 - 0.10 Final     Systolic (24hrs), Av , Min:135 , Max:144     Diastolic (24hrs), Av, Min:94, Max:103

## 2025-01-21 NOTE — ASSESSMENT & PLAN NOTE
Up to date on prenatal care  O pos, GBS pos    Admit to L&D antepartum service for observation in the setting of concern for poorly controlled diabetes and newly diagnosed preeclampsia [facility]  S/P NICU consult

## 2025-01-22 PROBLEM — Z98.891 S/P CESAREAN SECTION: Status: ACTIVE | Noted: 2025-01-22

## 2025-01-22 PROBLEM — O13.3 GESTATIONAL HYPERTENSION, THIRD TRIMESTER: Status: ACTIVE | Noted: 2025-01-22

## 2025-01-22 PROBLEM — E10.9 TYPE 1 DIABETES MELLITUS (HCC): Status: ACTIVE | Noted: 2024-08-21

## 2025-01-22 LAB
ALBUMIN SERPL BCG-MCNC: 3.1 G/DL (ref 3.5–5)
ALP SERPL-CCNC: 168 U/L (ref 34–104)
ALT SERPL W P-5'-P-CCNC: 13 U/L (ref 7–52)
ANION GAP SERPL CALCULATED.3IONS-SCNC: 9 MMOL/L (ref 4–13)
AST SERPL W P-5'-P-CCNC: 16 U/L (ref 13–39)
BASE EXCESS BLDCOV CALC-SCNC: -2.1 MMOL/L (ref 1–9)
BILIRUB SERPL-MCNC: 0.38 MG/DL (ref 0.2–1)
BUN SERPL-MCNC: 11 MG/DL (ref 5–25)
CALCIUM ALBUM COR SERPL-MCNC: 9.8 MG/DL (ref 8.3–10.1)
CALCIUM SERPL-MCNC: 9.1 MG/DL (ref 8.4–10.2)
CHLORIDE SERPL-SCNC: 102 MMOL/L (ref 96–108)
CO2 SERPL-SCNC: 23 MMOL/L (ref 21–32)
CREAT SERPL-MCNC: 0.58 MG/DL (ref 0.6–1.3)
ERYTHROCYTE [DISTWIDTH] IN BLOOD BY AUTOMATED COUNT: 14.6 % (ref 11.6–15.1)
GFR SERPL CREATININE-BSD FRML MDRD: 120 ML/MIN/1.73SQ M
GLUCOSE SERPL-MCNC: 106 MG/DL (ref 65–140)
GLUCOSE SERPL-MCNC: 109 MG/DL (ref 65–140)
GLUCOSE SERPL-MCNC: 113 MG/DL (ref 65–140)
GLUCOSE SERPL-MCNC: 113 MG/DL (ref 65–140)
GLUCOSE SERPL-MCNC: 183 MG/DL (ref 65–140)
GLUCOSE SERPL-MCNC: 52 MG/DL (ref 65–140)
GLUCOSE SERPL-MCNC: 62 MG/DL (ref 65–140)
GLUCOSE SERPL-MCNC: 69 MG/DL (ref 65–140)
GLUCOSE SERPL-MCNC: 79 MG/DL (ref 65–140)
GLUCOSE SERPL-MCNC: 88 MG/DL (ref 65–140)
GLUCOSE SERPL-MCNC: 93 MG/DL (ref 65–140)
GLUCOSE SERPL-MCNC: 94 MG/DL (ref 65–140)
GLUCOSE SERPL-MCNC: 94 MG/DL (ref 65–140)
HCO3 BLDCOV-SCNC: 26 MMOL/L (ref 12.2–28.6)
HCT VFR BLD AUTO: 37 % (ref 34.8–46.1)
HGB BLD-MCNC: 11.6 G/DL (ref 11.5–15.4)
HOLD SPECIMEN: NORMAL
MCH RBC QN AUTO: 26.6 PG (ref 26.8–34.3)
MCHC RBC AUTO-ENTMCNC: 31.4 G/DL (ref 31.4–37.4)
MCV RBC AUTO: 85 FL (ref 82–98)
OXYHGB MFR BLDCOV: 22.7 %
PCO2 BLDCOV: 56.8 MM HG (ref 27–43)
PH BLDCOV: 7.28 [PH] (ref 7.19–7.49)
PLATELET # BLD AUTO: 233 THOUSANDS/UL (ref 149–390)
PMV BLD AUTO: 12.1 FL (ref 8.9–12.7)
PO2 BLDCOV: 13.6 MM HG (ref 15–45)
POTASSIUM SERPL-SCNC: 3.7 MMOL/L (ref 3.5–5.3)
PROT SERPL-MCNC: 6.6 G/DL (ref 6.4–8.4)
RBC # BLD AUTO: 4.36 MILLION/UL (ref 3.81–5.12)
SAO2 % BLDCOV: 5.4 ML/DL
SODIUM SERPL-SCNC: 134 MMOL/L (ref 135–147)
WBC # BLD AUTO: 13.87 THOUSAND/UL (ref 4.31–10.16)

## 2025-01-22 PROCEDURE — 80053 COMPREHEN METABOLIC PANEL: CPT

## 2025-01-22 PROCEDURE — 94762 N-INVAS EAR/PLS OXIMTRY CONT: CPT

## 2025-01-22 PROCEDURE — 85027 COMPLETE CBC AUTOMATED: CPT

## 2025-01-22 PROCEDURE — 94760 N-INVAS EAR/PLS OXIMETRY 1: CPT

## 2025-01-22 PROCEDURE — NC001 PR NO CHARGE: Performed by: OBSTETRICS & GYNECOLOGY

## 2025-01-22 PROCEDURE — 82805 BLOOD GASES W/O2 SATURATION: CPT | Performed by: OBSTETRICS & GYNECOLOGY

## 2025-01-22 PROCEDURE — 99232 SBSQ HOSP IP/OBS MODERATE 35: CPT | Performed by: OBSTETRICS & GYNECOLOGY

## 2025-01-22 PROCEDURE — 82948 REAGENT STRIP/BLOOD GLUCOSE: CPT

## 2025-01-22 PROCEDURE — 59510 CESAREAN DELIVERY: CPT | Performed by: OBSTETRICS & GYNECOLOGY

## 2025-01-22 PROCEDURE — 59514 CESAREAN DELIVERY ONLY: CPT | Performed by: PHYSICIAN ASSISTANT

## 2025-01-22 RX ORDER — HYDROXYZINE HYDROCHLORIDE 25 MG/1
25 TABLET, FILM COATED ORAL EVERY 6 HOURS PRN
Status: DISCONTINUED | OUTPATIENT
Start: 2025-01-22 | End: 2025-01-25 | Stop reason: HOSPADM

## 2025-01-22 RX ORDER — MORPHINE SULFATE 1 MG/ML
INJECTION, SOLUTION EPIDURAL; INTRATHECAL; INTRAVENOUS AS NEEDED
Status: DISCONTINUED | OUTPATIENT
Start: 2025-01-22 | End: 2025-01-22

## 2025-01-22 RX ORDER — ACETAMINOPHEN 325 MG/1
650 TABLET ORAL EVERY 6 HOURS SCHEDULED
Status: DISCONTINUED | OUTPATIENT
Start: 2025-01-23 | End: 2025-01-22

## 2025-01-22 RX ORDER — PROMETHAZINE HYDROCHLORIDE 25 MG/ML
12.5 INJECTION, SOLUTION INTRAMUSCULAR; INTRAVENOUS ONCE AS NEEDED
Status: COMPLETED | OUTPATIENT
Start: 2025-01-22 | End: 2025-01-22

## 2025-01-22 RX ORDER — OXYCODONE HYDROCHLORIDE 5 MG/1
5 TABLET ORAL EVERY 4 HOURS PRN
Status: ACTIVE | OUTPATIENT
Start: 2025-01-22 | End: 2025-01-23

## 2025-01-22 RX ORDER — NALOXONE HYDROCHLORIDE 0.4 MG/ML
0.1 INJECTION, SOLUTION INTRAMUSCULAR; INTRAVENOUS; SUBCUTANEOUS
Status: ACTIVE | OUTPATIENT
Start: 2025-01-22 | End: 2025-01-23

## 2025-01-22 RX ORDER — IBUPROFEN 600 MG/1
600 TABLET, FILM COATED ORAL EVERY 6 HOURS
Status: DISCONTINUED | OUTPATIENT
Start: 2025-01-24 | End: 2025-01-22

## 2025-01-22 RX ORDER — DIPHENHYDRAMINE HYDROCHLORIDE 50 MG/ML
25 INJECTION INTRAMUSCULAR; INTRAVENOUS EVERY 6 HOURS PRN
Status: DISCONTINUED | OUTPATIENT
Start: 2025-01-22 | End: 2025-01-25 | Stop reason: HOSPADM

## 2025-01-22 RX ORDER — DEXTROSE MONOHYDRATE AND SODIUM CHLORIDE 5; .9 G/100ML; G/100ML
100 INJECTION, SOLUTION INTRAVENOUS CONTINUOUS
Status: DISCONTINUED | OUTPATIENT
Start: 2025-01-22 | End: 2025-01-22

## 2025-01-22 RX ORDER — SODIUM CHLORIDE 9 MG/ML
125 INJECTION, SOLUTION INTRAVENOUS CONTINUOUS
Status: DISCONTINUED | OUTPATIENT
Start: 2025-01-22 | End: 2025-01-24

## 2025-01-22 RX ORDER — METOCLOPRAMIDE HYDROCHLORIDE 5 MG/ML
5 INJECTION INTRAMUSCULAR; INTRAVENOUS EVERY 6 HOURS PRN
Status: ACTIVE | OUTPATIENT
Start: 2025-01-22 | End: 2025-01-23

## 2025-01-22 RX ORDER — DIPHENHYDRAMINE HYDROCHLORIDE 50 MG/ML
12.5 INJECTION INTRAMUSCULAR; INTRAVENOUS ONCE AS NEEDED
Status: DISCONTINUED | OUTPATIENT
Start: 2025-01-22 | End: 2025-01-25 | Stop reason: HOSPADM

## 2025-01-22 RX ORDER — IBUPROFEN 600 MG/1
600 TABLET, FILM COATED ORAL EVERY 6 HOURS
Status: DISCONTINUED | OUTPATIENT
Start: 2025-01-24 | End: 2025-01-24

## 2025-01-22 RX ORDER — FENTANYL CITRATE 50 UG/ML
INJECTION, SOLUTION INTRAMUSCULAR; INTRAVENOUS AS NEEDED
Status: DISCONTINUED | OUTPATIENT
Start: 2025-01-22 | End: 2025-01-22

## 2025-01-22 RX ORDER — HYDROMORPHONE HCL/PF 1 MG/ML
0.5 SYRINGE (ML) INJECTION
Status: DISCONTINUED | OUTPATIENT
Start: 2025-01-22 | End: 2025-01-25 | Stop reason: HOSPADM

## 2025-01-22 RX ORDER — BENZOCAINE/MENTHOL 6 MG-10 MG
1 LOZENGE MUCOUS MEMBRANE DAILY PRN
Status: DISCONTINUED | OUTPATIENT
Start: 2025-01-22 | End: 2025-01-25 | Stop reason: HOSPADM

## 2025-01-22 RX ORDER — KETOROLAC TROMETHAMINE 30 MG/ML
INJECTION, SOLUTION INTRAMUSCULAR; INTRAVENOUS AS NEEDED
Status: DISCONTINUED | OUTPATIENT
Start: 2025-01-22 | End: 2025-01-22

## 2025-01-22 RX ORDER — OXYCODONE HYDROCHLORIDE 10 MG/1
10 TABLET ORAL EVERY 4 HOURS PRN
Status: DISCONTINUED | OUTPATIENT
Start: 2025-01-23 | End: 2025-01-25 | Stop reason: HOSPADM

## 2025-01-22 RX ORDER — BUPIVACAINE HYDROCHLORIDE 7.5 MG/ML
INJECTION, SOLUTION INTRASPINAL AS NEEDED
Status: DISCONTINUED | OUTPATIENT
Start: 2025-01-22 | End: 2025-01-22

## 2025-01-22 RX ORDER — METOCLOPRAMIDE HYDROCHLORIDE 5 MG/ML
INJECTION INTRAMUSCULAR; INTRAVENOUS AS NEEDED
Status: DISCONTINUED | OUTPATIENT
Start: 2025-01-22 | End: 2025-01-22

## 2025-01-22 RX ORDER — DOCUSATE SODIUM 100 MG/1
100 CAPSULE, LIQUID FILLED ORAL 2 TIMES DAILY
Status: DISCONTINUED | OUTPATIENT
Start: 2025-01-22 | End: 2025-01-25 | Stop reason: HOSPADM

## 2025-01-22 RX ORDER — KETOROLAC TROMETHAMINE 30 MG/ML
30 INJECTION, SOLUTION INTRAMUSCULAR; INTRAVENOUS EVERY 6 HOURS SCHEDULED
Status: DISCONTINUED | OUTPATIENT
Start: 2025-01-23 | End: 2025-01-22

## 2025-01-22 RX ORDER — OXYCODONE HYDROCHLORIDE 5 MG/1
5 TABLET ORAL EVERY 4 HOURS PRN
Status: DISCONTINUED | OUTPATIENT
Start: 2025-01-23 | End: 2025-01-25 | Stop reason: HOSPADM

## 2025-01-22 RX ORDER — ONDANSETRON 2 MG/ML
INJECTION INTRAMUSCULAR; INTRAVENOUS AS NEEDED
Status: DISCONTINUED | OUTPATIENT
Start: 2025-01-22 | End: 2025-01-22

## 2025-01-22 RX ORDER — ONDANSETRON 2 MG/ML
4 INJECTION INTRAMUSCULAR; INTRAVENOUS EVERY 8 HOURS PRN
Status: DISCONTINUED | OUTPATIENT
Start: 2025-01-22 | End: 2025-01-22

## 2025-01-22 RX ORDER — ONDANSETRON 2 MG/ML
4 INJECTION INTRAMUSCULAR; INTRAVENOUS EVERY 8 HOURS PRN
Status: DISCONTINUED | OUTPATIENT
Start: 2025-01-23 | End: 2025-01-25 | Stop reason: HOSPADM

## 2025-01-22 RX ORDER — CLINDAMYCIN PHOSPHATE 900 MG/50ML
900 INJECTION, SOLUTION INTRAVENOUS ONCE
Status: COMPLETED | OUTPATIENT
Start: 2025-01-22 | End: 2025-01-22

## 2025-01-22 RX ORDER — KETOROLAC TROMETHAMINE 30 MG/ML
30 INJECTION, SOLUTION INTRAMUSCULAR; INTRAVENOUS EVERY 6 HOURS SCHEDULED
Status: COMPLETED | OUTPATIENT
Start: 2025-01-22 | End: 2025-01-24

## 2025-01-22 RX ORDER — HYDROMORPHONE HCL/PF 1 MG/ML
0.5 SYRINGE (ML) INJECTION EVERY 2 HOUR PRN
Status: DISCONTINUED | OUTPATIENT
Start: 2025-01-22 | End: 2025-01-25 | Stop reason: HOSPADM

## 2025-01-22 RX ORDER — ENOXAPARIN SODIUM 100 MG/ML
40 INJECTION SUBCUTANEOUS DAILY
Status: DISCONTINUED | OUTPATIENT
Start: 2025-01-23 | End: 2025-01-25 | Stop reason: HOSPADM

## 2025-01-22 RX ORDER — SIMETHICONE 80 MG
80 TABLET,CHEWABLE ORAL 4 TIMES DAILY PRN
Status: DISCONTINUED | OUTPATIENT
Start: 2025-01-22 | End: 2025-01-25 | Stop reason: HOSPADM

## 2025-01-22 RX ORDER — ACETAMINOPHEN 325 MG/1
650 TABLET ORAL EVERY 6 HOURS SCHEDULED
Status: DISCONTINUED | OUTPATIENT
Start: 2025-01-22 | End: 2025-01-25 | Stop reason: HOSPADM

## 2025-01-22 RX ORDER — ALBUTEROL SULFATE 0.83 MG/ML
2.5 SOLUTION RESPIRATORY (INHALATION) ONCE AS NEEDED
Status: DISCONTINUED | OUTPATIENT
Start: 2025-01-22 | End: 2025-01-25 | Stop reason: HOSPADM

## 2025-01-22 RX ORDER — CALCIUM CARBONATE 500 MG/1
1000 TABLET, CHEWABLE ORAL DAILY PRN
Status: DISCONTINUED | OUTPATIENT
Start: 2025-01-22 | End: 2025-01-25 | Stop reason: HOSPADM

## 2025-01-22 RX ORDER — NALBUPHINE HYDROCHLORIDE 10 MG/ML
5 INJECTION INTRAMUSCULAR; INTRAVENOUS; SUBCUTANEOUS
Status: ACTIVE | OUTPATIENT
Start: 2025-01-22 | End: 2025-01-23

## 2025-01-22 RX ORDER — METOCLOPRAMIDE HYDROCHLORIDE 5 MG/ML
10 INJECTION INTRAMUSCULAR; INTRAVENOUS EVERY 6 HOURS PRN
Status: DISCONTINUED | OUTPATIENT
Start: 2025-01-22 | End: 2025-01-25 | Stop reason: HOSPADM

## 2025-01-22 RX ORDER — DEXTROSE MONOHYDRATE 25 G/50ML
25 INJECTION, SOLUTION INTRAVENOUS ONCE
Status: COMPLETED | OUTPATIENT
Start: 2025-01-22 | End: 2025-01-22

## 2025-01-22 RX ORDER — OXYTOCIN/RINGER'S LACTATE 30/500 ML
PLASTIC BAG, INJECTION (ML) INTRAVENOUS CONTINUOUS PRN
Status: DISCONTINUED | OUTPATIENT
Start: 2025-01-22 | End: 2025-01-22

## 2025-01-22 RX ORDER — ONDANSETRON 2 MG/ML
4 INJECTION INTRAMUSCULAR; INTRAVENOUS EVERY 6 HOURS PRN
Status: ACTIVE | OUTPATIENT
Start: 2025-01-22 | End: 2025-01-23

## 2025-01-22 RX ORDER — FENTANYL CITRATE/PF 50 MCG/ML
50 SYRINGE (ML) INJECTION
Status: DISCONTINUED | OUTPATIENT
Start: 2025-01-22 | End: 2025-01-25 | Stop reason: HOSPADM

## 2025-01-22 RX ORDER — OXYTOCIN/RINGER'S LACTATE 30/500 ML
62.5 PLASTIC BAG, INJECTION (ML) INTRAVENOUS ONCE
Status: COMPLETED | OUTPATIENT
Start: 2025-01-22 | End: 2025-01-22

## 2025-01-22 RX ADMIN — BUPIVACAINE HYDROCHLORIDE IN DEXTROSE 1.6 ML: 7.5 INJECTION, SOLUTION SUBARACHNOID at 08:20

## 2025-01-22 RX ADMIN — METOCLOPRAMIDE 5 MG: 5 INJECTION, SOLUTION INTRAMUSCULAR; INTRAVENOUS at 08:26

## 2025-01-22 RX ADMIN — DEXTROSE MONOHYDRATE 25 ML: 25 INJECTION, SOLUTION INTRAVENOUS at 02:12

## 2025-01-22 RX ADMIN — Medication 500 MILLI-UNITS/MIN: at 08:43

## 2025-01-22 RX ADMIN — MORPHINE SULFATE 0.1 MG: 1 INJECTION, SOLUTION EPIDURAL; INTRATHECAL; INTRAVENOUS at 08:20

## 2025-01-22 RX ADMIN — DEXTROSE AND SODIUM CHLORIDE 50 ML/HR: 5; .9 INJECTION, SOLUTION INTRAVENOUS at 02:48

## 2025-01-22 RX ADMIN — HYDROMORPHONE HYDROCHLORIDE 0.5 MG: 1 INJECTION, SOLUTION INTRAMUSCULAR; INTRAVENOUS; SUBCUTANEOUS at 14:37

## 2025-01-22 RX ADMIN — KETOROLAC TROMETHAMINE 30 MG: 30 INJECTION, SOLUTION INTRAMUSCULAR; INTRAVENOUS at 09:02

## 2025-01-22 RX ADMIN — SODIUM CHLORIDE 125 ML/HR: 0.9 INJECTION, SOLUTION INTRAVENOUS at 14:37

## 2025-01-22 RX ADMIN — FENTANYL CITRATE 10 MCG: 50 INJECTION INTRAMUSCULAR; INTRAVENOUS at 08:20

## 2025-01-22 RX ADMIN — KETOROLAC TROMETHAMINE 30 MG: 30 INJECTION, SOLUTION INTRAMUSCULAR; INTRAVENOUS at 21:49

## 2025-01-22 RX ADMIN — DOCUSATE SODIUM 100 MG: 100 CAPSULE, LIQUID FILLED ORAL at 17:55

## 2025-01-22 RX ADMIN — PROMETHAZINE HYDROCHLORIDE 12.5 MG: 25 INJECTION INTRAMUSCULAR; INTRAVENOUS at 12:26

## 2025-01-22 RX ADMIN — ACETAMINOPHEN 650 MG: 325 TABLET, FILM COATED ORAL at 23:37

## 2025-01-22 RX ADMIN — ONDANSETRON 4 MG: 2 INJECTION, SOLUTION INTRAMUSCULAR; INTRAVENOUS at 08:17

## 2025-01-22 RX ADMIN — INSULIN ASPART 100 UNITS: 100 INJECTION, SOLUTION INTRAVENOUS; SUBCUTANEOUS at 21:40

## 2025-01-22 RX ADMIN — CLINDAMYCIN PHOSPHATE 900 MG: 900 INJECTION, SOLUTION INTRAVENOUS at 08:24

## 2025-01-22 RX ADMIN — GENTAMICIN SULFATE 389 MG: 40 INJECTION, SOLUTION INTRAMUSCULAR; INTRAVENOUS at 08:20

## 2025-01-22 RX ADMIN — SODIUM CHLORIDE 0.5 UNITS/HR: 9 INJECTION, SOLUTION INTRAVENOUS at 08:01

## 2025-01-22 RX ADMIN — OXYTOCIN 62.5 MILLI-UNITS/MIN: 10 INJECTION INTRAVENOUS at 10:47

## 2025-01-22 RX ADMIN — PHENYLEPHRINE HYDROCHLORIDE 50 MCG/MIN: 50 INJECTION INTRAVENOUS at 08:12

## 2025-01-22 RX ADMIN — SODIUM CHLORIDE 999 ML/HR: 0.9 INJECTION, SOLUTION INTRAVENOUS at 07:52

## 2025-01-22 NOTE — ANESTHESIA POSTPROCEDURE EVALUATION
Post-Op Assessment Note    CV Status:  Stable  Pain Score: 0    Pain management: adequate       Mental Status:  Alert and awake   Hydration Status:  Euvolemic   PONV Controlled:  Controlled   Airway Patency:  Patent     Post Op Vitals Reviewed: Yes    No anethesia notable event occurred.    Staff: Anesthesiologist, CRNA           Last Filed PACU Vitals:  Vitals Value Taken Time   Temp 97.3    Pulse 84    /56    Resp 18    SpO2 95% RA

## 2025-01-22 NOTE — ASSESSMENT & PLAN NOTE
, Hgb 11.6 --> AM CBC 10.2  Chavez catheter removed, voiding spontaneously   DVT ppx: SCDs and Lovenox 40 mg qD  Continue routine post partum care  Encourage ambulation  Encourage breastfeeding  Anticipate discharge POD 2-4

## 2025-01-22 NOTE — OP NOTE
OPERATIVE REPORT  PATIENT NAME: Lupe Hauser    :  1990  MRN: [id number]  Pt Location: [facility] L&D OR ROOM 02    SURGERY DATE: 2025    Surgeons and Role:     * Francesca Deng DO - Primary     * Kelle Flores PA-C - Assisting    No Qualified Resident Available to Assist    Kelle Flores PA-C was present and participated in the procedure, which was medically necessary to assist with retraction and suturing.     Preop Diagnosis:  Gestational diabetes mellitus (GDM) affecting pregnancy, antepartum [O24.419]  Pre-eclampsia  Prior CS x 1    Post-Op Diagnosis Codes:     * Gestational diabetes mellitus (GDM) affecting pregnancy, antepartum [O24.419]  Pre-eclampsia  Prior CS x 1    Procedure(s) (LRB):   SECTION () (N/A)    Specimen(s):  ID Type Source Tests Collected by Time Destination   A :  Tissue (Placenta on Hold) OB Only Placenta PLACENTA IN STORAGE Francesca Deng  2025 0843    B :  Cord Blood Cord BLOOD GAS, VENOUS, CORD, BLOOD GAS, ARTERIAL, CORD (Canceled) Francesca Leathaangelo Deng  2025 0843        Surgical QBL:  Surgical QBL (mL): 273 mL      Drains:  Urethral Catheter Double-lumen;Non-latex 16 Fr. (Active)   Number of days: 0       Anesthesia Type:   * No anesthesia type entered *    Operative Indications:  Gestational diabetes mellitus (GDM) affecting pregnancy, antepartum [O24.419]  Pre-eclampsia      Aakash Group Classification System:  No Multiple pregnancy, No Transverse or oblique lie, No Breech lie, Gestational age is < 37 weeks +  is AAKASH GROUP 10      Findings:  1. Delivery of viable female  on 25; Delivery: , Low Transverse at 2025 8:43 AM  Baby's Weight: 3370 g (7 lb 6.9 oz); 118.87    Apgar scores: 8  and 9    2. Normal appearing placenta with centrally-inserted 3 vessel cord  3. Clear amniotic fluid  4. Grossly normal uterus, tubes, and ovaries  5. Thin bladder adhesions, bladder flap created and bladder taken down  carefully.     Cord Gases:  Recent Results (from the past hour)   CORD, Blood gas, venous    Collection Time: 25  8:43 AM   Result Value Ref Range    pH, Cord Ash 7.278 7.190 - 7.490    pCO2, Cord Ash 56.8 (H) 27.0 - 43.0 mm HG    pO2, Cord Ash 13.6 (L) 15.0 - 45.0 mm HG    HCO3, Cord Ash 26.0 12.2 - 28.6 mmol/L    Base Exc, Cord Ash -2.1 (L) 1.0 - 9.0 mmol/L    O2 Cont, Cord Ash 5.4 mL/dL    O2 HGB,VENOUS CORD 22.7 %   Fingerstick Glucose (POCT)    Collection Time: 25  9:30 AM   Result Value Ref Range    POC Glucose 79 65 - 140 mg/dl       Specimens:   1. Arterial and venous cord gases  2. Cord blood  3. Segment of umbilical cord  4. Placenta to storage       Brief History:    Lupe Hauser is a 34 y.o. G4 P 0222  female with an ESTHER of 2025, by Last Menstrual Period at 36w0d weeks gestation admitted for  repeat  in setting of prior section, uncontrolled type I diabetes and pre-eclampsia .    Patient was made aware of these findings and the proposed plan. Risks, benefits, possible complications, alternate treatment options, and expected outcomes were discussed with the patient.  The patient agreed with the proposed plan and gave informed consent.      Procedure Details     The patient was taken to the operating room where she was properly identified to the OR staff and attending physician. Spinal anesthesia was obtained without difficulty.  Fetal heart tones were appreciated and found to be appropriate in the OR. A Chavez catheter and SCDs were placed.  The vagina was prepped with chlorhexidine and the abdomen was prepped with Chloraprep. Following appropriate drying time, the patient was draped in the usual sterile manner for a Pfannenstiel incision.  The patient received pre-op abx (clinda and gent).  A Time Out was held and the above information confirmed.  The patient was identified as Lupe Hauser and the procedure verified as  Delivery.    A Pfannenstiel incision was made and  carried down through the underlying subcutaneous tissue to the fascia using a scalpel and bovie due to dense adhesions. Rectus fascia was then incised in the midline and extended laterally using Parks scissors. The superior edge of the fascia was grasped with Kocher clamps, tented upward, and the underlying muscle was bluntly dissected off. The inferior edge was grasped with Kocher clamps and cleared in similar fashion.  All anatomic layers were well-demarcated. The rectus muscles were  and the peritoneum was identified and subsequently entered and extended longitudinally with blunt dissection. The bladder blade was inserted.  A bladder flap was created due to the bladder being adhered above area of desired hysterotomy. The bladder flap was created using Metzenbaum scissors and blunt dissection and came down nicely.     A low transverse uterine incision was made with the scalpel and extended laterally with blunt dissection. The amnion was entered.  Surgeons hand was inserted through the hysterotomy and the fetal head was palpated, elevated, and delivered through the uterine incision with the assistance of fundal pressure. Loose nuchal x 1 was noted. The umbilical cord was clamped and cut after a 30-60 second delay.  The infant was handed off to the  providers.  Arterial and venous cord gases, cord blood, and a segment of umbilical cord were obtained for evaluation and sent to the lab.  The placenta delivered spontaneously with uterine fundal massage and was noted to have a centrally inserted 3 vessel cord.    The uterus was exteriorized and a moist lap sponge was used to clear the cavity of clots and products of conception. The uterine incision was closed with a running locked suture of 0 Monocryl. A second layer of the same suture was used to imbricate the first. A figure of eight stitch was placed along the hysterotomy closure. Good hemostasis was confirmed upon uterine closure. NuKnit was placed  over the hysterotomy site. The paracolic gutters were inspected and cleared of all clots and debris. The peritoneum was closed using 2.0 plain suture. The fascia was closed with a running suture of loop PDS. The skin was closed with Stratifix in a subcuticular fashion. Exofin was applied and an abdominal binder was then placed.     At the conclusion of the procedure, all needle, sponge, and instrument counts were noted to be correct x2.  The patient tolerated the procedure well and was transferred to her the recovery room in stable condition. Francesca Deng DO was present and participated in all key portions of the case.          SIGNATURE: Francesca Deng DO  DATE: January 22, 2025  TIME: 9:34 AM

## 2025-01-22 NOTE — PLAN OF CARE
Problem: ANTEPARTUM  Goal: Maintain pregnancy as long as maternal and/or fetal condition is stable  Description: INTERVENTIONS:  - Maternal surveillance  - Fetal surveillance  - Monitor uterine activity  - Medications as ordered  - Bedrest  Outcome: Progressing     Problem: BIRTH - VAGINAL/ SECTION  Goal: Fetal and maternal status remain reassuring during the birth process  Description: INTERVENTIONS:  - Monitor vital signs  - Monitor fetal heart rate  - Monitor uterine activity  - Monitor labor progression (vaginal delivery)  - DVT prophylaxis  - Antibiotic prophylaxis  Outcome: Progressing  Goal: Emotionally satisfying birthing experience for mother/fetus  Description: Interventions:  - Assess, plan, implement and evaluate the nursing care given to the patient in labor  - Advocate the philosophy that each childbirth experience is a unique experience and support the family's chosen level of involvement and control during the labor process   - Actively participate in both the patient's and family's teaching of the birth process  - Consider cultural, Judaism and age-specific factors and plan care for the patient in labor  Outcome: Progressing

## 2025-01-22 NOTE — ASSESSMENT & PLAN NOTE
Transitioned to home insulin pump settings:  Midnight - 8 am: 1.00 U/hour   8 am - 10 pm: 1.25 U/hour   10 pm - Midnight: 1.00 U/hour   Insulin to carb ratio: 1:8   Correction factor: 1:25   Blood glucose target: 110   Active insulin time: 4 hours   Lab Results   Component Value Date    HGBA1C 7.5 (H) 01/08/2025       Recent Labs     01/22/25  1741 01/22/25  2131 01/23/25  0130 01/23/25  0612   POCGLU 93 183* 76 65       Blood Sugar Average: Last 72 hrs:  (P) [id number]

## 2025-01-22 NOTE — PROGRESS NOTES
Progress Note - Maternal-Fetal Medicine   Name: Lupe Hauser 34 y.o. female I MRN: [id number]  Unit/Bed#: -01 I Date of Admission: 2025   Date of Service: 2025 I Hospital Day: 0     Assessment & Plan  35 weeks gestation of pregnancy  Up to date on prenatal care  O pos, GBS pos    Admit to L&D antepartum service for observation in the setting of concern for poorly controlled diabetes and newly diagnosed preeclampsia with plan for delviery via RLTCS 25 (today)  S/P NICU consult  Pregnancy with history of  section, antepartum  Patient desires delivery via repeat    Type 1 diabetes mellitus affecting pregnancy in second trimester, antepartum  Lab Results   Component Value Date    HGBA1C 7.5 (H) 2025       Recent Labs     25  0157 25  0300 25  0425 25  0528   POCGLU 52* 106 69 62*       Blood Sugar Average: Last 72 hrs:  (P) [id number]    Patient 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  Patient currently on insulin pump requiring regular up-titration by JOAO Bloom who has been following this patient closely   Patient reports diabetes has become increasingly difficult to control throughout the course of her pregnancy    Home pump setting orders   POC glucose before meals and at bedtime daily  Currently NPO for   D5NS running      Fetal macrosomia during pregnancy in third trimester  Growth scan 1/15:     AC             34.59 cm        38 weeks 4 days* (>99%)  BPD             8.91 cm        36 weeks 1 day * (80%)  HC             31.87 cm        35 weeks 6 days* (38%)  Femur           6.53 cm        33 weeks 5 days* (13%)     EFW Hadlock 4   3038 grams - 6 lbs 11 oz                 (91%)     THE AVERAGE GESTATIONAL AGE is 36 weeks 1 day +/- 21 days.     AMNIOTIC FLUID     Q1: 5.8      Q2: 5.9      Q3: 4.4      Q4: 5.4  GORDON Total = 21.5 cm  Amniotic Fluid: Normal  Pyelectasis of fetus on prenatal  "ultrasound  Plan for  US  GBS bacteriuria  Noted on urine culture  Patient allergic to PCN and Cephalosporins   Preeclampsia  Newly diagnosed following elevated pressures in triage on 25   Patient with previosuly elevated BP on 25    Systolic (24hrs), Av , Min:119 , Max:162      Diastolic (24hrs), Av, Min:75, Max:103  CMP/CBC wnl  P:C ratio: 1.3 (increased from 0.2)    Denies any s/s of PreE at this time    Admit for observation for continued monitoring of BP      Subjective    Contractions: occasional  Loss of fluid: no  Vaginal bleeding: no  Fetal movement: yes    Pain: no  Headaches: no  Visual changes: no  Chest pain: no  Shortness of breath: no  Patient ready for her  this AM          Objective      Patient Vitals for the past 24 hrs:   BP Temp Temp src Pulse Resp SpO2 Height Weight   25 0419 119/75 98.2 °F (36.8 °C) Oral 88 18 98 % -- --   25 2349 133/96 97.5 °F (36.4 °C) Oral 89 18 98 % -- --   25 (!) 156/101 97.9 °F (36.6 °C) Oral 88 18 98 % -- --   25 1621 148/96 -- -- -- -- -- -- --   25 1607 162/100 97.9 °F (36.6 °C) Oral 61 18 97 % -- --   25 1415 147/96 97.7 °F (36.5 °C) Oral 89 18 97 % -- --   25 1050 150/91 98.4 °F (36.9 °C) Axillary 86 20 -- -- --   25 1019 135/94 -- -- 100 -- -- -- --   25 1004 140/99 -- -- 93 -- -- -- --   25 0939 -- -- -- -- -- -- 5' 5\" (1.651 m) 109 kg (240 lb)   25 0857 143/97 -- -- 90 -- -- -- --   25 0842 (!) 142/102 -- -- 100 -- -- -- --   25 0827 (!) 144/103 -- -- 99 -- -- -- --   25 0812 142/97 -- -- 87 -- -- -- --       Physical Exam  Constitutional:       General: She is not in acute distress.     Appearance: Normal appearance.   HENT:      Head: Normocephalic and atraumatic.   Cardiovascular:      Rate and Rhythm: Normal rate.      Pulses: Normal pulses.   Pulmonary:      Effort: Pulmonary effort is normal. No respiratory distress.      Breath " sounds: Normal breath sounds.   Abdominal:      General: Abdomen is flat.      Palpations: Abdomen is soft.   Skin:     General: Skin is warm and dry.   Neurological:      General: No focal deficit present.      Mental Status: She is alert.   Psychiatric:         Mood and Affect: Mood normal.         Behavior: Behavior normal.         I/O       None            Invasive Devices       Peripheral Intravenous Line  Duration             Peripheral IV 25 Right;Ventral (anterior) Forearm <1 day              Line  Duration             Pump Device Insulin pump Anterior;Right Hip <1 day                    Results from last 7 days   Lab Units 25  0239 25  0834   WBC Thousand/uL 13.87* 12.52*   HEMOGLOBIN g/dL 11.6 11.6   MCV fL 85 84   PLATELETS Thousands/uL 233 234     Results from last 7 days   Lab Units 25  0239 25  0834   POTASSIUM mmol/L 3.7 3.9   CHLORIDE mmol/L 102 103   CO2 mmol/L 23 22   BUN mg/dL 11 12   CREATININE mg/dL 0.58* 0.58*   EGFR ml/min/1.73sq m 120 120     Results from last 7 days   Lab Units 25  0239 25  0834   AST U/L 16 16   ALT U/L 13 13     Results from last 7 days   Lab Units 25  0239 25  0834   PLATELETS Thousands/uL 233 234     Results from last 7 days   Lab Units 25  0528 25  0425 25  0300 25  0157 25  2133 25  1827 25  1142 25  1023 25  0922   POC GLUCOSE mg/dl 62* 69 106 52* 151* 72 66 65 79     Results from last 7 days   Lab Units 25  0834   PROT/CREAT RATIO UR  1.3*                   Brief review of pertinent history:  Past Medical History:   Diagnosis Date    Abnormal Pap smear of cervix     hpv,    Antiphospholipid syndrome (HCC)     Diabetes mellitus (HCC)     Female infertility     past IVF    Genital warts     Miscarriage     X2    PCOS (polycystic ovarian syndrome)     Varicella     as a child     Past Surgical History:   Procedure Laterality Date     SECTION       CONDYLOMA EXCISION/FULGURATION      [place]  DELIVERY ONLY N/A 2021    Procedure:  SECTION ();  Surgeon: Jonnathan Verdugo MD;  Location: [facility] ;  Service: Obstetrics     OB History    Para Term  AB Living   4 1 0 1 2 1   SAB IAB Ectopic Multiple Live Births   2 0 0 0 1      # Outcome Date GA Lbr Galileo/2nd Weight Sex Type Anes PTL Lv   4 Current            3  21 29w5d  1650 g (3 lb 10.2 oz) M CS-LTranv Spinal Y JAYDA   2 2020 4w0d             Birth Comments: chemical   1 2018 6w0d              Fetal data:  Nonstress test: date 25 Time: 0530 - 600  Baseline:  130  Variability: moderate  Accelerations: present, 15x15  Decelerations: absent  Contractions:  rare  Assessment: reactive        MEDS:   Medication Administration - last 24 hours from 2025 0611 to 2025 0611         Date/Time Order Dose Route Action Action by     2025 1145 EST sodium chloride 0.9 % infusion 0 mL/hr Intravenous Stopped Leatha Brown RN     2025 0929 EST sodium chloride 0.9 % infusion 50 mL/hr Intravenous New Bag Pamela Lewis RN     2025 1145 EST dextrose 5 % and sodium chloride 0.9 % infusion 0 mL/hr Intravenous Stopped Leatha Brown RN     2025 0933 EST dextrose 5 % and sodium chloride 0.9 % infusion 50 mL/hr Intravenous New Bag Pamela Lewis RN     2025 0400 EST PATIENT MAINTAINED INSULIN PUMP 1 each 1 each Subcutaneous Given Idalmis Sousa RN     2025 2000 EST PATIENT MAINTAINED INSULIN PUMP 1 each 1 each Subcutaneous Given Idalmis Sousa RN     2025 1257 EST PATIENT MAINTAINED INSULIN PUMP 1 each 1 each Subcutaneous Given Estelle Leopold, RN     2025 0212 EST dextrose 50 % IV solution 25 mL 25 mL Intravenous Given Idalmis Sousa RN     2025 0538 EST dextrose 5 % and sodium chloride 0.9 % infusion 100 mL/hr Intravenous Rate/Dose Change Idalmis Sousa RN     2025 0248 EST dextrose 5 % and  sodium chloride 0.9 % infusion 50 mL/hr Intravenous Luis Fernando Sousa, RN            Current Facility-Administered Medications:     acetaminophen (TYLENOL) tablet 650 mg, Q4H PRN    calcium carbonate (TUMS) chewable tablet 1,000 mg, TID PRN    dextrose 5 % and sodium chloride 0.9 % infusion, Continuous, Last Rate: 100 mL/hr (01/22/25 0538)    insulin aspart (NovoLOG) FOR PUMP REFILLS 100 Units, Daily PRN    ondansetron (ZOFRAN) injection 4 mg, Q8H PRN    PATIENT MAINTAINED INSULIN PUMP 1 each, Q8H         Celi Roblero MD  OBGYN, PGY-3  1/22/2025  6:11 AM

## 2025-01-22 NOTE — ANESTHESIA PREPROCEDURE EVALUATION
"Procedure:   SECTION () (Uterus)     - denies any chest pain, palpitations, shortness of breath, syncope, lightheadedness, seizures   - denies any recent infectious symptoms such as fevers, chills, cough   - denies taking any anticoagulation medications or any issues with bleeding, bruising, clotting    EKG (24):  sinus tachycardia, , Qtc 444      - hx of DM1, on home insulin pump    -  --> 69 --> 62 --> 88 this morning    Relevant Problems   ANESTHESIA (within normal limits)      CARDIO   (+) Mild preeclampsia   (+) Preeclampsia      ENDO   (+) Type 1 diabetes mellitus affecting pregnancy in second trimester, antepartum      GI/HEPATIC (within normal limits)      /RENAL (within normal limits)      GYN   (+) 35 weeks gestation of pregnancy   (+) History of  delivery, currently pregnant in third trimester      HEMATOLOGY (within normal limits)      MUSCULOSKELETAL (within normal limits)      NEURO/PSYCH (within normal limits)      PULMONARY (within normal limits)      Obstetrics/Gynecology   (+) Pregnancy with history of  section, antepartum      Other   (+) Insulin pump in place      Lab Results   Component Value Date    WBC 13.87 (H) 2025    HGB 11.6 2025    HCT 37.0 2025    MCV 85 2025     2025     Lab Results   Component Value Date    SODIUM 134 (L) 2025    K 3.7 2025     2025    CO2 23 2025    AGAP 9 2025    BUN 11 2025    CREATININE 0.58 (L) 2025    GLUC 109 2025    CALCIUM 9.1 2025    AST 16 2025    ALT 13 2025    ALKPHOS 168 (H) 2025    TP 6.6 2025    TBILI 0.38 2025    EGFR 120 2025     No results found for: \"PTT\"  No results found for: \"INR\", \"PROTIME\"    Physical Exam    Airway    Mallampati score: III  TM Distance: >3 FB  Neck ROM: full     Dental   No notable dental hx     Cardiovascular  Cardiovascular exam " normal    Pulmonary  Pulmonary exam normal     Other Findings  post-pubertal.      Anesthesia Plan  ASA Score- 3     Anesthesia Type- spinal with ASA Monitors.         Additional Monitors:     Airway Plan:     Comment: Plan for spinal anesthesia with GA as back up.       Plan Factors-Exercise tolerance (METS): >4 METS.    Chart reviewed. EKG reviewed.  Existing labs reviewed. Patient summary reviewed.    Patient is not a current smoker.  Patient did not smoke on day of surgery.    Obstructive sleep apnea risk education given perioperatively.        Induction- intravenous.    Postoperative Plan- Plan for postoperative opioid use.     Perioperative Resuscitation Plan - Level 1 - Full Code.       Informed Consent- Anesthetic plan and risks discussed with patient.  I personally reviewed this patient with the CRNA. Discussed and agreed on the Anesthesia Plan with the CRNA..      NPO Status:  Vitals Value Taken Time   Date of last liquid 01/21/25 01/22/25 0626   Time of last liquid 2359 01/22/25 0626   Date of last solid 01/21/25 01/22/25 0626   Time of last solid 2359 01/22/25 0626

## 2025-01-22 NOTE — LACTATION NOTE
This note was copied from a baby's chart.  CONSULT - LACTATION  Baby Girl Hauser (Megan) 0 days female MRN: [id number]    [facility] NURSERY Room / Bed: (N)/(N) Encounter: [id number]    Maternal Information     MOTHER:  Lupe Hauser  Maternal Age: 34 y.o.  OB History: # 1 - Date: , Sex: None, Weight: None, GA: 6w0d, Type: None, Apgar1: None, Apgar5: None, Living: None, Birth Comments: None    # 2 - Date: , Sex: None, Weight: None, GA: 4w0d, Type: None, Apgar1: None, Apgar5: None, Living: None, Birth Comments: chemical    # 3 - Date: 21, Sex: Male, Weight: 1650 g (3 lb 10.2 oz), GA: 29w5d, Type: , Low Transverse, Apgar1: 6, Apgar5: 8, Living: Living, Birth Comments: None    # 4 - Date: 25, Sex: Female, Weight: 3370 g (7 lb 6.9 oz), GA: 36w0d, Type: , Low Transverse, Apgar1: 8, Apgar5: 9, Living: Living, Birth Comments: None   Previous breast reduction surgery? No    Lactation history:   Has patient previously breast fed: Yes   How long had patient previously breast fed: expressed breastmilk for a few months; baby never latched due to illness   Previous breast feeding complications: Infant separation     Past Surgical History:   Procedure Laterality Date     SECTION      CONDYLOMA EXCISION/FULGURATION      [place]IV[place] ONLY N/A 2021    Procedure:  SECTION ();  Surgeon: Jonnathan Verdugo MD;  Location: [facility] ;  Service: Obstetrics       Birth information:  YOB: 2025   Time of birth: 8:43 AM   Sex: female   Delivery type: , Low Transverse   Birth Weight: 3370 g (7 lb 6.9 oz)   Percent of Weight Change: 0%     Gestational Age: 36w0d   [unfilled]    Reason for Consult:    Reason for Consult: Initial assessment (ext) - 20 min, Latch Assess (ext) - 20 min, Latch Assess (routine) - 10 min, Supplementation (ext) - 20 min, Discharge (routine) - 10 min, High Risk Infant - 10 min    Risk  Factors:    Risk Factors:  (glucose protocols/ LPI/SGA)    Breast and nipple assessment:   Breasts/Nipples  Date Pumping Initiated: 25  Time Pumping Initiated: 1528  Left Breast: Soft  Right Breast: Soft  Left Nipple: Everted  Right Nipple: Everted  Intervention: Hand expression, Breast pump  Breastfeeding Status: Yes  Breastfeeding Progress: Not yet established, Breastfeeding well, Other issues (comment) (glucose protocols)  Reasons for not Breastfeeding: Infant medical condition (glucose protocols)    OB Lactation Tools:    Other OB Lactation Tools  Feeding Devices: Lanolin, Pump, Syringe, Bottle    Breast Pump:    Breast Pump  Pump: Personal, Electric, Manual (lansinoh duo ordered)  Pump Review/Education: Setup, frequency, and cleaning, Milk storage, Other (Comment) (cycle and hands on pumping)  Initiated by: c hume  Date Initiated: 25      Strunk Assessment:  slight dimpling of upper cheek on right breast in football hold.     Feeding assessment:  mixed feeding plan - verbal amt. Of neosure from onsite Physician - mixed feeding plan demonstrated with teach back  LATCH:  Latch: Grasps breast, tongue down, lips flanged, rhythmic sucking   Audible Swallowing: Spontaneous and intermittent (24 hours old)   Type of Nipple: Everted (After stimulation)   Comfort (Breast/Nipple): Soft/non-tender   Hold (Positioning): Partial assist, teach one side, mother does other, staff holds   LATCH Score: 9       Feeding recommendations:   Due to early pre-feed glucose level, verbal instruction via on-site Physician to provide 15 mls of Neosure to infant..    Demonstration and teach back of latch onto the right breast in football hold.      Latch After Lactation Education/Consult:  Efficiency: Football on L/R breast              Lips Flanged: Yes              Depth of latch: deeper on R with early feeding cues. L breast sleepy due to supplementation              Audible Swallow: Yes              Visible Milk:  Yes, with HE              Wide Open/ Asymmetrical: Yes, sometimes narrow gape due to hold. Enc. Snug hold of baby              Suck Swallow Cycle: Breathing: yes, Coordinated: yes - towards end of feeding on right breast, dimpling noticeable  Nipple Assessment after latch: Normal: elongated/eraser, no discoloration and no damage noted.  Latch Problems: none with active feeding    Position After Lactation Education/Consult:  Infant's Ergonomics/Body: football on the L/R breast               Body Alignment: Yes, with demonstration of pillow support and verbal ed.                Head Supported: Yes, enc. Snug hold. Enc. To compress between the shoulder blades               Close to Mom's body/ Lifted/ Supported: Yes, belly to belly and alignment demonstrated with teach back.                Mom's Ergonomics/Body: Yes                           Supported: Yes, pillows to lift baby to the breast                           Sitting Back: Yes                           Brings Baby to her breast: Yes  Positioning Problems: none with hands on demonstration    Demonstration of paced bottle feeding. Baby took 14 mls. 7 mls on the Left and 7 mls on the Right. Teach back by Lupe. Enc. NNS at the left breast.    Demonstration of multi-user pump and hand pump. Enc. To pump 10 min. After a feeding and hand pump.     Lansinoh Duo pump order sent to CM.    Feeding Plan:   Mixed feeding plan to assist with glucose levels. Enc. To pump after feeding attempt to feed expressed colostrum instead of Supplementation.     Education on positioning and alignment. Mom is encouraged to:     - Bring baby up to the breast (use of pillows to elevate so baby's torso is against mom's breasts)   - Skin to skin for feedings with top hand exposed to show signs of satiation   - Chin deep into breast tissue (make baby look up to the nipple)   - nose aligned to the nipple   -Wait for wide gape, drag chin on the breast so nipple is aimed at the upper, back  "palate  - Cheek should be touching breast   - Deep, firm hold of baby with ear, shoulder, hip alignment    Education on creating a snug hold of your infant to the breast by verifying the infant's cheek is touching the breast, your infant's chin is deep into the breast tissue, your infant's arms are \"hugging\" the breast, and your infant's lips are flanged on the areola. Bring infant to the breast, not your breast to the infant. Latch should feel like a tugging sensation on the nipple.      Feeding Plan    1. Meet early feeding cues  2. Use manual pump to elongate the nipple prior to the latch. May use lanolin inside flange.   3. Use massage, warmth, hand expression to stimulate breasts  4. Use pillows to bring baby to the breast (shoulders back, lower back support)  5. Bring baby to breast skin to skin  6. Have baby's chest against mom's torso. Baby's chin should be deeply into the breast, and nose should touch the nipple. This position will  assist with deeper latch  7. Place opposite hand under the breast and grab the breast like a taco. Your thumb should be in front of the baby's nose and behind the areola. Move baby not breast, and bring baby to breast when mouth is wide and deep latch is achieved.  8. Use breast compressions to stimulate suck  9. Once baby tires, bring him up to your chest and practice burping techniques.   10. Education on alternative feeding methods. Demonstration and teach back of (paced bottle feeding. Feed expressed milk or formula as needed/desired. Paced bottle feeding technique is less stressful for your baby, prevents overfeeding and protects the breastfeeding relationship.  You can find a video about paced bottle feeding at www.lacted.org or [employer] on YouTube.    11.  Pumping:   - When pumping, begin in stimulation mode (high cycle, low vacuum) until milk begins to express. Change pump to expression mode (low cycle, high vacuum). Use hands on pumping techniques to assist with milk " transfer. When milk stops expressing, change back to stimulation mode. When milk begins to flow, change to expression mode. You make cycle pump up to three times in a pumping session.  12. Bring back to the opposite breast to allow for non-nutritive suck.      (Scan QR code for [employer] - positions)   Review [employer] on youtube or scan QR code for [employer] video      [employer]        [employer] - positions            Christy Hume, MA 1/22/2025 4:02 PM

## 2025-01-22 NOTE — UTILIZATION REVIEW
"Initial Clinical Review    OBS 01-21-25 @ 1100 CONVERTED TO INPATIENT FOR RLTC @ 36 WEEKS    Admission: Date/Time/Statement:   Admission Orders (From admission, onward)       Ordered        01/21/25 1100  Place in Observation  Once                          Orders Placed This Encounter   Procedures    Place in Observation     Standing Status:   Standing     Number of Occurrences:   1     Level of Care:   Med Surg [16]         Chief Complaint   Patient presents with    Decreased Fetal Movement       Initial Presentation: 34 y.o. female presented to L&D as observation for \"poorly controlled blood sugars and newly diagnosed preeclampsia without severe features. Monitor blood sugars BP's NST TID, NPO IVF, IV insulin gtt Denies cramping leaking or bleeding  (+) fetal movement     Pregnancy Complications:  Type 1 diabetes, PAZ genetic mutation  Preeclampsia without severe features     Fetal heart rate:   Baseline Rate (FHR): 130 bpm  Variability: Moderate  Accelerations: 15 x 15 or greater, With fetal movement, Present  Decelerations: None  FHR Category: Category I     [place]:   Contraction Frequency (minutes): 0      Date: 01-22-25   Day 2: INPT for LTCS          Scheduled Medications:  clindamycin, 900 mg, Intravenous, Once   And  gentamicin, 5 mg/kg (Adjusted), Intravenous, Once  patient maintained insulin pump, 1 each, Subcutaneous, Q8H      Continuous IV Infusions:  dextrose 5 % and sodium chloride 0.9 %, 100 mL/hr, Intravenous, Continuous  dextrose 5 % and sodium chloride 0.9 %, 100 mL/hr, Intravenous, Continuous  insulin regular (HumuLIN R,NovoLIN R) 1 Units/mL in sodium chloride 0.9 % 100 mL infusion, 0.2-4 Units/hr, Intravenous, Continuous  sodium chloride, 125 mL/hr, Intravenous, Continuous      PRN Meds:  acetaminophen, 650 mg, Oral, Q4H PRN  calcium carbonate, 1,000 mg, Oral, TID PRN  insulin aspart, 100 Units, Subcutaneous Insulin Pump, Daily PRN  ondansetron, 4 mg, Intravenous, Q8H PRN      ED Triage Vitals "   Temperature Pulse Respirations Blood Pressure SpO2 Pain Score   01/21/25 1050 01/21/25 0812 01/21/25 1050 01/21/25 0812 01/21/25 1415 01/21/25 0740   98.4 °F (36.9 °C) 87 20 142/97 97 % No Pain     Weight (last 2 days)       Date/Time Weight    01/21/25 2014 --    Comment rows:    OBSERV: pt reports + fm, denies ctx. denies any vaginal bleeding or leakage of fluid. at 01/21/25 2014 01/21/25 0939 109 (240)            Vital Signs (last 3 days)       Date/Time Temp Pulse Resp BP MAP (mmHg) SpO2 O2 Device Cardiac (WDL) Patient Position - Orthostatic VS Pain    01/22/25 0711 98 °F (36.7 °C) 87 20 157/101 -- 97 % -- -- -- No Pain    01/22/25 0419 98.2 °F (36.8 °C) 88 18 119/75 -- 98 % -- -- -- --    01/21/25 2349 97.5 °F (36.4 °C) 89 18 133/96 -- 98 % -- -- -- No Pain    01/21/25 2014 97.9 °F (36.6 °C) 88 18 156/101 122 98 % None (Room air) WDL Lying No Pain    OBSERV: pt reports + fm, denies ctx. denies any vaginal bleeding or leakage of fluid. at 01/21/25 2014 01/21/25 1621 -- -- -- 148/96 110 -- -- -- Lying --    01/21/25 1607 97.9 °F (36.6 °C) 61 18 162/100 123 97 % None (Room air) -- Lying No Pain    01/21/25 1415 97.7 °F (36.5 °C) 89 18 147/96 -- 97 % None (Room air) -- Sitting No Pain    01/21/25 1050 98.4 °F (36.9 °C) 86 20 150/91 -- -- -- -- -- --    01/21/25 1019 -- 100 -- 135/94 -- -- -- -- -- --    01/21/25 1004 -- 93 -- 140/99 -- -- -- -- -- --    01/21/25 0857 -- 90 -- 143/97 -- -- -- -- -- --    01/21/25 0842 -- 100 -- 142/102 -- -- -- -- -- --    01/21/25 0827 -- 99 -- 144/103 -- -- -- -- -- --    01/21/25 0812 -- 87 -- 142/97 -- -- -- -- -- --    01/21/25 0740 -- -- -- -- -- -- -- -- -- No Pain              Pertinent Labs/Diagnostic Test Results:       Results from last 7 days   Lab Units 01/22/25  0239 01/21/25 0834   WBC Thousand/uL 13.87* 12.52*   HEMOGLOBIN g/dL 11.6 11.6   HEMATOCRIT % 37.0 36.0   PLATELETS Thousands/uL 233 234         Results from last 7 days   Lab Units 01/22/25  0239  25  0834   SODIUM mmol/L 134* 133*   POTASSIUM mmol/L 3.7 3.9   CHLORIDE mmol/L 102 103   CO2 mmol/L 23 22   ANION GAP mmol/L 9 8   BUN mg/dL 11 12   CREATININE mg/dL 0.58* 0.58*   EGFR ml/min/1.73sq m 120 120   CALCIUM mg/dL 9.1 9.3     Results from last 7 days   Lab Units 25  0239 25  0834   AST U/L 16 16   ALT U/L 13 13   ALK PHOS U/L 168* 158*   TOTAL PROTEIN g/dL 6.6 6.6   ALBUMIN g/dL 3.1* 3.2*   TOTAL BILIRUBIN mg/dL 0.38 0.35     Results from last 7 days   Lab Units 25  0738 25  0635 25  0528 25  0425 25  0300 25  0157 25  2133 25  1827 25  1142 25  1023 25  0922   POC GLUCOSE mg/dl 94 88 62* 69 106 52* 151* 72 66 65 79     Results from last 7 days   Lab Units 25  0239 25  0834   GLUCOSE RANDOM mg/dL 109 129       Results from last 7 days   Lab Units 25  0834   CREATININE UR mg/dL 123.2   PROTEIN UR mg/dL 160.0   PROT/CREAT RATIO UR  1.3*         Past Medical History:   Diagnosis Date    Abnormal Pap smear of cervix     hpv,    Antiphospholipid syndrome (HCC)     Diabetes mellitus (HCC)     Female infertility     past IVF    Genital warts     Miscarriage     X2    PCOS (polycystic ovarian syndrome)     Varicella     as a child     Present on Admission:   Type 1 diabetes mellitus affecting pregnancy in second trimester, antepartum   Preeclampsia   Fetal macrosomia during pregnancy in third trimester   GBS bacteriuria   Pyelectasis of fetus on prenatal ultrasound   Pregnancy with history of  section, antepartum   Mild preeclampsia      Admitting Diagnosis: Gestational diabetes mellitus (GDM) affecting pregnancy, antepartum [O24.419]  Decreased fetal movement [O36.8190]  35 weeks gestation of pregnancy [Z3A.35]  Age/Sex: 34 y.o. female    Network Utilization Review Department  ATTENTION: Please call with any questions or concerns to [phone] and carefully listen to the prompts so that you are  directed to the right person. All voicemails are confidential.   For Discharge needs, contact Care Management DC Support Team at [phone] opt. 2  Send all requests for admission clinical reviews, approved or denied determinations and any other requests to dedicated fax number below belonging to the [facility] where the patient is receiving treatment. List of dedicated fax numbers for the Facilities:  FACILITY NAME UR FAX NUMBER   ADMISSION DENIALS (Administrative/Medical Necessity) [phone]   DISCHARGE SUPPORT TEAM (NETWORK) [phone]   PARENT CHILD HEALTH (Maternity/NICU/Pediatrics) [phone]   [facility] [phone]   [facility] [phone]   [facility] [phone]   [facility] [phone]   [facility] [phone]   [facility] [phone]   [facility] [phone]   [facility] [phone]   [facility] [phone]   [facility] [phone]   [facility] [phone]   [facility] [phone]

## 2025-01-22 NOTE — ASSESSMENT & PLAN NOTE
CBC/CMP wnl, P:C 1.3  Continue to monitor for signs/symptoms of preeclampsia with severe features  Consider antihypertensives if persistently hypertensive     Systolic (24hrs), Av , Min:109 , Max:157   Diastolic (24hrs), Av, Min:55, Max:101

## 2025-01-22 NOTE — H&P
History & Physical - OB/GYN   Lupe Hauser 34 y.o. female MRN: [id number]  Unit/Bed#: -01 Encounter: [id number]    Assessment:   Lupe Hauser is a 34 y.o.  female with an ESTHER of 2025, by Last Menstrual Period at 36w0d weeks gestation who is being admitted for Repeat Low Transverse H-vyjuvinK-pxqeayj.      Plan:   1. Admit to L&D  2. CBC, RPR, type and screen  3. Anesthesia consult  4. Prep for OR   5. Ancef IV, IVF, NPO          Chief complaint:  Patient presents for .    She is a patient of [facility]    HPI:  Lupe Hauser is a 34 y.o.  female with an ESTHER of 2025, by Last Menstrual Period at 36w0d weeks gestation who is being admitted for Repeat Low Transverse X-sizhhmhM-lfyywyw.      She has had 1 prior sections. Breech presentation: no    Patient declines sterilization by salpingectomy. MA-31 completed if needed: NA     Patient is not in labor.   SROM: no    Her current obstetrical history is significant for pre-eclampsia, without severe features, pregestational DM, positive GBS status.     Contractions: irregular  Leakage of fluid: None.  Vaginal Bleeding: None.    Fetal movement: present.      Pregnancy Complications:  Patient Active Problem List   Diagnosis    35 weeks gestation of pregnancy    Pre-existing type 1 diabetes mellitus with hyperglycemia during pregnancy in third trimester ([facility])    Family history of congenital disease    BMI 37.0-37.9, adult    Insulin pump in place    Carpal tunnel syndrome on both sides    Pregnancy with history of  section, antepartum    History of  premature rupture of membranes (PPROM)    History of  delivery, currently pregnant in third trimester    Type 1 diabetes mellitus affecting pregnancy in second trimester, antepartum    Insulin pump titration    Encounter for care and examination of lactating mother    Breech presentation    Fetal macrosomia during pregnancy in third trimester    Elevated BP  without diagnosis of hypertension    Pyelectasis of fetus on prenatal ultrasound    GBS bacteriuria    Preeclampsia    Mild preeclampsia       PMH:  Past Medical History:   Diagnosis Date    Abnormal Pap smear of cervix     hpv,    Antiphospholipid syndrome (HCC)     Diabetes mellitus (HCC)     Female infertility     past IVF    Genital warts     Miscarriage     X2    PCOS (polycystic ovarian syndrome)     Varicella     as a child       PSH:  Past Surgical History:   Procedure Laterality Date     SECTION      CONDYLOMA EXCISION/FULGURATION      [place]  DELIVERY ONLY N/A 2021    Procedure:  SECTION ();  Surgeon: Jonnathan Verdugo MD;  Location: [facility] ;  Service: Obstetrics           OB Hx:  OB History    Para Term  AB Living   4 1 0 1 2 1   SAB IAB Ectopic Multiple Live Births   2 0 0 0 1      # Outcome Date GA Lbr [place]/2nd Weight Sex Type Anes PTL Lv   4 Current            3  21 29w5d  1650 g (3 lb 10.2 oz) M CS-LTranv Spinal Y JAYDA      Name: STANISLAV,BABY BOY (ORESTES)      Apgar1: 6  Apgar5: 8   2 2020 4w0d             Birth Comments: chemical   1 2018 6w0d              Meds:  No current facility-administered medications on file prior to encounter.     Current Outpatient Medications on File Prior to Encounter   Medication Sig Dispense Refill    Cholecalciferol (Vitamin D3) 50 MCG (2000 UT) capsule Take 2,000 Units by mouth daily      folic acid (FOLVITE) 1 mg tablet Take 1 tablet (1 mg total) by mouth daily 90 tablet 3    glucose blood (Contour Next Test) test strip Test up to 6 times per day. T1DM and pregnancy. 150 each 6    Prenatal MV-Min-Fe Fum-FA-DHA (PRENATAL 1 PO) Take by mouth      acetone, urine, test strip Use 1 strip if needed for high blood sugar         Allergies:  Allergies   Allergen Reactions    Amoxicillin Hives    Cefaclor Hives    Cephalosporins Hives         Vitals: Blood pressure (!) 157/101, pulse 87, temperature 98 °F (36.7 °C),  "temperature source Oral, resp. rate 20, height 5' 5\" (1.651 m), weight 109 kg (240 lb), last menstrual period 05/15/2024, SpO2 97%, not currently breastfeeding.Body mass index is 39.94 kg/m².    Physical Exam  Constitutional:       General: She is not in acute distress.     Appearance: Normal appearance. She is not ill-appearing, toxic-appearing or diaphoretic.   HENT:      Head: Normocephalic and atraumatic.   Pulmonary:      Effort: Pulmonary effort is normal.   Neurological:      General: No focal deficit present.      Mental Status: She is alert.   Skin:     General: Skin is warm and dry.   Psychiatric:         Mood and Affect: Mood normal.         Behavior: Behavior normal.   Vitals reviewed.         Fetal heart rate  moderate  Baseline: 130 bpm, Variability: Good {> 6 bpm), Accelerations: Reactive, and Decelerations: Absent    EFW: 7.5#    GBS: pos; PCN allergy: yes (hives)    Prenatal Labs: I have personally reviewed pertinent reports.  , Blood Type:   Lab Results   Component Value Date/Time    ABO Grouping O 01/21/2025 08:34 AM     , D (Rh type):   Lab Results   Component Value Date/Time    Rh Factor Positive 01/21/2025 08:34 AM     , Antibody Screen: No results found for: \"ANTIBODYSCR\" , HCT/HGB:   Lab Results   Component Value Date/Time    Hematocrit 37.0 01/22/2025 02:39 AM    Hemoglobin 11.6 01/22/2025 02:39 AM      , MCV:   Lab Results   Component Value Date/Time    MCV 85 01/22/2025 02:39 AM      , Platelets:   Lab Results   Component Value Date/Time    Platelets 233 01/22/2025 02:39 AM      , 1 hour Glucola: No results found for: \"[id number]\", 3 hour GTT: No results found for: \"KZXSHYS7MF\", Varicella:   Lab Results   Component Value Date/Time    Varicella IgG IMMUNE 08/12/2024 10:12 AM       , Rubella:   Lab Results   Component Value Date/Time    Rubella IgG Quant 12.8 (L) 08/12/2024 10:12 AM        , VDRL/RPR: No results found for: \"RPR\"   , Urine Culture/Screen:   Lab Results   Component Value " "Date/Time    Urine Culture <10,000 cfu/ml Streptococcus agalactiae (Group B) (A) 08/12/2024 10:12 AM    Urine Culture 10,000-19,000 cfu/ml 08/12/2024 10:12 AM       , Urine Drug Screen: No results found for: \"AMPHETUR\", \"BARBTUR\", \"BDZUR\", \"THCUR\", \"COCAINEUR\", \"METHADONEUR\", \"OPIATEUR\", \"PCPUR\", \"MTHAMUR\", \"ECSTASYUR\", \"TRICYCLICSUR\", Hep B:   Lab Results   Component Value Date/Time    Hepatitis B Surface Ag Non-reactive 08/12/2024 10:12 AM     , Hep C:   Lab Results   Component Value Date/Time    Hepatitis C Ab Non-reactive 08/12/2024 10:12 AM      , HIV: No results found for: \"HIVAGAB\"  , Chlamydia: No results found for: \"EXTCHLAMYDIA\"  , Gonorrhea:   Lab Results   Component Value Date/Time    N gonorrhoeae, DNA Probe Negative 08/21/2024 04:43 PM     , Group B Strep:  No results found for: \"STREPGRPB\"       Imaging, EKG, Pathology, and Other Studies: Results Review Statement: No pertinent imaging studies reviewed.        "

## 2025-01-22 NOTE — PLAN OF CARE
Problem: ANTEPARTUM  Goal: Maintain pregnancy as long as maternal and/or fetal condition is stable  Description: INTERVENTIONS:  - Maternal surveillance  - Fetal surveillance  - Monitor uterine activity  - Medications as ordered  - Bedrest  Outcome: Completed     Problem: BIRTH - VAGINAL/ SECTION  Goal: Fetal and maternal status remain reassuring during the birth process  Description: INTERVENTIONS:  - Monitor vital signs  - Monitor fetal heart rate  - Monitor uterine activity  - Monitor labor progression (vaginal delivery)  - DVT prophylaxis  - Antibiotic prophylaxis  Outcome: Completed  Goal: Emotionally satisfying birthing experience for mother/fetus  Description: Interventions:  - Assess, plan, implement and evaluate the nursing care given to the patient in labor  - Advocate the philosophy that each childbirth experience is a unique experience and support the family's chosen level of involvement and control during the labor process   - Actively participate in both the patient's and family's teaching of the birth process  - Consider cultural, Yarsanism and age-specific factors and plan care for the patient in labor  Outcome: Completed

## 2025-01-22 NOTE — ANESTHESIA POSTPROCEDURE EVALUATION
Post-Op Assessment Note    CV Status:  Stable    Pain management: adequate    Multimodal analgesia used between 6 hours prior to anesthesia start to PACU discharge    Mental Status:  Alert   Hydration Status:  Stable   PONV Controlled:  None   Airway Patency:  Patent     Post Op Vitals Reviewed: Yes    No anethesia notable event occurred.    Staff: Anesthesiologist           Last Filed PACU Vitals:  Vitals Value Taken Time   Temp 97.5 °F (36.4 °C) 01/22/25 1000   Pulse 77 01/22/25 1033   /84 01/22/25 1030   Resp 18 01/22/25 1000   SpO2 97 % 01/22/25 1033   Vitals shown include unfiled device data.    Modified Joseluis:     Vitals Value Taken Time   Activity 2 01/22/25 1030   Respiration 2 01/22/25 1030   Circulation 2 01/22/25 1030   Consciousness 2 01/22/25 1030   Oxygen Saturation 2 01/22/25 1030     Modified Joseluis Score: 10

## 2025-01-22 NOTE — ANESTHESIA PROCEDURE NOTES
Spinal Block    Patient location during procedure: OR  Start time: 1/22/2025 8:20 AM  Reason for block: procedure for pain and at surgeon's request  Staffing  Performed by: Ellie Schroeder CRNA  Authorized by: Erik Hare MD    Preanesthetic Checklist  Completed: patient identified, IV checked, site marked, risks and benefits discussed, surgical consent, monitors and equipment checked, pre-op evaluation and timeout performed  Spinal Block  Patient position: sitting  Prep: ChloraPrep and site prepped and draped  Patient monitoring: frequent blood pressure checks, continuous pulse ox and heart rate  Approach: midline  Location: L4-5  Needle  Needle type: Pencan   Needle gauge: 24 G  Needle length: 4 in  Assessment  Sensory level: T4  Injection Assessment:  negative aspiration for heme, no paresthesia on injection and positive aspiration for clear CSF.  Post-procedure:  site cleaned  Additional Notes  1st attempt at L3-4 unsuccessful. 2nd attempt at L4-5 successful

## 2025-01-22 NOTE — ASSESSMENT & PLAN NOTE
renal US   Jagdish Bryson   MRN: [id number]    Department:  [facility] Emergency Department   Date of Visit:  9/2/2017           Disclosure     Insurance plans vary and the physician(s) referred by the ER may not be covered by your plan.  Please contact your CARE PHYSICIAN AT ONCE OR RETURN IMMEDIATELY TO THE EMERGENCY DEPARTMENT. If you have been prescribed any medication(s), please fill your prescription right away and begin taking the medication(s) as directed.   If you believe that any of the medications

## 2025-01-23 LAB
ALBUMIN SERPL BCG-MCNC: 2.9 G/DL (ref 3.5–5)
ALP SERPL-CCNC: 127 U/L (ref 34–104)
ALT SERPL W P-5'-P-CCNC: 13 U/L (ref 7–52)
ANION GAP SERPL CALCULATED.3IONS-SCNC: 8 MMOL/L (ref 4–13)
AST SERPL W P-5'-P-CCNC: 26 U/L (ref 13–39)
BILIRUB SERPL-MCNC: 0.29 MG/DL (ref 0.2–1)
BUN SERPL-MCNC: 14 MG/DL (ref 5–25)
CALCIUM ALBUM COR SERPL-MCNC: 9.6 MG/DL (ref 8.3–10.1)
CALCIUM SERPL-MCNC: 8.7 MG/DL (ref 8.4–10.2)
CHLORIDE SERPL-SCNC: 102 MMOL/L (ref 96–108)
CO2 SERPL-SCNC: 24 MMOL/L (ref 21–32)
CREAT SERPL-MCNC: 0.76 MG/DL (ref 0.6–1.3)
DME PARACHUTE DELIVERY DATE ACTUAL: NORMAL
DME PARACHUTE DELIVERY DATE REQUESTED: NORMAL
DME PARACHUTE ITEM DESCRIPTION: NORMAL
DME PARACHUTE ORDER STATUS: NORMAL
DME PARACHUTE SUPPLIER NAME: NORMAL
DME PARACHUTE SUPPLIER PHONE: NORMAL
ERYTHROCYTE [DISTWIDTH] IN BLOOD BY AUTOMATED COUNT: 14.7 % (ref 11.6–15.1)
ERYTHROCYTE [DISTWIDTH] IN BLOOD BY AUTOMATED COUNT: 14.9 % (ref 11.6–15.1)
GFR SERPL CREATININE-BSD FRML MDRD: 102 ML/MIN/1.73SQ M
GLUCOSE SERPL-MCNC: 179 MG/DL (ref 65–140)
GLUCOSE SERPL-MCNC: 235 MG/DL (ref 65–140)
GLUCOSE SERPL-MCNC: 65 MG/DL (ref 65–140)
GLUCOSE SERPL-MCNC: 76 MG/DL (ref 65–140)
HCT VFR BLD AUTO: 33.1 % (ref 34.8–46.1)
HCT VFR BLD AUTO: 33.6 % (ref 34.8–46.1)
HGB BLD-MCNC: 10.1 G/DL (ref 11.5–15.4)
HGB BLD-MCNC: 10.2 G/DL (ref 11.5–15.4)
MAGNESIUM SERPL-MCNC: 3.5 MG/DL (ref 1.9–2.7)
MCH RBC QN AUTO: 26 PG (ref 26.8–34.3)
MCH RBC QN AUTO: 26.2 PG (ref 26.8–34.3)
MCHC RBC AUTO-ENTMCNC: 30.4 G/DL (ref 31.4–37.4)
MCHC RBC AUTO-ENTMCNC: 30.5 G/DL (ref 31.4–37.4)
MCV RBC AUTO: 86 FL (ref 82–98)
MCV RBC AUTO: 86 FL (ref 82–98)
PLATELET # BLD AUTO: 192 THOUSANDS/UL (ref 149–390)
PLATELET # BLD AUTO: 216 THOUSANDS/UL (ref 149–390)
PMV BLD AUTO: 12.1 FL (ref 8.9–12.7)
PMV BLD AUTO: 12.1 FL (ref 8.9–12.7)
POTASSIUM SERPL-SCNC: 4.1 MMOL/L (ref 3.5–5.3)
PROT SERPL-MCNC: 6 G/DL (ref 6.4–8.4)
RBC # BLD AUTO: 3.86 MILLION/UL (ref 3.81–5.12)
RBC # BLD AUTO: 3.93 MILLION/UL (ref 3.81–5.12)
SODIUM SERPL-SCNC: 134 MMOL/L (ref 135–147)
URATE SERPL-MCNC: 5.1 MG/DL (ref 2–7.5)
WBC # BLD AUTO: 13.22 THOUSAND/UL (ref 4.31–10.16)
WBC # BLD AUTO: 13.59 THOUSAND/UL (ref 4.31–10.16)

## 2025-01-23 PROCEDURE — 99024 POSTOP FOLLOW-UP VISIT: CPT | Performed by: OBSTETRICS & GYNECOLOGY

## 2025-01-23 PROCEDURE — 83735 ASSAY OF MAGNESIUM: CPT

## 2025-01-23 PROCEDURE — 85027 COMPLETE CBC AUTOMATED: CPT

## 2025-01-23 PROCEDURE — 80053 COMPREHEN METABOLIC PANEL: CPT

## 2025-01-23 PROCEDURE — 82948 REAGENT STRIP/BLOOD GLUCOSE: CPT

## 2025-01-23 PROCEDURE — 84550 ASSAY OF BLOOD/URIC ACID: CPT

## 2025-01-23 RX ORDER — LABETALOL HYDROCHLORIDE 5 MG/ML
40 INJECTION, SOLUTION INTRAVENOUS ONCE
Status: COMPLETED | OUTPATIENT
Start: 2025-01-23 | End: 2025-01-23

## 2025-01-23 RX ORDER — NIFEDIPINE 30 MG/1
30 TABLET, EXTENDED RELEASE ORAL DAILY
Status: DISCONTINUED | OUTPATIENT
Start: 2025-01-23 | End: 2025-01-25 | Stop reason: HOSPADM

## 2025-01-23 RX ORDER — MAGNESIUM SULFATE HEPTAHYDRATE 40 MG/ML
2 INJECTION, SOLUTION INTRAVENOUS CONTINUOUS
Status: DISCONTINUED | OUTPATIENT
Start: 2025-01-23 | End: 2025-01-24

## 2025-01-23 RX ORDER — FUROSEMIDE 20 MG/1
20 TABLET ORAL DAILY
Status: DISCONTINUED | OUTPATIENT
Start: 2025-01-23 | End: 2025-01-25 | Stop reason: HOSPADM

## 2025-01-23 RX ORDER — SODIUM CHLORIDE, SODIUM LACTATE, POTASSIUM CHLORIDE, CALCIUM CHLORIDE 600; 310; 30; 20 MG/100ML; MG/100ML; MG/100ML; MG/100ML
50 INJECTION, SOLUTION INTRAVENOUS CONTINUOUS
Status: DISCONTINUED | OUTPATIENT
Start: 2025-01-23 | End: 2025-01-25 | Stop reason: HOSPADM

## 2025-01-23 RX ORDER — CALCIUM GLUCONATE 94 MG/ML
1 INJECTION, SOLUTION INTRAVENOUS ONCE AS NEEDED
Status: DISCONTINUED | OUTPATIENT
Start: 2025-01-23 | End: 2025-01-25 | Stop reason: HOSPADM

## 2025-01-23 RX ORDER — MAGNESIUM SULFATE HEPTAHYDRATE 40 MG/ML
2 INJECTION, SOLUTION INTRAVENOUS ONCE
Status: COMPLETED | OUTPATIENT
Start: 2025-01-23 | End: 2025-01-23

## 2025-01-23 RX ORDER — LABETALOL HYDROCHLORIDE 5 MG/ML
20 INJECTION, SOLUTION INTRAVENOUS ONCE
Status: COMPLETED | OUTPATIENT
Start: 2025-01-23 | End: 2025-01-23

## 2025-01-23 RX ORDER — MAGNESIUM SULFATE HEPTAHYDRATE 40 MG/ML
4 INJECTION, SOLUTION INTRAVENOUS ONCE
Status: COMPLETED | OUTPATIENT
Start: 2025-01-23 | End: 2025-01-23

## 2025-01-23 RX ADMIN — NIFEDIPINE 30 MG: 30 TABLET, FILM COATED, EXTENDED RELEASE ORAL at 17:28

## 2025-01-23 RX ADMIN — FUROSEMIDE 20 MG: 20 TABLET ORAL at 17:28

## 2025-01-23 RX ADMIN — DOCUSATE SODIUM 100 MG: 100 CAPSULE, LIQUID FILLED ORAL at 08:33

## 2025-01-23 RX ADMIN — KETOROLAC TROMETHAMINE 30 MG: 30 INJECTION, SOLUTION INTRAMUSCULAR; INTRAVENOUS at 22:54

## 2025-01-23 RX ADMIN — DOCUSATE SODIUM 100 MG: 100 CAPSULE, LIQUID FILLED ORAL at 17:28

## 2025-01-23 RX ADMIN — KETOROLAC TROMETHAMINE 30 MG: 30 INJECTION, SOLUTION INTRAMUSCULAR; INTRAVENOUS at 10:40

## 2025-01-23 RX ADMIN — MAGNESIUM SULFATE IN WATER FOR 2 G: 40 INJECTION INTRAVENOUS at 19:26

## 2025-01-23 RX ADMIN — LABETALOL HYDROCHLORIDE 40 MG: 5 INJECTION, SOLUTION INTRAVENOUS at 18:56

## 2025-01-23 RX ADMIN — ACETAMINOPHEN 650 MG: 325 TABLET, FILM COATED ORAL at 12:07

## 2025-01-23 RX ADMIN — KETOROLAC TROMETHAMINE 30 MG: 30 INJECTION, SOLUTION INTRAMUSCULAR; INTRAVENOUS at 03:34

## 2025-01-23 RX ADMIN — LABETALOL HYDROCHLORIDE 20 MG: 5 INJECTION, SOLUTION INTRAVENOUS at 18:22

## 2025-01-23 RX ADMIN — MAGNESIUM SULFATE 4 G: 4 INJECTION INTRAVENOUS at 19:07

## 2025-01-23 RX ADMIN — SODIUM CHLORIDE, SODIUM LACTATE, POTASSIUM CHLORIDE, AND CALCIUM CHLORIDE 50 ML/HR: .6; .31; .03; .02 INJECTION, SOLUTION INTRAVENOUS at 19:07

## 2025-01-23 RX ADMIN — MAGNESIUM SULFATE IN WATER 2 G/HR: 20 INJECTION, SOLUTION INTRAVENOUS at 19:37

## 2025-01-23 RX ADMIN — ACETAMINOPHEN 650 MG: 325 TABLET, FILM COATED ORAL at 06:16

## 2025-01-23 RX ADMIN — ACETAMINOPHEN 650 MG: 325 TABLET, FILM COATED ORAL at 20:47

## 2025-01-23 RX ADMIN — ENOXAPARIN SODIUM 40 MG: 40 INJECTION SUBCUTANEOUS at 08:33

## 2025-01-23 RX ADMIN — HYDROMORPHONE HYDROCHLORIDE 0.5 MG: 1 INJECTION, SOLUTION INTRAMUSCULAR; INTRAVENOUS; SUBCUTANEOUS at 15:44

## 2025-01-23 RX ADMIN — KETOROLAC TROMETHAMINE 30 MG: 30 INJECTION, SOLUTION INTRAMUSCULAR; INTRAVENOUS at 16:41

## 2025-01-23 NOTE — PROGRESS NOTES
Reviewed blood pressures with Dr. Geurrero.  All pressures today have been elevated.  Will plan to initiate Procardia XL 30 mg daily starting now.  Patient also endorses swelling of her legs. Will also initiate 5 day course of lasix 20mg daily.  She is otherwise without Preeclampsia s/s.   Will increase vital frequency to Q4 hours.   Patient amenable to all recommendations.     Celi Roblero MD  OBGYN PGY-3  1/23/2025 5:10 PM

## 2025-01-23 NOTE — PROGRESS NOTES
Progress Note - OB/GYN   Name: Lupe Huaser 34 y.o. female I MRN: [id number]  Unit/Bed#: -01 I Date of Admission: 2025   Date of Service: 2025 I Hospital Day: 1     Assessment & Plan  S/P  section  , Hgb 11.6 --> AM CBC 10.2  Chavez catheter removed, voiding spontaneously   DVT ppx: SCDs and Lovenox 40 mg qD  Continue routine post partum care  Encourage ambulation  Encourage breastfeeding  Anticipate discharge POD 2-4    Type 1 diabetes mellitus (HCC)  Transitioned to home insulin pump settings:  Midnight - 8 am: 1.00 U/hour   8 am - 10 pm: 1.25 U/hour   10 pm - Midnight: 1.00 U/hour   Insulin to carb ratio: 1:8   Correction factor: 1:25   Blood glucose target: 110   Active insulin time: 4 hours   Lab Results   Component Value Date    HGBA1C 7.5 (H) 2025       Recent Labs     25  1741 25  2131 25  0130 25  0612   POCGLU 93 183* 76 65       Blood Sugar Average: Last 72 hrs:  (P) [id number]    Pyelectasis of fetus on prenatal ultrasound   renal US  Preeclampsia  CBC/CMP wnl, P:C 1.3  Continue to monitor for signs/symptoms of preeclampsia with severe features  Consider antihypertensives if persistently hypertensive     Systolic (24hrs), Av , Min:109 , Max:157   Diastolic (24hrs), Av, Min:55, Max:101                Lupe Hauser is a patient of: [facility]. She is POD# 1 s/p  repeat  section, low transverse incision  Recovering well and is stable       Disposition    - Anticipate discharge home on POD# 2-3      Subjective/Objective     Chief Complaint: Postpartum State     Subjective:    Lupe Hauser is POD#1 s/p  repeat  section, low transverse incision. She has no current complaints other than some occasional hypoglycemia overnight which recovered following p.o. intake.  Discussed continuing to monitor her today with her new pump settings prior to making any adjustments.  Pain is well controlled.  Patient  "is currently voiding.  She is ambulating.  Patient is notcurrently passing flatus and has had no bowel movement. She is tolerating PO, and denies nausea or vomitting. Patient denies fever, chills, chest pain, shortness of breath, or calf tenderness. Lochia is normal. She is  Breastfeeding. She is recovering well and is stable.       Vitals:   /91 (BP Location: Right arm) Comment: rn notified  Pulse 87   Temp 97.9 °F (36.6 °C) (Oral)   Resp 18   Ht 5' 5\" (1.651 m)   Wt 109 kg (240 lb)   LMP 05/15/2024   SpO2 97%   Breastfeeding Yes   BMI 39.94 kg/m²       Intake/Output Summary (Last 24 hours) at 1/23/2025 0643  Last data filed at 1/23/2025 0400  Gross per 24 hour   Intake 3140.26 ml   Output 3773 ml   Net -632.74 ml       Invasive Devices       Peripheral Intravenous Line  Duration             Peripheral IV 01/22/25 Left Antecubital <1 day              Line  Duration             Pump Device Insulin pump Anterior;Right Hip 1 day                    Physical Exam:   GEN: Lupe Hauser appears well, alert and oriented x 3, pleasant and cooperative   CARDIO: RRR, no murmurs or rubs  RESP:  CTAB, no wheezes or rales  ABDOMEN: soft, no tenderness, no distention, fundus @ U-2, Incision C/D/I  EXTREMITIES: non tender, no erythema,      Labs:     Hemoglobin   Date Value Ref Range Status   01/23/2025 10.2 (L) 11.5 - 15.4 g/dL Final   01/22/2025 11.6 11.5 - 15.4 g/dL Final     WBC   Date Value Ref Range Status   01/23/2025 13.22 (H) 4.31 - 10.16 Thousand/uL Final   01/22/2025 13.87 (H) 4.31 - 10.16 Thousand/uL Final     Platelets   Date Value Ref Range Status   01/23/2025 192 149 - 390 Thousands/uL Final   01/22/2025 233 149 - 390 Thousands/uL Final     Creatinine   Date Value Ref Range Status   01/22/2025 0.58 (L) 0.60 - 1.30 mg/dL Final     Comment:     Standardized to IDMS reference method   01/21/2025 0.58 (L) 0.60 - 1.30 mg/dL Final     Comment:     Standardized to IDMS reference method     AST   Date Value Ref " Range Status   01/22/2025 16 13 - 39 U/L Final   01/21/2025 16 13 - 39 U/L Final     ALT   Date Value Ref Range Status   01/22/2025 13 7 - 52 U/L Final     Comment:     Specimen collection should occur prior to Sulfasalazine administration due to the potential for falsely depressed results.    01/21/2025 13 7 - 52 U/L Final     Comment:     Specimen collection should occur prior to Sulfasalazine administration due to the potential for falsely depressed results.           Celi Roblero MD  1/23/2025  6:43 AM

## 2025-01-23 NOTE — PLAN OF CARE
Problem: PAIN - ADULT  Goal: Verbalizes/displays adequate comfort level or baseline comfort level  Description: Interventions:  - Encourage patient to monitor pain and request assistance  - Assess pain using appropriate pain scale  - Administer analgesics based on type and severity of pain and evaluate response  - Implement non-pharmacological measures as appropriate and evaluate response  - Consider cultural and social influences on pain and pain management  - Notify physician/advanced practitioner if interventions unsuccessful or patient reports new pain  Outcome: Progressing     Problem: INFECTION - ADULT  Goal: Absence or prevention of progression during hospitalization  Description: INTERVENTIONS:  - Assess and monitor for signs and symptoms of infection  - Monitor lab/diagnostic results  - Monitor all insertion sites, i.e. indwelling lines, tubes, and drains  - Monitor endotracheal if appropriate and nasal secretions for changes in amount and color  - [place] appropriate cooling/warming therapies per order  - Administer medications as ordered  - Instruct and encourage patient and family to use good hand hygiene technique  - Identify and instruct in appropriate isolation precautions for identified infection/condition  Outcome: Progressing  Goal: Absence of fever/infection during neutropenic period  Description: INTERVENTIONS:  - Monitor WBC    Outcome: Progressing     Problem: SAFETY ADULT  Goal: Patient will remain free of falls  Description: INTERVENTIONS:  - Educate patient/family on patient safety including physical limitations  - Instruct patient to call for assistance with activity   - Consult OT/PT to assist with strengthening/mobility   - Keep Call bell within reach  - Keep bed low and locked with side rails adjusted as appropriate  - Keep care items and personal belongings within reach  - Initiate and maintain comfort rounds  - Make Fall Risk Sign visible to staff  - Offer Toileting every  Hours,  in advance of need  - Initiate/Maintain alarm  - Obtain necessary fall risk management equipment:   - Apply yellow socks and bracelet for high fall risk patients  - Consider moving patient to room near nurses station  Outcome: Progressing  Goal: Maintain or return to baseline ADL function  Description: INTERVENTIONS:  -  Assess patient's ability to carry out ADLs; assess patient's baseline for ADL function and identify physical deficits which impact ability to perform ADLs (bathing, care of mouth/teeth, toileting, grooming, dressing, etc.)  - Assess/evaluate cause of self-care deficits   - Assess range of motion  - Assess patient's mobility; develop plan if impaired  - Assess patient's need for assistive devices and provide as appropriate  - Encourage maximum independence but intervene and supervise when necessary  - Involve family in performance of ADLs  - Assess for home care needs following discharge   - Consider OT consult to assist with ADL evaluation and planning for discharge  - Provide patient education as appropriate  Outcome: Progressing  Goal: Maintains/Returns to pre admission functional level  Description: INTERVENTIONS:  - Perform AM-PAC 6 Click Basic Mobility/ Daily Activity assessment daily.  - Set and communicate daily mobility goal to care team and patient/family/caregiver.   - Collaborate with rehabilitation services on mobility goals if consulted  - Perform Range of Motion  times a day.  - Reposition patient every  hours.  - Dangle patient times a day  - Stand patient  times a day  - Ambulate patient  times a day  - Out of bed to chair  times a day   - Out of bed for meals  times a day  - Out of bed for toileting  - Record patient progress and toleration of activity level   Outcome: Progressing     Problem: Knowledge Deficit  Goal: Patient/family/caregiver demonstrates understanding of disease process, treatment plan, medications, and discharge instructions  Description: Complete learning  assessment and assess knowledge base.  Interventions:  - Provide teaching at level of understanding  - Provide teaching via preferred learning methods  Outcome: Progressing     Problem: DISCHARGE PLANNING  Goal: Discharge to home or other facility with appropriate resources  Description: INTERVENTIONS:  - Identify barriers to discharge w/patient and caregiver  - Arrange for needed discharge resources and transportation as appropriate  - Identify discharge learning needs (meds, wound care, etc.)  - Arrange for interpretive services to assist at discharge as needed  - Refer to Case Management Department for coordinating discharge planning if the patient needs post-hospital services based on physician/advanced practitioner order or complex needs related to functional status, cognitive ability, or social support system  Outcome: Progressing     Problem: POSTPARTUM  Goal: Experiences normal postpartum course  Description: INTERVENTIONS:  - Monitor maternal vital signs  - Assess uterine involution and lochia  Outcome: Progressing  Goal: Appropriate maternal -  bonding  Description: INTERVENTIONS:  - Identify family support  - Assess for appropriate maternal/infant bonding   -Encourage maternal/infant bonding opportunities  - Referral to  or  as needed  Outcome: Progressing  Goal: Establishment of infant feeding pattern  Description: INTERVENTIONS:  - Assess breast/bottle feeding  - Refer to lactation as needed  Outcome: Progressing  Goal: Incision(s), wounds(s) or drain site(s) healing without S/S of infection  Description: INTERVENTIONS  - Assess and document dressing, incision, wound bed, drain sites and surrounding tissue  - Provide patient and family education  - Perform skin care/dressing changes every   Outcome: Progressing

## 2025-01-23 NOTE — CASE MANAGEMENT
Case Management Progress Note    Patient name Lupe Hauser  Location /-01 MRN [id number]  : 1990 Date 2025       LOS (days): 1  Geometric Mean LOS (GMLOS) (days):   Days to GMLOS:        OBJECTIVE:        Current admission status: Inpatient  Preferred Pharmacy:   [id number] - [place], [place] - [address] [address]  Phone: [phone] Fax: [phone]    Primary Care Provider: Navya Lewis DO    Primary Insurance: BLUE CROSS  Secondary Insurance:     PROGRESS NOTE:    CM received consult for MOB requesting [employer] Discreet Duo for home use. Order placed to [email or web address] via [place]. Pump to be shipped out to home today.

## 2025-01-23 NOTE — PLAN OF CARE
Problem: PAIN - ADULT  Goal: Verbalizes/displays adequate comfort level or baseline comfort level  Description: Interventions:  - Encourage patient to monitor pain and request assistance  - Assess pain using appropriate pain scale  - Administer analgesics based on type and severity of pain and evaluate response  - Implement non-pharmacological measures as appropriate and evaluate response  - Consider cultural and social influences on pain and pain management  - Notify physician/advanced practitioner if interventions unsuccessful or patient reports new pain  Outcome: Progressing     Problem: INFECTION - ADULT  Goal: Absence or prevention of progression during hospitalization  Description: INTERVENTIONS:  - Assess and monitor for signs and symptoms of infection  - Monitor lab/diagnostic results  - Monitor all insertion sites, i.e. indwelling lines, tubes, and drains  - Monitor endotracheal if appropriate and nasal secretions for changes in amount and color  - [place] appropriate cooling/warming therapies per order  - Administer medications as ordered  - Instruct and encourage patient and family to use good hand hygiene technique  - Identify and instruct in appropriate isolation precautions for identified infection/condition  Outcome: Progressing  Goal: Absence of fever/infection during neutropenic period  Description: INTERVENTIONS:  - Monitor WBC    Outcome: Progressing     Problem: SAFETY ADULT  Goal: Patient will remain free of falls  Description: INTERVENTIONS:  - Educate patient/family on patient safety including physical limitations  - Instruct patient to call for assistance with activity   - Consult OT/PT to assist with strengthening/mobility   - Keep Call bell within reach  - Keep bed low and locked with side rails adjusted as appropriate  - Keep care items and personal belongings within reach  - Initiate and maintain comfort rounds  - Make Fall Risk Sign visible to staff  - Apply yellow socks and bracelet  for high fall risk patients  - Consider moving patient to room near nurses station  Outcome: Progressing  Goal: Maintain or return to baseline ADL function  Description: INTERVENTIONS:  -  Assess patient's ability to carry out ADLs; assess patient's baseline for ADL function and identify physical deficits which impact ability to perform ADLs (bathing, care of mouth/teeth, toileting, grooming, dressing, etc.)  - Assess/evaluate cause of self-care deficits   - Assess range of motion  - Assess patient's mobility; develop plan if impaired  - Assess patient's need for assistive devices and provide as appropriate  - Encourage maximum independence but intervene and supervise when necessary  - Involve family in performance of ADLs  - Assess for home care needs following discharge   - Consider OT consult to assist with ADL evaluation and planning for discharge  - Provide patient education as appropriate  Outcome: Progressing  Goal: Maintains/Returns to pre admission functional level  Description: INTERVENTIONS:  - Perform AM-PAC 6 Click Basic Mobility/ Daily Activity assessment daily.  - Set and communicate daily mobility goal to care team and patient/family/caregiver.   - Collaborate with rehabilitation services on mobility goals if consulted  - Out of bed for toileting  - Record patient progress and toleration of activity level   Outcome: Progressing     Problem: Knowledge Deficit  Goal: Patient/family/caregiver demonstrates understanding of disease process, treatment plan, medications, and discharge instructions  Description: Complete learning assessment and assess knowledge base.  Interventions:  - Provide teaching at level of understanding  - Provide teaching via preferred learning methods  Outcome: Progressing     Problem: DISCHARGE PLANNING  Goal: Discharge to home or other facility with appropriate resources  Description: INTERVENTIONS:  - Identify barriers to discharge w/patient and caregiver  - Arrange for needed  discharge resources and transportation as appropriate  - Identify discharge learning needs (meds, wound care, etc.)  - Arrange for interpretive services to assist at discharge as needed  - Refer to Case Management Department for coordinating discharge planning if the patient needs post-hospital services based on physician/advanced practitioner order or complex needs related to functional status, cognitive ability, or social support system  Outcome: Progressing     Problem: POSTPARTUM  Goal: Experiences normal postpartum course  Description: INTERVENTIONS:  - Monitor maternal vital signs  - Assess uterine involution and lochia  Outcome: Progressing  Goal: Appropriate maternal -  bonding  Description: INTERVENTIONS:  - Identify family support  - Assess for appropriate maternal/infant bonding   -Encourage maternal/infant bonding opportunities  - Referral to  or  as needed  Outcome: Progressing  Goal: Establishment of infant feeding pattern  Description: INTERVENTIONS:  - Assess breast/bottle feeding  - Refer to lactation as needed  Outcome: Progressing  Goal: Incision(s), wounds(s) or drain site(s) healing without S/S of infection  Description: INTERVENTIONS  - Assess and document dressing, incision, wound bed, drain sites and surrounding tissue  - Provide patient and family education  Outcome: Progressing

## 2025-01-23 NOTE — LACTATION NOTE
This note was copied from a baby's chart.  CONSULT - LACTATION  Baby Girl (Cathy Hauser 1 days female MRN: [id number]    [facility] NURSERY Room / Bed: (N)/(N) Encounter: [id number]    Maternal Information     MOTHER:  Lupe Hauser  Maternal Age: 34 y.o.  OB History: # 1 - Date: , Sex: None, Weight: None, GA: 6w0d, Type: None, Apgar1: None, Apgar5: None, Living: None, Birth Comments: None    # 2 - Date: , Sex: None, Weight: None, GA: 4w0d, Type: None, Apgar1: None, Apgar5: None, Living: None, Birth Comments: chemical    # 3 - Date: 21, Sex: Male, Weight: 1650 g (3 lb 10.2 oz), GA: 29w5d, Type: , Low Transverse, Apgar1: 6, Apgar5: 8, Living: Living, Birth Comments: None    # 4 - Date: 25, Sex: Female, Weight: 3370 g (7 lb 6.9 oz), GA: 36w0d, Type: , Low Transverse, Apgar1: 8, Apgar5: 9, Living: Living, Birth Comments: None   Previous breast reduction surgery? No    Lactation history:   Has patient previously breast fed: Yes   How long had patient previously breast fed: 3 mo. excl. pumped   Previous breast feeding complications: Other (Comment) (short-gut syndrome)     Past Surgical History:   Procedure Laterality Date     SECTION      CONDYLOMA EXCISION/FULGURATION      [place]  DELIVERY ONLY N/A 2021    Procedure:  SECTION ();  Surgeon: Jonnathan Verdugo MD;  Location: AN LD;  Service: Obstetrics    TN  DELIVERY ONLY N/A 2025    Procedure:  SECTION ();  Surgeon: Francesca Deng DO;  Location: [facility] LD;  Service: Obstetrics       Birth information:  YOB: 2025   Time of birth: 8:43 AM   Sex: female   Delivery type: , Low Transverse   Birth Weight: 3370 g (7 lb 6.9 oz)   Percent of Weight Change: -3%     Gestational Age: 36w0d   [unfilled]    Reason for Consult:    Reason for Consult: Follow-up assessment (ext) - 20 min, Latch Assess (ext) - 20 min,  Pump Follow Up - 5 min, Discharge (routine) - 10 min    Risk Factors:    Risk Factors: Maternal anatomy (med. large breasts/ short shank nipples)    Breast and nipple assessment:   Breasts/Nipples  Date Pumping Initiated: 25  Time Pumping Initiated: 1528  Left Breast: Soft, Other (Comment) (round, medium, dark areolas, visible meza glands;)  Right Breast: Soft, Other (Comment) (round medium, dark areolas, visible montgomenry glands)  Left Nipple: Everted, Other (Comment) (short shank)  Right Nipple: Everted, Other (Comment) (short shank)  Intervention: Hand expression, Lanolin, Breast pump  Breastfeeding Status: Yes  Breastfeeding Progress: Not yet established, Breastfeeding well  Reasons for not Breastfeeding: Maternal preference    OB Lactation Tools:    Other OB Lactation Tools  Feeding Devices: Bottle, Lanolin, Pump, Syringe    Breast Pump:    Breast Pump  Pump: Personal, Electric, Manual  Pump Review/Education: Setup, frequency, and cleaning, Milk storage, Other (Comment) (cycle and hands on pumping)  Initiated by: c hume, ibclc  Date Initiated: 25       Assessment:  difficulty latching due to bottles overnight and short shank nipples    Feeding assessment: latch difficulty (due to positioning and alignment)  LATCH:  Latch: Grasps breast, tongue down, lips flanged, rhythmic sucking   Audible Swallowing: Spontaneous and intermittent (24 hours old)   Type of Nipple: Everted (After stimulation)   Comfort (Breast/Nipple): Soft/non-tender   Hold (Positioning): Partial assist, teach one side, mother does other, staff holds   LATCH Score: 9       Feeding recommendations:  breast feed on demand. Mom is attempting to breastfeed and can not get Emma to latch.     Outlook Latch After Lactation Education/Consult:  Efficiency: Football on both breasts              Lips Flanged: Yes              Depth of latch: deeper with ed. And demonstration of alignment              Audible Swallow: No               Visible Milk: Yes, with HE              Wide Open/ Asymmetrical: Yes, better with positioning adjustments              Suck Swallow Cycle: Breathing: yes, Coordinated: yes  Nipple Assessment after latch: Normal: elongated/eraser, no discoloration and no damage noted.  Latch Problems: none with demonstration of positioning and alignment. Demonstration of hand over hand, s2s, alignment of ear, shoulder, hip and long neck. Enc. Mom to compress between the shoulder blades for a snug hold.     Position After Lactation Education/Consult:  Infant's Ergonomics/Body               Body Alignment: Yes, with reji's chin deep into the breast and belly to belly with Reji coming from the side of the breast               Head Supported: Yes, with hand over hand demonstration of snug hold               Close to Mom's body/ Lifted/ Supported: Yes, with boppy around mom in a c hold               Mom's Ergonomics/Body: Yes, lower lumbar support and shoulders down and back                           Supported: Yes, pillows under the boppi                           Sitting Back: Yes                           Brings Baby to her breast: Yes, with reminders, positioning  Positioning Problems: alignment, s2s, belly to belly, long neck    Feeding Plan:     Education on positioning and alignment. Mom is encouraged to:     - Bring baby up to the breast (use of pillows to elevate so baby's torso is against mom's breasts)   - Skin to skin for feedings with top hand exposed to show signs of satiation   - Chin deep into breast tissue (make baby look up to the nipple)   - nose aligned to the nipple   -Wait for wide gape, drag chin on the breast so nipple is aimed at the upper, back palate  - Cheek should be touching breast   - Deep, firm hold of baby with ear, shoulder, hip alignment    Mom is encouraged to place baby skin to skin for feedings. Skin to skin education provided for baby placement on mother's chest, baby only in diaper,  "blankets below shoulders on baby's back. Skin to skin is encouraged to continue at home for feedings and between feedings.    Provided demonstration, education and support of deep latch to breast by placing the nipple to the nose, dragging down to chin to achieve a wide latch. Bring baby to the breast, not breast to baby. Move your shoulders down and away from your ears. Look for ear, shoulder, hip alignment. Baby's upper and lower lip should be flanged on the breast.    Education on creating a snug hold of your infant to the breast by verifying the infant's cheek is touching the breast, your infant's chin is deep into the breast tissue, your infant's arms are \"hugging\" the breast, and your infant's lips are flanged on the areola. Bring infant to the breast, not your breast to the infant. Latch should feel like a tugging sensation on the nipple.    Discussed 2nd night syndrome and ways to calm infant. Hand out given. Information on hand expression given. Discussed benefits of knowing how to manually express breast including stimulating milk supply, softening nipple for latch and evacuating breast in the event of engorgement.    Education and information provided about non-nutritive suck, role of colostrum, and benefits of skin to skin.    Mix Feeds:   Start every feeding at the breast. Offer both breasts or one breast and use breast compressions to achieve active suckling. Once baby is not actively suckling, bring baby in upright position and offer expressed milk and/or artificial supplementation via alternative feeding method (syringe, finger, paced bottle feeding). Burp frequently between breasts and during paced bottle feeding. Once feed is complete, place baby back on breast for on-nutritive suck. Pump after the feeding session to supplement with expressed milk at next feed.    Feed expressed milk or formula as needed/desired. Paced bottle feeding technique is less stressful for your baby, prevents overfeeding " and protects the breastfeeding relationship.  You can find a video about paced bottle feeding at www.lacted.org or [employer] on YouTube.    Mom's nipple everts with stimulation. With nipple compression, short shank noted. Education on ways to elongate the nipple: Hand expression, Latch assist, breast shells, supple cups, manual and electric pump stimulation.     Demonstrated with teach back breast compressions during a feeding to increase milk transfer and stimulate suckling after a breathing/muscle break.     Encouraged parents to call for assistance, questions, and concerns about breastfeeding.      Christy Hume, MA 1/23/2025 2:11 PM

## 2025-01-24 LAB
ALBUMIN SERPL BCG-MCNC: 3 G/DL (ref 3.5–5)
ALBUMIN SERPL BCG-MCNC: 3.1 G/DL (ref 3.5–5)
ALP SERPL-CCNC: 125 U/L (ref 34–104)
ALP SERPL-CCNC: 128 U/L (ref 34–104)
ALT SERPL W P-5'-P-CCNC: 14 U/L (ref 7–52)
ALT SERPL W P-5'-P-CCNC: 14 U/L (ref 7–52)
ANION GAP SERPL CALCULATED.3IONS-SCNC: 8 MMOL/L (ref 4–13)
ANION GAP SERPL CALCULATED.3IONS-SCNC: 8 MMOL/L (ref 4–13)
AST SERPL W P-5'-P-CCNC: 23 U/L (ref 13–39)
AST SERPL W P-5'-P-CCNC: 24 U/L (ref 13–39)
BILIRUB SERPL-MCNC: 0.35 MG/DL (ref 0.2–1)
BILIRUB SERPL-MCNC: 0.36 MG/DL (ref 0.2–1)
BUN SERPL-MCNC: 11 MG/DL (ref 5–25)
BUN SERPL-MCNC: 12 MG/DL (ref 5–25)
CALCIUM ALBUM COR SERPL-MCNC: 9.1 MG/DL (ref 8.3–10.1)
CALCIUM ALBUM COR SERPL-MCNC: 9.7 MG/DL (ref 8.3–10.1)
CALCIUM SERPL-MCNC: 8.3 MG/DL (ref 8.4–10.2)
CALCIUM SERPL-MCNC: 9 MG/DL (ref 8.4–10.2)
CHLORIDE SERPL-SCNC: 97 MMOL/L (ref 96–108)
CHLORIDE SERPL-SCNC: 98 MMOL/L (ref 96–108)
CO2 SERPL-SCNC: 28 MMOL/L (ref 21–32)
CO2 SERPL-SCNC: 29 MMOL/L (ref 21–32)
CREAT SERPL-MCNC: 0.69 MG/DL (ref 0.6–1.3)
CREAT SERPL-MCNC: 0.77 MG/DL (ref 0.6–1.3)
ERYTHROCYTE [DISTWIDTH] IN BLOOD BY AUTOMATED COUNT: 14.9 % (ref 11.6–15.1)
ERYTHROCYTE [DISTWIDTH] IN BLOOD BY AUTOMATED COUNT: 15.1 % (ref 11.6–15.1)
GFR SERPL CREATININE-BSD FRML MDRD: 101 ML/MIN/1.73SQ M
GFR SERPL CREATININE-BSD FRML MDRD: 113 ML/MIN/1.73SQ M
GLUCOSE SERPL-MCNC: 166 MG/DL (ref 65–140)
GLUCOSE SERPL-MCNC: 181 MG/DL (ref 65–140)
GLUCOSE SERPL-MCNC: 195 MG/DL (ref 65–140)
GLUCOSE SERPL-MCNC: 227 MG/DL (ref 65–140)
GLUCOSE SERPL-MCNC: 241 MG/DL (ref 65–140)
HCT VFR BLD AUTO: 34.7 % (ref 34.8–46.1)
HCT VFR BLD AUTO: 34.8 % (ref 34.8–46.1)
HGB BLD-MCNC: 10.8 G/DL (ref 11.5–15.4)
HGB BLD-MCNC: 11 G/DL (ref 11.5–15.4)
MAGNESIUM SERPL-MCNC: 3.9 MG/DL (ref 1.9–2.7)
MAGNESIUM SERPL-MCNC: 4.1 MG/DL (ref 1.9–2.7)
MCH RBC QN AUTO: 26.4 PG (ref 26.8–34.3)
MCH RBC QN AUTO: 26.6 PG (ref 26.8–34.3)
MCHC RBC AUTO-ENTMCNC: 31.1 G/DL (ref 31.4–37.4)
MCHC RBC AUTO-ENTMCNC: 31.6 G/DL (ref 31.4–37.4)
MCV RBC AUTO: 84 FL (ref 82–98)
MCV RBC AUTO: 85 FL (ref 82–98)
PLATELET # BLD AUTO: 228 THOUSANDS/UL (ref 149–390)
PLATELET # BLD AUTO: 234 THOUSANDS/UL (ref 149–390)
PMV BLD AUTO: 11.7 FL (ref 8.9–12.7)
PMV BLD AUTO: 11.9 FL (ref 8.9–12.7)
POTASSIUM SERPL-SCNC: 4.2 MMOL/L (ref 3.5–5.3)
POTASSIUM SERPL-SCNC: 4.3 MMOL/L (ref 3.5–5.3)
PROT SERPL-MCNC: 6.4 G/DL (ref 6.4–8.4)
PROT SERPL-MCNC: 6.5 G/DL (ref 6.4–8.4)
RBC # BLD AUTO: 4.09 MILLION/UL (ref 3.81–5.12)
RBC # BLD AUTO: 4.13 MILLION/UL (ref 3.81–5.12)
SODIUM SERPL-SCNC: 134 MMOL/L (ref 135–147)
SODIUM SERPL-SCNC: 134 MMOL/L (ref 135–147)
URATE SERPL-MCNC: 4.9 MG/DL (ref 2–7.5)
URATE SERPL-MCNC: 5.2 MG/DL (ref 2–7.5)
WBC # BLD AUTO: 12.19 THOUSAND/UL (ref 4.31–10.16)
WBC # BLD AUTO: 13.14 THOUSAND/UL (ref 4.31–10.16)

## 2025-01-24 PROCEDURE — 99024 POSTOP FOLLOW-UP VISIT: CPT | Performed by: OBSTETRICS & GYNECOLOGY

## 2025-01-24 PROCEDURE — 83735 ASSAY OF MAGNESIUM: CPT

## 2025-01-24 PROCEDURE — 82948 REAGENT STRIP/BLOOD GLUCOSE: CPT

## 2025-01-24 PROCEDURE — 85027 COMPLETE CBC AUTOMATED: CPT

## 2025-01-24 PROCEDURE — 80053 COMPREHEN METABOLIC PANEL: CPT

## 2025-01-24 PROCEDURE — 84550 ASSAY OF BLOOD/URIC ACID: CPT

## 2025-01-24 RX ORDER — IBUPROFEN 600 MG/1
600 TABLET, FILM COATED ORAL EVERY 6 HOURS SCHEDULED
Status: DISCONTINUED | OUTPATIENT
Start: 2025-01-24 | End: 2025-01-25 | Stop reason: HOSPADM

## 2025-01-24 RX ORDER — IBUPROFEN 600 MG/1
600 TABLET, FILM COATED ORAL EVERY 6 HOURS SCHEDULED
Status: DISCONTINUED | OUTPATIENT
Start: 2025-01-24 | End: 2025-01-24

## 2025-01-24 RX ORDER — OXYCODONE HYDROCHLORIDE 5 MG/1
5 TABLET ORAL EVERY 4 HOURS PRN
Qty: 7 TABLET | Refills: 0 | Status: CANCELLED | OUTPATIENT
Start: 2025-01-24 | End: 2025-02-03

## 2025-01-24 RX ADMIN — SIMETHICONE 80 MG: 80 TABLET, CHEWABLE ORAL at 08:18

## 2025-01-24 RX ADMIN — FUROSEMIDE 20 MG: 20 TABLET ORAL at 08:18

## 2025-01-24 RX ADMIN — IBUPROFEN 600 MG: 600 TABLET, FILM COATED ORAL at 14:46

## 2025-01-24 RX ADMIN — ACETAMINOPHEN 650 MG: 325 TABLET, FILM COATED ORAL at 04:13

## 2025-01-24 RX ADMIN — DOCUSATE SODIUM 100 MG: 100 CAPSULE, LIQUID FILLED ORAL at 17:19

## 2025-01-24 RX ADMIN — DOCUSATE SODIUM 100 MG: 100 CAPSULE, LIQUID FILLED ORAL at 08:18

## 2025-01-24 RX ADMIN — IBUPROFEN 600 MG: 600 TABLET, FILM COATED ORAL at 21:09

## 2025-01-24 RX ADMIN — HYDROMORPHONE HYDROCHLORIDE 0.5 MG: 1 INJECTION, SOLUTION INTRAMUSCULAR; INTRAVENOUS; SUBCUTANEOUS at 06:17

## 2025-01-24 RX ADMIN — NIFEDIPINE 30 MG: 30 TABLET, FILM COATED, EXTENDED RELEASE ORAL at 08:18

## 2025-01-24 RX ADMIN — KETOROLAC TROMETHAMINE 30 MG: 30 INJECTION, SOLUTION INTRAMUSCULAR; INTRAVENOUS at 05:34

## 2025-01-24 RX ADMIN — ACETAMINOPHEN 650 MG: 325 TABLET, FILM COATED ORAL at 12:08

## 2025-01-24 RX ADMIN — ACETAMINOPHEN 650 MG: 325 TABLET, FILM COATED ORAL at 17:19

## 2025-01-24 RX ADMIN — MAGNESIUM SULFATE IN WATER 2 G/HR: 20 INJECTION, SOLUTION INTRAVENOUS at 05:33

## 2025-01-24 RX ADMIN — ENOXAPARIN SODIUM 40 MG: 40 INJECTION SUBCUTANEOUS at 08:18

## 2025-01-24 RX ADMIN — HYDROMORPHONE HYDROCHLORIDE 0.5 MG: 1 INJECTION, SOLUTION INTRAMUSCULAR; INTRAVENOUS; SUBCUTANEOUS at 12:12

## 2025-01-24 NOTE — ASSESSMENT & PLAN NOTE
, Hgb 11.6 --> 10.2  Chavez catheter removed, voiding spontaneously   DVT ppx: SCDs and Lovenox 40 mg qD  Continue routine post partum care  Encourage ambulation  Encourage breastfeeding  Anticipate discharge POD 2-4

## 2025-01-24 NOTE — ASSESSMENT & PLAN NOTE
Transitioned to home insulin pump settings:  Midnight - 8 am: 1.00 U/hour   8 am - 10 pm: 1.25 U/hour   10 pm - Midnight: 1.00 U/hour   Insulin to carb ratio: 1:8   Correction factor: 1:25   Blood glucose target: 110   Active insulin time: 4 hours   Lab Results   Component Value Date    HGBA1C 7.5 (H) 01/08/2025       Recent Labs     01/22/25  2131 01/23/25  0130 01/23/25  0612 01/23/25  2331   POCGLU 183* 76 65 179*       Blood Sugar Average: Last 72 hrs:  (P) 94.95

## 2025-01-24 NOTE — PROGRESS NOTES
Patient evaluated at bedside due to nursing concern of diaphoresis    At bedside, patient is sitting up in bed, she appears comfortable but tired  She states that she has been feeling sweating since a little after the  section  She otherwise reports that her pain is well controlled and she is looking forward to eating something      Vitals:    21 1434   BP: 112/69   Pulse: 95   Resp: 18   Temp: 98 7 °F (37 1 °C)   SpO2: 98%       General: no acute distress  Abdomen: soft, non-tender; uterus firm at U, minimal bleeding      Blood glucose 111, VSS, O2 WNL    - Continue expectant management for now  - Patient to have food shortly  - Will re-evaluate as needed     Rafael Almanza MD, PGY-3  3/16/2021  3:59 PM Waleska Peña

## 2025-01-24 NOTE — PLAN OF CARE
Problem: PAIN - ADULT  Goal: Verbalizes/displays adequate comfort level or baseline comfort level  Description: Interventions:  - Encourage patient to monitor pain and request assistance  - Assess pain using appropriate pain scale  - Administer analgesics based on type and severity of pain and evaluate response  - Implement non-pharmacological measures as appropriate and evaluate response  - Consider cultural and social influences on pain and pain management  - Notify physician/advanced practitioner if interventions unsuccessful or patient reports new pain  Outcome: Progressing     Problem: INFECTION - ADULT  Goal: Absence or prevention of progression during hospitalization  Description: INTERVENTIONS:  - Assess and monitor for signs and symptoms of infection  - Monitor lab/diagnostic results  - Monitor all insertion sites, i.e. indwelling lines, tubes, and drains  - Monitor endotracheal if appropriate and nasal secretions for changes in amount and color  - Leonardo appropriate cooling/warming therapies per order  - Administer medications as ordered  - Instruct and encourage patient and family to use good hand hygiene technique  - Identify and instruct in appropriate isolation precautions for identified infection/condition  Outcome: Progressing  Goal: Absence of fever/infection during neutropenic period  Description: INTERVENTIONS:  - Monitor WBC    Outcome: Progressing     Problem: SAFETY ADULT  Goal: Patient will remain free of falls  Description: INTERVENTIONS:  - Educate patient/family on patient safety including physical limitations  - Instruct patient to call for assistance with activity   - Consult OT/PT to assist with strengthening/mobility   - Keep Call bell within reach  - Keep bed low and locked with side rails adjusted as appropriate  - Keep care items and personal belongings within reach  - Initiate and maintain comfort rounds  - Make Fall Risk Sign visible to staff  - Apply yellow socks and bracelet  for high fall risk patients  - Consider moving patient to room near nurses station  Outcome: Progressing  Goal: Maintain or return to baseline ADL function  Description: INTERVENTIONS:  -  Assess patient's ability to carry out ADLs; assess patient's baseline for ADL function and identify physical deficits which impact ability to perform ADLs (bathing, care of mouth/teeth, toileting, grooming, dressing, etc.)  - Assess/evaluate cause of self-care deficits   - Assess range of motion  - Assess patient's mobility; develop plan if impaired  - Assess patient's need for assistive devices and provide as appropriate  - Encourage maximum independence but intervene and supervise when necessary  - Involve family in performance of ADLs  - Assess for home care needs following discharge   - Consider OT consult to assist with ADL evaluation and planning for discharge  - Provide patient education as appropriate  Outcome: Progressing  Goal: Maintains/Returns to pre admission functional level  Description: INTERVENTIONS:  - Perform AM-PAC 6 Click Basic Mobility/ Daily Activity assessment daily.  - Set and communicate daily mobility goal to care team and patient/family/caregiver.   - Collaborate with rehabilitation services on mobility goals if consulted  - Out of bed for toileting  - Record patient progress and toleration of activity level   Outcome: Progressing     Problem: Knowledge Deficit  Goal: Patient/family/caregiver demonstrates understanding of disease process, treatment plan, medications, and discharge instructions  Description: Complete learning assessment and assess knowledge base.  Interventions:  - Provide teaching at level of understanding  - Provide teaching via preferred learning methods  Outcome: Progressing     Problem: DISCHARGE PLANNING  Goal: Discharge to home or other facility with appropriate resources  Description: INTERVENTIONS:  - Identify barriers to discharge w/patient and caregiver  - Arrange for needed  discharge resources and transportation as appropriate  - Identify discharge learning needs (meds, wound care, etc.)  - Arrange for interpretive services to assist at discharge as needed  - Refer to Case Management Department for coordinating discharge planning if the patient needs post-hospital services based on physician/advanced practitioner order or complex needs related to functional status, cognitive ability, or social support system  Outcome: Progressing     Problem: POSTPARTUM  Goal: Experiences normal postpartum course  Description: INTERVENTIONS:  - Monitor maternal vital signs  - Assess uterine involution and lochia  Outcome: Progressing  Goal: Appropriate maternal -  bonding  Description: INTERVENTIONS:  - Identify family support  - Assess for appropriate maternal/infant bonding   -Encourage maternal/infant bonding opportunities  - Referral to  or  as needed  Outcome: Progressing  Goal: Establishment of infant feeding pattern  Description: INTERVENTIONS:  - Assess breast/bottle feeding  - Refer to lactation as needed  Outcome: Progressing  Goal: Incision(s), wounds(s) or drain site(s) healing without S/S of infection  Description: INTERVENTIONS  - Assess and document dressing, incision, wound bed, drain sites and surrounding tissue  - Provide patient and family education    Outcome: Progressing

## 2025-01-24 NOTE — ASSESSMENT & PLAN NOTE
Managed by Endocrinology Outpatient    Transitioned to home insulin pump settings:  Midnight - 8 am: 1.00 U/hour   8 am - 10 pm: 1.25 U/hour   10 pm - Midnight: 1.00 U/hour   Insulin to carb ratio: 1:8   Correction factor: 1:25   Blood glucose target: 110   Active insulin time: 4 hours   Lab Results   Component Value Date    HGBA1C 7.5 (H) 01/08/2025       Recent Labs     01/24/25  1108 01/24/25  1931 01/24/25  2359 01/25/25  0629   POCGLU 195* 181* 177* 90       Blood Sugar Average: Last 72 hrs:  (P) 116.8

## 2025-01-24 NOTE — PLAN OF CARE
Problem: PAIN - ADULT  Goal: Verbalizes/displays adequate comfort level or baseline comfort level  Description: Interventions:  - Encourage patient to monitor pain and request assistance  - Assess pain using appropriate pain scale  - Administer analgesics based on type and severity of pain and evaluate response  - Implement non-pharmacological measures as appropriate and evaluate response  - Consider cultural and social influences on pain and pain management  - Notify physician/advanced practitioner if interventions unsuccessful or patient reports new pain  Outcome: Progressing     Problem: INFECTION - ADULT  Goal: Absence or prevention of progression during hospitalization  Description: INTERVENTIONS:  - Assess and monitor for signs and symptoms of infection  - Monitor lab/diagnostic results  - Monitor all insertion sites, i.e. indwelling lines, tubes, and drains  - Monitor endotracheal if appropriate and nasal secretions for changes in amount and color  - Lamar appropriate cooling/warming therapies per order  - Administer medications as ordered  - Instruct and encourage patient and family to use good hand hygiene technique  - Identify and instruct in appropriate isolation precautions for identified infection/condition  Outcome: Progressing  Goal: Absence of fever/infection during neutropenic period  Description: INTERVENTIONS:  - Monitor WBC    Outcome: Progressing     Problem: SAFETY ADULT  Goal: Patient will remain free of falls  Description: INTERVENTIONS:  - Educate patient/family on patient safety including physical limitations  - Instruct patient to call for assistance with activity   - Consult OT/PT to assist with strengthening/mobility   - Keep Call bell within reach  - Keep bed low and locked with side rails adjusted as appropriate  - Keep care items and personal belongings within reach  - Initiate and maintain comfort rounds  - Make Fall Risk Sign visible to staff  - Apply yellow socks and bracelet  for high fall risk patients  - Consider moving patient to room near nurses station  Outcome: Progressing  Goal: Maintain or return to baseline ADL function  Description: INTERVENTIONS:  -  Assess patient's ability to carry out ADLs; assess patient's baseline for ADL function and identify physical deficits which impact ability to perform ADLs (bathing, care of mouth/teeth, toileting, grooming, dressing, etc.)  - Assess/evaluate cause of self-care deficits   - Assess range of motion  - Assess patient's mobility; develop plan if impaired  - Assess patient's need for assistive devices and provide as appropriate  - Encourage maximum independence but intervene and supervise when necessary  - Involve family in performance of ADLs  - Assess for home care needs following discharge   - Consider OT consult to assist with ADL evaluation and planning for discharge  - Provide patient education as appropriate  Outcome: Progressing  Goal: Maintains/Returns to pre admission functional level  Description: INTERVENTIONS:  - Perform AM-PAC 6 Click Basic Mobility/ Daily Activity assessment daily.  - Set and communicate daily mobility goal to care team and patient/family/caregiver.   - Collaborate with rehabilitation services on mobility goals if consulted    - Out of bed for toileting  - Record patient progress and toleration of activity level   Outcome: Progressing     Problem: Knowledge Deficit  Goal: Patient/family/caregiver demonstrates understanding of disease process, treatment plan, medications, and discharge instructions  Description: Complete learning assessment and assess knowledge base.  Interventions:  - Provide teaching at level of understanding  - Provide teaching via preferred learning methods  Outcome: Progressing     Problem: DISCHARGE PLANNING  Goal: Discharge to home or other facility with appropriate resources  Description: INTERVENTIONS:  - Identify barriers to discharge w/patient and caregiver  - Arrange for needed  discharge resources and transportation as appropriate  - Identify discharge learning needs (meds, wound care, etc.)  - Arrange for interpretive services to assist at discharge as needed  - Refer to Case Management Department for coordinating discharge planning if the patient needs post-hospital services based on physician/advanced practitioner order or complex needs related to functional status, cognitive ability, or social support system  Outcome: Progressing     Problem: POSTPARTUM  Goal: Experiences normal postpartum course  Description: INTERVENTIONS:  - Monitor maternal vital signs  - Assess uterine involution and lochia  Outcome: Progressing  Goal: Appropriate maternal -  bonding  Description: INTERVENTIONS:  - Identify family support  - Assess for appropriate maternal/infant bonding   -Encourage maternal/infant bonding opportunities  - Referral to  or  as needed  Outcome: Progressing  Goal: Establishment of infant feeding pattern  Description: INTERVENTIONS:  - Assess breast/bottle feeding  - Refer to lactation as needed  Outcome: Progressing  Goal: Incision(s), wounds(s) or drain site(s) healing without S/S of infection  Description: INTERVENTIONS  - Assess and document dressing, incision, wound bed, drain sites and surrounding tissue  - Provide patient and family education  - Perform skin care/dressing changes every   Outcome: Progressing

## 2025-01-24 NOTE — DISCHARGE SUMMARY
Obstetrics Discharge Summary  Lupe Hauser 34 y.o. female MRN: [id number]  Unit/Bed#: -01 Encounter: [id number]    Admission Date: 2025   Discharge Attending: Yoko Buenrostro MD  Discharge Diagnosis:   Gestational hypertension, third trimester  Assessment & Plan  - BP as above    Mild preeclampsia  Assessment & Plan  - BP as above    Preeclampsia  Assessment & Plan  CBC/CMP wnl, P:C 1.3  Current regimen Procardia 30 mg daily  Lasix 20 mg daily (-)  Magnesium for 12 hours started on 25      Fetal macrosomia during pregnancy in third trimester  Assessment & Plan  Baby Weight 7lb 6.9 oz    Type 1 diabetes mellitus (HCC)  Assessment & Plan  Managed by Endocrinology Outpatient    Transitioned to home insulin pump settings:  Midnight - 8 am: 1.00 U/hour   8 am - 10 pm: 1.25 U/hour   10 pm - Midnight: 1.00 U/hour   Insulin to carb ratio: 1:8   Correction factor: 1:25   Blood glucose target: 110   Active insulin time: 4 hours   Lab Results   Component Value Date    HGBA1C 7.5 (H) 2025       Recent Labs     25  1108 25  1931 25  2359 25  0629   POCGLU 195* 181* 177* 90         Blood Sugar Average: Last 72 hrs:  (P) 116.8      Pregnancy with history of  section, antepartum  Assessment & Plan  - RLTCS uncomplicated    * S/P  section  Assessment & Plan  , Hgb 11.6 --> 10.2  Chavez catheter removed, voiding spontaneously   DVT ppx: SCDs and Lovenox 40 mg qD  Continue routine post partum care  Encourage ambulation  Encourage breastfeeding  Anticipate discharge POD 2-4           Delivery Method: , Low Transverse   Delivery Date and Time: 2025 8:43 AM  Delivery Attending: Francesca Deng DO    Anesthesia: spinal    Hospital course:   Lupe Hauser is a G4-. On 25 at 0843 she delivered a viable female  36w0d via repeat  section, low transverse incision. 's birth weight was 7lbs 6.9oz; Apgars were 8 (1 min) and   9 (5 min). Placenta delivered manually without difficulty.   was delivered without issue. Patient tolerated the procedure well and was transferred to recovery in stable condition.     Her post-delivery course was complicated by hypertensive pressures which required acute treatment with IV labetalol 20 and 40 mg, she also was started on Procardia 30, and Lasix, she received 12 hours of magnesium.  On day of discharge her pressures remained within normal limits. Her preoperative hemoglobin was 11.6 g/dL, postoperative was 10.2. Her postoperative pain was well controlled with oral analgesics.. Mom's blood type is O positive, while baby's is B positive, RhoGAM was not indicated.      On day of discharge, she was ambulating and able to reasonably perform all ADLs. She was voiding and had appropriate bowel function. Pain was well controlled. She was discharged home on post-op day #2 with close follow-up with her outpatient endocrinology provider due to her type 1 diabetes. Patient was instructed to follow up with her OBGYN as an outpatient and was given appropriate warnings to call provider if she develops signs of infection or uncontrolled pain.    Postpartum Contraception: condoms    Complications: none apparent    Condition at discharge: good     Provisions for Follow-Up Care:  Please see after visit summary for information related to follow-up care and any pertinent home health orders.      Disposition: Home    Discharge Medications:   Please see AVS for a complete list of discharge medications.

## 2025-01-24 NOTE — ASSESSMENT & PLAN NOTE
CBC/CMP wnl, P:C 1.3  Current regimen Procardia 30 mg daily  Lasix 20 mg daily for 5 days  Magnesium for 24 hours started on 1/23/25 1924

## 2025-01-24 NOTE — PROGRESS NOTES
Progress Note - OB/GYN   Name: Lupe Hauser 34 y.o. female I MRN: [id number]  Unit/Bed#: -01 I Date of Admission: 2025   Date of Service: 2025 I Hospital Day: 2     Assessment & Plan  S/P  section  , Hgb 11.6 --> AM CBC 10.2  Chavez catheter removed, voiding spontaneously   DVT ppx: SCDs and Lovenox 40 mg qD  Continue routine post partum care  Encourage ambulation  Encourage breastfeeding  Anticipate discharge POD 2-4    Type 1 diabetes mellitus (HCC)  Transitioned to home insulin pump settings:  Midnight - 8 am: 1.00 U/hour   8 am - 10 pm: 1.25 U/hour   10 pm - Midnight: 1.00 U/hour   Insulin to carb ratio: 1:8   Correction factor: 1:25   Blood glucose target: 110   Active insulin time: 4 hours   Lab Results   Component Value Date    HGBA1C 7.5 (H) 2025       Recent Labs     25  2131 25  0130 25  0612 25  2331   POCGLU 183* 76 65 179*       Blood Sugar Average: Last 72 hrs:  (P) 94.95    Pyelectasis of fetus on prenatal ultrasound   renal US  Preeclampsia  CBC/CMP wnl, P:C 1.3  Current regimen Procardia 30 mg daily  Lasix 20 mg daily for 5 days  Magnesium for 24 hours started on 25 192    35 weeks gestation of pregnancy    Pregnancy with history of  section, antepartum    Fetal macrosomia during pregnancy in third trimester    GBS bacteriuria    Mild preeclampsia    Gestational hypertension, third trimester      Lupe Hauser is a patient of: [facility]. She is PPD#3 s/p Repeat low transverse  section. Recovering well and is stable.     Disposition    - Anticipate discharge home on PPD# 3-4  Barriers to discharge: None     Sadie Lowe MD  OBGYN PGY-1  2025 7:31 AM    Subjective/Objective     Chief Complaint: Postpartum State   Subjective:    Lupe Hauser was started on magnesium last night due to elevated pressures and she required 2 doses of IV labetalol 20 and 40 mg, she was started on Procardia 30 and Lasix.  " Since midnight her pressures have been within normal range her pain is well controlled. She is currently voiding. She is ambulating. Patient is currently passing flatus and has had no bowel movement. She is tolerating PO, and denies nausea or vomitting. Patient denies fever, chills, chest pain, shortness of breath, or calf tenderness. Lochia is normal. She is Breastfeeding.    Vitals:   /82 (BP Location: Right arm)   Pulse 99   Temp 97.6 °F (36.4 °C) (Oral)   Resp 16   Ht 5' 5\" (1.651 m)   Wt 109 kg (240 lb)   LMP 05/15/2024   SpO2 97%   Breastfeeding Yes   BMI 39.94 kg/m²     Intake/Output Summary (Last 24 hours) at 1/24/2025 0731  Last data filed at 1/24/2025 0031  Gross per 24 hour   Intake --   Output 3550 ml   Net -3550 ml       Physical Exam  GEN: Lupe Hauser appears well, alert and oriented x 3, pleasant and cooperative   CARDIO: Regular Rate  RESP:  non-labored breathing  ABDOMEN: soft, no tenderness, firm fundus below umbilicus, Incision C/D/I  EXTREMITIES: Non tender, trace edema appropriate for post-pregnancy, no erythema    Labs:   Recent Results (from the past 72 hours)   Comprehensive metabolic panel    Collection Time: 01/21/25  8:34 AM   Result Value Ref Range    Sodium 133 (L) 135 - 147 mmol/L    Potassium 3.9 3.5 - 5.3 mmol/L    Chloride 103 96 - 108 mmol/L    CO2 22 21 - 32 mmol/L    ANION GAP 8 4 - 13 mmol/L    BUN 12 5 - 25 mg/dL    Creatinine 0.58 (L) 0.60 - 1.30 mg/dL    Glucose 129 65 - 140 mg/dL    Calcium 9.3 8.4 - 10.2 mg/dL    Corrected Calcium 9.9 8.3 - 10.1 mg/dL    AST 16 13 - 39 U/L    ALT 13 7 - 52 U/L    Alkaline Phosphatase 158 (H) 34 - 104 U/L    Total Protein 6.6 6.4 - 8.4 g/dL    Albumin 3.2 (L) 3.5 - 5.0 g/dL    Total Bilirubin 0.35 0.20 - 1.00 mg/dL    eGFR 120 ml/min/1.73sq m   CBC and Platelet    Collection Time: 01/21/25  8:34 AM   Result Value Ref Range    WBC 12.52 (H) 4.31 - 10.16 Thousand/uL    RBC 4.31 3.81 - 5.12 Million/uL    Hemoglobin 11.6 11.5 - " 15.4 g/dL    Hematocrit 36.0 34.8 - 46.1 %    MCV 84 82 - 98 fL    MCH 26.9 26.8 - 34.3 pg    MCHC 32.2 31.4 - 37.4 g/dL    RDW 14.4 11.6 - 15.1 %    Platelets 234 149 - 390 Thousands/uL    MPV 11.9 8.9 - 12.7 fL   Protein / creatinine ratio, urine    Collection Time: 01/21/25  8:34 AM   Result Value Ref Range    Creatinine, Ur 123.2 Reference range not established. mg/dL    Protein Urine Random 160.0 Reference range not established. mg/dL    Prot/Creat Ratio, Ur 1.3 (H) 0.0 - 0.1   Lavender Top on hold    Collection Time: 01/21/25  8:34 AM   Result Value Ref Range    Extra Tube Hold for add-ons.    RPR-Syphilis Screening (Total Syphilis IGG/IGM)    Collection Time: 01/21/25  8:34 AM   Result Value Ref Range    Syphilis Total Antibody Non-reactive Non-Reactive   Type and screen    Collection Time: 01/21/25  8:34 AM   Result Value Ref Range    ABO Grouping O     Rh Factor Positive     Antibody Screen Negative     Specimen Expiration Date [id number]    Fingerstick Glucose (POCT)    Collection Time: 01/21/25  9:22 AM   Result Value Ref Range    POC Glucose 79 65 - 140 mg/dl   Fingerstick Glucose (POCT)    Collection Time: 01/21/25 10:23 AM   Result Value Ref Range    POC Glucose 65 65 - 140 mg/dl   Fingerstick Glucose (POCT)    Collection Time: 01/21/25 11:42 AM   Result Value Ref Range    POC Glucose 66 65 - 140 mg/dl   Fingerstick Glucose (POCT)    Collection Time: 01/21/25  6:27 PM   Result Value Ref Range    POC Glucose 72 65 - 140 mg/dl   Fingerstick Glucose (POCT)    Collection Time: 01/21/25  9:33 PM   Result Value Ref Range    POC Glucose 151 (H) 65 - 140 mg/dl   Fingerstick Glucose (POCT)    Collection Time: 01/22/25  1:57 AM   Result Value Ref Range    POC Glucose 52 (L) 65 - 140 mg/dl   CBC and Platelet    Collection Time: 01/22/25  2:39 AM   Result Value Ref Range    WBC 13.87 (H) 4.31 - 10.16 Thousand/uL    RBC 4.36 3.81 - 5.12 Million/uL    Hemoglobin 11.6 11.5 - 15.4 g/dL    Hematocrit 37.0 34.8 - 46.1 %     MCV 85 82 - 98 fL    MCH 26.6 (L) 26.8 - 34.3 pg    MCHC 31.4 31.4 - 37.4 g/dL    RDW 14.6 11.6 - 15.1 %    Platelets 233 149 - 390 Thousands/uL    MPV 12.1 8.9 - 12.7 fL   Comprehensive metabolic panel    Collection Time: 01/22/25  2:39 AM   Result Value Ref Range    Sodium 134 (L) 135 - 147 mmol/L    Potassium 3.7 3.5 - 5.3 mmol/L    Chloride 102 96 - 108 mmol/L    CO2 23 21 - 32 mmol/L    ANION GAP 9 4 - 13 mmol/L    BUN 11 5 - 25 mg/dL    Creatinine 0.58 (L) 0.60 - 1.30 mg/dL    Glucose 109 65 - 140 mg/dL    Calcium 9.1 8.4 - 10.2 mg/dL    Corrected Calcium 9.8 8.3 - 10.1 mg/dL    AST 16 13 - 39 U/L    ALT 13 7 - 52 U/L    Alkaline Phosphatase 168 (H) 34 - 104 U/L    Total Protein 6.6 6.4 - 8.4 g/dL    Albumin 3.1 (L) 3.5 - 5.0 g/dL    Total Bilirubin 0.38 0.20 - 1.00 mg/dL    eGFR 120 ml/min/1.73sq m   Fingerstick Glucose (POCT)    Collection Time: 01/22/25  3:00 AM   Result Value Ref Range    POC Glucose 106 65 - 140 mg/dl   Fingerstick Glucose (POCT)    Collection Time: 01/22/25  4:25 AM   Result Value Ref Range    POC Glucose 69 65 - 140 mg/dl   Fingerstick Glucose (POCT)    Collection Time: 01/22/25  5:28 AM   Result Value Ref Range    POC Glucose 62 (L) 65 - 140 mg/dl   Fingerstick Glucose (POCT)    Collection Time: 01/22/25  6:35 AM   Result Value Ref Range    POC Glucose 88 65 - 140 mg/dl   Fingerstick Glucose (POCT)    Collection Time: 01/22/25  7:38 AM   Result Value Ref Range    POC Glucose 94 65 - 140 mg/dl   Fingerstick Glucose (POCT)    Collection Time: 01/22/25  8:05 AM   Result Value Ref Range    POC Glucose 94 65 - 140 mg/dl   CORD, Blood gas, venous    Collection Time: 01/22/25  8:43 AM   Result Value Ref Range    pH, [place] 7.278 7.190 - 7.490    pCO2, [place] 56.8 (H) 27.0 - 43.0 mm HG    pO2, [place] 13.6 (L) 15.0 - 45.0 mm HG    HCO3, Cord [place] 26.0 12.2 - 28.6 mmol/L    Base Exc, Cord Ash -2.1 (L) 1.0 - 9.0 mmol/L    O2 Cont, Cord Ash 5.4 mL/dL    O2 HGB,VENOUS CORD 22.7 %    Fingerstick Glucose (POCT)    Collection Time: 01/22/25  9:30 AM   Result Value Ref Range    POC Glucose 79 65 - 140 mg/dl   Fingerstick Glucose (POCT)    Collection Time: 01/22/25 10:45 AM   Result Value Ref Range    POC Glucose 113 65 - 140 mg/dl   Fingerstick Glucose (POCT)    Collection Time: 01/22/25 11:45 AM   Result Value Ref Range    POC Glucose 113 65 - 140 mg/dl   Lansinoh Discreet Duo    Collection Time: 01/22/25  3:27 PM   Result Value Ref Range    Supplier Name [employer]     Supplier Phone Number [phone]     Order Status Completed     Delivery Note      Delivery Request Date 01/22/2025     Date Delivered  01/23/2025     Item Description [employer] Discreet Duo Wearable Breast Pump    Fingerstick Glucose (POCT)    Collection Time: 01/22/25  5:41 PM   Result Value Ref Range    POC Glucose 93 65 - 140 mg/dl   Fingerstick Glucose (POCT)    Collection Time: 01/22/25  9:31 PM   Result Value Ref Range    POC Glucose 183 (H) 65 - 140 mg/dl   Fingerstick Glucose (POCT)    Collection Time: 01/23/25  1:30 AM   Result Value Ref Range    POC Glucose 76 65 - 140 mg/dl   CBC    Collection Time: 01/23/25  5:22 AM   Result Value Ref Range    WBC 13.22 (H) 4.31 - 10.16 Thousand/uL    RBC 3.93 3.81 - 5.12 Million/uL    Hemoglobin 10.2 (L) 11.5 - 15.4 g/dL    Hematocrit 33.6 (L) 34.8 - 46.1 %    MCV 86 82 - 98 fL    MCH 26.0 (L) 26.8 - 34.3 pg    MCHC 30.4 (L) 31.4 - 37.4 g/dL    RDW 14.7 11.6 - 15.1 %    Platelets 192 149 - 390 Thousands/uL    MPV 12.1 8.9 - 12.7 fL   Fingerstick Glucose (POCT)    Collection Time: 01/23/25  6:12 AM   Result Value Ref Range    POC Glucose 65 65 - 140 mg/dl   CBC    Collection Time: 01/23/25  7:59 PM   Result Value Ref Range    WBC 13.59 (H) 4.31 - 10.16 Thousand/uL    RBC 3.86 3.81 - 5.12 Million/uL    Hemoglobin 10.1 (L) 11.5 - 15.4 g/dL    Hematocrit 33.1 (L) 34.8 - 46.1 %    MCV 86 82 - 98 fL    MCH 26.2 (L) 26.8 - 34.3 pg    MCHC 30.5 (L) 31.4 - 37.4 g/dL    RDW  14.9 11.6 - 15.1 %    Platelets 216 149 - 390 Thousands/uL    MPV 12.1 8.9 - 12.7 fL   Comprehensive metabolic panel    Collection Time: 01/23/25  7:59 PM   Result Value Ref Range    Sodium 134 (L) 135 - 147 mmol/L    Potassium 4.1 3.5 - 5.3 mmol/L    Chloride 102 96 - 108 mmol/L    CO2 24 21 - 32 mmol/L    ANION GAP 8 4 - 13 mmol/L    BUN 14 5 - 25 mg/dL    Creatinine 0.76 0.60 - 1.30 mg/dL    Glucose 235 (H) 65 - 140 mg/dL    Calcium 8.7 8.4 - 10.2 mg/dL    Corrected Calcium 9.6 8.3 - 10.1 mg/dL    AST 26 13 - 39 U/L    ALT 13 7 - 52 U/L    Alkaline Phosphatase 127 (H) 34 - 104 U/L    Total Protein 6.0 (L) 6.4 - 8.4 g/dL    Albumin 2.9 (L) 3.5 - 5.0 g/dL    Total Bilirubin 0.29 0.20 - 1.00 mg/dL    eGFR 102 ml/min/1.73sq m   Magnesium    Collection Time: 01/23/25  7:59 PM   Result Value Ref Range    Magnesium 3.5 (H) 1.9 - 2.7 mg/dL   Uric acid    Collection Time: 01/23/25  7:59 PM   Result Value Ref Range    Uric Acid 5.1 2.0 - 7.5 mg/dL   Fingerstick Glucose (POCT)    Collection Time: 01/23/25 11:31 PM   Result Value Ref Range    POC Glucose 179 (H) 65 - 140 mg/dl   CBC    Collection Time: 01/24/25  1:49 AM   Result Value Ref Range    WBC 13.14 (H) 4.31 - 10.16 Thousand/uL    RBC 4.13 3.81 - 5.12 Million/uL    Hemoglobin 11.0 (L) 11.5 - 15.4 g/dL    Hematocrit 34.8 34.8 - 46.1 %    MCV 84 82 - 98 fL    MCH 26.6 (L) 26.8 - 34.3 pg    MCHC 31.6 31.4 - 37.4 g/dL    RDW 14.9 11.6 - 15.1 %    Platelets 234 149 - 390 Thousands/uL    MPV 11.7 8.9 - 12.7 fL   Comprehensive metabolic panel    Collection Time: 01/24/25  1:49 AM   Result Value Ref Range    Sodium 134 (L) 135 - 147 mmol/L    Potassium 4.2 3.5 - 5.3 mmol/L    Chloride 98 96 - 108 mmol/L    CO2 28 21 - 32 mmol/L    ANION GAP 8 4 - 13 mmol/L    BUN 12 5 - 25 mg/dL    Creatinine 0.77 0.60 - 1.30 mg/dL    Glucose 166 (H) 65 - 140 mg/dL    Calcium 9.0 8.4 - 10.2 mg/dL    Corrected Calcium 9.7 8.3 - 10.1 mg/dL    AST 24 13 - 39 U/L    ALT 14 7 - 52 U/L    Alkaline  Phosphatase 128 (H) 34 - 104 U/L    Total Protein 6.5 6.4 - 8.4 g/dL    Albumin 3.1 (L) 3.5 - 5.0 g/dL    Total Bilirubin 0.35 0.20 - 1.00 mg/dL    eGFR 101 ml/min/1.73sq m   Magnesium    Collection Time: 01/24/25  1:49 AM   Result Value Ref Range    Magnesium 3.9 (H) 1.9 - 2.7 mg/dL   Uric acid    Collection Time: 01/24/25  1:49 AM   Result Value Ref Range    Uric Acid 5.2 2.0 - 7.5 mg/dL     Meds:    Current Facility-Administered Medications:     acetaminophen (TYLENOL) tablet 650 mg, 650 mg, Oral, Q6H IRAJ, Chloe Marin MD, 650 mg at 01/24/25 0413    albuterol inhalation solution 2.5 mg, 2.5 mg, Nebulization, Once PRN, Ellie Schroeder CRNA    benzocaine-menthol-lanolin-aloe (DERMOPLAST) 20-0.5 % topical spray 1 Application, 1 Application, Topical, Q6H PRN, Celi Roblero MD    calcium carbonate (TUMS) chewable tablet 1,000 mg, 1,000 mg, Oral, Daily PRN, Celi Roblero MD    calcium gluconate 10 % injection 1 g, 1 g, Intravenous, Once PRN, Chloe Marin MD    diphenhydrAMINE (BENADRYL) injection 12.5 mg, 12.5 mg, Intravenous, Once PRN, Ellie Schroeder CRNA    diphenhydrAMINE (BENADRYL) injection 25 mg, 25 mg, Intravenous, Q6H PRN, Celi Roblero MD    docusate sodium (COLACE) capsule 100 mg, 100 mg, Oral, BID, Celi Roblero MD, 100 mg at 01/23/25 1728    enoxaparin (LOVENOX) subcutaneous injection 40 mg, 40 mg, Subcutaneous, Daily, Celi Roblero MD, 40 mg at 01/23/25 0833    fentaNYL (SUBLIMAZE) injection 50 mcg, 50 mcg, Intravenous, Q3 min PRN, Ellie Schroeder CRNA    furosemide (LASIX) tablet 20 mg, 20 mg, Oral, Daily, Ceil Roblero MD, 20 mg at 01/23/25 1728    hydrocortisone 1 % cream 1 Application, 1 Application, Topical, Daily PRN, Celi Roblero MD    HYDROmorphone (DILAUDID) injection 0.5 mg, 0.5 mg, Intravenous, Q5 Min PRN, Ellie Schroeder CRNA    HYDROmorphone (DILAUDID) injection 0.5 mg, 0.5 mg, Intravenous, Q2H PRN, Ellie Schroeder CRNA, 0.5 mg at 01/24/25 0617    hydrOXYzine HCL (ATARAX)  tablet 25 mg, 25 mg, Oral, Q6H PRN, Ellie Schroeder CRNA    [COMPLETED] ketorolac (TORADOL) injection 30 mg, 30 mg, Intravenous, Q6H IRAJ, 30 mg at 01/24/25 0534 **FOLLOWED BY** ibuprofen (MOTRIN) tablet 600 mg, 600 mg, Oral, Q6H, Chloe Marin MD    insulin aspart (NovoLOG) FOR PUMP REFILLS 100 Units, 100 Units, Subcutaneous Insulin Pump, Daily PRN, Celi Roblero MD, 100 Units at 01/22/25 2140    lactated ringers infusion, 50 mL/hr, Intravenous, Continuous, Chloe Marin MD, Last Rate: 50 mL/hr at 01/23/25 1907, 50 mL/hr at 01/23/25 1907    magnesium sulfate 20 g/500 mL infusion (premix), 2 g/hr, Intravenous, Continuous, Chloe Marin MD, Last Rate: 50 mL/hr at 01/24/25 0533, 2 g/hr at 01/24/25 0533    metoclopramide (REGLAN) injection 10 mg, 10 mg, Intravenous, Q6H PRN, Celi Roblero MD    NIFEdipine (PROCARDIA XL) 24 hr tablet 30 mg, 30 mg, Oral, Daily, Celi Roblero MD, 30 mg at 01/23/25 1728    ondansetron (ZOFRAN) injection 4 mg, 4 mg, Intravenous, Q8H PRN, Celi Roblero MD    oxyCODONE (ROXICODONE) immediate release tablet 10 mg, 10 mg, Oral, Q4H PRN, Celi Roblero MD    oxyCODONE (ROXICODONE) IR tablet 5 mg, 5 mg, Oral, Q4H PRN, Celi Roblero MD    PATIENT MAINTAINED INSULIN PUMP 1 each, 1 each, Subcutaneous, Q8H, Celi Roblero MD, 1 each at 01/24/25 0415    simethicone (MYLICON) chewable tablet 80 mg, 80 mg, Oral, 4x Daily PRN, Celi Roblero MD    sodium chloride 0.9 % infusion, 125 mL/hr, Intravenous, Continuous, Celi Roblero MD, Last Rate: 125 mL/hr at 01/22/25 1437, 125 mL/hr at 01/22/25 1437    witch hazel-glycerin (TUCKS) topical pad 1 Pad, 1 Pad, Topical, Q4H PRN, Celi Roblero MD

## 2025-01-24 NOTE — ASSESSMENT & PLAN NOTE
CBC/CMP wnl, P:C 1.3  Current regimen Procardia 30 mg daily  Lasix 20 mg daily (1/21-1/25)  Magnesium for 12 hours started on 1/23/25 1924

## 2025-01-25 VITALS
WEIGHT: 240 LBS | TEMPERATURE: 98 F | RESPIRATION RATE: 18 BRPM | DIASTOLIC BLOOD PRESSURE: 90 MMHG | SYSTOLIC BLOOD PRESSURE: 135 MMHG | OXYGEN SATURATION: 97 % | HEART RATE: 100 BPM | HEIGHT: 65 IN | BODY MASS INDEX: 39.99 KG/M2

## 2025-01-25 LAB
GLUCOSE SERPL-MCNC: 177 MG/DL (ref 65–140)
GLUCOSE SERPL-MCNC: 90 MG/DL (ref 65–140)

## 2025-01-25 PROCEDURE — 82948 REAGENT STRIP/BLOOD GLUCOSE: CPT

## 2025-01-25 PROCEDURE — NC001 PR NO CHARGE: Performed by: OBSTETRICS & GYNECOLOGY

## 2025-01-25 PROCEDURE — 99024 POSTOP FOLLOW-UP VISIT: CPT | Performed by: OBSTETRICS & GYNECOLOGY

## 2025-01-25 RX ORDER — NIFEDIPINE 30 MG
30 TABLET, EXTENDED RELEASE ORAL DAILY
Qty: 30 TABLET | Refills: 0 | Status: SHIPPED | OUTPATIENT
Start: 2025-01-25

## 2025-01-25 RX ORDER — IBUPROFEN 600 MG/1
600 TABLET, FILM COATED ORAL EVERY 6 HOURS SCHEDULED
Qty: 30 TABLET | Refills: 0 | Status: SHIPPED | OUTPATIENT
Start: 2025-01-25 | End: 2025-01-29

## 2025-01-25 RX ORDER — OXYCODONE HYDROCHLORIDE 5 MG/1
5 TABLET ORAL EVERY 4 HOURS PRN
Qty: 7 TABLET | Refills: 0 | Status: SHIPPED | OUTPATIENT
Start: 2025-01-25 | End: 2025-01-29

## 2025-01-25 RX ORDER — FUROSEMIDE 20 MG/1
20 TABLET ORAL DAILY
Qty: 3 TABLET | Refills: 0 | Status: SHIPPED | OUTPATIENT
Start: 2025-01-25 | End: 2025-01-28

## 2025-01-25 RX ORDER — BENZOCAINE/MENTHOL 6 MG-10 MG
1 LOZENGE MUCOUS MEMBRANE DAILY PRN
Start: 2025-01-25 | End: 2025-01-29

## 2025-01-25 RX ORDER — DOCUSATE SODIUM 100 MG/1
100 CAPSULE, LIQUID FILLED ORAL 2 TIMES DAILY
Qty: 60 CAPSULE | Refills: 0 | Status: SHIPPED | OUTPATIENT
Start: 2025-01-25 | End: 2025-01-29

## 2025-01-25 RX ADMIN — ENOXAPARIN SODIUM 40 MG: 40 INJECTION SUBCUTANEOUS at 09:13

## 2025-01-25 RX ADMIN — DOCUSATE SODIUM 100 MG: 100 CAPSULE, LIQUID FILLED ORAL at 09:13

## 2025-01-25 RX ADMIN — FUROSEMIDE 20 MG: 20 TABLET ORAL at 09:13

## 2025-01-25 RX ADMIN — ACETAMINOPHEN 650 MG: 325 TABLET, FILM COATED ORAL at 00:07

## 2025-01-25 RX ADMIN — NIFEDIPINE 30 MG: 30 TABLET, FILM COATED, EXTENDED RELEASE ORAL at 09:13

## 2025-01-25 RX ADMIN — HYDROMORPHONE HYDROCHLORIDE 0.5 MG: 1 INJECTION, SOLUTION INTRAMUSCULAR; INTRAVENOUS; SUBCUTANEOUS at 00:22

## 2025-01-25 RX ADMIN — IBUPROFEN 600 MG: 600 TABLET, FILM COATED ORAL at 06:25

## 2025-01-25 RX ADMIN — ACETAMINOPHEN 650 MG: 325 TABLET, FILM COATED ORAL at 06:25

## 2025-01-25 NOTE — PROGRESS NOTES
Progress Note - OB/GYN   Name: Lupe Hauser 34 y.o. female I MRN: [id number]  Unit/Bed#: -01 I Date of Admission: 2025   Date of Service: 2025 I Hospital Day: 3     Assessment & Plan  S/P  section  , Hgb 11.6 --> 10.2  Chavez catheter removed, voiding spontaneously   DVT ppx: SCDs and Lovenox 40 mg qD  Continue routine post partum care  Encourage ambulation  Encourage breastfeeding  Anticipate discharge POD 2-4    Type 1 diabetes mellitus (HCC)  Managed by Endocrinology Outpatient    Transitioned to home insulin pump settings:  Midnight - 8 am: 1.00 U/hour   8 am - 10 pm: 1.25 U/hour   10 pm - Midnight: 1.00 U/hour   Insulin to carb ratio: 1:8   Correction factor: 1:25   Blood glucose target: 110   Active insulin time: 4 hours   Lab Results   Component Value Date    HGBA1C 7.5 (H) 2025       Recent Labs     25  1108 25  1931 25  2359 25  0629   POCGLU 195* 181* 177* 90       Blood Sugar Average: Last 72 hrs:  (P) 116.8    Pyelectasis of fetus on prenatal ultrasound   renal US  Preeclampsia  CBC/CMP wnl, P:C 1.3  Current regimen Procardia 30 mg daily  Lasix 20 mg daily (-)  Magnesium for 12 hours started on 25 1924    37 weeks gestation of pregnancy (Resolved: 2025)    Pregnancy with history of  section, antepartum  - RLTCS uncomplicated  Fetal macrosomia during pregnancy in third trimester  Baby Weight 7lb 6.9 oz  GBS bacteriuria    Mild preeclampsia  - BP as above  Gestational hypertension, third trimester  - BP as above    Lupe Hauser is a patient of: [facility]. She is PPD#3 s/p Repeat low transverse  section. Recovering well and is stable.     Disposition    - Anticipate discharge home on PPD# 3-4  Barriers to discharge:  Blood Sugar Control     Sadie Lowe MD  OBGYN PGY-1  2025 10:58 AM    Subjective/Objective     Chief Complaint: Postpartum State   Subjective:    Lupe Hauser has no current  "complaints and had no acute events overnight. Her blood sugar control has been poor and monitoring her sugars has been complicated by her not wearing her glucose sensor and infrequent fingersticks. Her pain is well controlled. She is currently voiding. She is ambulating. Patient is currently passing flatus and has had bowel movement. She is tolerating PO, and denies nausea or vomitting. Patient denies fever, chills, chest pain, shortness of breath, or calf tenderness. Lochia is normal. She is Breastfeeding and Bottle feeding.    Vitals:   /90   Pulse 100   Temp 98 °F (36.7 °C) (Oral)   Resp 18   Ht 5' 5\" (1.651 m)   Wt 109 kg (240 lb)   LMP 05/15/2024   SpO2 97%   Breastfeeding Yes   BMI 39.94 kg/m²   No intake or output data in the 24 hours ending 01/25/25 1058    Physical Exam  GEN: Lupe Hauser appears well, alert and oriented x 3, pleasant and cooperative   CARDIO: Regular Rate  RESP:  non-labored breathing  ABDOMEN: soft, no tenderness, firm fundus below umbilicus, Incision C/D/I  EXTREMITIES: Non tender, trace edema appropriate for post-pregnancy, no erythema    Labs:   Recent Results (from the past 72 hours)   Fingerstick Glucose (POCT)    Collection Time: 01/22/25 11:45 AM   Result Value Ref Range    POC Glucose 113 65 - 140 mg/dl   Lansinoh Discreet Duo    Collection Time: 01/22/25  3:27 PM   Result Value Ref Range    Supplier Name [employer]     Supplier Phone Number [phone]     Order Status Completed     Delivery Note      Delivery Request Date 01/22/2025     Date Delivered  01/23/2025     Item Description Lansinoh Discreet Duo Wearable Breast Pump    Fingerstick Glucose (POCT)    Collection Time: 01/22/25  5:41 PM   Result Value Ref Range    POC Glucose 93 65 - 140 mg/dl   Fingerstick Glucose (POCT)    Collection Time: 01/22/25  9:31 PM   Result Value Ref Range    POC Glucose 183 (H) 65 - 140 mg/dl   Fingerstick Glucose (POCT)    Collection Time: 01/23/25  1:30 AM "   Result Value Ref Range    POC Glucose 76 65 - 140 mg/dl   CBC    Collection Time: 01/23/25  5:22 AM   Result Value Ref Range    WBC 13.22 (H) 4.31 - 10.16 Thousand/uL    RBC 3.93 3.81 - 5.12 Million/uL    Hemoglobin 10.2 (L) 11.5 - 15.4 g/dL    Hematocrit 33.6 (L) 34.8 - 46.1 %    MCV 86 82 - 98 fL    MCH 26.0 (L) 26.8 - 34.3 pg    MCHC 30.4 (L) 31.4 - 37.4 g/dL    RDW 14.7 11.6 - 15.1 %    Platelets 192 149 - 390 Thousands/uL    MPV 12.1 8.9 - 12.7 fL   Fingerstick Glucose (POCT)    Collection Time: 01/23/25  6:12 AM   Result Value Ref Range    POC Glucose 65 65 - 140 mg/dl   CBC    Collection Time: 01/23/25  7:59 PM   Result Value Ref Range    WBC 13.59 (H) 4.31 - 10.16 Thousand/uL    RBC 3.86 3.81 - 5.12 Million/uL    Hemoglobin 10.1 (L) 11.5 - 15.4 g/dL    Hematocrit 33.1 (L) 34.8 - 46.1 %    MCV 86 82 - 98 fL    MCH 26.2 (L) 26.8 - 34.3 pg    MCHC 30.5 (L) 31.4 - 37.4 g/dL    RDW 14.9 11.6 - 15.1 %    Platelets 216 149 - 390 Thousands/uL    MPV 12.1 8.9 - 12.7 fL   Comprehensive metabolic panel    Collection Time: 01/23/25  7:59 PM   Result Value Ref Range    Sodium 134 (L) 135 - 147 mmol/L    Potassium 4.1 3.5 - 5.3 mmol/L    Chloride 102 96 - 108 mmol/L    CO2 24 21 - 32 mmol/L    ANION GAP 8 4 - 13 mmol/L    BUN 14 5 - 25 mg/dL    Creatinine 0.76 0.60 - 1.30 mg/dL    Glucose 235 (H) 65 - 140 mg/dL    Calcium 8.7 8.4 - 10.2 mg/dL    Corrected Calcium 9.6 8.3 - 10.1 mg/dL    AST 26 13 - 39 U/L    ALT 13 7 - 52 U/L    Alkaline Phosphatase 127 (H) 34 - 104 U/L    Total Protein 6.0 (L) 6.4 - 8.4 g/dL    Albumin 2.9 (L) 3.5 - 5.0 g/dL    Total Bilirubin 0.29 0.20 - 1.00 mg/dL    eGFR 102 ml/min/1.73sq m   Magnesium    Collection Time: 01/23/25  7:59 PM   Result Value Ref Range    Magnesium 3.5 (H) 1.9 - 2.7 mg/dL   Uric acid    Collection Time: 01/23/25  7:59 PM   Result Value Ref Range    Uric Acid 5.1 2.0 - 7.5 mg/dL   Fingerstick Glucose (POCT)    Collection Time: 01/23/25 11:31 PM   Result Value Ref Range     POC Glucose 179 (H) 65 - 140 mg/dl   CBC    Collection Time: 01/24/25  1:49 AM   Result Value Ref Range    WBC 13.14 (H) 4.31 - 10.16 Thousand/uL    RBC 4.13 3.81 - 5.12 Million/uL    Hemoglobin 11.0 (L) 11.5 - 15.4 g/dL    Hematocrit 34.8 34.8 - 46.1 %    MCV 84 82 - 98 fL    MCH 26.6 (L) 26.8 - 34.3 pg    MCHC 31.6 31.4 - 37.4 g/dL    RDW 14.9 11.6 - 15.1 %    Platelets 234 149 - 390 Thousands/uL    MPV 11.7 8.9 - 12.7 fL   Comprehensive metabolic panel    Collection Time: 01/24/25  1:49 AM   Result Value Ref Range    Sodium 134 (L) 135 - 147 mmol/L    Potassium 4.2 3.5 - 5.3 mmol/L    Chloride 98 96 - 108 mmol/L    CO2 28 21 - 32 mmol/L    ANION GAP 8 4 - 13 mmol/L    BUN 12 5 - 25 mg/dL    Creatinine 0.77 0.60 - 1.30 mg/dL    Glucose 166 (H) 65 - 140 mg/dL    Calcium 9.0 8.4 - 10.2 mg/dL    Corrected Calcium 9.7 8.3 - 10.1 mg/dL    AST 24 13 - 39 U/L    ALT 14 7 - 52 U/L    Alkaline Phosphatase 128 (H) 34 - 104 U/L    Total Protein 6.5 6.4 - 8.4 g/dL    Albumin 3.1 (L) 3.5 - 5.0 g/dL    Total Bilirubin 0.35 0.20 - 1.00 mg/dL    eGFR 101 ml/min/1.73sq m   Magnesium    Collection Time: 01/24/25  1:49 AM   Result Value Ref Range    Magnesium 3.9 (H) 1.9 - 2.7 mg/dL   Uric acid    Collection Time: 01/24/25  1:49 AM   Result Value Ref Range    Uric Acid 5.2 2.0 - 7.5 mg/dL   Fingerstick Glucose (POCT)    Collection Time: 01/24/25  7:40 AM   Result Value Ref Range    POC Glucose 227 (H) 65 - 140 mg/dl   CBC    Collection Time: 01/24/25  7:54 AM   Result Value Ref Range    WBC 12.19 (H) 4.31 - 10.16 Thousand/uL    RBC 4.09 3.81 - 5.12 Million/uL    Hemoglobin 10.8 (L) 11.5 - 15.4 g/dL    Hematocrit 34.7 (L) 34.8 - 46.1 %    MCV 85 82 - 98 fL    MCH 26.4 (L) 26.8 - 34.3 pg    MCHC 31.1 (L) 31.4 - 37.4 g/dL    RDW 15.1 11.6 - 15.1 %    Platelets 228 149 - 390 Thousands/uL    MPV 11.9 8.9 - 12.7 fL   Comprehensive metabolic panel    Collection Time: 01/24/25  7:54 AM   Result Value Ref Range    Sodium 134 (L) 135 - 147  mmol/L    Potassium 4.3 3.5 - 5.3 mmol/L    Chloride 97 96 - 108 mmol/L    CO2 29 21 - 32 mmol/L    ANION GAP 8 4 - 13 mmol/L    BUN 11 5 - 25 mg/dL    Creatinine 0.69 0.60 - 1.30 mg/dL    Glucose 241 (H) 65 - 140 mg/dL    Calcium 8.3 (L) 8.4 - 10.2 mg/dL    Corrected Calcium 9.1 8.3 - 10.1 mg/dL    AST 23 13 - 39 U/L    ALT 14 7 - 52 U/L    Alkaline Phosphatase 125 (H) 34 - 104 U/L    Total Protein 6.4 6.4 - 8.4 g/dL    Albumin 3.0 (L) 3.5 - 5.0 g/dL    Total Bilirubin 0.36 0.20 - 1.00 mg/dL    eGFR 113 ml/min/1.73sq m   Magnesium    Collection Time: 01/24/25  7:54 AM   Result Value Ref Range    Magnesium 4.1 (H) 1.9 - 2.7 mg/dL   Uric acid    Collection Time: 01/24/25  7:54 AM   Result Value Ref Range    Uric Acid 4.9 2.0 - 7.5 mg/dL   Fingerstick Glucose (POCT)    Collection Time: 01/24/25 11:08 AM   Result Value Ref Range    POC Glucose 195 (H) 65 - 140 mg/dl   Fingerstick Glucose (POCT)    Collection Time: 01/24/25  7:31 PM   Result Value Ref Range    POC Glucose 181 (H) 65 - 140 mg/dl   Fingerstick Glucose (POCT)    Collection Time: 01/24/25 11:59 PM   Result Value Ref Range    POC Glucose 177 (H) 65 - 140 mg/dl   Fingerstick Glucose (POCT)    Collection Time: 01/25/25  6:29 AM   Result Value Ref Range    POC Glucose 90 65 - 140 mg/dl     Meds:    Current Facility-Administered Medications:     acetaminophen (TYLENOL) tablet 650 mg, 650 mg, Oral, Q6H IRAJChloe MD, 650 mg at 01/25/25 0625    albuterol inhalation solution 2.5 mg, 2.5 mg, Nebulization, Once PRN, Ellie Schroeder CRNA    benzocaine-menthol-lanolin-aloe (DERMOPLAST) 20-0.5 % topical spray 1 Application, 1 Application, Topical, Q6H PRN, Celi Roblero MD    calcium carbonate (TUMS) chewable tablet 1,000 mg, 1,000 mg, Oral, Daily PRN, Celi Roblero MD    calcium gluconate 10 % injection 1 g, 1 g, Intravenous, Once PRN, Chloe Marin MD    diphenhydrAMINE (BENADRYL) injection 12.5 mg, 12.5 mg, Intravenous, Once PRN, Ellie Schroeder CRNA     diphenhydrAMINE (BENADRYL) injection 25 mg, 25 mg, Intravenous, Q6H PRN, Celi Roblero MD    docusate sodium (COLACE) capsule 100 mg, 100 mg, Oral, BID, Celi Roblero MD, 100 mg at 01/25/25 0913    enoxaparin (LOVENOX) subcutaneous injection 40 mg, 40 mg, Subcutaneous, Daily, Celi Roblero MD, 40 mg at 01/25/25 0913    fentaNYL (SUBLIMAZE) injection 50 mcg, 50 mcg, Intravenous, Q3 min PRN, Ellie Schroeder CRNA    furosemide (LASIX) tablet 20 mg, 20 mg, Oral, Daily, Celi Roblero MD, 20 mg at 01/25/25 0913    hydrocortisone 1 % cream 1 Application, 1 Application, Topical, Daily PRN, Celi Roblero MD    HYDROmorphone (DILAUDID) injection 0.5 mg, 0.5 mg, Intravenous, Q5 Min PRN, Ellie Schroeder CRNA    HYDROmorphone (DILAUDID) injection 0.5 mg, 0.5 mg, Intravenous, Q2H PRN, Ellie Schroeder CRNA, 0.5 mg at 01/25/25 0022    hydrOXYzine HCL (ATARAX) tablet 25 mg, 25 mg, Oral, Q6H PRN, Ellie Schroeder CRNA    ibuprofen (MOTRIN) tablet 600 mg, 600 mg, Oral, Q6H IRAJ, Sadie Lowe MD, 600 mg at 01/25/25 0625    insulin aspart (NovoLOG) FOR PUMP REFILLS 100 Units, 100 Units, Subcutaneous Insulin Pump, Daily PRN, Celi Roblero MD, 100 Units at 01/22/25 2140    lactated ringers infusion, 50 mL/hr, Intravenous, Continuous, Chloe Marin MD, Last Rate: 50 mL/hr at 01/23/25 1907, 50 mL/hr at 01/23/25 1907    metoclopramide (REGLAN) injection 10 mg, 10 mg, Intravenous, Q6H PRN, Celi Roblero MD    NIFEdipine (PROCARDIA XL) 24 hr tablet 30 mg, 30 mg, Oral, Daily, Celi Roblero MD, 30 mg at 01/25/25 0913    ondansetron (ZOFRAN) injection 4 mg, 4 mg, Intravenous, Q8H PRN, Celi Roblero MD    oxyCODONE (ROXICODONE) immediate release tablet 10 mg, 10 mg, Oral, Q4H PRN, Celi Roblero MD    oxyCODONE (ROXICODONE) IR tablet 5 mg, 5 mg, Oral, Q4H PRN, Celi Roblero MD    PATIENT MAINTAINED INSULIN PUMP 1 each, 1 each, Subcutaneous, Q8H, Celi Roblero MD, 1 each at 01/25/25 0600    simethicone  (MYLICON) chewable tablet 80 mg, 80 mg, Oral, 4x Daily PRN, Celi Roblero MD, 80 mg at 01/24/25 0818    witch hazel-glycerin (TUCKS) topical pad 1 Pad, 1 Pad, Topical, Q4H PRN, Celi Roblero MD

## 2025-01-25 NOTE — PLAN OF CARE
Problem: PAIN - ADULT  Goal: Verbalizes/displays adequate comfort level or baseline comfort level  Description: Interventions:  - Encourage patient to monitor pain and request assistance  - Assess pain using appropriate pain scale  - Administer analgesics based on type and severity of pain and evaluate response  - Implement non-pharmacological measures as appropriate and evaluate response  - Consider cultural and social influences on pain and pain management  - Notify physician/advanced practitioner if interventions unsuccessful or patient reports new pain  Outcome: Adequate for Discharge     Problem: INFECTION - ADULT  Goal: Absence or prevention of progression during hospitalization  Description: INTERVENTIONS:  - Assess and monitor for signs and symptoms of infection  - Monitor lab/diagnostic results  - Monitor all insertion sites, i.e. indwelling lines, tubes, and drains  - Monitor endotracheal if appropriate and nasal secretions for changes in amount and color  - [place] appropriate cooling/warming therapies per order  - Administer medications as ordered  - Instruct and encourage patient and family to use good hand hygiene technique  - Identify and instruct in appropriate isolation precautions for identified infection/condition  Outcome: Adequate for Discharge  Goal: Absence of fever/infection during neutropenic period  Description: INTERVENTIONS:  - Monitor WBC    Outcome: Adequate for Discharge     Problem: SAFETY ADULT  Goal: Patient will remain free of falls  Description: INTERVENTIONS:  - Educate patient/family on patient safety including physical limitations  - Instruct patient to call for assistance with activity   - Consult OT/PT to assist with strengthening/mobility   - Keep Call bell within reach  - Keep bed low and locked with side rails adjusted as appropriate  - Keep care items and personal belongings within reach  - Initiate and maintain comfort rounds  - Make Fall Risk Sign visible to  staff  - Apply yellow socks and bracelet for high fall risk patients  - Consider moving patient to room near nurses station  Outcome: Adequate for Discharge  Goal: Maintain or return to baseline ADL function  Description: INTERVENTIONS:  -  Assess patient's ability to carry out ADLs; assess patient's baseline for ADL function and identify physical deficits which impact ability to perform ADLs (bathing, care of mouth/teeth, toileting, grooming, dressing, etc.)  - Assess/evaluate cause of self-care deficits   - Assess range of motion  - Assess patient's mobility; develop plan if impaired  - Assess patient's need for assistive devices and provide as appropriate  - Encourage maximum independence but intervene and supervise when necessary  - Involve family in performance of ADLs  - Assess for home care needs following discharge   - Consider OT consult to assist with ADL evaluation and planning for discharge  - Provide patient education as appropriate  Outcome: Adequate for Discharge  Goal: Maintains/Returns to pre admission functional level  Description: INTERVENTIONS:  - Perform AM-PAC 6 Click Basic Mobility/ Daily Activity assessment daily.  - Set and communicate daily mobility goal to care team and patient/family/caregiver.   - Out of bed for toileting  - Record patient progress and toleration of activity level   Outcome: Adequate for Discharge     Problem: Knowledge Deficit  Goal: Patient/family/caregiver demonstrates understanding of disease process, treatment plan, medications, and discharge instructions  Description: Complete learning assessment and assess knowledge base.  Interventions:  - Provide teaching at level of understanding  - Provide teaching via preferred learning methods  Outcome: Adequate for Discharge     Problem: DISCHARGE PLANNING  Goal: Discharge to home or other facility with appropriate resources  Description: INTERVENTIONS:  - Identify barriers to discharge w/patient and caregiver  - Arrange  for needed discharge resources and transportation as appropriate  - Identify discharge learning needs (meds, wound care, etc.)  - Arrange for interpretive services to assist at discharge as needed  - Refer to Case Management Department for coordinating discharge planning if the patient needs post-hospital services based on physician/advanced practitioner order or complex needs related to functional status, cognitive ability, or social support system  Outcome: Adequate for Discharge     Problem: POSTPARTUM  Goal: Experiences normal postpartum course  Description: INTERVENTIONS:  - Monitor maternal vital signs  - Assess uterine involution and lochia  Outcome: Adequate for Discharge  Goal: Appropriate maternal -  bonding  Description: INTERVENTIONS:  - Identify family support  - Assess for appropriate maternal/infant bonding   -Encourage maternal/infant bonding opportunities  - Referral to  or  as needed  Outcome: Adequate for Discharge  Goal: Establishment of infant feeding pattern  Description: INTERVENTIONS:  - Assess breast/bottle feeding  - Refer to lactation as needed  Outcome: Adequate for Discharge  Goal: Incision(s), wounds(s) or drain site(s) healing without S/S of infection  Description: INTERVENTIONS  - Assess and document dressing, incision, wound bed, drain sites and surrounding tissue  - Provide patient and family education  Outcome: Adequate for Discharge

## 2025-01-25 NOTE — PLAN OF CARE
Problem: PAIN - ADULT  Goal: Verbalizes/displays adequate comfort level or baseline comfort level  Description: Interventions:  - Encourage patient to monitor pain and request assistance  - Assess pain using appropriate pain scale  - Administer analgesics based on type and severity of pain and evaluate response  - Implement non-pharmacological measures as appropriate and evaluate response  - Consider cultural and social influences on pain and pain management  - Notify physician/advanced practitioner if interventions unsuccessful or patient reports new pain  Outcome: Progressing     Problem: INFECTION - ADULT  Goal: Absence or prevention of progression during hospitalization  Description: INTERVENTIONS:  - Assess and monitor for signs and symptoms of infection  - Monitor lab/diagnostic results  - Monitor all insertion sites, i.e. indwelling lines, tubes, and drains  - Monitor endotracheal if appropriate and nasal secretions for changes in amount and color  - [place] appropriate cooling/warming therapies per order  - Administer medications as ordered  - Instruct and encourage patient and family to use good hand hygiene technique  - Identify and instruct in appropriate isolation precautions for identified infection/condition  Outcome: Progressing  Goal: Absence of fever/infection during neutropenic period  Description: INTERVENTIONS:  - Monitor WBC    Outcome: Progressing     Problem: SAFETY ADULT  Goal: Patient will remain free of falls  Description: INTERVENTIONS:  - Educate patient/family on patient safety including physical limitations  - Instruct patient to call for assistance with activity   - Consult OT/PT to assist with strengthening/mobility   - Keep Call bell within reach  - Keep bed low and locked with side rails adjusted as appropriate  - Keep care items and personal belongings within reach  - Initiate and maintain comfort rounds  - Consider moving patient to room near nurses station  Outcome:  Progressing  Goal: Maintain or return to baseline ADL function  Description: INTERVENTIONS:  -  Assess patient's ability to carry out ADLs; assess patient's baseline for ADL function and identify physical deficits which impact ability to perform ADLs (bathing, care of mouth/teeth, toileting, grooming, dressing, etc.)  - Assess/evaluate cause of self-care deficits   - Assess range of motion  - Assess patient's mobility; develop plan if impaired  - Assess patient's need for assistive devices and provide as appropriate  - Encourage maximum independence but intervene and supervise when necessary  - Involve family in performance of ADLs  - Assess for home care needs following discharge   - Consider OT consult to assist with ADL evaluation and planning for discharge  - Provide patient education as appropriate  Outcome: Progressing  Goal: Maintains/Returns to pre admission functional level  Description: INTERVENTIONS:  - Perform AM-PAC 6 Click Basic Mobility/ Daily Activity assessment daily.  - Set and communicate daily mobility goal to care team and patient/family/caregiver.   - Collaborate with rehabilitation services on mobility goals if consulted  - Out of bed for toileting  - Record patient progress and toleration of activity level   Outcome: Progressing     Problem: Knowledge Deficit  Goal: Patient/family/caregiver demonstrates understanding of disease process, treatment plan, medications, and discharge instructions  Description: Complete learning assessment and assess knowledge base.  Interventions:  - Provide teaching at level of understanding  - Provide teaching via preferred learning methods  Outcome: Progressing     Problem: DISCHARGE PLANNING  Goal: Discharge to home or other facility with appropriate resources  Description: INTERVENTIONS:  - Identify barriers to discharge w/patient and caregiver  - Arrange for needed discharge resources and transportation as appropriate  - Identify discharge learning needs  (meds, wound care, etc.)  - Arrange for interpretive services to assist at discharge as needed  - Refer to Case Management Department for coordinating discharge planning if the patient needs post-hospital services based on physician/advanced practitioner order or complex needs related to functional status, cognitive ability, or social support system  Outcome: Progressing     Problem: POSTPARTUM  Goal: Experiences normal postpartum course  Description: INTERVENTIONS:  - Monitor maternal vital signs  - Assess uterine involution and lochia  Outcome: Progressing  Goal: Appropriate maternal -  bonding  Description: INTERVENTIONS:  - Identify family support  - Assess for appropriate maternal/infant bonding   -Encourage maternal/infant bonding opportunities  - Referral to  or  as needed  Outcome: Progressing  Goal: Establishment of infant feeding pattern  Description: INTERVENTIONS:  - Assess breast/bottle feeding  - Refer to lactation as needed  Outcome: Progressing  Goal: Incision(s), wounds(s) or drain site(s) healing without S/S of infection  Description: INTERVENTIONS  - Assess and document dressing, incision, wound bed, drain sites and surrounding tissue  - Provide patient and family education  - Perform skin care/dressing changes every   Outcome: Progressing

## 2025-01-27 NOTE — UTILIZATION REVIEW
"NOTIFICATION OF INPATIENT ADMISSION   MATERNITY/DELIVERY AUTHORIZATION REQUEST   SERVICING FACILITY:   [facility]  Parent Child Health - L&D, [place], NICU  [address] [place] [address]  Tax ID: [id number]  NPI: [id number]   ATTENDING PROVIDER:  Attending Name and NPI#: Francesca Deng Do [[id number]]  Address: [address] [place] [address]  Phone: [phone]   ADMISSION INFORMATION:  Place of Service: Inpatient [facility]  Place of Service Code: 21  Inpatient Admission Date/Time: 25  7:38 AM  Discharge Date/Time: 2025 12:38 PM  Admitting Diagnosis Code/Description:  Gestational diabetes mellitus (GDM) affecting pregnancy, antepartum [O24.419]  Decreased fetal movement [O36.8190]  35 weeks gestation of pregnancy [Z3A.35]  Encounter for  delivery without indication [O82]     Mother: Lupe Hauser 1990 Estimated Date of Delivery: 25  Delivering clinician: Francesca Deng   OB History          4    Para   2    Term   0       2    AB   2    Living   2         SAB   2    IAB   0    Ectopic   0    Multiple   0    Live Births   2                Name & MRN:   Information for the patient's :  Emma Hauser [[id number]]    Delivery Information:  Sex: female  Delivered 2025 8:43 AM by , Low Transverse; Gestational Age: 36w0d     Measurements:  Weight: 7 lb 6.9 oz (3370 g);  Height: 19.5\"    APGAR 1 minute 5 minutes 10 minutes   Totals: 8 9       UTILIZATION REVIEW CONTACT:  Mis Colon, Utilization   Network Utilization Review Department  Phone: [phone]  Fax [phone]  Email: [email or web address]  Contact for approvals/pending authorizations, clinical reviews, and discharge.     PHYSICIAN ADVISORY SERVICES:  Medical Necessity Denial & Sgkw-zq-Hbft Review  Phone: [phone]  Fax: [phone]  Email: [email or web address]   "   DISCHARGE SUPPORT TEAM:  For Patients Discharge Needs & Updates  Phone: [phone] opt. 2 Fax: [phone]  Email: [email or web address]        NOTIFICATION OF ADMISSION DISCHARGE   This is a Notification of Discharge from [facility]. Please be advised that this patient has been discharge from our facility. Below you will find the admission and discharge date and time including the patient’s disposition.   UTILIZATION REVIEW CONTACT:  Mis Colon  Utilization   Network Utilization Review Department  Phone: [phone] x carefully listen to the prompts. All voicemails are confidential.  Email: [email or web address]     ADMISSION INFORMATION  PRESENTATION DATE: 1/21/2025  7:36 AM  OBERVATION ADMISSION DATE: 01/21/2025 1100  INPATIENT ADMISSION DATE: 1/22/25  7:38 AM   DISCHARGE DATE: 1/25/2025 12:38 PM   DISPOSITION:Home/Self Care    Network Utilization Review Department  ATTENTION: Please call with any questions or concerns to [phone] and carefully listen to the prompts so that you are directed to the right person. All voicemails are confidential.   For Discharge needs, contact Care Management DC Support Team at [phone] opt. 2  Send all requests for admission clinical reviews, approved or denied determinations and any other requests to dedicated fax number below belonging to the campus where the patient is receiving treatment. List of dedicated fax numbers for the Facilities:  FACILITY NAME UR FAX NUMBER   ADMISSION DENIALS (Administrative/Medical Necessity) [phone]   DISCHARGE SUPPORT TEAM ([facility]) [phone]   PARENT CHILD HEALTH (Maternity/NICU/Pediatrics) [phone]   [facility] [phone]   [facility] [phone]   [facility] [phone]   [facility] [phone]   [facility] [phone]    [facility] [phone]   [facility] [phone]   [facility] [phone]   [facility] [phone]   [facility] [phone]   [facility] [phone]   [facility] [phone]

## 2025-01-28 LAB
DME PARACHUTE DELIVERY DATE REQUESTED: NORMAL
DME PARACHUTE ITEM DESCRIPTION: NORMAL
DME PARACHUTE ORDER STATUS: NORMAL
DME PARACHUTE SUPPLIER NAME: NORMAL
DME PARACHUTE SUPPLIER PHONE: NORMAL
PLACENTA IN STORAGE: NORMAL

## 2025-01-29 ENCOUNTER — POSTPARTUM VISIT (OUTPATIENT)
Dept: OBGYN CLINIC | Facility: CLINIC | Age: 35
End: 2025-01-29

## 2025-01-29 VITALS
SYSTOLIC BLOOD PRESSURE: 128 MMHG | WEIGHT: 218 LBS | DIASTOLIC BLOOD PRESSURE: 76 MMHG | BODY MASS INDEX: 36.32 KG/M2 | HEIGHT: 65 IN

## 2025-01-29 DIAGNOSIS — Z48.89 POSTOPERATIVE VISIT: Primary | ICD-10-CM

## 2025-01-29 PROCEDURE — 99024 POSTOP FOLLOW-UP VISIT: CPT | Performed by: OBSTETRICS & GYNECOLOGY

## 2025-01-29 NOTE — PROGRESS NOTES
Assessment   Post partum visit: incision check   S/p  delivery.      Doing well postoperatively.      Plan    1. Continue current medications.  2. Wound care discussed.  3. Increase activity as tolerated.   4. Anticipated return to work: 8 - 12 weeks post partum.  5. Follow up at 4-6 weeks post delivery for postpartum checkup.  6. Breastfeeding support through [facility] lactation as needed.         Reymundo Hauser is a 34 y.o. y.o. female who presents for an incision check.       Delivery Method: , Low Transverse   Delivery Date and Time: 2025 8:43 AM  Delivery Attending: Francesca Deng  Gestational Age at delivery: 36w0d   Route of Delivery: , Low Transverse    She is eating a regular diet with difficulty.   Bowel movements are normal.   Pain is controlled with current analgesics. Medications being used: ibuprofen (OTC).   Bleeding: spotting    Breast feeding: yes. Patient is Breastfeeding.     The following portions of the patient's history were reviewed and updated as appropriate: allergies, current medications, past family history, past medical history, past social history, past surgical history, and problem list.    Allergies  Amoxicillin, Cefaclor, and Cephalosporins    Medications    Current Outpatient Medications:     glucose blood (Contour Next Test) test strip, Test up to 6 times per day. T1DM and pregnancy., Disp: 150 each, Rfl: 6    Insulin Aspart (NovoLOG) 100 units/mL injection, Use up to 300 units a day via insulin pump. T1DM and pregnancy., Disp: 90 mL, Rfl: 0    insulin aspart (NovoLOG) 100 units/mL injection, 100 Units by Subcutaneous Insulin Pump route once as needed (per patient requirements) for up to 1 dose, Disp: 10 mL, Rfl: 0    NIFEdipine (ADALAT CC) 30 MG 24 hr tablet, Take 1 tablet (30 mg total) by mouth daily, Disp: 30 tablet, Rfl: 0    PATIENT MAINTAINED INSULIN PUMP, Inject 1 each under the skin every 8 (eight) hours, Disp: 90 each, Rfl:  0    Prenatal MV-Min-Fe Fum-FA-DHA (PRENATAL 1 PO), Take by mouth, Disp: , Rfl:     acetone, urine, test strip, Use 1 strip if needed for high blood sugar (Patient not taking: Reported on 1/29/2025), Disp: , Rfl:     benzocaine-menthol-lanolin-aloe (DERMOPLAST) 20-0.5 % topical spray, Apply 1 Application topically every 6 (six) hours as needed for mild pain (Patient not taking: Reported on 1/29/2025), Disp: , Rfl:     Cholecalciferol (Vitamin D3) 50 MCG (2000 UT) capsule, Take 2,000 Units by mouth daily (Patient not taking: Reported on 1/29/2025), Disp: , Rfl:     docusate sodium (COLACE) 100 mg capsule, Take 1 capsule (100 mg total) by mouth 2 (two) times a day (Patient not taking: Reported on 1/29/2025), Disp: 60 capsule, Rfl: 0    folic acid (FOLVITE) 1 mg tablet, Take 1 tablet (1 mg total) by mouth daily (Patient not taking: Reported on 1/29/2025), Disp: 90 tablet, Rfl: 3    furosemide (LASIX) 20 mg tablet, Take 1 tablet (20 mg total) by mouth daily for 3 doses, Disp: 3 tablet, Rfl: 0    hydrocortisone 1 % cream, Apply 1 Application topically daily as needed for irritation (Patient not taking: Reported on 1/29/2025), Disp: , Rfl:     ibuprofen (MOTRIN) 600 mg tablet, Take 1 tablet (600 mg total) by mouth every 6 (six) hours (Patient not taking: Reported on 1/29/2025), Disp: 30 tablet, Rfl: 0    oxyCODONE (Roxicodone) 5 immediate release tablet, Take 1 tablet (5 mg total) by mouth every 4 (four) hours as needed for moderate pain for up to 10 days Max Daily Amount: 30 mg (Patient not taking: Reported on 1/29/2025), Disp: 7 tablet, Rfl: 0    witch hazel-glycerin (TUCKS) topical pad, Apply 1 Pad topically every 4 (four) hours as needed for irritation (Patient not taking: Reported on 1/29/2025), Disp: , Rfl:       Review of Systems  Denies Fevers/chills/N/V/constipation/ excessive vaginal bleeding/excessive pain/dysuria/frequency of urination. Also as noted in HPI.      Objective     /76 (BP Location: Right  "arm, Patient Position: Sitting, Cuff Size: Standard)   Ht 5' 5\" (1.651 m)   Wt 98.9 kg (218 lb)   LMP 05/15/2024   Breastfeeding Yes   BMI 36.28 kg/m²     General: alert and oriented, in no acute distress  Abdomen: soft, non-tender  Incision: healing well, no drainage, no erythema, no hernia, no seroma, no swelling, no dehiscence, incision well approximated       "

## 2025-03-10 ENCOUNTER — POSTPARTUM VISIT (OUTPATIENT)
Dept: OBGYN CLINIC | Facility: CLINIC | Age: 35
End: 2025-03-10

## 2025-03-10 VITALS
HEIGHT: 65 IN | WEIGHT: 196.2 LBS | SYSTOLIC BLOOD PRESSURE: 102 MMHG | BODY MASS INDEX: 32.69 KG/M2 | DIASTOLIC BLOOD PRESSURE: 80 MMHG

## 2025-03-10 PROBLEM — R82.71 GBS BACTERIURIA: Status: RESOLVED | Noted: 2025-01-20 | Resolved: 2025-03-10

## 2025-03-10 PROBLEM — O35.EXX0 PYELECTASIS OF FETUS ON PRENATAL ULTRASOUND: Status: RESOLVED | Noted: 2025-01-15 | Resolved: 2025-03-10

## 2025-03-10 PROBLEM — O13.3 GESTATIONAL HYPERTENSION, THIRD TRIMESTER: Status: RESOLVED | Noted: 2025-01-22 | Resolved: 2025-03-10

## 2025-03-10 PROBLEM — O09.893 HISTORY OF PRETERM DELIVERY, CURRENTLY PREGNANT IN THIRD TRIMESTER: Status: RESOLVED | Noted: 2024-08-21 | Resolved: 2025-03-10

## 2025-03-10 PROBLEM — O34.219 PREGNANCY WITH HISTORY OF CESAREAN SECTION, ANTEPARTUM: Status: RESOLVED | Noted: 2024-07-21 | Resolved: 2025-03-10

## 2025-03-10 PROBLEM — O14.00 MILD PREECLAMPSIA: Status: RESOLVED | Noted: 2025-01-21 | Resolved: 2025-03-10

## 2025-03-10 PROBLEM — O14.90 PREECLAMPSIA: Status: RESOLVED | Noted: 2025-01-21 | Resolved: 2025-03-10

## 2025-03-10 PROBLEM — Z87.59 HISTORY OF PRETERM PREMATURE RUPTURE OF MEMBRANES (PPROM): Status: RESOLVED | Noted: 2024-07-22 | Resolved: 2025-03-10

## 2025-03-10 PROBLEM — Z98.891 S/P CESAREAN SECTION: Status: RESOLVED | Noted: 2025-01-22 | Resolved: 2025-03-10

## 2025-03-10 PROBLEM — O24.013 PRE-EXISTING TYPE 1 DIABETES MELLITUS WITH HYPERGLYCEMIA DURING PREGNANCY IN THIRD TRIMESTER (HCC): Status: RESOLVED | Noted: 2021-11-09 | Resolved: 2025-03-10

## 2025-03-10 PROBLEM — O36.63X0 FETAL MACROSOMIA DURING PREGNANCY IN THIRD TRIMESTER: Status: RESOLVED | Noted: 2024-12-23 | Resolved: 2025-03-10

## 2025-03-10 PROBLEM — E10.65 PRE-EXISTING TYPE 1 DIABETES MELLITUS WITH HYPERGLYCEMIA DURING PREGNANCY IN THIRD TRIMESTER (HCC): Status: RESOLVED | Noted: 2021-11-09 | Resolved: 2025-03-10

## 2025-03-10 PROCEDURE — 99024 POSTOP FOLLOW-UP VISIT: CPT | Performed by: OBSTETRICS & GYNECOLOGY

## 2025-03-10 NOTE — PROGRESS NOTES
Assessment & Plan     Normal postpartum exam.     1. Contraception: condoms.  2. Annual exam due in September.   3. Increase activity as tolerated, may resume all normal activity.  4. Lactation consult needed: no; if yes, [facility] information discussed.         Subjective   Chief Complaint   Patient presents with    Postpartum Care     Patient presents for her postpartum visit.   25 36w0d  3370 g (7 lb 6.9 oz) F CS-LTranv Spinal  Living 8 9   Name: Emma Hauser  PPDS - 0             Lupe Hauser is a 34 y.o.  female who presents for a postpartum visit.     Delivery Method: , Low Transverse   Delivery Date and Time: 2025 8:43 AM  Delivery Attending: Francesca Deng  Gestational Age at delivery: 36w0d   Route of Delivery: , Low Transverse  Reason for  delivery: Prior  1, poorly controlled type 1 DM, pre-eclampsia    Admitting Diagnoses:   Patient Active Problem List   Diagnosis    Family history of congenital disease    BMI 37.0-37.9, adult    Insulin pump in place    Carpal tunnel syndrome on both sides    Type 1 diabetes mellitus (HCC)    Insulin pump titration    Elevated BP without diagnosis of hypertension       Gestational Diabetes: pre-existing diabetes.   Pregnancy Complications: none    Anesthesia: Spinal,     Delivery: , Low Transverse at 2025 8:43 AM      Baby's Name: Emma Hauser   Baby's Weight: 3370 g (7 lb 6.9 oz); 118.87    Apgar scores: 8  and 9  at 1 and 5 minutes, respectively  Breast or formula: Breastfeeding  Pediatrician: Mark Alan    Bleeding none. Bowel function: normal. Bladder function: normal. The patient is not having any pain.  Constipation. Discussed stool softener     Patient has not been sexually active. Desired contraception method is condoms.    Postpartum depression screening: negative. EPDS : 0. She denies depression/anxiety/trouble sleeping.     Last Pap :  ; no  "abnormalities      The following portions of the patient's history were reviewed and updated as appropriate: allergies, current medications, past family history, past medical history, past social history, past surgical history, and problem list.      Current Outpatient Medications:     glucose blood (Contour Next Test) test strip, Test up to 6 times per day. T1DM and pregnancy., Disp: 150 each, Rfl: 6    Insulin Aspart (NovoLOG) 100 units/mL injection, Use up to 300 units a day via insulin pump. T1DM and pregnancy., Disp: 90 mL, Rfl: 0    insulin aspart (NovoLOG) 100 units/mL injection, 100 Units by Subcutaneous Insulin Pump route once as needed (per patient requirements) for up to 1 dose, Disp: 10 mL, Rfl: 0    PATIENT MAINTAINED INSULIN PUMP, Inject 1 each under the skin every 8 (eight) hours, Disp: 90 each, Rfl: 0    Prenatal MV-Min-Fe Fum-FA-DHA (PRENATAL 1 PO), Take by mouth, Disp: , Rfl:     furosemide (LASIX) 20 mg tablet, Take 1 tablet (20 mg total) by mouth daily for 3 doses, Disp: 3 tablet, Rfl: 0    Allergies   Allergen Reactions    Amoxicillin Hives    Cefaclor Hives    Cephalosporins Hives       Review of Systems  Review of Systems   Constitutional:  Negative for chills and fever.   Respiratory:  Negative for shortness of breath.    Cardiovascular:  Negative for chest pain and leg swelling.   Gastrointestinal:  Positive for constipation. Negative for abdominal distention, abdominal pain, nausea and vomiting.   Genitourinary:  Negative for dysuria, frequency, menstrual problem, urgency, vaginal bleeding and vaginal discharge.   Neurological:  Negative for headaches.         Objective      /80 (BP Location: Left arm, Patient Position: Sitting, Cuff Size: Standard)   Ht 5' 5\" (1.651 m)   Wt 89 kg (196 lb 3.2 oz)   LMP 05/15/2024   Breastfeeding Yes   BMI 32.65 kg/m²     Physical Exam  Constitutional:       General: She is not in acute distress.     Appearance: Normal appearance. She is " well-developed. She is not ill-appearing.   Pulmonary:      Effort: Pulmonary effort is normal. No respiratory distress.   Neurological:      General: No focal deficit present.      Mental Status: She is alert and oriented to person, place, and time.   Psychiatric:         Mood and Affect: Mood normal.         Behavior: Behavior normal.         Thought Content: Thought content normal.         Judgment: Judgment normal.   Vitals and nursing note reviewed.         Incision: clean, dry, and intact

## (undated) DEVICE — GLOVE INDICATOR PI UNDERGLOVE SZ 6.5 BLUE

## (undated) DEVICE — SUT PDS II 0 CTB-1 27 IN ZB340

## (undated) DEVICE — EXOFIN PRECISION PEN HIGH VISCOSITY TOPICAL SKIN ADHESIVE: Brand: EXOFIN PRECISION PEN, 1G

## (undated) DEVICE — GLOVE PI ULTRA TOUCH SZ 6

## (undated) DEVICE — ADHESIVE SKIN HIGH VISCOSITY EXOFIN 1ML

## (undated) DEVICE — SUT VICRYL 0 CTX 36 IN J978H

## (undated) DEVICE — Device

## (undated) DEVICE — PACK C-SECTION PBDS

## (undated) DEVICE — SUT PLAIN 2-0 CTX 27 IN 872H

## (undated) DEVICE — SUT PDS II 0 TP-1 60 IN Z991G

## (undated) DEVICE — SURGICEL NU-KNIT 3 X 4

## (undated) DEVICE — SUT STRATAFIX SPIRAL PDS 2-0 CT-1 45 CM SXPP1B411

## (undated) DEVICE — 6617 IOBAN II PATIENT ISOLATION DRAPE 5/BX,4BX/CS: Brand: STERI-DRAPE™ IOBAN™ 2

## (undated) DEVICE — SUT VICRYL 0 CT-1 36 IN J946H

## (undated) DEVICE — GLOVE PI ULTRA TOUCH SZ.7.5

## (undated) DEVICE — SUT STRATAFIX SPIRAL 4-0 PGA/PCL 30 X 30 CM SXMD2B409

## (undated) DEVICE — SUT STRATAFIX SPIRAL PDS PLUS 1 CTX 18 IN SXPP1A400

## (undated) DEVICE — SKIN MARKER DUAL TIP WITH RULER CAP, FLEXIBLE RULER AND LABELS: Brand: DEVON

## (undated) DEVICE — CHLORAPREP HI-LITE 26ML ORANGE